# Patient Record
Sex: MALE | Race: WHITE | NOT HISPANIC OR LATINO | Employment: OTHER | URBAN - METROPOLITAN AREA
[De-identification: names, ages, dates, MRNs, and addresses within clinical notes are randomized per-mention and may not be internally consistent; named-entity substitution may affect disease eponyms.]

---

## 2017-03-29 ENCOUNTER — OFFICE VISIT (OUTPATIENT)
Dept: PAIN MEDICINE | Facility: CLINIC | Age: 67
End: 2017-03-29
Payer: MEDICARE

## 2017-03-29 VITALS
HEART RATE: 70 BPM | DIASTOLIC BLOOD PRESSURE: 75 MMHG | BODY MASS INDEX: 23.52 KG/M2 | SYSTOLIC BLOOD PRESSURE: 133 MMHG | WEIGHT: 189.13 LBS | HEIGHT: 75 IN

## 2017-03-29 DIAGNOSIS — M54.16 LUMBAR RADICULITIS: ICD-10-CM

## 2017-03-29 DIAGNOSIS — M51.36 DDD (DEGENERATIVE DISC DISEASE), LUMBAR: Primary | ICD-10-CM

## 2017-03-29 DIAGNOSIS — M50.30 DDD (DEGENERATIVE DISC DISEASE), CERVICAL: ICD-10-CM

## 2017-03-29 PROCEDURE — 1160F RVW MEDS BY RX/DR IN RCRD: CPT | Mod: S$GLB,,, | Performed by: ANESTHESIOLOGY

## 2017-03-29 PROCEDURE — 99999 PR PBB SHADOW E&M-EST. PATIENT-LVL III: CPT | Mod: PBBFAC,,, | Performed by: ANESTHESIOLOGY

## 2017-03-29 PROCEDURE — 99204 OFFICE O/P NEW MOD 45 MIN: CPT | Mod: S$GLB,,, | Performed by: ANESTHESIOLOGY

## 2017-03-29 RX ORDER — HYDROCODONE BITARTRATE AND ACETAMINOPHEN 10; 325 MG/1; MG/1
1 TABLET ORAL EVERY 12 HOURS PRN
Qty: 60 TABLET | Refills: 0 | Status: SHIPPED | OUTPATIENT
Start: 2017-03-29 | End: 2017-05-08 | Stop reason: SDUPTHER

## 2017-03-29 RX ORDER — PANTOPRAZOLE SODIUM 40 MG/1
40 TABLET, DELAYED RELEASE ORAL NIGHTLY
Refills: 3 | COMMUNITY
Start: 2017-03-02 | End: 2021-06-11

## 2017-03-29 RX ORDER — UMECLIDINIUM 62.5 UG/1
1 AEROSOL, POWDER ORAL DAILY
Refills: 2 | COMMUNITY
Start: 2017-02-22 | End: 2018-01-16

## 2017-03-29 NOTE — PROGRESS NOTES
This note was completed with dictation software and grammatical errors may exist.    Referring Physician: Demetri Andres MD    PCP: Hermelindo Sanchez MD      CC: Neck and lower back pain    HPI:   Patient is 66-year-old male referred to us for neck and lower back pain.  Lower back pain is more bothersome currently.  Pain has been present for over 30 years.  He has intermittent aching, burning, sharp pain in his lower back.  Pain radiates down his left lower leg into his left calf.  He does have numbness in his left toes.  He also has posterior neck pain and radiates to his bilateral shoulders.  He denies any radiating arm pain.  Pain worsens with lifting, getting up, standing and walking.  Pain improves with rest.  He has tried physical therapy with minimal benefit.  He is underwent lumbar NATALIE's as well as cervical NATALIE's with Dr. Andres in the past with moderate benefit.  He has not any interventions recently.  He does desire a lumbar NATALIE in the near future with a cervical NATALIE to follow.  He denies any worsening weakness.  No bowel bladder changes.  He takes Norco very sparingly with mild to moderate benefit.  He rates his pain 4/10 today, 10/10 at worse.    ROS:  CONSTITUTIONAL: No fevers, chills, night sweats, wt. loss, appetite changes  SKIN: no rashes or itching  ENT: No headaches, head trauma, vision changes, or eye pain  LYMPH NODES: None noticed   CV: No chest pain, palpitations.   RESP: No shortness of breath, dyspnea on exertion, cough, wheezing, or hemoptysis  GI: No nausea, emesis, diarrhea, constipation, melena, hematochezia, pain.    : No dysuria, hematuria, urgency, or frequency   HEME: No easy bruising, bleeding problems  PSYCHIATRIC: No depression, anxiety, psychosis, hallucinations.  NEURO: No seizures, memory loss, dizziness or difficulty sleeping  MSK: +HPI      Past Medical History:   Diagnosis Date    Arthritis     Cancer SKIN    Cardiac angina     COPD (chronic obstructive pulmonary  "disease)     Coronary artery disease ANGINA. HAD  [facility] 8/12. 40 % BLOCKAGE. DR RUBY IS CARDIOLOGIST.    Depression     GERD (gastroesophageal reflux disease)     Trigger thumb     BILAT    Wears dentures     UPPER    Wears glasses      Past Surgical History:   Procedure Laterality Date    APPENDECTOMY      BACK SURGERY      KNEE SURGERY      BILAT    TRIGGER THUMB      RIGHT     History reviewed. No pertinent family history.  Social History     Social History    Marital status:      Spouse name: N/A    Number of children: N/A    Years of education: N/A     Social History Main Topics    Smoking status: Current Some Day Smoker     Years: 40.00    Smokeless tobacco: None      Comment: 1-2 CIGT SOME DAYS    Alcohol use No      Comment: ALCOHILIC - NONE X 2 YEARS    Drug use: No    Sexual activity: Not Asked     Other Topics Concern    None     Social History Narrative         Medications/Allergies: See med card    Vitals:    03/29/17 1053   BP: 133/75   Pulse: 70   Weight: 85.8 kg (189 lb 2.5 oz)   Height: 6' 3" (1.905 m)         Physical exam:    GENERAL: A and O x3, the patient appears well groomed and is in no acute distress.  Skin: No rashes or obvious lesions  HEENT: normocephalic, atraumatic  CARDIOVASCULAR:  Palpable peripheral pulses  LUNGS: easy work of breathing  ABDOMEN: soft, nontender   UPPER EXTREMITIES: Normal alignment, normal range of motion, no atrophy, no skin changes,  hair growth and nail growth normal and equal bilaterally. No swelling, no tenderness.    LOWER EXTREMITIES:  Normal alignment, normal range of motion, no atrophy, no skin changes,  hair growth and nail growth normal and equal bilaterally. No swelling, no tenderness.  CERVICAL SPINE:  Cervical spine: ROM is full in flexion, extension and lateral rotation with moderate increased pain.  Spurling's maneuver causes no neck pain to either side.  Myofascial exam: No Tenderness to palpation across cervical " paraspinous region bilaterally.    LUMBAR SPINE  Lumbar spine: ROM is limited with flexion extension and oblique extension with moderate increased pain.    Ej's test causes no increased pain on either side.    Supine straight leg raise is positive on the left at 60°  Internal and external rotation of the hip causes no increased pain on either side.  Myofascial exam: No tenderness to palpation across lumbar paraspinous muscles.      MENTAL STATUS: normal orientation, speech, language, and fund of knowledge for social situation.  Emotional state appropriate.    CRANIAL NERVES:  II:  PERRL bilaterally,   III,IV,VI: EOMI.    V:  Facial sensation equal bilaterally  VII:  Facial motor function normal.  VIII:  Hearing equal to finger rub bilaterally  IX/X: Gag normal, palate symmetric  XI:  Shoulder shrug equal, head turn equal  XII:  Tongue midline without fasciculations      MOTOR: Tone and bulk: normal bilateral upper and lower Strength: normal   Delt Bi Tri WE WF     R 5 5 5 5 5 5   L 5 5 5 5 5 5     IP ADD ABD Quad TA Gas HAM  R 5 5 5 5 5 5 5  L 5 5 5 5 5 5 5    SENSATION: Light touch and pinprick intact bilaterally  REFLEXES: normal, symmetric, nonbrisk.  Toes down, no clonus. No hoffmans.  GAIT: normal rise, base, steps, and arm swing.        Imaging:  MRI lumbar spine 10/2012 ([facility])  L2-3, mild disc desiccation.  Mild bilateral facet hypertrophy.  Small right paracentral disc protrusion.  Minimal narrowing of bilateral neural foraminal  L3-4, grade 1 retrolisthesis, disc desiccation, mild disc height loss.  Mild to moderate right lateral recess narrowing seen.  Mild to moderate bilateral facet arthrosis.  L4-5, disc desiccation.  Mild to moderate bilateral facet hypertrophy.  No significant spinal canal stenosis.  Mild bilateral neuroforaminal narrowing.  L5-S1, disc desiccation.  Severe disc height loss.  Moderate bilateral facet arthrosis.  Severe bilateral neural foraminal stenosis.    MRI  cervical spine 10/2012  C2-3: Mild posterior disc osteophyte complex.  At C3-4, right greater than left vertebral joint hypertrophy.  Mild to severe bilateral neuroforaminal narrowing, right greater than left.  C4-5, broad-based disc osteophyte complex.  Bilateral uncovertebral joint hypertrophy and moderate bilateral facet arthrosis.  Moderate to severe bilateral neuroforaminal narrowing.  C5-6, moderate posterior disc osteophyte complex.  Mild central canal stenosis.  Severe right and moderate to severe left neuroforaminal narrowing.  C6-7, moderate disc osteophyte complex.  Mild central canal stenosis.  Moderate to severe bilateral neuroforaminal narrowing        Assessment: Patient referred for neck and lower back pain  1. DDD (degenerative disc disease), lumbar    2. Lumbar radiculitis    3. DDD (degenerative disc disease), cervical          Plan:  - I have stressed the importance of physical activity and exercise to improve overall health  - I think that the patient's back pain and radicular leg symptoms are due to degenerative disc disease and have recommended a lumbar epidural steroid injection to the L5-S1 level.   - Will consider repeat cervical NATALIE for neck pain is neck pain worsens  - He did request pain medications.  He takes it very sparingly.  Script for Norco 10mg provided  - Follow up after procedure        Thank you for referring this interesting patient, and I look forward to continuing to collaborate in his care.

## 2017-04-04 DIAGNOSIS — M47.816 LUMBAR FACET ARTHROPATHY: Primary | ICD-10-CM

## 2017-04-11 ENCOUNTER — SURGERY (OUTPATIENT)
Age: 67
End: 2017-04-11

## 2017-04-11 ENCOUNTER — HOSPITAL ENCOUNTER (OUTPATIENT)
Facility: AMBULARY SURGERY CENTER | Age: 67
Discharge: HOME | End: 2017-04-11
Attending: ANESTHESIOLOGY | Admitting: ANESTHESIOLOGY
Payer: MEDICARE

## 2017-04-11 DIAGNOSIS — M51.36 DDD (DEGENERATIVE DISC DISEASE), LUMBAR: Primary | ICD-10-CM

## 2017-04-11 PROBLEM — M51.369 DDD (DEGENERATIVE DISC DISEASE), LUMBAR: Status: ACTIVE | Noted: 2017-04-11

## 2017-04-11 PROCEDURE — 62323 NJX INTERLAMINAR LMBR/SAC: CPT | Mod: ,,, | Performed by: ANESTHESIOLOGY

## 2017-04-11 PROCEDURE — 62323 NJX INTERLAMINAR LMBR/SAC: CPT | Performed by: ANESTHESIOLOGY

## 2017-04-11 PROCEDURE — 99152 MOD SED SAME PHYS/QHP 5/>YRS: CPT | Mod: ,,, | Performed by: ANESTHESIOLOGY

## 2017-04-11 RX ORDER — SODIUM CHLORIDE 9 MG/ML
INJECTION, SOLUTION INTRAMUSCULAR; INTRAVENOUS; SUBCUTANEOUS
Status: DISCONTINUED | OUTPATIENT
Start: 2017-04-11 | End: 2017-04-11 | Stop reason: HOSPADM

## 2017-04-11 RX ORDER — MIDAZOLAM HYDROCHLORIDE 2 MG/2ML
INJECTION, SOLUTION INTRAMUSCULAR; INTRAVENOUS
Status: DISCONTINUED | OUTPATIENT
Start: 2017-04-11 | End: 2017-04-11 | Stop reason: HOSPADM

## 2017-04-11 RX ORDER — ALPRAZOLAM 1 MG/1
1 TABLET, ORALLY DISINTEGRATING ORAL
Status: DISCONTINUED | OUTPATIENT
Start: 2017-04-11 | End: 2017-04-11 | Stop reason: HOSPADM

## 2017-04-11 RX ORDER — DEXAMETHASONE SODIUM PHOSPHATE 10 MG/ML
INJECTION INTRAMUSCULAR; INTRAVENOUS
Status: DISCONTINUED | OUTPATIENT
Start: 2017-04-11 | End: 2017-04-11 | Stop reason: HOSPADM

## 2017-04-11 RX ORDER — DEXAMETHASONE SODIUM PHOSPHATE 10 MG/ML
INJECTION INTRAMUSCULAR; INTRAVENOUS
Status: DISCONTINUED
Start: 2017-04-11 | End: 2017-04-11 | Stop reason: HOSPADM

## 2017-04-11 RX ORDER — FENTANYL CITRATE 50 UG/ML
INJECTION, SOLUTION INTRAMUSCULAR; INTRAVENOUS
Status: DISCONTINUED | OUTPATIENT
Start: 2017-04-11 | End: 2017-04-11 | Stop reason: HOSPADM

## 2017-04-11 RX ORDER — FENTANYL CITRATE 50 UG/ML
INJECTION, SOLUTION INTRAMUSCULAR; INTRAVENOUS
Status: DISCONTINUED
Start: 2017-04-11 | End: 2017-04-11 | Stop reason: HOSPADM

## 2017-04-11 RX ORDER — SODIUM CHLORIDE, SODIUM LACTATE, POTASSIUM CHLORIDE, CALCIUM CHLORIDE 600; 310; 30; 20 MG/100ML; MG/100ML; MG/100ML; MG/100ML
INJECTION, SOLUTION INTRAVENOUS ONCE AS NEEDED
Status: COMPLETED | OUTPATIENT
Start: 2017-04-11 | End: 2017-04-11

## 2017-04-11 RX ORDER — LIDOCAINE HYDROCHLORIDE 10 MG/ML
INJECTION INFILTRATION; PERINEURAL
Status: DISCONTINUED | OUTPATIENT
Start: 2017-04-11 | End: 2017-04-11 | Stop reason: HOSPADM

## 2017-04-11 RX ORDER — MIDAZOLAM HYDROCHLORIDE 1 MG/ML
INJECTION INTRAMUSCULAR; INTRAVENOUS
Status: DISCONTINUED
Start: 2017-04-11 | End: 2017-04-11 | Stop reason: HOSPADM

## 2017-04-11 RX ADMIN — LIDOCAINE HYDROCHLORIDE 10 ML: 10 INJECTION INFILTRATION; PERINEURAL at 01:04

## 2017-04-11 RX ADMIN — MIDAZOLAM HYDROCHLORIDE 2 MG: 2 INJECTION, SOLUTION INTRAMUSCULAR; INTRAVENOUS at 01:04

## 2017-04-11 RX ADMIN — FENTANYL CITRATE 75 MCG: 50 INJECTION, SOLUTION INTRAMUSCULAR; INTRAVENOUS at 01:04

## 2017-04-11 RX ADMIN — SODIUM CHLORIDE, SODIUM LACTATE, POTASSIUM CHLORIDE, CALCIUM CHLORIDE: 600; 310; 30; 20 INJECTION, SOLUTION INTRAVENOUS at 12:04

## 2017-04-11 RX ADMIN — SODIUM CHLORIDE 4 ML: 9 INJECTION, SOLUTION INTRAMUSCULAR; INTRAVENOUS; SUBCUTANEOUS at 01:04

## 2017-04-11 RX ADMIN — DEXAMETHASONE SODIUM PHOSPHATE 10 MG: 10 INJECTION INTRAMUSCULAR; INTRAVENOUS at 01:04

## 2017-04-11 RX ADMIN — FENTANYL CITRATE 25 MCG: 50 INJECTION, SOLUTION INTRAMUSCULAR; INTRAVENOUS at 01:04

## 2017-04-11 NOTE — DISCHARGE SUMMARY
[facility]  Discharge Note  Short Stay    Admit Date: 4/11/2017    Discharge Date and Time: 4/11/2017    Attending Physician: Steffen Ortiz MD     Discharge Provider: Steffen Ortiz    Diagnoses:  Active Hospital Problems    Diagnosis  POA    *DDD (degenerative disc disease), lumbar [M51.36]  Yes      Resolved Hospital Problems    Diagnosis Date Resolved POA   No resolved problems to display.       Hospital Course: lumbar ANTALIE  Discharged Condition: Good    Final Diagnoses:   Active Hospital Problems    Diagnosis  POA    *DDD (degenerative disc disease), lumbar [M51.36]  Yes      Resolved Hospital Problems    Diagnosis Date Resolved POA   No resolved problems to display.       Disposition: Home or Self Care    Follow up/Patient Instructions:    Medications:  Reconciled Home Medications:   Current Discharge Medication List      CONTINUE these medications which have NOT CHANGED    Details   nitroGLYCERIN (NITROSTAT) 0.4 MG SL tablet Place 0.4 mg under the tongue every 5 (five) minutes as needed.        aspirin (ECOTRIN) 81 MG EC tablet Take 81 mg by mouth once daily.        atorvastatin (LIPITOR) 40 MG tablet Take 40 mg by mouth nightly.        hydrocodone-acetaminophen 10-325mg (NORCO)  mg Tab Take 1 tablet by mouth every 12 (twelve) hours as needed (pain).  Qty: 60 tablet, Refills: 0    Associated Diagnoses: DDD (degenerative disc disease), lumbar      INCRUSE ELLIPTA 62.5 mcg/actuation DsDv Take 1 puff by mouth once daily.  Refills: 2      multivitamin capsule Take 1 capsule by mouth once daily.        omeprazole (PRILOSEC) 40 MG capsule Take 40 mg by mouth once daily.        pantoprazole (PROTONIX) 40 MG tablet Take 40 mg by mouth once daily.  Refills: 3      sertraline (ZOLOFT) 50 MG tablet Take 50 mg by mouth once daily.        trazodone (DESYREL) 150 MG tablet Take 150 mg by mouth every evening.        UNABLE TO FIND [employer] STEP ONE NICOTINE PATCH Q DAY          STOP taking these medications        celecoxib (CELEBREX) 50 MG capsule Comments:   Reason for Stopping:               Discharge Procedure Orders  Diet general     Activity as tolerated     Call MD for:  temperature >100.4     Call MD for:  persistent nausea and vomiting or diarrhea     Call MD for:  severe uncontrolled pain     Call MD for:  redness, tenderness, or signs of infection (pain, swelling, redness, odor or green/yellow discharge around incision site)     Call MD for:  difficulty breathing or increased cough     Call MD for:  severe persistent headache          Follow up with MD in 2-3 weeks    Discharge Procedure Orders (must include Diet, Follow-up, Activity):    Discharge Procedure Orders (must include Diet, Follow-up, Activity)  Diet general     Activity as tolerated     Call MD for:  temperature >100.4     Call MD for:  persistent nausea and vomiting or diarrhea     Call MD for:  severe uncontrolled pain     Call MD for:  redness, tenderness, or signs of infection (pain, swelling, redness, odor or green/yellow discharge around incision site)     Call MD for:  difficulty breathing or increased cough     Call MD for:  severe persistent headache

## 2017-04-11 NOTE — DISCHARGE INSTRUCTIONS
Activity as tolerated.  No heat at injection sites x 2 days  Use ice for mild swelling and for comfort.  May shower today. No baths for two days.      Resume Aspirin, Plavix, or Coumadin the day after the procedure unless otherwise instructed. Gradually increase activity; Do not drive for 24 hours; If diabetic,monitor your glucose carefully.    Seek immediate medical help for:   Severe increase in your usual pain or appearance of new pain.  Prolonged or increasing weakness or numbness in the legs or arms.  Fever above 101 ,Drainage,redness,active bleeding, or increased swelling at the injection site.  Headache, shortness of breath, chest pain, or breathing problems.

## 2017-04-11 NOTE — H&P (VIEW-ONLY)
This note was completed with dictation software and grammatical errors may exist.    Referring Physician: Demetri Andres MD    PCP: Hermelindo Sanchez MD      CC: Neck and lower back pain    HPI:   Patient is 66-year-old male referred to us for neck and lower back pain.  Lower back pain is more bothersome currently.  Pain has been present for over 30 years.  He has intermittent aching, burning, sharp pain in his lower back.  Pain radiates down his left lower leg into his left calf.  He does have numbness in his left toes.  He also has posterior neck pain and radiates to his bilateral shoulders.  He denies any radiating arm pain.  Pain worsens with lifting, getting up, standing and walking.  Pain improves with rest.  He has tried physical therapy with minimal benefit.  He is underwent lumbar NATALIE's as well as cervical NATALIE's with Dr. Andres in the past with moderate benefit.  He has not any interventions recently.  He does desire a lumbar NATALIE in the near future with a cervical NATALIE to follow.  He denies any worsening weakness.  No bowel bladder changes.  He takes Norco very sparingly with mild to moderate benefit.  He rates his pain 4/10 today, 10/10 at worse.    ROS:  CONSTITUTIONAL: No fevers, chills, night sweats, wt. loss, appetite changes  SKIN: no rashes or itching  ENT: No headaches, head trauma, vision changes, or eye pain  LYMPH NODES: None noticed   CV: No chest pain, palpitations.   RESP: No shortness of breath, dyspnea on exertion, cough, wheezing, or hemoptysis  GI: No nausea, emesis, diarrhea, constipation, melena, hematochezia, pain.    : No dysuria, hematuria, urgency, or frequency   HEME: No easy bruising, bleeding problems  PSYCHIATRIC: No depression, anxiety, psychosis, hallucinations.  NEURO: No seizures, memory loss, dizziness or difficulty sleeping  MSK: +HPI      Past Medical History:   Diagnosis Date    Arthritis     Cancer SKIN    Cardiac angina     COPD (chronic obstructive pulmonary  "disease)     Coronary artery disease ANGINA. HAD  [facility] 8/12. 40 % BLOCKAGE. DR RUBY IS CARDIOLOGIST.    Depression     GERD (gastroesophageal reflux disease)     Trigger thumb     BILAT    Wears dentures     UPPER    Wears glasses      Past Surgical History:   Procedure Laterality Date    APPENDECTOMY      BACK SURGERY      KNEE SURGERY      BILAT    TRIGGER THUMB      RIGHT     History reviewed. No pertinent family history.  Social History     Social History    Marital status:      Spouse name: N/A    Number of children: N/A    Years of education: N/A     Social History Main Topics    Smoking status: Current Some Day Smoker     Years: 40.00    Smokeless tobacco: None      Comment: 1-2 CIGT SOME DAYS    Alcohol use No      Comment: ALCOHILIC - NONE X 2 YEARS    Drug use: No    Sexual activity: Not Asked     Other Topics Concern    None     Social History Narrative         Medications/Allergies: See med card    Vitals:    03/29/17 1053   BP: 133/75   Pulse: 70   Weight: 85.8 kg (189 lb 2.5 oz)   Height: 6' 3" (1.905 m)         Physical exam:    GENERAL: A and O x3, the patient appears well groomed and is in no acute distress.  Skin: No rashes or obvious lesions  HEENT: normocephalic, atraumatic  CARDIOVASCULAR:  Palpable peripheral pulses  LUNGS: easy work of breathing  ABDOMEN: soft, nontender   UPPER EXTREMITIES: Normal alignment, normal range of motion, no atrophy, no skin changes,  hair growth and nail growth normal and equal bilaterally. No swelling, no tenderness.    LOWER EXTREMITIES:  Normal alignment, normal range of motion, no atrophy, no skin changes,  hair growth and nail growth normal and equal bilaterally. No swelling, no tenderness.  CERVICAL SPINE:  Cervical spine: ROM is full in flexion, extension and lateral rotation with moderate increased pain.  Spurling's maneuver causes no neck pain to either side.  Myofascial exam: No Tenderness to palpation across cervical " paraspinous region bilaterally.    LUMBAR SPINE  Lumbar spine: ROM is limited with flexion extension and oblique extension with moderate increased pain.    Ej's test causes no increased pain on either side.    Supine straight leg raise is positive on the left at 60°  Internal and external rotation of the hip causes no increased pain on either side.  Myofascial exam: No tenderness to palpation across lumbar paraspinous muscles.      MENTAL STATUS: normal orientation, speech, language, and fund of knowledge for social situation.  Emotional state appropriate.    CRANIAL NERVES:  II:  PERRL bilaterally,   III,IV,VI: EOMI.    V:  Facial sensation equal bilaterally  VII:  Facial motor function normal.  VIII:  Hearing equal to finger rub bilaterally  IX/X: Gag normal, palate symmetric  XI:  Shoulder shrug equal, head turn equal  XII:  Tongue midline without fasciculations      MOTOR: Tone and bulk: normal bilateral upper and lower Strength: normal   Delt Bi Tri WE WF     R 5 5 5 5 5 5   L 5 5 5 5 5 5     IP ADD ABD Quad TA Gas HAM  R 5 5 5 5 5 5 5  L 5 5 5 5 5 5 5    SENSATION: Light touch and pinprick intact bilaterally  REFLEXES: normal, symmetric, nonbrisk.  Toes down, no clonus. No hoffmans.  GAIT: normal rise, base, steps, and arm swing.        Imaging:  MRI lumbar spine 10/2012 ([facility])  L2-3, mild disc desiccation.  Mild bilateral facet hypertrophy.  Small right paracentral disc protrusion.  Minimal narrowing of bilateral neural foraminal  L3-4, grade 1 retrolisthesis, disc desiccation, mild disc height loss.  Mild to moderate right lateral recess narrowing seen.  Mild to moderate bilateral facet arthrosis.  L4-5, disc desiccation.  Mild to moderate bilateral facet hypertrophy.  No significant spinal canal stenosis.  Mild bilateral neuroforaminal narrowing.  L5-S1, disc desiccation.  Severe disc height loss.  Moderate bilateral facet arthrosis.  Severe bilateral neural foraminal stenosis.    MRI  cervical spine 10/2012  C2-3: Mild posterior disc osteophyte complex.  At C3-4, right greater than left vertebral joint hypertrophy.  Mild to severe bilateral neuroforaminal narrowing, right greater than left.  C4-5, broad-based disc osteophyte complex.  Bilateral uncovertebral joint hypertrophy and moderate bilateral facet arthrosis.  Moderate to severe bilateral neuroforaminal narrowing.  C5-6, moderate posterior disc osteophyte complex.  Mild central canal stenosis.  Severe right and moderate to severe left neuroforaminal narrowing.  C6-7, moderate disc osteophyte complex.  Mild central canal stenosis.  Moderate to severe bilateral neuroforaminal narrowing        Assessment: Patient referred for neck and lower back pain  1. DDD (degenerative disc disease), lumbar    2. Lumbar radiculitis    3. DDD (degenerative disc disease), cervical          Plan:  - I have stressed the importance of physical activity and exercise to improve overall health  - I think that the patient's back pain and radicular leg symptoms are due to degenerative disc disease and have recommended a lumbar epidural steroid injection to the L5-S1 level.   - Will consider repeat cervical NATALIE for neck pain is neck pain worsens  - He did request pain medications.  He takes it very sparingly.  Script for Norco 10mg provided  - Follow up after procedure        Thank you for referring this interesting patient, and I look forward to continuing to collaborate in his care.

## 2017-04-11 NOTE — IP AVS SNAPSHOT
[facility] [place] Location  [address] [facility] [address] [facility] [address]           Patient Discharge Instructions   Our goal is to set you up for success. This packet includes information on your condition, medications, and your home care.  It will help you care for yourself to prevent having to return to the hospital.     Please ask your nurse if you have any questions.      There are many details to remember when preparing to leave the hospital. Here is what you will need to do:    1. Take your medicine. If you are prescribed medications, review your Medication List on the following pages. You may have new medications to  at the pharmacy and others that you'll need to stop taking. Review the instructions for how and when to take your medications. Talk with your doctor or nurses if you are unsure of what to do.     2. Go to your follow-up appointments. Specific follow-up information is listed in the following pages. Your may be contacted by a nurse or clinical provider about future appointments. Be sure we have all of the phone numbers to reach you. Please contact your provider's office if you are unable to make an appointment.     3. Watch for warning signs. Your doctor or nurse will give you detailed warning signs to watch for and when to call for assistance. These instructions may also include educational information about your condition. If you experience any of warning signs to your health, call your doctor.           [facility]  Unless otherwise directed by your provider, please   contact [facility], our nurse care line   that is available for 24/7 assistance.     [phone] (toll-free)     Registered nurses in the [facility]   provide: appointment scheduling, clinical advisement, health education, and other advisory services.                  ** Verify the list of medication(s) below is accurate and up to date. Carry this with you in case of  emergency. If your medications have changed, please notify your healthcare provider.             Medication List      CONTINUE taking these medications        Additional Info                      aspirin 81 MG EC tablet   Commonly known as:  ECOTRIN   Refills:  0   Dose:  81 mg    Instructions:  Take 81 mg by mouth once daily.     Begin Date    AM    Noon    PM    Bedtime       atorvastatin 40 MG tablet   Commonly known as:  LIPITOR   Refills:  0   Dose:  40 mg   Indications:  hypercholesterolemia    Instructions:  Take 40 mg by mouth nightly.     Begin Date    AM    Noon    PM    Bedtime       hydrocodone-acetaminophen 10-325mg  mg Tab   Commonly known as:  NORCO   Quantity:  60 tablet   Refills:  0   Dose:  1 tablet    Instructions:  Take 1 tablet by mouth every 12 (twelve) hours as needed (pain).     Begin Date    AM    Noon    PM    Bedtime       INCRUSE ELLIPTA 62.5 mcg/actuation Dsdv   Refills:  2   Dose:  1 puff   Generic drug:  umeclidinium    Instructions:  Take 1 puff by mouth once daily.     Begin Date    AM    Noon    PM    Bedtime       multivitamin capsule   Refills:  0   Dose:  1 capsule   Indications:  Vitamin Deficiency Prevention    Instructions:  Take 1 capsule by mouth once daily.     Begin Date    AM    Noon    PM    Bedtime       nitroGLYCERIN 0.4 MG SL tablet   Commonly known as:  NITROSTAT   Refills:  0   Dose:  0.4 mg   Indications:  Angina    Instructions:  Place 0.4 mg under the tongue every 5 (five) minutes as needed.     Begin Date    AM    Noon    PM    Bedtime       omeprazole 40 MG capsule   Commonly known as:  PRILOSEC   Refills:  0   Dose:  40 mg   Indications:  Gastroesophageal Reflux    Instructions:  Take 40 mg by mouth once daily.     Begin Date    AM    Noon    PM    Bedtime       pantoprazole 40 MG tablet   Commonly known as:  PROTONIX   Refills:  3   Dose:  40 mg    Instructions:  Take 40 mg by mouth once daily.     Begin Date    AM    Noon    PM    Bedtime        sertraline 50 MG tablet   Commonly known as:  ZOLOFT   Refills:  0   Dose:  50 mg   Indications:  Depression    Instructions:  Take 50 mg by mouth once daily.     Begin Date    AM    Noon    PM    Bedtime       trazodone 150 MG tablet   Commonly known as:  DESYREL   Refills:  0   Dose:  150 mg   Indications:  SLEEP    Instructions:  Take 150 mg by mouth every evening.     Begin Date    AM    Noon    PM    Bedtime       UNABLE TO FIND   Refills:  0    Instructions:  [facility] STEP ONE NICOTINE PATCH Q DAY     Begin Date    AM    Noon    PM    Bedtime         STOP taking these medications     celecoxib 50 MG capsule   Commonly known as:  CeleBREX                  Please bring to all follow up appointments:    1. A copy of your discharge instructions.  2. All medicines you are currently taking in their original bottles.  3. Identification and insurance card.    Please arrive 15 minutes ahead of scheduled appointment time.    Please call 24 hours in advance if you must reschedule your appointment and/or time.        Your Scheduled Appointments     May 11, 2017  1:30 PM CDT   Established Patient Visit with JAYNE Luciano - Pain Management ([facility] - Building [address]  [address]  [place] [address]   [phone]                Discharge Instructions     Future Orders    Activity as tolerated     Call MD for:  difficulty breathing or increased cough     Call MD for:  persistent nausea and vomiting or diarrhea     Call MD for:  redness, tenderness, or signs of infection (pain, swelling, redness, odor or green/yellow discharge around incision site)     Call MD for:  severe persistent headache     Call MD for:  severe uncontrolled pain     Call MD for:  temperature >100.4     Diet general     Questions:    Total calories:      Fat restriction, if any:      Protein restriction, if any:      Na restriction, if any:      Fluid restriction:      Additional restrictions:    "       Discharge Instructions         Activity as tolerated.  No heat at injection sites x 2 days  Use ice for mild swelling and for comfort.  May shower today. No baths for two days.      Resume Aspirin, Plavix, or Coumadin the day after the procedure unless otherwise instructed. Gradually increase activity; Do not drive for 24 hours; If diabetic,monitor your glucose carefully.    Seek immediate medical help for:   Severe increase in your usual pain or appearance of new pain.  Prolonged or increasing weakness or numbness in the legs or arms.  Fever above 101 ,Drainage,redness,active bleeding, or increased swelling at the injection site.  Headache, shortness of breath, chest pain, or breathing problems.            Primary Diagnosis     Your primary diagnosis was:  Degeneration Of Lumbar Or Lumbosacral Intervertebral Disc      Admission Information     Date & Time Provider Department [facility]    4/11/2017 12:14 PM Steffen Ortiz MD [facility] [id number]      Care Providers     Provider Role Specialty Primary office phone    Steffen Ortiz MD Attending Provider Pain Medicine [phone]    Steffen Ortiz MD Surgeon  Pain Medicine [phone]      Your Vitals Were     BP Pulse Temp Resp Height Weight    150/69 65 97.9 °F (36.6 °C) (Oral) 18 6' 3" (1.905 m) 85.7 kg (189 lb)    SpO2 BMI             98% 23.62 kg/m2         Recent Lab Values     No lab values to display.      Allergies as of 4/11/2017        Reactions    Penicillins     AS A CHILD - NOT SURE REACTION      Smoking Cessation     If you would like to quit smoking:   You may be eligible for free services if you are a [place] resident and started smoking cigarettes before September 1, 1988.  Call the Smoking Cessation Trust (SCT) toll free at (866) 765-6782 or (398) 416-2674.   Call 9-800-QUIT-NOW if you do not meet the above criteria.   Contact us via email: [email or web address]   View our website for more information: " [email or web address]        Language Assistance Services     ATTENTION: Language assistance services are available, free of charge. Please call [phone].      ATENCIÓN: Si habla dougie, tiene a quintanilla disposición servicios gratuitos de asistencia lingüística. Llame al [phone].     CHÚ Ý: N?u b?n nói Ti?ng Vi?t, có các d?ch v? h? tr? ngôn ng? mi?n phí dành cho b?n. G?i s? [phone].         [facility] complies with applicable Federal civil rights laws and does not discriminate on the basis of race, color, national origin, age, disability, or sex.

## 2017-04-11 NOTE — OP NOTE
PROCEDURE DATE: 4/11/2017    Procedure:   Interlaminar epidural steroid injection at L5-S1 under fluoroscopic guidance.    Diagnosis: lUMBAR DISC DISPLACEMENT WITHOUT MYELOPATHY  pOSTOP DIAGNOSIS: sAME    Physician: Steffen Ortiz M.D.    Medications injected:10 mg dexamethasone with 4 ml of preservative free NaCl    Local anesthetic injected:    Lidocaine 1% 2 ml total    Sedation Medications: RN IV sedation    Estimated blood loss:  None    Complications:  None    Technique:  Time-out taken to identify patient and procedure prior to starting the procedure.  With the patient laying in a prone position, the area was prepped and draped in the usual sterile fashion using ChloraPrep and a fenestrated drape.  After determining the target level with an AP fluoroscopic view, local anesthetic was given using a 25-gauge 1.5 inch needle by raising a wheal and then infiltrating toward the interlaminar entry space.  A 3.5inch 20-gauge Touhy needle was introduced under AP fluoroscopic guidance to the interlaminar space of L5-S1. Once the trajectory was established, the needle was visualized in the lateral view and advanced using loss of resistance technique. Once in the desired position, 1ml contrast was injected to confirm placement and there was no vascular uptake nor intrathecal spread.  The medication was then injected slowly. The patient tolerated the procedure well.      The patient was monitored after the procedure.   They were given post-procedure and discharge instructions to follow at home.  The patient was discharged in a stable condition.

## 2017-04-12 VITALS
HEART RATE: 61 BPM | TEMPERATURE: 98 F | SYSTOLIC BLOOD PRESSURE: 118 MMHG | BODY MASS INDEX: 23.5 KG/M2 | DIASTOLIC BLOOD PRESSURE: 68 MMHG | WEIGHT: 189 LBS | RESPIRATION RATE: 18 BRPM | HEIGHT: 75 IN | OXYGEN SATURATION: 97 %

## 2017-04-13 ENCOUNTER — TELEPHONE (OUTPATIENT)
Dept: PAIN MEDICINE | Facility: CLINIC | Age: 67
End: 2017-04-13

## 2017-04-13 NOTE — TELEPHONE ENCOUNTER
----- Message from Narciso Beltrán sent at 4/13/2017  1:37 PM CDT -----  Contact: same  Patient called in and is requesting a call back from Carol Ann regarding changing some of his meds.  Patient call back number is [phone]

## 2017-04-24 DIAGNOSIS — M54.12 CERVICAL RADICULOPATHY: Primary | ICD-10-CM

## 2017-05-01 ENCOUNTER — SURGERY (OUTPATIENT)
Age: 67
End: 2017-05-01

## 2017-05-01 ENCOUNTER — HOSPITAL ENCOUNTER (OUTPATIENT)
Facility: AMBULARY SURGERY CENTER | Age: 67
Discharge: HOME | End: 2017-05-01
Attending: ANESTHESIOLOGY | Admitting: ANESTHESIOLOGY
Payer: MEDICARE

## 2017-05-01 DIAGNOSIS — M51.36 DDD (DEGENERATIVE DISC DISEASE), LUMBAR: Primary | ICD-10-CM

## 2017-05-01 DIAGNOSIS — M50.30 DDD (DEGENERATIVE DISC DISEASE), CERVICAL: ICD-10-CM

## 2017-05-01 PROCEDURE — 99152 MOD SED SAME PHYS/QHP 5/>YRS: CPT | Mod: ,,, | Performed by: ANESTHESIOLOGY

## 2017-05-01 PROCEDURE — 62321 NJX INTERLAMINAR CRV/THRC: CPT | Performed by: ANESTHESIOLOGY

## 2017-05-01 PROCEDURE — 62321 NJX INTERLAMINAR CRV/THRC: CPT | Mod: ,,, | Performed by: ANESTHESIOLOGY

## 2017-05-01 RX ORDER — FENTANYL CITRATE 50 UG/ML
INJECTION, SOLUTION INTRAMUSCULAR; INTRAVENOUS
Status: DISCONTINUED
Start: 2017-05-01 | End: 2017-05-01 | Stop reason: HOSPADM

## 2017-05-01 RX ORDER — DEXAMETHASONE SODIUM PHOSPHATE 10 MG/ML
INJECTION INTRAMUSCULAR; INTRAVENOUS
Status: DISCONTINUED
Start: 2017-05-01 | End: 2017-05-01 | Stop reason: HOSPADM

## 2017-05-01 RX ORDER — DEXAMETHASONE SODIUM PHOSPHATE 10 MG/ML
INJECTION INTRAMUSCULAR; INTRAVENOUS
Status: DISCONTINUED | OUTPATIENT
Start: 2017-05-01 | End: 2017-05-01 | Stop reason: HOSPADM

## 2017-05-01 RX ORDER — MIDAZOLAM HYDROCHLORIDE 1 MG/ML
INJECTION INTRAMUSCULAR; INTRAVENOUS
Status: DISCONTINUED
Start: 2017-05-01 | End: 2017-05-01 | Stop reason: HOSPADM

## 2017-05-01 RX ORDER — MIDAZOLAM HYDROCHLORIDE 2 MG/2ML
INJECTION, SOLUTION INTRAMUSCULAR; INTRAVENOUS
Status: DISCONTINUED | OUTPATIENT
Start: 2017-05-01 | End: 2017-05-01 | Stop reason: HOSPADM

## 2017-05-01 RX ORDER — SODIUM CHLORIDE, SODIUM LACTATE, POTASSIUM CHLORIDE, CALCIUM CHLORIDE 600; 310; 30; 20 MG/100ML; MG/100ML; MG/100ML; MG/100ML
INJECTION, SOLUTION INTRAVENOUS ONCE AS NEEDED
Status: COMPLETED | OUTPATIENT
Start: 2017-05-01 | End: 2017-05-01

## 2017-05-01 RX ORDER — LIDOCAINE HYDROCHLORIDE 10 MG/ML
INJECTION, SOLUTION EPIDURAL; INFILTRATION; INTRACAUDAL; PERINEURAL
Status: DISCONTINUED | OUTPATIENT
Start: 2017-05-01 | End: 2017-05-01 | Stop reason: HOSPADM

## 2017-05-01 RX ORDER — ALPRAZOLAM 1 MG/1
1 TABLET, ORALLY DISINTEGRATING ORAL
Status: DISCONTINUED | OUTPATIENT
Start: 2017-05-01 | End: 2017-05-01 | Stop reason: HOSPADM

## 2017-05-01 RX ORDER — FENTANYL CITRATE 50 UG/ML
INJECTION, SOLUTION INTRAMUSCULAR; INTRAVENOUS
Status: DISCONTINUED | OUTPATIENT
Start: 2017-05-01 | End: 2017-05-01 | Stop reason: HOSPADM

## 2017-05-01 RX ORDER — SODIUM CHLORIDE 9 MG/ML
INJECTION, SOLUTION INTRAMUSCULAR; INTRAVENOUS; SUBCUTANEOUS
Status: DISCONTINUED | OUTPATIENT
Start: 2017-05-01 | End: 2017-05-01 | Stop reason: HOSPADM

## 2017-05-01 RX ADMIN — MIDAZOLAM HYDROCHLORIDE 2 MG: 2 INJECTION, SOLUTION INTRAMUSCULAR; INTRAVENOUS at 01:05

## 2017-05-01 RX ADMIN — FENTANYL CITRATE 75 MCG: 50 INJECTION, SOLUTION INTRAMUSCULAR; INTRAVENOUS at 01:05

## 2017-05-01 RX ADMIN — FENTANYL CITRATE 25 MCG: 50 INJECTION, SOLUTION INTRAMUSCULAR; INTRAVENOUS at 01:05

## 2017-05-01 RX ADMIN — LIDOCAINE HYDROCHLORIDE 10 ML: 10 INJECTION, SOLUTION EPIDURAL; INFILTRATION; INTRACAUDAL; PERINEURAL at 01:05

## 2017-05-01 RX ADMIN — SODIUM CHLORIDE 4 ML: 9 INJECTION, SOLUTION INTRAMUSCULAR; INTRAVENOUS; SUBCUTANEOUS at 01:05

## 2017-05-01 RX ADMIN — DEXAMETHASONE SODIUM PHOSPHATE 10 MG: 10 INJECTION INTRAMUSCULAR; INTRAVENOUS at 01:05

## 2017-05-01 RX ADMIN — SODIUM CHLORIDE, SODIUM LACTATE, POTASSIUM CHLORIDE, CALCIUM CHLORIDE: 600; 310; 30; 20 INJECTION, SOLUTION INTRAVENOUS at 01:05

## 2017-05-01 NOTE — IP AVS SNAPSHOT
[facility] Surgical [place] Location  [address] [address]           Patient Discharge Instructions   Our goal is to set you up for success. This packet includes information on your condition, medications, and your home care.  It will help you care for yourself to prevent having to return to the hospital.     Please ask your nurse if you have any questions.      There are many details to remember when preparing to leave the hospital. Here is what you will need to do:    1. Take your medicine. If you are prescribed medications, review your Medication List on the following pages. You may have new medications to  at the pharmacy and others that you'll need to stop taking. Review the instructions for how and when to take your medications. Talk with your doctor or nurses if you are unsure of what to do.     2. Go to your follow-up appointments. Specific follow-up information is listed in the following pages. Your may be contacted by a nurse or clinical provider about future appointments. Be sure we have all of the phone numbers to reach you. Please contact your provider's office if you are unable to make an appointment.     3. Watch for warning signs. Your doctor or nurse will give you detailed warning signs to watch for and when to call for assistance. These instructions may also include educational information about your condition. If you experience any of warning signs to your health, call your doctor.           [facility]  Unless otherwise directed by your provider, please   contact [facility], our nurse care line   that is available for 24/7 assistance.     [phone] (toll-free)     Registered nurses in the [facility]   provide: appointment scheduling, clinical advisement, health education, and other advisory services.                  ** Verify the list of medication(s) below is accurate and up to date. Carry this with you in case of  emergency. If your medications have changed, please notify your healthcare provider.             Medication List      CONTINUE taking these medications        Additional Info                      aspirin 81 MG EC tablet   Commonly known as:  ECOTRIN   Refills:  0   Dose:  81 mg    Instructions:  Take 81 mg by mouth once daily.     Begin Date    AM    Noon    PM    Bedtime       atorvastatin 40 MG tablet   Commonly known as:  LIPITOR   Refills:  0   Dose:  40 mg   Indications:  hypercholesterolemia    Instructions:  Take 40 mg by mouth nightly.     Begin Date    AM    Noon    PM    Bedtime       INCRUSE ELLIPTA 62.5 mcg/actuation Dsdv   Refills:  2   Dose:  1 puff   Generic drug:  umeclidinium    Instructions:  Take 1 puff by mouth once daily.     Begin Date    AM    Noon    PM    Bedtime       multivitamin capsule   Refills:  0   Dose:  1 capsule   Indications:  Vitamin Deficiency Prevention    Instructions:  Take 1 capsule by mouth once daily.     Begin Date    AM    Noon    PM    Bedtime       nitroGLYCERIN 0.4 MG SL tablet   Commonly known as:  NITROSTAT   Refills:  0   Dose:  0.4 mg   Indications:  Angina    Instructions:  Place 0.4 mg under the tongue every 5 (five) minutes as needed.     Begin Date    AM    Noon    PM    Bedtime       omeprazole 40 MG capsule   Commonly known as:  PRILOSEC   Refills:  0   Dose:  40 mg   Indications:  Gastroesophageal Reflux    Instructions:  Take 40 mg by mouth once daily.     Begin Date    AM    Noon    PM    Bedtime       pantoprazole 40 MG tablet   Commonly known as:  PROTONIX   Refills:  3   Dose:  40 mg    Instructions:  Take 40 mg by mouth once daily.     Begin Date    AM    Noon    PM    Bedtime       sertraline 50 MG tablet   Commonly known as:  ZOLOFT   Refills:  0   Dose:  50 mg   Indications:  Depression    Instructions:  Take 50 mg by mouth once daily.     Begin Date    AM    Noon    PM    Bedtime       trazodone 150 MG tablet   Commonly known as:  DESYREL    Refills:  0   Dose:  150 mg   Indications:  SLEEP    Instructions:  Take 150 mg by mouth every evening.     Begin Date    AM    Noon    PM    Bedtime       UNABLE TO FIND   Refills:  0    Instructions:  [facility] STEP ONE NICOTINE PATCH Q DAY     Begin Date    AM    Noon    PM    Bedtime                  Please bring to all follow up appointments:    1. A copy of your discharge instructions.  2. All medicines you are currently taking in their original bottles.  3. Identification and insurance card.    Please arrive 15 minutes ahead of scheduled appointment time.    Please call 24 hours in advance if you must reschedule your appointment and/or time.        Your Scheduled Appointments     May 23, 2017 11:00 AM CDT   Established Patient Visit with JAYNE Luciano - Pain Management ([facility] [place] [facility] Building [address]  [place] [id number] [phone]                Discharge Instructions     Future Orders    Activity as tolerated     Call MD for:  difficulty breathing or increased cough     Call MD for:  persistent nausea and vomiting or diarrhea     Call MD for:  redness, tenderness, or signs of infection (pain, swelling, redness, odor or green/yellow discharge around incision site)     Call MD for:  severe persistent headache     Call MD for:  severe uncontrolled pain     Call MD for:  temperature >100.4     Diet general     Questions:    Total calories:      Fat restriction, if any:      Protein restriction, if any:      Na restriction, if any:      Fluid restriction:      Additional restrictions:          Discharge Instructions       Pain injection instructions:     Steroids take about a week to relieve pain.  Initially you may get pain relief from the local anesthetic but this may wear off before the steroid works.    No driving for 24 hrs   Activity as tolerated- gradually increase activities.  Dont lift over 10 lbs for 24 hrs  No heat at injection  "sites x 2 days  Use ice for mild swelling and for comfort.  May shower today. No baths for two days.      Resume Aspirin, Plavix, or Coumadin the day after the procedure unless otherwise instructed.   If diabetic,monitor your glucose carefully as steroids can increase glucose level    Seek immediate medical help for:   Severe increase in your usual pain or appearance of new pain.  Prolonged or increasing weakness or numbness in the legs or arms.  Fever above 101 ,Drainage,redness,active bleeding, or increased swelling at the injection site.  Headache, shortness of breath, chest pain, or breathing problems.            Primary Diagnosis     Your primary diagnosis was:  Degeneration Of Intervertebral Disc Of Cervical Region      Admission Information     Date & Time Provider Department CSN    5/1/2017 12:41 PM Steffen Ortiz MD [facility] [id number]      Care Providers     Provider Role Specialty Primary office phone    Steffen Ortiz MD Attending Provider Pain Medicine [phone]    Steffen Ortiz MD Surgeon  Pain Medicine [phone]      Your Vitals Were     BP Pulse Temp Resp Height Weight    159/69 (BP Location: Right arm, Patient Position: Lying, BP Method: Automatic) 68 97.8 °F (36.6 °C) (Oral) 18 6' 3" (1.905 m) 85.7 kg (189 lb)    SpO2 BMI             97% 23.62 kg/m2         Recent Lab Values     No lab values to display.      Allergies as of 5/1/2017        Reactions    Penicillins     AS A CHILD - NOT SURE REACTION      Smoking Cessation     If you would like to quit smoking:   You may be eligible for free services if you are a [place] resident and started smoking cigarettes before September 1, 1988.  Call the Smoking Cessation Trust (RUST) toll free at (162) 141-0235 or (168) 209-5033.   Call 4-800-QUIT-NOW if you do not meet the above criteria.   Contact us via email: [email or web address]   View our website for more information: [email or web address]        Language Assistance " Services     ATTENTION: Language assistance services are available, free of charge. Please call [phone].      ATENCIÓN: Si habla español, tiene a quintanilla disposición servicios gratuitos de asistencia lingüística. Llame al [phone].     CHÚ Ý: N?u b?n nói Ti?ng Vi?t, có các d?ch v? h? tr? ngôn ng? mi?n phí dành cho b?n. G?i s? [phone].         [facility] complies with applicable Federal civil rights laws and does not discriminate on the basis of race, color, national origin, age, disability, or sex.

## 2017-05-01 NOTE — PLAN OF CARE
Pt states ready to go home , stable, salma po fluids, ambulatory, denies pain. Injection site at neck CD&I. No drainage no dressing

## 2017-05-01 NOTE — OP NOTE
PROCEDURE DATE: 5/1/2017    Procedure: C7-T1 cervical interlaminar epidural steroid injection under utilizing fluoroscopy.    Diagnosis: Cervical DISC DEGENERATION WITHOUT MYELOPATHY  POSTOP DIAGNOSIS: SAME    Physician: Steffen Ortiz MD    Medications injected:  Dexamethasone 10mg followed by a slow injection of 4 mL sterile, preservative-free normal saline.    Local anesthetic used: Lidocaine 1%, 2 ml.    Sedation Medications: RN IV sedation    Complications:  None    Estimated blood loss: None    Technique:  A time-out was taken to identify patient and procedure prior to starting the procedure.  With the patient laying in a prone position with the neck in a mid-flexed forward position, the area was prepped and draped in the usual sterile fashion using ChloraPrep and a fenestrated drape.  The area was determined under AP fluoroscopic guidance.  Local anesthetic was given using a 25-gauge 1.5 inch needle by raising a wheal and then infiltrating ventrally.  A 3.5 inch 20-gauge Touhy needle was introduced under fluoroscopic guidance to meet the lamina of C7.  The needle was then hinged under the lamina then advanced using loss of resistance technique.  Once the tip of the needle was in the desired position, the 1ml contrast dye Omnipaque was injected to determine placement and no uptake.  The steroid was then injected slowly followed by a slow injection of 4 mL of the sterile preservative-free normal saline.  The patient tolerated the procedure well.    The patient was monitored after the procedure and was given post-procedure and discharge instructions to follow at home. The patient was discharged in a stable condition.

## 2017-05-01 NOTE — H&P
"CC: neck pain    HPI: The patient is a 66 y.o. male with a history of cervical DDD here for cervical NATALIE. There are no major changes in history and physical from 3/2017 by myself.    Past Medical History:   Diagnosis Date    Arthritis     Cancer SKIN    Cardiac angina     COPD (chronic obstructive pulmonary disease)     Coronary artery disease ANGINA. HAD  [facility] 8/12. 40 % BLOCKAGE. DR RUBY IS CARDIOLOGIST.    Depression     GERD (gastroesophageal reflux disease)     Trigger thumb     BILAT    Wears dentures     UPPER    Wears glasses        Past Surgical History:   Procedure Laterality Date    APPENDECTOMY      BACK SURGERY      KNEE SURGERY      BILAT    TRIGGER THUMB      RIGHT       No family history on file.    Social History     Social History    Marital status:      Spouse name: N/A    Number of children: N/A    Years of education: N/A     Social History Main Topics    Smoking status: Current Some Day Smoker     Years: 40.00    Smokeless tobacco: Not on file      Comment: 1-2 CIGT SOME DAYS    Alcohol use No      Comment: ALCOHILIC - NONE X 2 YEARS    Drug use: No    Sexual activity: Not on file     Other Topics Concern    Not on file     Social History Narrative       No current facility-administered medications for this encounter.        Review of patient's allergies indicates:   Allergen Reactions    Penicillins      AS A CHILD - NOT SURE REACTION       Vitals:    04/27/17 1522   Weight: 85.7 kg (189 lb)   Height: 6' 3" (1.905 m)       REVIEW OF SYSTEMS:     GENERAL: No weight loss, malaise or fevers.  HEENT:  No recent changes in vision or hearing  NECK: Negative for lumps, no difficulty with swallowing.  RESPIRATORY: Negative for cough, wheezing or shortness of breath, patient denies any recent URI.  CARDIOVASCULAR: Negative for chest pain, leg swelling or palpitations.  GI: Negative for abdominal discomfort, blood in stools or black stools or change in bowel " habits.  MUSCULOSKELETAL: See HPI.  SKIN: Negative for lesions, rash, and itching.  PSYCH: No suicidal or homicidal ideations, no current mood disturbances.  HEMATOLOGY/LYMPHOLOGY: Negative for prolonged bleeding, bruising easily or swollen nodes. Patient is not currently taking any anti-coagulants  ENDO: No history of diabetes or thyroid dysfunction  NEURO: No history of syncope, paralysis, seizures or tremors.All other reviewed and negative other than HPI.    Physical exam:  Gen: A and O x3, pleasant, well-groomed  Skin: No rashes or obvious lesions  HEENT: PERRLA, no obvious deformities on ears or in canals. No thyroid masses, trachea midline, no palpable lymph nodes in neck, axilla.  CVS: Regular rate and rhythm, normal S1 and S2, no murmurs.  Resp: Clear to auscultation bilaterally.  Abdomen: Soft, NT/ND, normal bowel sounds present.  Musculoskeletal/Neuro: Moving all extremities    Assessment:  DDD (degenerative disc disease), lumbar  -     Place in Outpatient; Standing  -     Vital signs; Standing  -     Activity as tolerated; Standing  -     Insert peripheral IV; Standing  -     Verify informed consent; Standing  -     Notify physician ; Standing  -     Notify physician ; Standing  -     Notify physician (specify); Standing  -     Diet NPO; Standing    DDD (degenerative disc disease), cervical    Other orders  -     lactated ringers infusion; Inject into the vein once as needed (IV Sedation).  -     Low Risk of VTE; Standing  -     alprazolam dissolvable tablet 1 mg; Take 1 tablet (1 mg total) by mouth as needed for Anxiety.          PLAN: cervical NATALIE  ASA 3,  MP3  No history of anesthetic complications  Plan for RN IV sedation

## 2017-05-01 NOTE — DISCHARGE SUMMARY
[facility]  Discharge Note  Short Stay    Admit Date: 5/1/2017    Discharge Date and Time: 5/1/2017    Attending Physician: Steffen Ortiz MD     Discharge Provider: Steffen Ortiz    Diagnoses:  Active Hospital Problems    Diagnosis  POA    *DDD (degenerative disc disease), cervical [M50.30]  Yes      Resolved Hospital Problems    Diagnosis Date Resolved POA   No resolved problems to display.       Hospital Course: Lumbar NATALIE  Discharged Condition: Good    Final Diagnoses:   Active Hospital Problems    Diagnosis  POA    *DDD (degenerative disc disease), cervical [M50.30]  Yes      Resolved Hospital Problems    Diagnosis Date Resolved POA   No resolved problems to display.       Disposition: Home or Self Care    Follow up/Patient Instructions:    Medications:  Reconciled Home Medications:   Current Discharge Medication List      CONTINUE these medications which have NOT CHANGED    Details   INCRUSE ELLIPTA 62.5 mcg/actuation DsDv Take 1 puff by mouth once daily.  Refills: 2      multivitamin capsule Take 1 capsule by mouth once daily.        sertraline (ZOLOFT) 50 MG tablet Take 50 mg by mouth once daily.        aspirin (ECOTRIN) 81 MG EC tablet Take 81 mg by mouth once daily.        atorvastatin (LIPITOR) 40 MG tablet Take 40 mg by mouth nightly.        nitroGLYCERIN (NITROSTAT) 0.4 MG SL tablet Place 0.4 mg under the tongue every 5 (five) minutes as needed.        omeprazole (PRILOSEC) 40 MG capsule Take 40 mg by mouth once daily.        pantoprazole (PROTONIX) 40 MG tablet Take 40 mg by mouth once daily.  Refills: 3      trazodone (DESYREL) 150 MG tablet Take 150 mg by mouth every evening.        UNABLE TO FIND [facility] STEP ONE NICOTINE PATCH Q DAY              Discharge Procedure Orders  Diet general     Activity as tolerated     Call MD for:  temperature >100.4     Call MD for:  persistent nausea and vomiting or diarrhea     Call MD for:  severe uncontrolled pain     Call MD for:  redness, tenderness, or  signs of infection (pain, swelling, redness, odor or green/yellow discharge around incision site)     Call MD for:  difficulty breathing or increased cough     Call MD for:  severe persistent headache          Follow up with MD in 2-3 weeks    Discharge Procedure Orders (must include Diet, Follow-up, Activity):    Discharge Procedure Orders (must include Diet, Follow-up, Activity)  Diet general     Activity as tolerated     Call MD for:  temperature >100.4     Call MD for:  persistent nausea and vomiting or diarrhea     Call MD for:  severe uncontrolled pain     Call MD for:  redness, tenderness, or signs of infection (pain, swelling, redness, odor or green/yellow discharge around incision site)     Call MD for:  difficulty breathing or increased cough     Call MD for:  severe persistent headache

## 2017-05-02 VITALS
HEART RATE: 75 BPM | BODY MASS INDEX: 23.5 KG/M2 | SYSTOLIC BLOOD PRESSURE: 145 MMHG | TEMPERATURE: 99 F | OXYGEN SATURATION: 94 % | HEIGHT: 75 IN | DIASTOLIC BLOOD PRESSURE: 87 MMHG | WEIGHT: 189 LBS | RESPIRATION RATE: 18 BRPM

## 2017-05-08 ENCOUNTER — TELEPHONE (OUTPATIENT)
Dept: PAIN MEDICINE | Facility: CLINIC | Age: 67
End: 2017-05-08

## 2017-05-08 ENCOUNTER — NURSE TRIAGE (OUTPATIENT)
Dept: ADMINISTRATIVE | Facility: CLINIC | Age: 67
End: 2017-05-08

## 2017-05-08 DIAGNOSIS — M51.36 DDD (DEGENERATIVE DISC DISEASE), LUMBAR: ICD-10-CM

## 2017-05-08 RX ORDER — HYDROCODONE BITARTRATE AND ACETAMINOPHEN 10; 325 MG/1; MG/1
1 TABLET ORAL EVERY 12 HOURS PRN
Qty: 60 TABLET | Refills: 0 | Status: SHIPPED | OUTPATIENT
Start: 2017-05-08 | End: 2017-06-07

## 2017-05-08 RX ORDER — HYDROCODONE BITARTRATE AND ACETAMINOPHEN 10; 325 MG/1; MG/1
1 TABLET ORAL EVERY 12 HOURS PRN
Qty: 60 TABLET | Refills: 0 | Status: SHIPPED | OUTPATIENT
Start: 2017-05-08 | End: 2017-05-08 | Stop reason: SDUPTHER

## 2017-05-08 NOTE — TELEPHONE ENCOUNTER
----- Message from Berny Marin sent at 5/8/2017  2:34 PM CDT -----  Contact: Adam  Needs to be sent to another [facility]. Not in stock Rx hydrocodone-acetaminophen 10-325mg (NORCO)  mg Tab 60 tablet   May be at this one below  [facility]/pharmacy   [phone]    Call [phone] (home)

## 2017-05-08 NOTE — TELEPHONE ENCOUNTER
----- Message from Narciso DALLAS Frisard sent at 5/8/2017  1:50 PM CDT -----  Contact: same  Patient called in and requested a message be sent regarding his injection he had last week and this past Friday 5/5/17 started getting sick.  Patient thought it may be a stomach but wanted to check just in case it was from the shot although it was 4 days later.    Patient stated he was supposed to have an appt for tomorrow Tuesday 5/9/17 (it states patient cancelled appt but he says he did not cancel)  Patient stated he has to be seen tomorrow.    Patient call back number is [phone]

## 2017-05-08 NOTE — TELEPHONE ENCOUNTER
Reason for Disposition   MODERATE difficulty breathing (e.g., speaks in phrases, SOB even at rest, pulse 100-120) of new onset or worse than normal    Protocols used: ST BREATHING DIFFICULTY-A-OH

## 2017-06-14 ENCOUNTER — TELEPHONE (OUTPATIENT)
Dept: PAIN MEDICINE | Facility: CLINIC | Age: 67
End: 2017-06-14

## 2017-06-14 NOTE — TELEPHONE ENCOUNTER
----- Message from Chloé Jacobs sent at 6/14/2017  2:49 PM CDT -----  Patient requesting to schedule injection in neck for pain/stated currently on vacation but would like to schedule for when he returns/please call back at [phone] to schedule or advise. (Patient is calling from [place] so may need to use area code when calling)

## 2017-06-26 ENCOUNTER — OFFICE VISIT (OUTPATIENT)
Dept: PAIN MEDICINE | Facility: CLINIC | Age: 67
End: 2017-06-26
Payer: MEDICARE

## 2017-06-26 VITALS
SYSTOLIC BLOOD PRESSURE: 149 MMHG | DIASTOLIC BLOOD PRESSURE: 77 MMHG | BODY MASS INDEX: 23.5 KG/M2 | HEART RATE: 63 BPM | WEIGHT: 189 LBS | HEIGHT: 75 IN

## 2017-06-26 DIAGNOSIS — M54.12 CERVICAL RADICULOPATHY: ICD-10-CM

## 2017-06-26 DIAGNOSIS — M47.816 LUMBAR FACET ARTHROPATHY: ICD-10-CM

## 2017-06-26 DIAGNOSIS — M51.36 DDD (DEGENERATIVE DISC DISEASE), LUMBAR: Primary | ICD-10-CM

## 2017-06-26 DIAGNOSIS — M54.16 LUMBAR RADICULITIS: ICD-10-CM

## 2017-06-26 PROCEDURE — 99214 OFFICE O/P EST MOD 30 MIN: CPT | Mod: S$GLB,,, | Performed by: PHYSICIAN ASSISTANT

## 2017-06-26 PROCEDURE — 99999 PR PBB SHADOW E&M-EST. PATIENT-LVL IV: CPT | Mod: PBBFAC,,, | Performed by: PHYSICIAN ASSISTANT

## 2017-06-26 RX ORDER — HYDROCODONE BITARTRATE AND ACETAMINOPHEN 10; 325 MG/1; MG/1
1 TABLET ORAL EVERY 12 HOURS PRN
Qty: 60 TABLET | Refills: 0 | Status: SHIPPED | OUTPATIENT
Start: 2017-06-26 | End: 2017-07-25

## 2017-06-26 RX ORDER — HYDROCODONE BITARTRATE AND ACETAMINOPHEN 10; 325 MG/1; MG/1
1 TABLET ORAL EVERY 12 HOURS PRN
COMMUNITY
End: 2017-06-26 | Stop reason: SDUPTHER

## 2017-06-26 RX ORDER — KETOCONAZOLE 20 MG/G
CREAM TOPICAL
COMMUNITY
Start: 2017-05-08 | End: 2018-01-16

## 2017-06-26 RX ORDER — BACLOFEN 10 MG/1
10 TABLET ORAL 3 TIMES DAILY PRN
Qty: 90 TABLET | Refills: 0 | Status: SHIPPED | OUTPATIENT
Start: 2017-06-26 | End: 2017-07-25 | Stop reason: SDUPTHER

## 2017-06-26 RX ORDER — CLONAZEPAM 0.5 MG/1
TABLET ORAL
Refills: 0 | COMMUNITY
Start: 2017-05-08 | End: 2019-04-09

## 2017-06-26 NOTE — PROGRESS NOTES
Referring Physician: No ref. provider found    PCP: Hermelindo Sanchez MD      CC: Neck and lower back pain    Interval History:  Mr. Goode his a 66 y.o. male with chronic low back and neck pain who presents today for f/u s/p cervical NATALIE. Reports >75% relief for 2-3 weeks. Pain then gradually returned to baseline. He would like to repeat this. He continues to have mild  benefit from lumbar NATALIE at L5-S1. He would like to also repeat this. In the past he had several injections with Dr. Andres with moderate benefit. He takes Hydrocodone sparingly with moderate benefit. He is not currently on any anti neuropathic agents. He does request a muscle relaxer. Pain today is rated 8/10.    Pt has been seen in the clinic before, however pt is new to me.     History below per Dr. Ortiz    HPI:   Patient is 66-year-old male referred to us for neck and lower back pain.  Lower back pain is more bothersome currently.  Pain has been present for over 30 years.  He has intermittent aching, burning, sharp pain in his lower back.  Pain radiates down his left lower leg into his left calf.  He does have numbness in his left toes.  He also has posterior neck pain and radiates to his bilateral shoulders.  He denies any radiating arm pain.  Pain worsens with lifting, getting up, standing and walking.  Pain improves with rest.  He has tried physical therapy with minimal benefit.  He is underwent lumbar NATALIE's as well as cervical NATALIE's with Dr. Andres in the past with moderate benefit.  He has not any interventions recently.  He does desire a lumbar NATALIE in the near future with a cervical NATALIE to follow.  He denies any worsening weakness.  No bowel bladder changes.  He takes Norco very sparingly with mild to moderate benefit.  He rates his pain 4/10 today, 10/10 at worse.    ROS:  CONSTITUTIONAL: No fevers, chills, night sweats, wt. loss, appetite changes  SKIN: no rashes or itching  ENT: No headaches, head trauma, vision changes, or eye pain  LYMPH  "NODES: None noticed   CV: No chest pain, palpitations.   RESP: No shortness of breath, dyspnea on exertion, cough, wheezing, or hemoptysis  GI: No nausea, emesis, diarrhea, constipation, melena, hematochezia, pain.    : No dysuria, hematuria, urgency, or frequency   HEME: No easy bruising, bleeding problems  PSYCHIATRIC: No depression, anxiety, psychosis, hallucinations.  NEURO: No seizures, memory loss, dizziness or difficulty sleeping  MSK: +HPI      Past Medical History:   Diagnosis Date    Arthritis     Cancer SKIN    Cardiac angina     COPD (chronic obstructive pulmonary disease)     Coronary artery disease ANGINA. HAD  [facility] 8/12. 40 % BLOCKAGE. DR RUBY IS CARDIOLOGIST.    Depression     GERD (gastroesophageal reflux disease)     Trigger thumb     BILAT    Wears dentures     UPPER    Wears glasses      Past Surgical History:   Procedure Laterality Date    APPENDECTOMY      BACK SURGERY      KNEE SURGERY      BILAT    TRIGGER THUMB      RIGHT     History reviewed. No pertinent family history.  Social History     Social History    Marital status:      Spouse name: N/A    Number of children: N/A    Years of education: N/A     Social History Main Topics    Smoking status: Current Some Day Smoker     Years: 40.00    Smokeless tobacco: None      Comment: 1-2 CIGT SOME DAYS    Alcohol use No      Comment: ALCOHILIC - NONE X 2 YEARS    Drug use: No    Sexual activity: Not Asked     Other Topics Concern    None     Social History Narrative    None         Medications/Allergies: See med card    Vitals:    06/26/17 1008   BP: (!) 149/77   Pulse: 63   Weight: 85.7 kg (189 lb)   Height: 6' 3" (1.905 m)   PainSc:   8   PainLoc: Neck         Physical exam:    GENERAL: A and O x3, the patient appears well groomed and is in no acute distress.  Skin: No rashes or obvious lesions  HEENT: normocephalic, atraumatic  CARDIOVASCULAR:  RRR  LUNGS: non labored breathing  ABDOMEN: soft, nontender "   UPPER EXTREMITIES: Normal alignment, normal range of motion, no atrophy, no skin changes,  hair growth and nail growth normal and equal bilaterally. No swelling, no tenderness.    LOWER EXTREMITIES:  Normal alignment, normal range of motion, no atrophy, no skin changes,  hair growth and nail growth normal and equal bilaterally. No swelling, no tenderness.  CERVICAL SPINE:  Cervical spine: ROM is full in flexion, extension and lateral rotation with moderate increased pain.  Spurling's maneuver causes no neck pain to either side.  Myofascial exam: No Tenderness to palpation across cervical paraspinous region bilaterally.    LUMBAR SPINE  Lumbar spine: ROM is limited with flexion extension and oblique extension with moderate increased pain.    Ej's test causes no increased pain on either side.    Supine straight leg raise is negative  Internal and external rotation of the hip causes no increased pain on either side.  Myofascial exam: No tenderness to palpation across lumbar paraspinous muscles.      MENTAL STATUS: normal orientation, speech, language, and fund of knowledge for social situation.  Emotional state appropriate.    CRANIAL NERVES:  II:  PERRL bilaterally,   III,IV,VI: EOMI.    V:  Facial sensation equal bilaterally  VII:  Facial motor function normal.  VIII:  Hearing equal to finger rub bilaterally  IX/X: Gag normal, palate symmetric  XI:  Shoulder shrug equal, head turn equal  XII:  Tongue midline without fasciculations      MOTOR: Tone and bulk: normal bilateral upper and lower Strength: normal   Delt Bi Tri WE WF     R 5 5 5 5 5 5   L 5 5 5 5 5 5     IP ADD ABD Quad TA Gas HAM  R 5 5 5 5 5 5 5  L 5 5 5 5 5 5 5    SENSATION: Light touch and pinprick intact bilaterally  REFLEXES: normal, symmetric, nonbrisk.  Toes down, no clonus. No hoffmans.  GAIT: normal rise, base, steps, and arm swing.        Imaging:  MRI lumbar spine 10/2012 ([facility])  L2-3, mild disc desiccation.  Mild bilateral  facet hypertrophy.  Small right paracentral disc protrusion.  Minimal narrowing of bilateral neural foraminal  L3-4, grade 1 retrolisthesis, disc desiccation, mild disc height loss.  Mild to moderate right lateral recess narrowing seen.  Mild to moderate bilateral facet arthrosis.  L4-5, disc desiccation.  Mild to moderate bilateral facet hypertrophy.  No significant spinal canal stenosis.  Mild bilateral neuroforaminal narrowing.  L5-S1, disc desiccation.  Severe disc height loss.  Moderate bilateral facet arthrosis.  Severe bilateral neural foraminal stenosis.    MRI cervical spine 10/2012  C2-3: Mild posterior disc osteophyte complex.  At C3-4, right greater than left vertebral joint hypertrophy.  Mild to severe bilateral neuroforaminal narrowing, right greater than left.  C4-5, broad-based disc osteophyte complex.  Bilateral uncovertebral joint hypertrophy and moderate bilateral facet arthrosis.  Moderate to severe bilateral neuroforaminal narrowing.  C5-6, moderate posterior disc osteophyte complex.  Mild central canal stenosis.  Severe right and moderate to severe left neuroforaminal narrowing.  C6-7, moderate disc osteophyte complex.  Mild central canal stenosis.  Moderate to severe bilateral neuroforaminal narrowing        Assessment: Mr. Goode is a 66 y.o. male with neck and lower back pain  1. DDD (degenerative disc disease), lumbar    2. Cervical radiculopathy    3. Lumbar facet arthropathy    4. Lumbar radiculitis      Plan:  1.  I have stressed the importance of physical activity and exercise to improve overall health  2. Schedule cervical NATALIE at C7-T1. I have explained the risks, benefits, and alternatives of the procedure in detail. The patient voices understanding and all questions have been answered. The patient agrees to proceed as planned. Written Consent obtained.   3. He would like to repeat lumbar epidural steroid injection to the L5-S1 level as well. This would be his 4th steroid injection in  4 months, I recommend we wait until pain has returned to baseline.  4.  He did request pain medications.  He takes it very sparingly.  Script for Norco 10mg provided  5. Baclofen 10 mg q 8  h prn. Will consider adding an anti neuropathic agent in the future.   6. F/u s/p cervical NATALIE

## 2017-07-05 DIAGNOSIS — M54.12 CERVICAL RADICULOPATHY: Primary | ICD-10-CM

## 2017-07-13 ENCOUNTER — SURGERY (OUTPATIENT)
Age: 67
End: 2017-07-13

## 2017-07-13 ENCOUNTER — HOSPITAL ENCOUNTER (OUTPATIENT)
Facility: AMBULARY SURGERY CENTER | Age: 67
Discharge: HOME | End: 2017-07-13
Attending: ANESTHESIOLOGY | Admitting: ANESTHESIOLOGY
Payer: MEDICARE

## 2017-07-13 DIAGNOSIS — M50.30 DDD (DEGENERATIVE DISC DISEASE), CERVICAL: Primary | ICD-10-CM

## 2017-07-13 DIAGNOSIS — M51.36 DDD (DEGENERATIVE DISC DISEASE), LUMBAR: ICD-10-CM

## 2017-07-13 PROCEDURE — 99152 MOD SED SAME PHYS/QHP 5/>YRS: CPT | Mod: ,,, | Performed by: ANESTHESIOLOGY

## 2017-07-13 PROCEDURE — 62321 NJX INTERLAMINAR CRV/THRC: CPT | Mod: ,,, | Performed by: ANESTHESIOLOGY

## 2017-07-13 PROCEDURE — 62321 NJX INTERLAMINAR CRV/THRC: CPT | Performed by: ANESTHESIOLOGY

## 2017-07-13 RX ORDER — FENTANYL CITRATE 50 UG/ML
INJECTION, SOLUTION INTRAMUSCULAR; INTRAVENOUS
Status: DISCONTINUED | OUTPATIENT
Start: 2017-07-13 | End: 2017-07-13 | Stop reason: HOSPADM

## 2017-07-13 RX ORDER — ALPRAZOLAM 1 MG/1
1 TABLET, ORALLY DISINTEGRATING ORAL ONCE AS NEEDED
Status: DISCONTINUED | OUTPATIENT
Start: 2017-07-13 | End: 2017-07-13 | Stop reason: HOSPADM

## 2017-07-13 RX ORDER — SODIUM CHLORIDE, SODIUM LACTATE, POTASSIUM CHLORIDE, CALCIUM CHLORIDE 600; 310; 30; 20 MG/100ML; MG/100ML; MG/100ML; MG/100ML
INJECTION, SOLUTION INTRAVENOUS CONTINUOUS
Status: DISCONTINUED | OUTPATIENT
Start: 2017-07-13 | End: 2017-07-13 | Stop reason: HOSPADM

## 2017-07-13 RX ORDER — MIDAZOLAM HYDROCHLORIDE 1 MG/ML
INJECTION INTRAMUSCULAR; INTRAVENOUS
Status: DISCONTINUED
Start: 2017-07-13 | End: 2017-07-13 | Stop reason: HOSPADM

## 2017-07-13 RX ORDER — DEXAMETHASONE SODIUM PHOSPHATE 10 MG/ML
INJECTION INTRAMUSCULAR; INTRAVENOUS
Status: DISCONTINUED
Start: 2017-07-13 | End: 2017-07-13 | Stop reason: HOSPADM

## 2017-07-13 RX ORDER — SODIUM CHLORIDE 9 MG/ML
INJECTION, SOLUTION INTRAMUSCULAR; INTRAVENOUS; SUBCUTANEOUS
Status: DISCONTINUED | OUTPATIENT
Start: 2017-07-13 | End: 2017-07-13 | Stop reason: HOSPADM

## 2017-07-13 RX ORDER — MIDAZOLAM HYDROCHLORIDE 2 MG/2ML
INJECTION, SOLUTION INTRAMUSCULAR; INTRAVENOUS
Status: DISCONTINUED | OUTPATIENT
Start: 2017-07-13 | End: 2017-07-13 | Stop reason: HOSPADM

## 2017-07-13 RX ORDER — DEXAMETHASONE SODIUM PHOSPHATE 10 MG/ML
INJECTION INTRAMUSCULAR; INTRAVENOUS
Status: DISCONTINUED | OUTPATIENT
Start: 2017-07-13 | End: 2017-07-13 | Stop reason: HOSPADM

## 2017-07-13 RX ORDER — FENTANYL CITRATE 50 UG/ML
INJECTION, SOLUTION INTRAMUSCULAR; INTRAVENOUS
Status: DISCONTINUED
Start: 2017-07-13 | End: 2017-07-13 | Stop reason: HOSPADM

## 2017-07-13 RX ORDER — LIDOCAINE HYDROCHLORIDE 10 MG/ML
INJECTION, SOLUTION EPIDURAL; INFILTRATION; INTRACAUDAL; PERINEURAL
Status: DISCONTINUED
Start: 2017-07-13 | End: 2017-07-13 | Stop reason: HOSPADM

## 2017-07-13 RX ORDER — LIDOCAINE HYDROCHLORIDE 10 MG/ML
INJECTION, SOLUTION EPIDURAL; INFILTRATION; INTRACAUDAL; PERINEURAL
Status: DISCONTINUED | OUTPATIENT
Start: 2017-07-13 | End: 2017-07-13 | Stop reason: HOSPADM

## 2017-07-13 RX ADMIN — SODIUM CHLORIDE 4 ML: 9 INJECTION, SOLUTION INTRAMUSCULAR; INTRAVENOUS; SUBCUTANEOUS at 03:07

## 2017-07-13 RX ADMIN — MIDAZOLAM HYDROCHLORIDE 2 MG: 2 INJECTION, SOLUTION INTRAMUSCULAR; INTRAVENOUS at 03:07

## 2017-07-13 RX ADMIN — LIDOCAINE HYDROCHLORIDE 5 ML: 10 INJECTION, SOLUTION EPIDURAL; INFILTRATION; INTRACAUDAL; PERINEURAL at 03:07

## 2017-07-13 RX ADMIN — FENTANYL CITRATE 25 MCG: 50 INJECTION, SOLUTION INTRAMUSCULAR; INTRAVENOUS at 03:07

## 2017-07-13 RX ADMIN — DEXAMETHASONE SODIUM PHOSPHATE 10 MG: 10 INJECTION INTRAMUSCULAR; INTRAVENOUS at 03:07

## 2017-07-13 RX ADMIN — SODIUM CHLORIDE, SODIUM LACTATE, POTASSIUM CHLORIDE, CALCIUM CHLORIDE: 600; 310; 30; 20 INJECTION, SOLUTION INTRAVENOUS at 02:07

## 2017-07-13 RX ADMIN — FENTANYL CITRATE 75 MCG: 50 INJECTION, SOLUTION INTRAMUSCULAR; INTRAVENOUS at 03:07

## 2017-07-13 NOTE — H&P (VIEW-ONLY)
Referring Physician: No ref. provider found    PCP: Hermelindo Sanchez MD      CC: Neck and lower back pain    Interval History:  Mr. Goode his a 66 y.o. male with chronic low back and neck pain who presents today for f/u s/p cervical NATALIE. Reports >75% relief for 2-3 weeks. Pain then gradually returned to baseline. He would like to repeat this. He continues to have mild  benefit from lumbar NATALIE at L5-S1. He would like to also repeat this. In the past he had several injections with Dr. Andres with moderate benefit. He takes Hydrocodone sparingly with moderate benefit. He is not currently on any anti neuropathic agents. He does request a muscle relaxer. Pain today is rated 8/10.    Pt has been seen in the clinic before, however pt is new to me.     History below per Dr. Ortiz    HPI:   Patient is 66-year-old male referred to us for neck and lower back pain.  Lower back pain is more bothersome currently.  Pain has been present for over 30 years.  He has intermittent aching, burning, sharp pain in his lower back.  Pain radiates down his left lower leg into his left calf.  He does have numbness in his left toes.  He also has posterior neck pain and radiates to his bilateral shoulders.  He denies any radiating arm pain.  Pain worsens with lifting, getting up, standing and walking.  Pain improves with rest.  He has tried physical therapy with minimal benefit.  He is underwent lumbar NATALIE's as well as cervical NATALIE's with Dr. Andres in the past with moderate benefit.  He has not any interventions recently.  He does desire a lumbar NATALIE in the near future with a cervical NATALIE to follow.  He denies any worsening weakness.  No bowel bladder changes.  He takes Norco very sparingly with mild to moderate benefit.  He rates his pain 4/10 today, 10/10 at worse.    ROS:  CONSTITUTIONAL: No fevers, chills, night sweats, wt. loss, appetite changes  SKIN: no rashes or itching  ENT: No headaches, head trauma, vision changes, or eye pain  LYMPH  "NODES: None noticed   CV: No chest pain, palpitations.   RESP: No shortness of breath, dyspnea on exertion, cough, wheezing, or hemoptysis  GI: No nausea, emesis, diarrhea, constipation, melena, hematochezia, pain.    : No dysuria, hematuria, urgency, or frequency   HEME: No easy bruising, bleeding problems  PSYCHIATRIC: No depression, anxiety, psychosis, hallucinations.  NEURO: No seizures, memory loss, dizziness or difficulty sleeping  MSK: +HPI      Past Medical History:   Diagnosis Date    Arthritis     Cancer SKIN    Cardiac angina     COPD (chronic obstructive pulmonary disease)     Coronary artery disease ANGINA. HAD  [facility] 8/12. 40 % BLOCKAGE. DR RUBY IS CARDIOLOGIST.    Depression     GERD (gastroesophageal reflux disease)     Trigger thumb     BILAT    Wears dentures     UPPER    Wears glasses      Past Surgical History:   Procedure Laterality Date    APPENDECTOMY      BACK SURGERY      KNEE SURGERY      BILAT    TRIGGER THUMB      RIGHT     History reviewed. No pertinent family history.  Social History     Social History    Marital status:      Spouse name: N/A    Number of children: N/A    Years of education: N/A     Social History Main Topics    Smoking status: Current Some Day Smoker     Years: 40.00    Smokeless tobacco: None      Comment: 1-2 CIGT SOME DAYS    Alcohol use No      Comment: ALCOHILIC - NONE X 2 YEARS    Drug use: No    Sexual activity: Not Asked     Other Topics Concern    None     Social History Narrative    None         Medications/Allergies: See med card    Vitals:    06/26/17 1008   BP: (!) 149/77   Pulse: 63   Weight: 85.7 kg (189 lb)   Height: 6' 3" (1.905 m)   PainSc:   8   PainLoc: Neck         Physical exam:    GENERAL: A and O x3, the patient appears well groomed and is in no acute distress.  Skin: No rashes or obvious lesions  HEENT: normocephalic, atraumatic  CARDIOVASCULAR:  RRR  LUNGS: non labored breathing  ABDOMEN: soft, nontender "   UPPER EXTREMITIES: Normal alignment, normal range of motion, no atrophy, no skin changes,  hair growth and nail growth normal and equal bilaterally. No swelling, no tenderness.    LOWER EXTREMITIES:  Normal alignment, normal range of motion, no atrophy, no skin changes,  hair growth and nail growth normal and equal bilaterally. No swelling, no tenderness.  CERVICAL SPINE:  Cervical spine: ROM is full in flexion, extension and lateral rotation with moderate increased pain.  Spurling's maneuver causes no neck pain to either side.  Myofascial exam: No Tenderness to palpation across cervical paraspinous region bilaterally.    LUMBAR SPINE  Lumbar spine: ROM is limited with flexion extension and oblique extension with moderate increased pain.    Ej's test causes no increased pain on either side.    Supine straight leg raise is negative  Internal and external rotation of the hip causes no increased pain on either side.  Myofascial exam: No tenderness to palpation across lumbar paraspinous muscles.      MENTAL STATUS: normal orientation, speech, language, and fund of knowledge for social situation.  Emotional state appropriate.    CRANIAL NERVES:  II:  PERRL bilaterally,   III,IV,VI: EOMI.    V:  Facial sensation equal bilaterally  VII:  Facial motor function normal.  VIII:  Hearing equal to finger rub bilaterally  IX/X: Gag normal, palate symmetric  XI:  Shoulder shrug equal, head turn equal  XII:  Tongue midline without fasciculations      MOTOR: Tone and bulk: normal bilateral upper and lower Strength: normal   Delt Bi Tri WE WF     R 5 5 5 5 5 5   L 5 5 5 5 5 5     IP ADD ABD Quad TA Gas HAM  R 5 5 5 5 5 5 5  L 5 5 5 5 5 5 5    SENSATION: Light touch and pinprick intact bilaterally  REFLEXES: normal, symmetric, nonbrisk.  Toes down, no clonus. No hoffmans.  GAIT: normal rise, base, steps, and arm swing.        Imaging:  MRI lumbar spine 10/2012 ([facility])  L2-3, mild disc desiccation.  Mild bilateral  facet hypertrophy.  Small right paracentral disc protrusion.  Minimal narrowing of bilateral neural foraminal  L3-4, grade 1 retrolisthesis, disc desiccation, mild disc height loss.  Mild to moderate right lateral recess narrowing seen.  Mild to moderate bilateral facet arthrosis.  L4-5, disc desiccation.  Mild to moderate bilateral facet hypertrophy.  No significant spinal canal stenosis.  Mild bilateral neuroforaminal narrowing.  L5-S1, disc desiccation.  Severe disc height loss.  Moderate bilateral facet arthrosis.  Severe bilateral neural foraminal stenosis.    MRI cervical spine 10/2012  C2-3: Mild posterior disc osteophyte complex.  At C3-4, right greater than left vertebral joint hypertrophy.  Mild to severe bilateral neuroforaminal narrowing, right greater than left.  C4-5, broad-based disc osteophyte complex.  Bilateral uncovertebral joint hypertrophy and moderate bilateral facet arthrosis.  Moderate to severe bilateral neuroforaminal narrowing.  C5-6, moderate posterior disc osteophyte complex.  Mild central canal stenosis.  Severe right and moderate to severe left neuroforaminal narrowing.  C6-7, moderate disc osteophyte complex.  Mild central canal stenosis.  Moderate to severe bilateral neuroforaminal narrowing        Assessment: Mr. Goode is a 66 y.o. male with neck and lower back pain  1. DDD (degenerative disc disease), lumbar    2. Cervical radiculopathy    3. Lumbar facet arthropathy    4. Lumbar radiculitis      Plan:  1.  I have stressed the importance of physical activity and exercise to improve overall health  2. Schedule cervical NATALIE at C7-T1. I have explained the risks, benefits, and alternatives of the procedure in detail. The patient voices understanding and all questions have been answered. The patient agrees to proceed as planned. Written Consent obtained.   3. He would like to repeat lumbar epidural steroid injection to the L5-S1 level as well. This would be his 4th steroid injection in  4 months, I recommend we wait until pain has returned to baseline.  4.  He did request pain medications.  He takes it very sparingly.  Script for Norco 10mg provided  5. Baclofen 10 mg q 8  h prn. Will consider adding an anti neuropathic agent in the future.   6. F/u s/p cervical NATALIE

## 2017-07-13 NOTE — OP NOTE
PROCEDURE DATE: 7/13/2017    Procedure: C7-T1 cervical interlaminar epidural steroid injection under utilizing fluoroscopy.    Diagnosis: Cervical Radiculitis  POSTOP DIAGNOSIS: SAME    Physician: Steffen Ortiz MD    Medications injected:  Dexamethasone 10mg followed by a slow injection of 4 mL sterile, preservative-free normal saline.    Local anesthetic used: Lidocaine 1%, 2 ml.    Sedation Medications: RN IV sedation    Complications:  None    Estimated blood loss: None    Technique:  A time-out was taken to identify patient and procedure prior to starting the procedure.  With the patient laying in a prone position with the neck in a mid-flexed forward position, the area was prepped and draped in the usual sterile fashion using ChloraPrep and a fenestrated drape.  The area was determined under AP fluoroscopic guidance.  Local anesthetic was given using a 25-gauge 1.5 inch needle by raising a wheal and then infiltrating ventrally.  A 3.5 inch 20-gauge Touhy needle was introduced under fluoroscopic guidance to meet the lamina of C7.  The needle was then hinged under the lamina then advanced using loss of resistance technique.  Once the tip of the needle was in the desired position, the 1ml contrast dye Omnipaque was injected to determine placement and no uptake.  The steroid was then injected slowly followed by a slow injection of 4 mL of the sterile preservative-free normal saline.  The patient tolerated the procedure well.    The patient was monitored after the procedure and was given post-procedure and discharge instructions to follow at home. The patient was discharged in a stable condition.

## 2017-07-13 NOTE — DISCHARGE SUMMARY
[facility]  Discharge Note  Short Stay    Admit Date: 7/13/2017    Discharge Date and Time: 7/13/2017    Attending Physician: Steffen Ortiz MD     Discharge Provider: Steffen Ortiz    Diagnoses:  Active Hospital Problems    Diagnosis  POA    *DDD (degenerative disc disease), cervical [M50.30]  Yes      Resolved Hospital Problems    Diagnosis Date Resolved POA   No resolved problems to display.       Hospital Course: Cervical NATALIE  Discharged Condition: Good    Final Diagnoses:   Active Hospital Problems    Diagnosis  POA    *DDD (degenerative disc disease), cervical [M50.30]  Yes      Resolved Hospital Problems    Diagnosis Date Resolved POA   No resolved problems to display.       Disposition: Home or Self Care    Follow up/Patient Instructions:    Medications:  Reconciled Home Medications:   Current Discharge Medication List      CONTINUE these medications which have NOT CHANGED    Details   INCRUSE ELLIPTA 62.5 mcg/actuation DsDv Take 1 puff by mouth once daily.  Refills: 2      sertraline (ZOLOFT) 50 MG tablet Take 50 mg by mouth once daily.        aspirin (ECOTRIN) 81 MG EC tablet Take 81 mg by mouth once daily.        atorvastatin (LIPITOR) 40 MG tablet Take 40 mg by mouth nightly.        baclofen (LIORESAL) 10 MG tablet Take 1 tablet (10 mg total) by mouth 3 (three) times daily as needed.  Qty: 90 tablet, Refills: 0      clonazePAM (KLONOPIN) 0.5 MG tablet TAKE 1 TABLET BY MOUTH 30 MINUTES PRIOR TO FLIGHT  Refills: 0      hydrocodone-acetaminophen 10-325mg (NORCO)  mg Tab Take 1 tablet by mouth every 12 (twelve) hours as needed for Pain.  Qty: 60 tablet, Refills: 0      ketoconazole (NIZORAL) 2 % cream       multivitamin capsule Take 1 capsule by mouth once daily.        nitroGLYCERIN (NITROSTAT) 0.4 MG SL tablet Place 0.4 mg under the tongue every 5 (five) minutes as needed.        omeprazole (PRILOSEC) 40 MG capsule Take 40 mg by mouth once daily.        pantoprazole (PROTONIX) 40 MG tablet Take  40 mg by mouth once daily.  Refills: 3      trazodone (DESYREL) 150 MG tablet Take 150 mg by mouth every evening.        UNABLE TO FIND [employer] STEP ONE NICOTINE PATCH Q DAY              Discharge Procedure Orders  Diet general     Activity as tolerated     Call MD for:  temperature >100.4     Call MD for:  persistent nausea and vomiting or diarrhea     Call MD for:  severe uncontrolled pain     Call MD for:  redness, tenderness, or signs of infection (pain, swelling, redness, odor or green/yellow discharge around incision site)     Call MD for:  difficulty breathing or increased cough     Call MD for:  severe persistent headache          Follow up with MD in 2-3 weeks    Discharge Procedure Orders (must include Diet, Follow-up, Activity):    Discharge Procedure Orders (must include Diet, Follow-up, Activity)  Diet general     Activity as tolerated     Call MD for:  temperature >100.4     Call MD for:  persistent nausea and vomiting or diarrhea     Call MD for:  severe uncontrolled pain     Call MD for:  redness, tenderness, or signs of infection (pain, swelling, redness, odor or green/yellow discharge around incision site)     Call MD for:  difficulty breathing or increased cough     Call MD for:  severe persistent headache

## 2017-07-13 NOTE — INTERVAL H&P NOTE
The patient has been examined and the H&P has been reviewed:    I concur with the findings and no changes have occurred since H&P was written.   This patient has been cleared for surgery in an ambulatory surgical facility    ASA 3,  MP3  No history of anesthetic complications  Plan for RN IV sedation      Anesthesia/Surgery risks, benefits and alternative options discussed and understood by patient/family.          Active Hospital Problems    Diagnosis  POA    DDD (degenerative disc disease), cervical [M50.30]  Yes      Resolved Hospital Problems    Diagnosis Date Resolved POA   No resolved problems to display.

## 2017-07-14 VITALS
TEMPERATURE: 98 F | HEIGHT: 75 IN | OXYGEN SATURATION: 97 % | BODY MASS INDEX: 23.5 KG/M2 | RESPIRATION RATE: 20 BRPM | DIASTOLIC BLOOD PRESSURE: 76 MMHG | SYSTOLIC BLOOD PRESSURE: 143 MMHG | WEIGHT: 189 LBS | HEART RATE: 57 BPM

## 2017-07-25 RX ORDER — BACLOFEN 10 MG/1
10 TABLET ORAL 3 TIMES DAILY PRN
Qty: 90 TABLET | Refills: 0 | Status: SHIPPED | OUTPATIENT
Start: 2017-07-25 | End: 2017-08-14 | Stop reason: SDUPTHER

## 2017-08-14 ENCOUNTER — TELEPHONE (OUTPATIENT)
Dept: PAIN MEDICINE | Facility: CLINIC | Age: 67
End: 2017-08-14

## 2017-08-14 ENCOUNTER — OFFICE VISIT (OUTPATIENT)
Dept: PAIN MEDICINE | Facility: CLINIC | Age: 67
End: 2017-08-14
Payer: MEDICARE

## 2017-08-14 VITALS
HEART RATE: 60 BPM | BODY MASS INDEX: 23.5 KG/M2 | WEIGHT: 189 LBS | HEIGHT: 75 IN | DIASTOLIC BLOOD PRESSURE: 78 MMHG | SYSTOLIC BLOOD PRESSURE: 131 MMHG

## 2017-08-14 DIAGNOSIS — M54.16 LUMBAR RADICULITIS: ICD-10-CM

## 2017-08-14 DIAGNOSIS — M47.816 LUMBAR FACET ARTHROPATHY: ICD-10-CM

## 2017-08-14 DIAGNOSIS — M54.12 CERVICAL RADICULOPATHY: Primary | ICD-10-CM

## 2017-08-14 DIAGNOSIS — M51.36 DDD (DEGENERATIVE DISC DISEASE), LUMBAR: ICD-10-CM

## 2017-08-14 PROCEDURE — 99999 PR PBB SHADOW E&M-EST. PATIENT-LVL III: CPT | Mod: PBBFAC,,, | Performed by: PHYSICIAN ASSISTANT

## 2017-08-14 PROCEDURE — 99214 OFFICE O/P EST MOD 30 MIN: CPT | Mod: S$GLB,,, | Performed by: PHYSICIAN ASSISTANT

## 2017-08-14 PROCEDURE — 3008F BODY MASS INDEX DOCD: CPT | Mod: S$GLB,,, | Performed by: PHYSICIAN ASSISTANT

## 2017-08-14 RX ORDER — ALBUTEROL SULFATE 90 UG/1
AEROSOL, METERED RESPIRATORY (INHALATION)
COMMUNITY
Start: 2017-07-05 | End: 2018-01-16

## 2017-08-14 RX ORDER — TIOTROPIUM BROMIDE 18 UG/1
CAPSULE ORAL; RESPIRATORY (INHALATION)
Status: ON HOLD | COMMUNITY
Start: 2017-08-10 | End: 2018-04-09

## 2017-08-14 RX ORDER — HYDROCODONE BITARTRATE AND ACETAMINOPHEN 10; 325 MG/1; MG/1
1 TABLET ORAL EVERY 12 HOURS PRN
Qty: 60 TABLET | Refills: 0 | Status: SHIPPED | OUTPATIENT
Start: 2017-08-14 | End: 2017-09-13

## 2017-08-14 RX ORDER — BACLOFEN 10 MG/1
10 TABLET ORAL 3 TIMES DAILY PRN
Qty: 90 TABLET | Refills: 1 | Status: SHIPPED | OUTPATIENT
Start: 2017-08-14 | End: 2017-09-13

## 2017-08-14 NOTE — PROGRESS NOTES
Referring Physician: No ref. provider found    PCP: Hermelindo Sanchez MD      CC: Neck and lower back pain    Interval History:  Mr. Goode his a 66 y.o. male with chronic low back and neck pain who presents today for f/u s/p repeat cervical NATALIE. Reports >85% relief.  He continues to have mild  benefit from lumbar NATALIE at L5-S1. He would like to also repeat this. In the past he had several injections with Dr. Andres with moderate benefit. He takes Hydrocodone sparingly with moderate benefit. He is not currently on any anti neuropathic agents. He also takes baclofen. Pain today is rated 6/10.    HPI:   Patient is 66-year-old male referred to us for neck and lower back pain.  Lower back pain is more bothersome currently.  Pain has been present for over 30 years.  He has intermittent aching, burning, sharp pain in his lower back.  Pain radiates down his left lower leg into his left calf.  He does have numbness in his left toes.  He also has posterior neck pain and radiates to his bilateral shoulders.  He denies any radiating arm pain.  Pain worsens with lifting, getting up, standing and walking.  Pain improves with rest.  He has tried physical therapy with minimal benefit.  He is underwent lumbar NATALIE's as well as cervical NATALIE's with Dr. Andres in the past with moderate benefit.  He has not any interventions recently.  He does desire a lumbar NATALIE in the near future with a cervical NATALIE to follow.  He denies any worsening weakness.  No bowel bladder changes.  He takes Norco very sparingly with mild to moderate benefit.  He rates his pain 4/10 today, 10/10 at worse.    ROS:  CONSTITUTIONAL: No fevers, chills, night sweats, wt. loss, appetite changes  SKIN: no rashes or itching  ENT: No headaches, head trauma, vision changes, or eye pain  LYMPH NODES: None noticed   CV: No chest pain, palpitations.   RESP: No shortness of breath, dyspnea on exertion, cough, wheezing, or hemoptysis  GI: No nausea, emesis, diarrhea,  "constipation, melena, hematochezia, pain.    : No dysuria, hematuria, urgency, or frequency   HEME: No easy bruising, bleeding problems  PSYCHIATRIC: No depression, anxiety, psychosis, hallucinations.  NEURO: No seizures, memory loss, dizziness or difficulty sleeping  MSK: +HPI      Past Medical History:   Diagnosis Date    Arthritis     Cancer SKIN    Cardiac angina     COPD (chronic obstructive pulmonary disease)     Coronary artery disease ANGINA. HAD  [facility] 8/12. 40 % BLOCKAGE. DR RUBY IS CARDIOLOGIST.    Depression     GERD (gastroesophageal reflux disease)     Trigger thumb     BILAT    Wears dentures     UPPER    Wears glasses      Past Surgical History:   Procedure Laterality Date    APPENDECTOMY      BACK SURGERY      KNEE SURGERY      BILAT    TRIGGER THUMB      RIGHT     History reviewed. No pertinent family history.  Social History     Social History    Marital status:      Spouse name: N/A    Number of children: N/A    Years of education: N/A     Social History Main Topics    Smoking status: Current Some Day Smoker     Years: 40.00    Smokeless tobacco: None      Comment: 1-2 CIGT SOME DAYS    Alcohol use No      Comment: ALCOHILIC - NONE X 2 YEARS    Drug use: No    Sexual activity: Not Asked     Other Topics Concern    None     Social History Narrative    None         Medications/Allergies: See med card    Vitals:    08/14/17 0816   BP: 131/78   Pulse: 60   Weight: 85.7 kg (189 lb)   Height: 6' 3" (1.905 m)   PainSc:   6   PainLoc: Neck         Physical exam:    GENERAL: A and O x3, the patient appears well groomed and is in no acute distress.  Skin: No rashes or obvious lesions  HEENT: normocephalic, atraumatic  CARDIOVASCULAR:  RRR  LUNGS: non labored breathing  ABDOMEN: soft, nontender   UPPER EXTREMITIES: Normal alignment, normal range of motion, no atrophy, no skin changes,  hair growth and nail growth normal and equal bilaterally. No swelling, no tenderness.  "   LOWER EXTREMITIES:  Normal alignment, normal range of motion, no atrophy, no skin changes,  hair growth and nail growth normal and equal bilaterally. No swelling, no tenderness.  CERVICAL SPINE:  Cervical spine: ROM is full in flexion, extension and lateral rotation with moderate increased pain.  Spurling's maneuver causes no neck pain to either side.  Myofascial exam: No Tenderness to palpation across cervical paraspinous region bilaterally.    LUMBAR SPINE  Lumbar spine: ROM is limited with flexion extension and oblique extension with moderate increased pain.    Ej's test causes no increased pain on either side.    Supine straight leg raise is negative  Internal and external rotation of the hip causes no increased pain on either side.  Myofascial exam: No tenderness to palpation across lumbar paraspinous muscles.      MENTAL STATUS: normal orientation, speech, language, and fund of knowledge for social situation.  Emotional state appropriate.    CRANIAL NERVES:  II:  PERRL bilaterally,   III,IV,VI: EOMI.    V:  Facial sensation equal bilaterally  VII:  Facial motor function normal.  VIII:  Hearing equal to finger rub bilaterally  IX/X: Gag normal, palate symmetric  XI:  Shoulder shrug equal, head turn equal  XII:  Tongue midline without fasciculations      MOTOR: Tone and bulk: normal bilateral upper and lower Strength: normal   Delt Bi Tri WE WF     R 5 5 5 5 5 5   L 5 5 5 5 5 5     IP ADD ABD Quad TA Gas HAM  R 5 5 5 5 5 5 5  L 5 5 5 5 5 5 5    SENSATION: Light touch and pinprick intact bilaterally  REFLEXES: normal, symmetric, nonbrisk.  Toes down, no clonus. No hoffmans.  GAIT: normal rise, base, steps, and arm swing.        Imaging:  MRI lumbar spine 10/2012 ([facility])  L2-3, mild disc desiccation.  Mild bilateral facet hypertrophy.  Small right paracentral disc protrusion.  Minimal narrowing of bilateral neural foraminal  L3-4, grade 1 retrolisthesis, disc desiccation, mild disc height  loss.  Mild to moderate right lateral recess narrowing seen.  Mild to moderate bilateral facet arthrosis.  L4-5, disc desiccation.  Mild to moderate bilateral facet hypertrophy.  No significant spinal canal stenosis.  Mild bilateral neuroforaminal narrowing.  L5-S1, disc desiccation.  Severe disc height loss.  Moderate bilateral facet arthrosis.  Severe bilateral neural foraminal stenosis.    MRI cervical spine 10/2012  C2-3: Mild posterior disc osteophyte complex.  At C3-4, right greater than left vertebral joint hypertrophy.  Mild to severe bilateral neuroforaminal narrowing, right greater than left.  C4-5, broad-based disc osteophyte complex.  Bilateral uncovertebral joint hypertrophy and moderate bilateral facet arthrosis.  Moderate to severe bilateral neuroforaminal narrowing.  C5-6, moderate posterior disc osteophyte complex.  Mild central canal stenosis.  Severe right and moderate to severe left neuroforaminal narrowing.  C6-7, moderate disc osteophyte complex.  Mild central canal stenosis.  Moderate to severe bilateral neuroforaminal narrowing        Assessment: Mr. Goode is a 66 y.o. male with neck and lower back pain  1. Cervical radiculopathy    2. DDD (degenerative disc disease), lumbar    3. Lumbar facet arthropathy    4. Lumbar radiculitis      Plan:  1.  I have stressed the importance of physical activity and exercise to improve overall health  2. Monitor progress and consider repeat cervical NATALIE at C7-T1. He is aware that he has had 3 steroid injections in 4 months and we will only be able to do 4 in 12 months.   3. He would like to repeat lumbar epidural steroid injection to the L5-S1 level as well. This would be his 4th steroid injection in 4 months, I recommend we wait until pain has returned to baseline.  4.  He did request pain medications.  He takes it very sparingly.  Script for Norco 10mg provided  5. Baclofen 10 mg q 8  h prn. Will consider adding an anti neuropathic agent in the future.    6. F/u prn  All medication management was performed by Dr. Steffen Ortiz

## 2017-10-18 ENCOUNTER — OFFICE VISIT (OUTPATIENT)
Dept: PAIN MEDICINE | Facility: CLINIC | Age: 67
End: 2017-10-18
Payer: MEDICARE

## 2017-10-18 VITALS
HEIGHT: 75 IN | SYSTOLIC BLOOD PRESSURE: 126 MMHG | HEART RATE: 72 BPM | WEIGHT: 189 LBS | BODY MASS INDEX: 23.5 KG/M2 | DIASTOLIC BLOOD PRESSURE: 83 MMHG

## 2017-10-18 DIAGNOSIS — M54.16 LUMBAR RADICULITIS: ICD-10-CM

## 2017-10-18 DIAGNOSIS — M51.36 DDD (DEGENERATIVE DISC DISEASE), LUMBAR: Primary | ICD-10-CM

## 2017-10-18 DIAGNOSIS — M50.30 DDD (DEGENERATIVE DISC DISEASE), CERVICAL: ICD-10-CM

## 2017-10-18 PROCEDURE — 99214 OFFICE O/P EST MOD 30 MIN: CPT | Mod: S$GLB,,, | Performed by: ANESTHESIOLOGY

## 2017-10-18 PROCEDURE — 99999 PR PBB SHADOW E&M-EST. PATIENT-LVL III: CPT | Mod: PBBFAC,,, | Performed by: ANESTHESIOLOGY

## 2017-10-18 RX ORDER — TRAZODONE HYDROCHLORIDE 150 MG/1
225 TABLET ORAL
COMMUNITY
Start: 2017-10-06 | End: 2018-01-16

## 2017-10-18 RX ORDER — TAMSULOSIN HYDROCHLORIDE 0.4 MG/1
0.4 CAPSULE ORAL NIGHTLY
COMMUNITY
Start: 2017-09-21 | End: 2020-12-15

## 2017-10-18 RX ORDER — HYDROCODONE BITARTRATE AND ACETAMINOPHEN 10; 325 MG/1; MG/1
1 TABLET ORAL EVERY 12 HOURS PRN
Qty: 60 TABLET | Refills: 0 | Status: SHIPPED | OUTPATIENT
Start: 2017-10-18 | End: 2017-11-27 | Stop reason: SDUPTHER

## 2017-10-18 RX ORDER — HYDROCODONE BITARTRATE AND ACETAMINOPHEN 10; 325 MG/1; MG/1
TABLET ORAL
COMMUNITY
End: 2017-10-18 | Stop reason: SDUPTHER

## 2017-10-18 RX ORDER — BACLOFEN 10 MG/1
TABLET ORAL 4 TIMES DAILY PRN
COMMUNITY
Start: 2017-10-13 | End: 2019-04-09

## 2017-10-18 RX ORDER — CIPROFLOXACIN 500 MG/1
TABLET ORAL
COMMUNITY
Start: 2017-09-21 | End: 2018-01-16

## 2017-10-18 RX ORDER — PNEUMOCOCCAL 13-VALENT CONJUGATE VACCINE 2.2; 2.2; 2.2; 2.2; 2.2; 4.4; 2.2; 2.2; 2.2; 2.2; 2.2; 2.2; 2.2 UG/.5ML; UG/.5ML; UG/.5ML; UG/.5ML; UG/.5ML; UG/.5ML; UG/.5ML; UG/.5ML; UG/.5ML; UG/.5ML; UG/.5ML; UG/.5ML; UG/.5ML
INJECTION, SUSPENSION INTRAMUSCULAR
COMMUNITY
Start: 2017-09-22 | End: 2018-01-16

## 2017-10-18 NOTE — PROGRESS NOTES
Referring Physician: No ref. provider found    PCP: Hermelindo Sanchez MD      CC: Neck and lower back pain    Interval History:  Mr. Goode his a 67 y.o. male with chronic low back and neck pain who returns for his follow up visit.  Neck pain continues to be tolerable following cervical ESIs on 5/2017 and 7/2017.  He is pleased with his progress. He takes Hydrocodone sparingly with moderate benefit. He is not currently on any anti neuropathic agents. He also takes baclofen. Pain today is rated 6/10.  Prior HPI:   Patient is 66-year-old male referred to us for neck and lower back pain.  Lower back pain is more bothersome currently.  Pain has been present for over 30 years.  He has intermittent aching, burning, sharp pain in his lower back.  Pain radiates down his left lower leg into his left calf.  He does have numbness in his left toes.  He also has posterior neck pain and radiates to his bilateral shoulders.  He denies any radiating arm pain.  Pain worsens with lifting, getting up, standing and walking.  Pain improves with rest.  He has tried physical therapy with minimal benefit.  He is underwent lumbar NATALIE's as well as cervical NATALIE's with Dr. Andres in the past with moderate benefit.  He has not any interventions recently.  He does desire a lumbar NATALIE in the near future with a cervical NATALIE to follow.  He denies any worsening weakness.  No bowel bladder changes.  He takes Norco very sparingly with mild to moderate benefit.  He rates his pain 4/10 today, 10/10 at worse.    ROS:  CONSTITUTIONAL: No fevers, chills, night sweats, wt. loss, appetite changes  SKIN: no rashes or itching  ENT: No headaches, head trauma, vision changes, or eye pain  LYMPH NODES: None noticed   CV: No chest pain, palpitations.   RESP: No shortness of breath, dyspnea on exertion, cough, wheezing, or hemoptysis  GI: No nausea, emesis, diarrhea, constipation, melena, hematochezia, pain.    : No dysuria, hematuria, urgency, or frequency  "  HEME: No easy bruising, bleeding problems  PSYCHIATRIC: No depression, anxiety, psychosis, hallucinations.  NEURO: No seizures, memory loss, dizziness or difficulty sleeping  MSK: +HPI      Past Medical History:   Diagnosis Date    Arthritis     Cancer SKIN    Cardiac angina     COPD (chronic obstructive pulmonary disease)     Coronary artery disease ANGINA. HAD  [facility] 8/12. 40 % BLOCKAGE. DR RUBY IS CARDIOLOGIST.    Depression     GERD (gastroesophageal reflux disease)     Trigger thumb     BILAT    Wears dentures     UPPER    Wears glasses      Past Surgical History:   Procedure Laterality Date    APPENDECTOMY      BACK SURGERY      KNEE SURGERY      BILAT    TRIGGER THUMB      RIGHT     History reviewed. No pertinent family history.  Social History     Social History    Marital status:      Spouse name: N/A    Number of children: N/A    Years of education: N/A     Social History Main Topics    Smoking status: Current Some Day Smoker     Years: 40.00    Smokeless tobacco: None      Comment: 1-2 CIGT SOME DAYS    Alcohol use No      Comment: ALCOHILIC - NONE X 2 YEARS    Drug use: No    Sexual activity: Not Asked     Other Topics Concern    None     Social History Narrative    None         Medications/Allergies: See med card    Vitals:    10/18/17 1110   BP: 126/83   Pulse: 72   Weight: 85.7 kg (189 lb)   Height: 6' 3" (1.905 m)   PainSc:   6   PainLoc: Neck         Physical exam:    GENERAL: A and O x3, the patient appears well groomed and is in no acute distress.  Skin: No rashes or obvious lesions  HEENT: normocephalic, atraumatic  CARDIOVASCULAR:  RRR  LUNGS: non labored breathing  ABDOMEN: soft, nontender   UPPER EXTREMITIES: Normal alignment, normal range of motion, no atrophy, no skin changes,  hair growth and nail growth normal and equal bilaterally. No swelling, no tenderness.    LOWER EXTREMITIES:  Normal alignment, normal range of motion, no atrophy, no skin changes,  " hair growth and nail growth normal and equal bilaterally. No swelling, no tenderness.  CERVICAL SPINE:  Cervical spine: ROM is full in flexion, extension and lateral rotation with moderate increased pain.  Spurling's maneuver causes no neck pain to either side.  Myofascial exam: No Tenderness to palpation across cervical paraspinous region bilaterally.    LUMBAR SPINE  Lumbar spine: ROM is limited with flexion extension and oblique extension with moderate increased pain.    Ej's test causes no increased pain on either side.    Supine straight leg raise is negative  Internal and external rotation of the hip causes no increased pain on either side.  Myofascial exam: No tenderness to palpation across lumbar paraspinous muscles.      MENTAL STATUS: normal orientation, speech, language, and fund of knowledge for social situation.  Emotional state appropriate.    CRANIAL NERVES:  II:  PERRL bilaterally,   III,IV,VI: EOMI.    V:  Facial sensation equal bilaterally  VII:  Facial motor function normal.  VIII:  Hearing equal to finger rub bilaterally  IX/X: Gag normal, palate symmetric  XI:  Shoulder shrug equal, head turn equal  XII:  Tongue midline without fasciculations      MOTOR: Tone and bulk: normal bilateral upper and lower Strength: normal   Delt Bi Tri WE WF     R 5 5 5 5 5 5   L 5 5 5 5 5 5     IP ADD ABD Quad TA Gas HAM  R 5 5 5 5 5 5 5  L 5 5 5 5 5 5 5    SENSATION: Light touch and pinprick intact bilaterally  REFLEXES: normal, symmetric, nonbrisk.  Toes down, no clonus. No hoffmans.  GAIT: normal rise, base, steps, and arm swing.        Imaging:  MRI lumbar spine 10/2012 ([facility])  L2-3, mild disc desiccation.  Mild bilateral facet hypertrophy.  Small right paracentral disc protrusion.  Minimal narrowing of bilateral neural foraminal  L3-4, grade 1 retrolisthesis, disc desiccation, mild disc height loss.  Mild to moderate right lateral recess narrowing seen.  Mild to moderate bilateral facet  arthrosis.  L4-5, disc desiccation.  Mild to moderate bilateral facet hypertrophy.  No significant spinal canal stenosis.  Mild bilateral neuroforaminal narrowing.  L5-S1, disc desiccation.  Severe disc height loss.  Moderate bilateral facet arthrosis.  Severe bilateral neural foraminal stenosis.    MRI cervical spine 10/2012  C2-3: Mild posterior disc osteophyte complex.  At C3-4, right greater than left vertebral joint hypertrophy.  Mild to severe bilateral neuroforaminal narrowing, right greater than left.  C4-5, broad-based disc osteophyte complex.  Bilateral uncovertebral joint hypertrophy and moderate bilateral facet arthrosis.  Moderate to severe bilateral neuroforaminal narrowing.  C5-6, moderate posterior disc osteophyte complex.  Mild central canal stenosis.  Severe right and moderate to severe left neuroforaminal narrowing.  C6-7, moderate disc osteophyte complex.  Mild central canal stenosis.  Moderate to severe bilateral neuroforaminal narrowing        Assessment: Mr. Goode is a 67 y.o. male with neck and lower back pain  1. DDD (degenerative disc disease), lumbar    2. Lumbar radiculitis    3. DDD (degenerative disc disease), cervical      Plan:  1.  I have stressed the importance of physical activity and exercise to improve overall health  2. Monitor progress and consider repeat cervical NATALIE at C7-T1. He is aware that he has had 3 steroid injections in 4 months and we will only be able to do 4 in 12 months.   3. He did request pain medications.  He takes it very sparingly.  Script for Norco 10mg provided.  UDS next visit  4. Baclofen 10 mg q 8  h prn. Will consider adding an anti neuropathic agent in the future.   5. Follow up in 2 months

## 2017-11-27 DIAGNOSIS — M51.36 DDD (DEGENERATIVE DISC DISEASE), LUMBAR: ICD-10-CM

## 2017-11-27 DIAGNOSIS — M54.16 LUMBAR RADICULITIS: ICD-10-CM

## 2017-11-27 RX ORDER — HYDROCODONE BITARTRATE AND ACETAMINOPHEN 10; 325 MG/1; MG/1
1 TABLET ORAL EVERY 12 HOURS PRN
Qty: 60 TABLET | Refills: 0 | Status: SHIPPED | OUTPATIENT
Start: 2017-11-27 | End: 2018-01-04 | Stop reason: SDUPTHER

## 2017-11-27 NOTE — TELEPHONE ENCOUNTER
----- Message from Jada Johnson sent at 11/27/2017 10:37 AM CST -----  Contact: self  Patient needs a refill on Narco called into [facility] pharmacy at [phone].  Please call patient at [phone] if you have any questions. Thanks!

## 2018-01-04 DIAGNOSIS — M51.36 DDD (DEGENERATIVE DISC DISEASE), LUMBAR: ICD-10-CM

## 2018-01-04 DIAGNOSIS — M54.16 LUMBAR RADICULITIS: ICD-10-CM

## 2018-01-04 RX ORDER — HYDROCODONE BITARTRATE AND ACETAMINOPHEN 10; 325 MG/1; MG/1
1 TABLET ORAL EVERY 12 HOURS PRN
Qty: 60 TABLET | Refills: 0 | Status: SHIPPED | OUTPATIENT
Start: 2018-01-04 | End: 2018-03-08

## 2018-01-04 NOTE — TELEPHONE ENCOUNTER
----- Message from RT Amara sent at 1/4/2018 11:48 AM CST -----  Contact: pt    pt , requesting medication refill: [place], thanks.

## 2018-01-16 ENCOUNTER — OFFICE VISIT (OUTPATIENT)
Dept: GASTROENTEROLOGY | Facility: CLINIC | Age: 68
End: 2018-01-16
Payer: MEDICARE

## 2018-01-16 VITALS
HEIGHT: 75 IN | BODY MASS INDEX: 24.52 KG/M2 | WEIGHT: 197.19 LBS | RESPIRATION RATE: 18 BRPM | DIASTOLIC BLOOD PRESSURE: 60 MMHG | SYSTOLIC BLOOD PRESSURE: 122 MMHG

## 2018-01-16 DIAGNOSIS — N28.1 KIDNEY CYSTS: ICD-10-CM

## 2018-01-16 DIAGNOSIS — M54.9 BACK PAIN, UNSPECIFIED BACK LOCATION, UNSPECIFIED BACK PAIN LATERALITY, UNSPECIFIED CHRONICITY: ICD-10-CM

## 2018-01-16 DIAGNOSIS — Z12.11 ENCOUNTER FOR SCREENING COLONOSCOPY: Primary | ICD-10-CM

## 2018-01-16 DIAGNOSIS — N40.0 BENIGN PROSTATIC HYPERPLASIA, UNSPECIFIED WHETHER LOWER URINARY TRACT SYMPTOMS PRESENT: ICD-10-CM

## 2018-01-16 PROCEDURE — 99205 OFFICE O/P NEW HI 60 MIN: CPT | Mod: ,,, | Performed by: INTERNAL MEDICINE

## 2018-01-16 RX ORDER — ATORVASTATIN CALCIUM 40 MG/1
40 TABLET, FILM COATED ORAL NIGHTLY
COMMUNITY

## 2018-01-16 RX ORDER — POLYETHYLENE GLYCOL 3350, SODIUM SULFATE ANHYDROUS, SODIUM BICARBONATE, SODIUM CHLORIDE, POTASSIUM CHLORIDE 236; 22.74; 6.74; 5.86; 2.97 G/4L; G/4L; G/4L; G/4L; G/4L
4 POWDER, FOR SOLUTION ORAL ONCE
Qty: 1 BOTTLE | Refills: 0 | Status: SHIPPED | OUTPATIENT
Start: 2018-01-16 | End: 2018-01-16

## 2018-01-17 NOTE — PROGRESS NOTES
[facility] New Patient Visit    Subjective:           PCP: Hermelindo Sanchez    Referring Provider: No ref. provider found    Chief Complaint: Establish Care and Colonoscopy       HPI:  HPI  Adam Goode is a 67 y.o. male here for A colonoscopy. Patient's old records were reviewed and the patient has a 6 cm cyst in the kidney. I discussed this with Dr. MICAH FRANCO. Patient patient is going to be referred to Dr. Sacha Mancilla for a opinion. Detailed physical examination is noncontributory. Old colonoscopy report was not obtained despite every possible effort. Proceed with colonoscopy.    ROS:   Constitutional: No fevers, chills, weight loss  ENT: No allergies, sore throat, rhinorrhea  CV: No chest pain, palpitations, edema  Pulm: No cough, shortness of breath, wheezing  Ophtho: No vision changes  GI: No blood in stools, change in bowel habits, nausea/vomiting  Denies alternating diarrhea/constipation.   Derm: No rash  Heme: No easy bruising or lymphadenopathy  MSK: No arthralgias or myalgias  : No dysuria, hematuria, frequency, polyuria, or flank tenderness  Endo: No hot or cold intolerance  Neuro: No syncope or seizure, or dizziness  Psych: No hallucination, depression or suicidal ideation      Medical History:  has a past medical history of Arthritis; Cancer (SKIN); Cardiac angina; COPD (chronic obstructive pulmonary disease); Coronary artery disease (ANGINA. HAD  [facility] 8/12. 40 % BLOCKAGE. DR RUBY IS CARDIOLOGIST.); Depression; GERD (gastroesophageal reflux disease); Trigger thumb; Wears dentures; and Wears glasses.      Surgical History:  has a past surgical history that includes TRIGGER THUMB; Back surgery; Knee surgery; and Appendectomy.    Family History:   Family History   Problem Relation Age of Onset    COPD Mother     Diabetes Mother     Heart disease Mother     Birth defects Father     Diabetes Father     Heart disease Father        Social History:   Social History   Substance Use Topics  "   Smoking status: Current Some Day Smoker     Years: 40.00    Smokeless tobacco: Never Used      Comment: 1-2 CIGT SOME DAYS    Alcohol use Yes      Comment: beer on occasion       The patient's social and family histories were reviewed by me and updated in the appropriate section of the electronic medical record.    Allergies:   Review of patient's allergies indicates:   Allergen Reactions    Penicillins      AS A CHILD - NOT SURE REACTION       Medications:   Current Outpatient Prescriptions   Medication Sig Dispense Refill    aspirin (ECOTRIN) 81 MG EC tablet Take 81 mg by mouth once daily.        atorvastatin (LIPITOR) 40 MG tablet Take 40 mg by mouth once daily.      baclofen (LIORESAL) 10 MG tablet       clonazePAM (KLONOPIN) 0.5 MG tablet TAKE 1 TABLET BY MOUTH 30 MINUTES PRIOR TO FLIGHT  0    hydrocodone-acetaminophen 10-325mg (NORCO)  mg Tab Take 1 tablet by mouth every 12 (twelve) hours as needed for Pain. 60 tablet 0    multivitamin capsule Take 1 capsule by mouth once daily.        nitroGLYCERIN (NITROSTAT) 0.4 MG SL tablet Place 0.4 mg under the tongue every 5 (five) minutes as needed.        pantoprazole (PROTONIX) 40 MG tablet Take 40 mg by mouth once daily.  3    sertraline (ZOLOFT) 50 MG tablet Take 50 mg by mouth once daily.        SPIRIVA WITH HANDIHALER 18 mcg inhalation capsule       tamsulosin (FLOMAX) 0.4 mg Cp24       trazodone (DESYREL) 150 MG tablet Take 150 mg by mouth every evening.        UNABLE TO FIND [employer] STEP ONE NICOTINE PATCH Q DAY        No current facility-administered medications for this visit.      All medications and past history have been reviewed.        Objective:        Vital Signs:  Blood pressure 122/60, resp. rate 18, height 6' 3" (1.905 m), weight 89.4 kg (197 lb 3.2 oz). Body mass index is 24.65 kg/m².    Physical Exam:   General Appearance: Well appearing in no acute distress, well developed, well                 nourished  Head: " Normocephalic, without obvious abnormality  Eyes:  Pupils equal, round and reactive to light  Throat: Lips, mucosa, and tongue normal; teeth and gums normal  Lungs: CTA bilaterally in anterior and posterior fields, no wheezes, no crackles  Heart:  Regular rate and rhythm, no murmurs heard  Abdomen: Soft, non tender, non distended with positive bowel sounds in all four quadrants. No hepatosplenomegaly, ascites, or mass. Negative for succusion splash  : male   Extremities: No cyanosis, edema or deformity  Skin: No rash  Neurologic: Normal exam      Labs/Imaging:  All lab results and imaging results have been reviewed and discussed with the patient.    Endoscopy:     Assessment/Plan:    Assessment:       1. Encounter for screening colonoscopy    2. Back pain, unspecified back location, unspecified back pain laterality, unspecified chronicity    3. Kidney cysts    4. Benign prostatic hyperplasia, unspecified whether lower urinary tract symptoms present        Plan:       Encounter for screening colonoscopy  -     Ambulatory Referral to External Surgery    Back pain, unspecified back location, unspecified back pain laterality, unspecified chronicity    Kidney cysts    Benign prostatic hyperplasia, unspecified whether lower urinary tract symptoms present  -     Ambulatory Referral to External Surgery          Follow-up for After Test(s).    The plan was discussed with the patient and all questions/concerns have been answered to the patient's satisfaction.    CC: No ref. provider found    Electronically signed by Keyanna Pinzon MD    This note was dictated using voice recognition software and may contain grammatical errors.

## 2018-02-05 ENCOUNTER — TELEPHONE (OUTPATIENT)
Dept: GASTROENTEROLOGY | Facility: CLINIC | Age: 68
End: 2018-02-05

## 2018-02-05 NOTE — TELEPHONE ENCOUNTER
----- Message from Janette Galvan sent at 2/1/2018  7:32 AM CST -----  Contact: self  Pt having very loose stools since his colonoscopy, feels like he cant breathe and his left leg is numb.  Please call him asap at [phone].  Dr Winslow tried to call the pt, couldn't get him.  Asked janette to call and give him an appt today.  Janette spoke to pt, he feels great and appreciates dr winslow concern.  Please call pt with colonoscopy results.

## 2018-02-15 DIAGNOSIS — M51.36 DDD (DEGENERATIVE DISC DISEASE), LUMBAR: ICD-10-CM

## 2018-02-15 DIAGNOSIS — M54.16 LUMBAR RADICULITIS: ICD-10-CM

## 2018-02-15 RX ORDER — HYDROCODONE BITARTRATE AND ACETAMINOPHEN 10; 325 MG/1; MG/1
1 TABLET ORAL EVERY 12 HOURS PRN
Qty: 60 TABLET | Refills: 0 | OUTPATIENT
Start: 2018-02-15

## 2018-02-15 NOTE — TELEPHONE ENCOUNTER
----- Message from Bertha Pearson sent at 2/15/2018 11:44 AM CST -----  Contact: Patient  Adam, patient [phone] calling because he needs a refill on hydrocodone-acetaminophen 10-325mg (NORCO)  mg Tab. Patient is almost out of medication. Please advise. Thanks.    [facility]/pharmacy #[id number] [id number]  Phone: [phone] Fax: [phone]

## 2018-03-08 ENCOUNTER — OFFICE VISIT (OUTPATIENT)
Dept: PAIN MEDICINE | Facility: CLINIC | Age: 68
End: 2018-03-08
Payer: MEDICARE

## 2018-03-08 VITALS
HEIGHT: 75 IN | BODY MASS INDEX: 24.49 KG/M2 | SYSTOLIC BLOOD PRESSURE: 132 MMHG | DIASTOLIC BLOOD PRESSURE: 81 MMHG | HEART RATE: 61 BPM | WEIGHT: 197 LBS

## 2018-03-08 DIAGNOSIS — M51.36 DDD (DEGENERATIVE DISC DISEASE), LUMBAR: ICD-10-CM

## 2018-03-08 DIAGNOSIS — M50.30 DDD (DEGENERATIVE DISC DISEASE), CERVICAL: ICD-10-CM

## 2018-03-08 DIAGNOSIS — Z79.891 CHRONIC USE OF OPIATE DRUGS THERAPEUTIC PURPOSES: Primary | ICD-10-CM

## 2018-03-08 PROCEDURE — 99214 OFFICE O/P EST MOD 30 MIN: CPT | Mod: S$GLB,,, | Performed by: ANESTHESIOLOGY

## 2018-03-08 PROCEDURE — 80365 DRUG SCREENING OXYCODONE: CPT

## 2018-03-08 PROCEDURE — 80307 DRUG TEST PRSMV CHEM ANLYZR: CPT

## 2018-03-08 PROCEDURE — 99999 PR PBB SHADOW E&M-EST. PATIENT-LVL IV: CPT | Mod: PBBFAC,,, | Performed by: ANESTHESIOLOGY

## 2018-03-08 RX ORDER — PREDNISONE 20 MG/1
TABLET ORAL
Refills: 0 | Status: ON HOLD | COMMUNITY
Start: 2017-12-21 | End: 2019-01-30

## 2018-03-08 RX ORDER — HYDROCODONE BITARTRATE AND ACETAMINOPHEN 10; 325 MG/1; MG/1
TABLET ORAL
COMMUNITY
Start: 2017-10-20 | End: 2018-03-08

## 2018-03-08 RX ORDER — HYDROCODONE BITARTRATE AND ACETAMINOPHEN 10; 325 MG/1; MG/1
1 TABLET ORAL EVERY 12 HOURS PRN
Qty: 60 TABLET | Refills: 0 | Status: SHIPPED | OUTPATIENT
Start: 2018-03-08 | End: 2018-04-07

## 2018-03-08 RX ORDER — DOXYCYCLINE 100 MG/1
CAPSULE ORAL
Refills: 0 | Status: ON HOLD | COMMUNITY
Start: 2017-12-21 | End: 2019-01-30

## 2018-03-08 RX ORDER — HYDROCODONE BITARTRATE AND ACETAMINOPHEN 10; 325 MG/1; MG/1
1 TABLET ORAL EVERY 12 HOURS PRN
Qty: 60 TABLET | Refills: 0 | Status: SHIPPED | OUTPATIENT
Start: 2018-04-06 | End: 2018-05-06

## 2018-03-08 RX ORDER — TIOTROPIUM BROMIDE AND OLODATEROL 3.124; 2.736 UG/1; UG/1
SPRAY, METERED RESPIRATORY (INHALATION)
COMMUNITY
Start: 2018-02-27 | End: 2020-02-05 | Stop reason: ALTCHOICE

## 2018-03-08 NOTE — PROGRESS NOTES
Referring Physician: No ref. provider found    PCP: Hermelindo Sanchez MD      CC: Neck and lower back pain    Interval History:  Mr. Goode his a 67 y.o. male with chronic low back and neck pain who returns for his follow up visit.  Neck pain continues to be tolerable following cervical ESIs on 5/2017 and 7/2017.  Pain has gradually returned and he wishes to have repeat cervical NATALIE.  He takes Hydrocodone sparingly with moderate benefit. He is not currently on any anti neuropathic agents. He also takes baclofen. Pain today is rated 6/10.  Prior HPI:   Patient is 66-year-old male referred to us for neck and lower back pain.  Lower back pain is more bothersome currently.  Pain has been present for over 30 years.  He has intermittent aching, burning, sharp pain in his lower back.  Pain radiates down his left lower leg into his left calf.  He does have numbness in his left toes.  He also has posterior neck pain and radiates to his bilateral shoulders.  He denies any radiating arm pain.  Pain worsens with lifting, getting up, standing and walking.  Pain improves with rest.  He has tried physical therapy with minimal benefit.  He is underwent lumbar NATALIE's as well as cervical NATALIE's with Dr. Andres in the past with moderate benefit.  He has not any interventions recently.  He does desire a lumbar NATALIE in the near future with a cervical NATALIE to follow.  He denies any worsening weakness.  No bowel bladder changes.  He takes Norco very sparingly with mild to moderate benefit.  He rates his pain 4/10 today, 10/10 at worse.    ROS:  CONSTITUTIONAL: No fevers, chills, night sweats, wt. loss, appetite changes  SKIN: no rashes or itching  ENT: No headaches, head trauma, vision changes, or eye pain  LYMPH NODES: None noticed   CV: No chest pain, palpitations.   RESP: No shortness of breath, dyspnea on exertion, cough, wheezing, or hemoptysis  GI: No nausea, emesis, diarrhea, constipation, melena, hematochezia, pain.    : No dysuria,  "hematuria, urgency, or frequency   HEME: No easy bruising, bleeding problems  PSYCHIATRIC: No depression, anxiety, psychosis, hallucinations.  NEURO: No seizures, memory loss, dizziness or difficulty sleeping  MSK: +HPI      Past Medical History:   Diagnosis Date    Arthritis     Cancer SKIN    Cardiac angina     COPD (chronic obstructive pulmonary disease)     Coronary artery disease ANGINA. HAD  [facility] 8/12. 40 % BLOCKAGE. DR RUBY IS CARDIOLOGIST.    Depression     GERD (gastroesophageal reflux disease)     Trigger thumb     BILAT    Wears dentures     UPPER    Wears glasses      Past Surgical History:   Procedure Laterality Date    APPENDECTOMY      BACK SURGERY      KNEE SURGERY      BILAT    TRIGGER THUMB      RIGHT     Family History   Problem Relation Age of Onset    COPD Mother     Diabetes Mother     Heart disease Mother     Birth defects Father     Diabetes Father     Heart disease Father      Social History     Social History    Marital status:      Spouse name: N/A    Number of children: N/A    Years of education: N/A     Social History Main Topics    Smoking status: Current Some Day Smoker     Years: 40.00    Smokeless tobacco: Never Used      Comment: 1-2 CIGT SOME DAYS    Alcohol use Yes      Comment: beer on occasion    Drug use: No    Sexual activity: Yes     Partners: Female     Other Topics Concern    None     Social History Narrative    None         Medications/Allergies: See med card    Vitals:    03/08/18 1037   BP: 132/81   Pulse: 61   Weight: 89.4 kg (197 lb)   Height: 6' 3" (1.905 m)   PainSc:   8   PainLoc: Neck         Physical exam:    GENERAL: A and O x3, the patient appears well groomed and is in no acute distress.  Skin: No rashes or obvious lesions  HEENT: normocephalic, atraumatic  CARDIOVASCULAR:  RRR  LUNGS: non labored breathing  ABDOMEN: soft, nontender   UPPER EXTREMITIES: Normal alignment, normal range of motion, no atrophy, no skin " changes,  hair growth and nail growth normal and equal bilaterally. No swelling, no tenderness.    LOWER EXTREMITIES:  Normal alignment, normal range of motion, no atrophy, no skin changes,  hair growth and nail growth normal and equal bilaterally. No swelling, no tenderness.  CERVICAL SPINE:  Cervical spine: ROM is full in flexion, extension and lateral rotation with moderate increased pain.  Spurling's maneuver causes no neck pain to either side.  Myofascial exam: No Tenderness to palpation across cervical paraspinous region bilaterally.    LUMBAR SPINE  Lumbar spine: ROM is limited with flexion extension and oblique extension with moderate increased pain.    Ej's test causes no increased pain on either side.    Supine straight leg raise is negative  Internal and external rotation of the hip causes no increased pain on either side.  Myofascial exam: No tenderness to palpation across lumbar paraspinous muscles.      MENTAL STATUS: normal orientation, speech, language, and fund of knowledge for social situation.  Emotional state appropriate.    CRANIAL NERVES:  II:  PERRL bilaterally,   III,IV,VI: EOMI.    V:  Facial sensation equal bilaterally  VII:  Facial motor function normal.  VIII:  Hearing equal to finger rub bilaterally  IX/X: Gag normal, palate symmetric  XI:  Shoulder shrug equal, head turn equal  XII:  Tongue midline without fasciculations      MOTOR: Tone and bulk: normal bilateral upper and lower Strength: normal   Delt Bi Tri WE WF     R 5 5 5 5 5 5   L 5 5 5 5 5 5     IP ADD ABD Quad TA Gas HAM  R 5 5 5 5 5 5 5  L 5 5 5 5 5 5 5    SENSATION: Light touch and pinprick intact bilaterally  REFLEXES: normal, symmetric, nonbrisk.  Toes down, no clonus. No hoffmans.  GAIT: normal rise, base, steps, and arm swing.        Imaging:  MRI lumbar spine 10/2012 ([facility])  L2-3, mild disc desiccation.  Mild bilateral facet hypertrophy.  Small right paracentral disc protrusion.  Minimal narrowing of  bilateral neural foraminal  L3-4, grade 1 retrolisthesis, disc desiccation, mild disc height loss.  Mild to moderate right lateral recess narrowing seen.  Mild to moderate bilateral facet arthrosis.  L4-5, disc desiccation.  Mild to moderate bilateral facet hypertrophy.  No significant spinal canal stenosis.  Mild bilateral neuroforaminal narrowing.  L5-S1, disc desiccation.  Severe disc height loss.  Moderate bilateral facet arthrosis.  Severe bilateral neural foraminal stenosis.    MRI cervical spine 10/2012  C2-3: Mild posterior disc osteophyte complex.  At C3-4, right greater than left vertebral joint hypertrophy.  Mild to severe bilateral neuroforaminal narrowing, right greater than left.  C4-5, broad-based disc osteophyte complex.  Bilateral uncovertebral joint hypertrophy and moderate bilateral facet arthrosis.  Moderate to severe bilateral neuroforaminal narrowing.  C5-6, moderate posterior disc osteophyte complex.  Mild central canal stenosis.  Severe right and moderate to severe left neuroforaminal narrowing.  C6-7, moderate disc osteophyte complex.  Mild central canal stenosis.  Moderate to severe bilateral neuroforaminal narrowing        Assessment: Mr. Goode is a 67 y.o. male with neck and lower back pain  1. Chronic use of opiate drugs therapeutic purposes    2. DDD (degenerative disc disease), lumbar    3. DDD (degenerative disc disease), cervical      Plan:  1.  I have stressed the importance of physical activity and exercise to improve overall health  2. Schedule repeat cervical NATALIE  3. He did request pain medications.  He takes it very sparingly.  Script for Norco 10mg provided.  UDS today (maybe negative for opioids as he takes it sparingly.  Last use was 3 days ago)  4. Baclofen 10 mg q 8  h prn. Will consider adding an anti neuropathic agent in the future.   5. Follow up in 2 months

## 2018-03-10 LAB
CODEINE UR-MCNC: NEGATIVE NG/ML
CODEINE UR-MCNC: NEGATIVE NG/ML
HYDROCODONE UR-MCNC: NEGATIVE NG/ML
HYDROMORPHONE UR-MCNC: 54 NG/ML
MORPHINE UR-MCNC: NEGATIVE NG/ML
NALOXONE BY LS MS/MS: NEGATIVE NG/ML
NORHYDROCODONE BY LC MS/MS: NEGATIVE NG/ML
NOROXYCODONE BY LC-MS/MS: NEGATIVE NG/ML
NOROXYMORPHONE BY LC-MS/MS: NEGATIVE NG/ML
OXYCODONE UR-MCNC: NEGATIVE NG/ML
OXYCODONE UR-MCNC: NEGATIVE NG/ML
TOXICOLOGIST REVIEW: NORMAL

## 2018-03-12 DIAGNOSIS — M54.12 CERVICAL RADICULOPATHY: Primary | ICD-10-CM

## 2018-03-13 ENCOUNTER — TELEPHONE (OUTPATIENT)
Dept: PAIN MEDICINE | Facility: CLINIC | Age: 68
End: 2018-03-13

## 2018-03-13 LAB
6MAM UR QL: NOT DETECTED
7AMINOCLONAZEPAM UR QL: NOT DETECTED
A-OH ALPRAZ UR QL: NOT DETECTED
ALPRAZ UR QL: NOT DETECTED
AMPHET UR QL SCN: NOT DETECTED
ANNOTATION COMMENT IMP: NORMAL
ANNOTATION COMMENT IMP: NORMAL
BARBITURATES UR QL: NOT DETECTED
BUPRENORPHINE UR QL: NOT DETECTED
BZE UR QL: NOT DETECTED
CARBOXYTHC UR QL: PRESENT
CARISOPRODOL UR QL: NOT DETECTED
CLONAZEPAM UR QL: NOT DETECTED
CODEINE UR QL: NOT DETECTED
CREAT UR-MCNC: 94.7 MG/DL (ref 20–400)
DIAZEPAM UR QL: NOT DETECTED
ETHYL GLUCURONIDE UR QL: NOT DETECTED
FENTANYL UR QL: NOT DETECTED
HYDROCODONE UR QL: NOT DETECTED
HYDROMORPHONE UR QL: NOT DETECTED
LORAZEPAM UR QL: NOT DETECTED
MDA UR QL: NOT DETECTED
MDEA UR QL: NOT DETECTED
MDMA UR QL: NOT DETECTED
ME-PHENIDATE UR QL: NOT DETECTED
MEPERIDINE UR QL: NOT DETECTED
METHADONE UR QL: NOT DETECTED
METHAMPHET UR QL: NOT DETECTED
MIDAZOLAM UR QL SCN: NOT DETECTED
MORPHINE UR QL: NOT DETECTED
NORBUPRENORPHINE UR QL CFM: NOT DETECTED
NORDIAZEPAM UR QL: NOT DETECTED
NORFENTANYL UR QL: NOT DETECTED
NORHYDROCODONE UR QL CFM: NOT DETECTED
NOROXYCODONE UR QL CFM: NOT DETECTED
NOROXYMORPHONE: NOT DETECTED
OXAZEPAM UR QL: NOT DETECTED
OXYCODONE UR QL: NOT DETECTED
OXYMORPHONE UR QL: NOT DETECTED
PATHOLOGY STUDY: NORMAL
PCP UR QL: NOT DETECTED
PHENTERMINE UR QL: NOT DETECTED
PROPOXYPH UR QL: NOT DETECTED
SERVICE CMNT-IMP: NORMAL
TAPENTADOL UR QL SCN: NOT DETECTED
TAPENTADOL-O-SULF: NOT DETECTED
TEMAZEPAM UR QL: NOT DETECTED
TRAMADOL UR QL: NOT DETECTED
ZOLPIDEM UR QL: NOT DETECTED

## 2018-03-19 ENCOUNTER — SURGERY (OUTPATIENT)
Age: 68
End: 2018-03-19

## 2018-03-19 ENCOUNTER — HOSPITAL ENCOUNTER (OUTPATIENT)
Facility: AMBULARY SURGERY CENTER | Age: 68
Discharge: HOME | End: 2018-03-19
Attending: ANESTHESIOLOGY | Admitting: ANESTHESIOLOGY
Payer: MEDICARE

## 2018-03-19 DIAGNOSIS — M50.30 DDD (DEGENERATIVE DISC DISEASE), CERVICAL: Primary | ICD-10-CM

## 2018-03-19 DIAGNOSIS — M54.12 CERVICAL RADICULITIS: ICD-10-CM

## 2018-03-19 PROCEDURE — 62321 NJX INTERLAMINAR CRV/THRC: CPT | Mod: ,,, | Performed by: ANESTHESIOLOGY

## 2018-03-19 PROCEDURE — 99152 MOD SED SAME PHYS/QHP 5/>YRS: CPT | Mod: ,,, | Performed by: ANESTHESIOLOGY

## 2018-03-19 PROCEDURE — 62321 NJX INTERLAMINAR CRV/THRC: CPT | Performed by: ANESTHESIOLOGY

## 2018-03-19 RX ORDER — DEXAMETHASONE SODIUM PHOSPHATE 10 MG/ML
INJECTION INTRAMUSCULAR; INTRAVENOUS
Status: DISCONTINUED | OUTPATIENT
Start: 2018-03-19 | End: 2018-03-23 | Stop reason: HOSPADM

## 2018-03-19 RX ORDER — BUPIVACAINE HYDROCHLORIDE 5 MG/ML
INJECTION, SOLUTION EPIDURAL; INTRACAUDAL
Status: DISPENSED
Start: 2018-03-19 | End: 2018-03-19

## 2018-03-19 RX ORDER — DEXAMETHASONE SODIUM PHOSPHATE 10 MG/ML
INJECTION INTRAMUSCULAR; INTRAVENOUS
Status: DISPENSED
Start: 2018-03-19 | End: 2018-03-19

## 2018-03-19 RX ORDER — LIDOCAINE HYDROCHLORIDE 10 MG/ML
INJECTION, SOLUTION EPIDURAL; INFILTRATION; INTRACAUDAL; PERINEURAL
Status: DISCONTINUED | OUTPATIENT
Start: 2018-03-19 | End: 2018-03-23 | Stop reason: HOSPADM

## 2018-03-19 RX ORDER — LIDOCAINE HYDROCHLORIDE 10 MG/ML
INJECTION, SOLUTION EPIDURAL; INFILTRATION; INTRACAUDAL; PERINEURAL
Status: DISPENSED
Start: 2018-03-19 | End: 2018-03-19

## 2018-03-19 RX ORDER — FENTANYL CITRATE 50 UG/ML
INJECTION, SOLUTION INTRAMUSCULAR; INTRAVENOUS
Status: DISPENSED
Start: 2018-03-19 | End: 2018-03-19

## 2018-03-19 RX ORDER — SODIUM CHLORIDE 9 MG/ML
INJECTION, SOLUTION INTRAMUSCULAR; INTRAVENOUS; SUBCUTANEOUS
Status: DISCONTINUED | OUTPATIENT
Start: 2018-03-19 | End: 2018-03-23 | Stop reason: HOSPADM

## 2018-03-19 RX ORDER — FENTANYL CITRATE 50 UG/ML
INJECTION, SOLUTION INTRAMUSCULAR; INTRAVENOUS
Status: DISCONTINUED | OUTPATIENT
Start: 2018-03-19 | End: 2018-03-23 | Stop reason: HOSPADM

## 2018-03-19 RX ORDER — MIDAZOLAM HYDROCHLORIDE 1 MG/ML
INJECTION INTRAMUSCULAR; INTRAVENOUS
Status: DISPENSED
Start: 2018-03-19 | End: 2018-03-19

## 2018-03-19 RX ORDER — MIDAZOLAM HYDROCHLORIDE 2 MG/2ML
INJECTION, SOLUTION INTRAMUSCULAR; INTRAVENOUS
Status: DISCONTINUED | OUTPATIENT
Start: 2018-03-19 | End: 2018-03-23 | Stop reason: HOSPADM

## 2018-03-19 RX ORDER — SODIUM CHLORIDE, SODIUM LACTATE, POTASSIUM CHLORIDE, CALCIUM CHLORIDE 600; 310; 30; 20 MG/100ML; MG/100ML; MG/100ML; MG/100ML
INJECTION, SOLUTION INTRAVENOUS CONTINUOUS
Status: DISCONTINUED | OUTPATIENT
Start: 2018-03-19 | End: 2018-03-23 | Stop reason: HOSPADM

## 2018-03-19 RX ADMIN — FENTANYL CITRATE 25 MCG: 50 INJECTION, SOLUTION INTRAMUSCULAR; INTRAVENOUS at 12:03

## 2018-03-19 RX ADMIN — FENTANYL CITRATE 50 MCG: 50 INJECTION, SOLUTION INTRAMUSCULAR; INTRAVENOUS at 12:03

## 2018-03-19 RX ADMIN — SODIUM CHLORIDE, SODIUM LACTATE, POTASSIUM CHLORIDE, CALCIUM CHLORIDE: 600; 310; 30; 20 INJECTION, SOLUTION INTRAVENOUS at 11:03

## 2018-03-19 RX ADMIN — DEXAMETHASONE SODIUM PHOSPHATE 10 MG: 10 INJECTION INTRAMUSCULAR; INTRAVENOUS at 12:03

## 2018-03-19 RX ADMIN — MIDAZOLAM HYDROCHLORIDE 2 MG: 2 INJECTION, SOLUTION INTRAMUSCULAR; INTRAVENOUS at 12:03

## 2018-03-19 RX ADMIN — LIDOCAINE HYDROCHLORIDE 10 ML: 10 INJECTION, SOLUTION EPIDURAL; INFILTRATION; INTRACAUDAL; PERINEURAL at 12:03

## 2018-03-19 RX ADMIN — SODIUM CHLORIDE 4 ML: 9 INJECTION, SOLUTION INTRAMUSCULAR; INTRAVENOUS; SUBCUTANEOUS at 12:03

## 2018-03-19 NOTE — H&P (VIEW-ONLY)
Referring Physician: No ref. provider found    PCP: Hermelindo Sanchez MD      CC: Neck and lower back pain    Interval History:  Mr. Goode his a 67 y.o. male with chronic low back and neck pain who returns for his follow up visit.  Neck pain continues to be tolerable following cervical ESIs on 5/2017 and 7/2017.  Pain has gradually returned and he wishes to have repeat cervical NATALIE.  He takes Hydrocodone sparingly with moderate benefit. He is not currently on any anti neuropathic agents. He also takes baclofen. Pain today is rated 6/10.  Prior HPI:   Patient is 66-year-old male referred to us for neck and lower back pain.  Lower back pain is more bothersome currently.  Pain has been present for over 30 years.  He has intermittent aching, burning, sharp pain in his lower back.  Pain radiates down his left lower leg into his left calf.  He does have numbness in his left toes.  He also has posterior neck pain and radiates to his bilateral shoulders.  He denies any radiating arm pain.  Pain worsens with lifting, getting up, standing and walking.  Pain improves with rest.  He has tried physical therapy with minimal benefit.  He is underwent lumbar NATALIE's as well as cervical NATALIE's with Dr. Andres in the past with moderate benefit.  He has not any interventions recently.  He does desire a lumbar NATALIE in the near future with a cervical NATALIE to follow.  He denies any worsening weakness.  No bowel bladder changes.  He takes Norco very sparingly with mild to moderate benefit.  He rates his pain 4/10 today, 10/10 at worse.    ROS:  CONSTITUTIONAL: No fevers, chills, night sweats, wt. loss, appetite changes  SKIN: no rashes or itching  ENT: No headaches, head trauma, vision changes, or eye pain  LYMPH NODES: None noticed   CV: No chest pain, palpitations.   RESP: No shortness of breath, dyspnea on exertion, cough, wheezing, or hemoptysis  GI: No nausea, emesis, diarrhea, constipation, melena, hematochezia, pain.    : No dysuria,  "hematuria, urgency, or frequency   HEME: No easy bruising, bleeding problems  PSYCHIATRIC: No depression, anxiety, psychosis, hallucinations.  NEURO: No seizures, memory loss, dizziness or difficulty sleeping  MSK: +HPI      Past Medical History:   Diagnosis Date    Arthritis     Cancer SKIN    Cardiac angina     COPD (chronic obstructive pulmonary disease)     Coronary artery disease ANGINA. HAD  [facility] 8/12. 40 % BLOCKAGE. DR RUBY IS CARDIOLOGIST.    Depression     GERD (gastroesophageal reflux disease)     Trigger thumb     BILAT    Wears dentures     UPPER    Wears glasses      Past Surgical History:   Procedure Laterality Date    APPENDECTOMY      BACK SURGERY      KNEE SURGERY      BILAT    TRIGGER THUMB      RIGHT     Family History   Problem Relation Age of Onset    COPD Mother     Diabetes Mother     Heart disease Mother     Birth defects Father     Diabetes Father     Heart disease Father      Social History     Social History    Marital status:      Spouse name: N/A    Number of children: N/A    Years of education: N/A     Social History Main Topics    Smoking status: Current Some Day Smoker     Years: 40.00    Smokeless tobacco: Never Used      Comment: 1-2 CIGT SOME DAYS    Alcohol use Yes      Comment: beer on occasion    Drug use: No    Sexual activity: Yes     Partners: Female     Other Topics Concern    None     Social History Narrative    None         Medications/Allergies: See med card    Vitals:    03/08/18 1037   BP: 132/81   Pulse: 61   Weight: 89.4 kg (197 lb)   Height: 6' 3" (1.905 m)   PainSc:   8   PainLoc: Neck         Physical exam:    GENERAL: A and O x3, the patient appears well groomed and is in no acute distress.  Skin: No rashes or obvious lesions  HEENT: normocephalic, atraumatic  CARDIOVASCULAR:  RRR  LUNGS: non labored breathing  ABDOMEN: soft, nontender   UPPER EXTREMITIES: Normal alignment, normal range of motion, no atrophy, no skin " changes,  hair growth and nail growth normal and equal bilaterally. No swelling, no tenderness.    LOWER EXTREMITIES:  Normal alignment, normal range of motion, no atrophy, no skin changes,  hair growth and nail growth normal and equal bilaterally. No swelling, no tenderness.  CERVICAL SPINE:  Cervical spine: ROM is full in flexion, extension and lateral rotation with moderate increased pain.  Spurling's maneuver causes no neck pain to either side.  Myofascial exam: No Tenderness to palpation across cervical paraspinous region bilaterally.    LUMBAR SPINE  Lumbar spine: ROM is limited with flexion extension and oblique extension with moderate increased pain.    Ej's test causes no increased pain on either side.    Supine straight leg raise is negative  Internal and external rotation of the hip causes no increased pain on either side.  Myofascial exam: No tenderness to palpation across lumbar paraspinous muscles.      MENTAL STATUS: normal orientation, speech, language, and fund of knowledge for social situation.  Emotional state appropriate.    CRANIAL NERVES:  II:  PERRL bilaterally,   III,IV,VI: EOMI.    V:  Facial sensation equal bilaterally  VII:  Facial motor function normal.  VIII:  Hearing equal to finger rub bilaterally  IX/X: Gag normal, palate symmetric  XI:  Shoulder shrug equal, head turn equal  XII:  Tongue midline without fasciculations      MOTOR: Tone and bulk: normal bilateral upper and lower Strength: normal   Delt Bi Tri WE WF     R 5 5 5 5 5 5   L 5 5 5 5 5 5     IP ADD ABD Quad TA Gas HAM  R 5 5 5 5 5 5 5  L 5 5 5 5 5 5 5    SENSATION: Light touch and pinprick intact bilaterally  REFLEXES: normal, symmetric, nonbrisk.  Toes down, no clonus. No hoffmans.  GAIT: normal rise, base, steps, and arm swing.        Imaging:  MRI lumbar spine 10/2012 ([facility])  L2-3, mild disc desiccation.  Mild bilateral facet hypertrophy.  Small right paracentral disc protrusion.  Minimal narrowing of  bilateral neural foraminal  L3-4, grade 1 retrolisthesis, disc desiccation, mild disc height loss.  Mild to moderate right lateral recess narrowing seen.  Mild to moderate bilateral facet arthrosis.  L4-5, disc desiccation.  Mild to moderate bilateral facet hypertrophy.  No significant spinal canal stenosis.  Mild bilateral neuroforaminal narrowing.  L5-S1, disc desiccation.  Severe disc height loss.  Moderate bilateral facet arthrosis.  Severe bilateral neural foraminal stenosis.    MRI cervical spine 10/2012  C2-3: Mild posterior disc osteophyte complex.  At C3-4, right greater than left vertebral joint hypertrophy.  Mild to severe bilateral neuroforaminal narrowing, right greater than left.  C4-5, broad-based disc osteophyte complex.  Bilateral uncovertebral joint hypertrophy and moderate bilateral facet arthrosis.  Moderate to severe bilateral neuroforaminal narrowing.  C5-6, moderate posterior disc osteophyte complex.  Mild central canal stenosis.  Severe right and moderate to severe left neuroforaminal narrowing.  C6-7, moderate disc osteophyte complex.  Mild central canal stenosis.  Moderate to severe bilateral neuroforaminal narrowing        Assessment: Mr. Goode is a 67 y.o. male with neck and lower back pain  1. Chronic use of opiate drugs therapeutic purposes    2. DDD (degenerative disc disease), lumbar    3. DDD (degenerative disc disease), cervical      Plan:  1.  I have stressed the importance of physical activity and exercise to improve overall health  2. Schedule repeat cervical NATALIE  3. He did request pain medications.  He takes it very sparingly.  Script for Norco 10mg provided.  UDS today (maybe negative for opioids as he takes it sparingly.  Last use was 3 days ago)  4. Baclofen 10 mg q 8  h prn. Will consider adding an anti neuropathic agent in the future.   5. Follow up in 2 months

## 2018-03-19 NOTE — OP NOTE
PROCEDURE DATE: 3/19/2018    Procedure: C7-T1 cervical interlaminar epidural steroid injection under utilizing fluoroscopy.    Diagnosis: Cervical Degenerative Disc Diease; Cervical Radiculitis  POSTOP DIAGNOSIS: SAME    Physician: Steffen Ortiz MD    Medications injected:  Dexamethasone 10mg followed by a slow injection of 4 mL sterile, preservative-free normal saline.    Local anesthetic used: Lidocaine 1%, 2 ml.    Sedation Medications: RN IV sedation    Complications:  None    Estimated blood loss: NOne    Technique:  A time-out was taken to identify patient and procedure prior to starting the procedure.  With the patient laying in a prone position with the neck in a mid-flexed forward position, the area was prepped and draped in the usual sterile fashion using ChloraPrep and a fenestrated drape.  The area was determined under AP fluoroscopic guidance.  Local anesthetic was given using a 25-gauge 1.5 inch needle by raising a wheal and then infiltrating ventrally.  A 3.5 inch 20-gauge Touhy needle was introduced under fluoroscopic guidance to meet the lamina of C7.  The needle was then hinged under the lamina then advanced using loss of resistance technique.  Once the tip of the needle was in the desired position, the 1ml contrast dye Omnipaque was injected to determine placement and no uptake.  The steroid was then injected slowly followed by a slow injection of 4 mL of the sterile preservative-free normal saline.  The patient tolerated the procedure well.    The patient was monitored after the procedure and was given post-procedure and discharge instructions to follow at home. The patient was discharged in a stable condition.

## 2018-03-19 NOTE — DISCHARGE INSTRUCTIONS
Recovery After Procedural Sedation (Adult)  You have been given medicine by vein to make you sleep during your surgery. This may have included both a pain medicine and sleeping medicine. Most of the effects have worn off. But you may still have some drowsiness for the next 6 to 8 hours.  Home care  Follow these guidelines when you get home:  · For the next 8 hours, you should be watched by a responsible adult. This person should make sure your condition is not getting worse.  · Don't drink any alcohol for the next 24 hours.  · Don't drive, operate dangerous machinery, or make important business or personal decisions during the next 24 hours.  Note: Your healthcare provider may tell you not to take any medicine by mouth for pain or sleep in the next 4 hours. These medicines may react with the medicines you were given in the hospital. This could cause a much stronger response than usual.  Follow-up care  Follow up with your healthcare provider if you are not alert and back to your usual level of activity within 12 hours.  When to seek medical advice  Call your healthcare provider right away if any of these occur:  · Drowsiness gets worse  · Weakness or dizziness gets worse  · Repeated vomiting  · You can't be awakened   Date Last Reviewed: 10/18/2016  © 2289-7824 [employer]. [address] [place] [address], [place] [address]. All rights reserved. This information is not intended as a substitute for professional medical care. Always follow your healthcare professional's instructions.      Pain injection instructions:     Steroids take about a 2 weeks to relieve pain.  Initially you may get pain relief from the local anesthetic but this may wear off before the steroid works.    No driving for 24 hrs   Activity as tolerated- gradually increase activities.  Dont lift over 10 lbs for 24 hrs   No heat at injection sites x 2 days  Use ice for mild swelling and for comfort.  May shower today. No baths for two days.       Resume Aspirin, Plavix, or Coumadin the day after the procedure unless otherwise instructed.   If diabetic,monitor your glucose carefully as steroids can increase glucose level    Seek immediate medical help for:   Severe increase in your usual pain or appearance of new pain.  Prolonged (mor than 8 hours) or increasing weakness or numbness in the legs or arms.    - Numbing medicine was injected that affects nerves that carry information from       muscles to brain and vice versa.  This numbness can last 6-8 hrs so be very careful walking.    Fever above 101 ,Drainage,redness,active bleeding, or increased swelling at the injection site.  Headache, shortness of breath, chest pain, or breathing problems.

## 2018-03-19 NOTE — DISCHARGE SUMMARY
[facility]  Discharge Note  Short Stay    Admit Date: 3/19/2018    Discharge Date and Time: 3/19/2018    Attending Physician: Steffen Ortiz MD     Discharge Provider: Steffen Ortiz    Diagnoses:  Active Hospital Problems    Diagnosis  POA    *Cervical radiculitis [M54.12]  Yes      Resolved Hospital Problems    Diagnosis Date Resolved POA   No resolved problems to display.       Hospital Course: Cervical NATALIE  Discharged Condition: Good    Final Diagnoses:   Active Hospital Problems    Diagnosis  POA    *Cervical radiculitis [M54.12]  Yes      Resolved Hospital Problems    Diagnosis Date Resolved POA   No resolved problems to display.       Disposition: Home or Self Care    Follow up/Patient Instructions:    Medications:  Reconciled Home Medications:   Current Discharge Medication List      CONTINUE these medications which have NOT CHANGED    Details   aspirin (ECOTRIN) 81 MG EC tablet Take 81 mg by mouth once daily.        !! multivitamin capsule Take 1 capsule by mouth once daily.        STIOLTO RESPIMAT 2.5-2.5 mcg/actuation Mist       atorvastatin (LIPITOR) 40 MG tablet Take 40 mg by mouth once daily.      baclofen (LIORESAL) 10 MG tablet       clonazePAM (KLONOPIN) 0.5 MG tablet TAKE 1 TABLET BY MOUTH 30 MINUTES PRIOR TO FLIGHT  Refills: 0      doxycycline (VIBRAMYCIN) 100 MG Cap TAKE ONE CAPSULE BY MOUTH TWICE A DAY FOR 10 DAYS  Refills: 0      !! hydrocodone-acetaminophen 10-325mg (NORCO)  mg Tab Take 1 tablet by mouth every 12 (twelve) hours as needed (pain).  Qty: 60 tablet, Refills: 0      !! hydrocodone-acetaminophen 10-325mg (NORCO)  mg Tab Take 1 tablet by mouth every 12 (twelve) hours as needed (pain).  Qty: 60 tablet, Refills: 0      !! multivitamin capsule Take by mouth.      nitroGLYCERIN (NITROSTAT) 0.4 MG SL tablet Place 0.4 mg under the tongue every 5 (five) minutes as needed.        pantoprazole (PROTONIX) 40 MG tablet Take 40 mg by mouth once daily.  Refills: 3      predniSONE  (DELTASONE) 20 MG tablet TAKE 2 TABS BY MOUTH FOR 4 DAYS, THEN 1 TABLET DAILY FOR 6 DAYS  Refills: 0      sertraline (ZOLOFT) 50 MG tablet Take 50 mg by mouth once daily.        SPIRIVA WITH HANDIHALER 18 mcg inhalation capsule       tamsulosin (FLOMAX) 0.4 mg Cp24       trazodone (DESYREL) 150 MG tablet Take 150 mg by mouth every evening.        umeclidinium 62.5 mcg/actuation DsDv Take by mouth.      UNABLE TO FIND [employer] STEP ONE NICOTINE PATCH Q DAY        !! - Potential duplicate medications found. Please discuss with provider.          Discharge Procedure Orders  Call MD for:  temperature >100.4     Call MD for:  persistent nausea and vomiting or diarrhea     Call MD for:  severe uncontrolled pain     Call MD for:  redness, tenderness, or signs of infection (pain, swelling, redness, odor or green/yellow discharge around incision site)     Call MD for:  difficulty breathing or increased cough     Call MD for:  severe persistent headache          Follow up with MD in 2-3 weeks    Discharge Procedure Orders (must include Diet, Follow-up, Activity):    Discharge Procedure Orders (must include Diet, Follow-up, Activity)  Call MD for:  temperature >100.4     Call MD for:  persistent nausea and vomiting or diarrhea     Call MD for:  severe uncontrolled pain     Call MD for:  redness, tenderness, or signs of infection (pain, swelling, redness, odor or green/yellow discharge around incision site)     Call MD for:  difficulty breathing or increased cough     Call MD for:  severe persistent headache

## 2018-03-19 NOTE — PLAN OF CARE
Patient awake, alert, and oriented.  Complaints of pain are tolerable at present time per pt report.  VSS, tolerating fluids without C/O N/V. Stable for discharge home.

## 2018-03-20 VITALS
HEIGHT: 75 IN | RESPIRATION RATE: 18 BRPM | DIASTOLIC BLOOD PRESSURE: 65 MMHG | HEART RATE: 67 BPM | WEIGHT: 197.06 LBS | OXYGEN SATURATION: 93 % | BODY MASS INDEX: 24.5 KG/M2 | SYSTOLIC BLOOD PRESSURE: 110 MMHG | TEMPERATURE: 98 F

## 2018-03-27 ENCOUNTER — TELEPHONE (OUTPATIENT)
Dept: PAIN MEDICINE | Facility: CLINIC | Age: 68
End: 2018-03-27

## 2018-03-27 NOTE — TELEPHONE ENCOUNTER
Patient reports doing better since injection on neck. Patient would like to schedule procedure for lower back pain. Please advise if ok to schedule. Last procedure NATALIE L5-S1 on 4/11/17

## 2018-03-27 NOTE — TELEPHONE ENCOUNTER
----- Message from Chloé Jacobs sent at 3/27/2018 10:42 AM CDT -----  Patient needs to speak with nurse concerning scheduling injection/please call back at [phone].

## 2018-03-28 ENCOUNTER — TELEPHONE (OUTPATIENT)
Dept: PAIN MEDICINE | Facility: CLINIC | Age: 68
End: 2018-03-28

## 2018-03-28 NOTE — TELEPHONE ENCOUNTER
----- Message from Love Grijalva sent at 3/28/2018  9:29 AM CDT -----  Contact: [phone]  Patient needs to reschedule procedure for an injection.Thanks!

## 2018-04-02 DIAGNOSIS — M51.36 DDD (DEGENERATIVE DISC DISEASE), LUMBAR: Primary | ICD-10-CM

## 2018-04-09 ENCOUNTER — SURGERY (OUTPATIENT)
Age: 68
End: 2018-04-09

## 2018-04-09 ENCOUNTER — HOSPITAL ENCOUNTER (OUTPATIENT)
Facility: AMBULARY SURGERY CENTER | Age: 68
Discharge: HOME | End: 2018-04-09
Attending: ANESTHESIOLOGY | Admitting: ANESTHESIOLOGY
Payer: MEDICARE

## 2018-04-09 DIAGNOSIS — M54.12 CERVICAL RADICULITIS: ICD-10-CM

## 2018-04-09 DIAGNOSIS — M50.30 DDD (DEGENERATIVE DISC DISEASE), CERVICAL: Primary | ICD-10-CM

## 2018-04-09 DIAGNOSIS — M51.36 DDD (DEGENERATIVE DISC DISEASE), LUMBAR: ICD-10-CM

## 2018-04-09 PROCEDURE — 62323 NJX INTERLAMINAR LMBR/SAC: CPT | Performed by: ANESTHESIOLOGY

## 2018-04-09 PROCEDURE — 99152 MOD SED SAME PHYS/QHP 5/>YRS: CPT | Mod: ,,, | Performed by: ANESTHESIOLOGY

## 2018-04-09 PROCEDURE — 62323 NJX INTERLAMINAR LMBR/SAC: CPT | Mod: ,,, | Performed by: ANESTHESIOLOGY

## 2018-04-09 RX ORDER — FENTANYL CITRATE 50 UG/ML
INJECTION, SOLUTION INTRAMUSCULAR; INTRAVENOUS
Status: DISCONTINUED | OUTPATIENT
Start: 2018-04-09 | End: 2018-04-09 | Stop reason: HOSPADM

## 2018-04-09 RX ORDER — LIDOCAINE HYDROCHLORIDE 10 MG/ML
INJECTION, SOLUTION EPIDURAL; INFILTRATION; INTRACAUDAL; PERINEURAL
Status: DISCONTINUED
Start: 2018-04-09 | End: 2018-04-09 | Stop reason: HOSPADM

## 2018-04-09 RX ORDER — DEXAMETHASONE SODIUM PHOSPHATE 10 MG/ML
INJECTION INTRAMUSCULAR; INTRAVENOUS
Status: DISCONTINUED | OUTPATIENT
Start: 2018-04-09 | End: 2018-04-09 | Stop reason: HOSPADM

## 2018-04-09 RX ORDER — MIDAZOLAM HYDROCHLORIDE 1 MG/ML
INJECTION INTRAMUSCULAR; INTRAVENOUS
Status: DISCONTINUED
Start: 2018-04-09 | End: 2018-04-09 | Stop reason: HOSPADM

## 2018-04-09 RX ORDER — SODIUM CHLORIDE 9 MG/ML
INJECTION, SOLUTION INTRAVENOUS CONTINUOUS
Status: DISCONTINUED | OUTPATIENT
Start: 2018-04-09 | End: 2018-04-09 | Stop reason: HOSPADM

## 2018-04-09 RX ORDER — LIDOCAINE HYDROCHLORIDE 10 MG/ML
INJECTION, SOLUTION EPIDURAL; INFILTRATION; INTRACAUDAL; PERINEURAL
Status: DISCONTINUED | OUTPATIENT
Start: 2018-04-09 | End: 2018-04-09 | Stop reason: HOSPADM

## 2018-04-09 RX ORDER — MIDAZOLAM HYDROCHLORIDE 2 MG/2ML
INJECTION, SOLUTION INTRAMUSCULAR; INTRAVENOUS
Status: DISCONTINUED | OUTPATIENT
Start: 2018-04-09 | End: 2018-04-09 | Stop reason: HOSPADM

## 2018-04-09 RX ORDER — DEXAMETHASONE SODIUM PHOSPHATE 10 MG/ML
INJECTION INTRAMUSCULAR; INTRAVENOUS
Status: DISCONTINUED
Start: 2018-04-09 | End: 2018-04-09 | Stop reason: HOSPADM

## 2018-04-09 RX ORDER — SODIUM CHLORIDE 9 MG/ML
INJECTION, SOLUTION INTRAMUSCULAR; INTRAVENOUS; SUBCUTANEOUS
Status: DISCONTINUED | OUTPATIENT
Start: 2018-04-09 | End: 2018-04-09 | Stop reason: HOSPADM

## 2018-04-09 RX ORDER — SODIUM CHLORIDE, SODIUM LACTATE, POTASSIUM CHLORIDE, CALCIUM CHLORIDE 600; 310; 30; 20 MG/100ML; MG/100ML; MG/100ML; MG/100ML
INJECTION, SOLUTION INTRAVENOUS CONTINUOUS
Status: DISCONTINUED | OUTPATIENT
Start: 2018-04-09 | End: 2018-04-09 | Stop reason: HOSPADM

## 2018-04-09 RX ORDER — FENTANYL CITRATE 50 UG/ML
INJECTION, SOLUTION INTRAMUSCULAR; INTRAVENOUS
Status: DISCONTINUED
Start: 2018-04-09 | End: 2018-04-09 | Stop reason: HOSPADM

## 2018-04-09 RX ADMIN — DEXAMETHASONE SODIUM PHOSPHATE 10 MG: 10 INJECTION INTRAMUSCULAR; INTRAVENOUS at 01:04

## 2018-04-09 RX ADMIN — SODIUM CHLORIDE 4 ML: 9 INJECTION, SOLUTION INTRAMUSCULAR; INTRAVENOUS; SUBCUTANEOUS at 01:04

## 2018-04-09 RX ADMIN — FENTANYL CITRATE 50 MCG: 50 INJECTION, SOLUTION INTRAMUSCULAR; INTRAVENOUS at 01:04

## 2018-04-09 RX ADMIN — MIDAZOLAM HYDROCHLORIDE 2 MG: 2 INJECTION, SOLUTION INTRAMUSCULAR; INTRAVENOUS at 01:04

## 2018-04-09 RX ADMIN — LIDOCAINE HYDROCHLORIDE 10 ML: 10 INJECTION, SOLUTION EPIDURAL; INFILTRATION; INTRACAUDAL; PERINEURAL at 01:04

## 2018-04-09 RX ADMIN — SODIUM CHLORIDE: 9 INJECTION, SOLUTION INTRAVENOUS at 12:04

## 2018-04-09 NOTE — DISCHARGE INSTRUCTIONS
Recovery After Procedural Sedation (Adult)  You have been given medicine by vein to make you sleep during your surgery. This may have included both a pain medicine and sleeping medicine. Most of the effects have worn off. But you may still have some drowsiness for the next 6 to 8 hours.  Home care  Follow these guidelines when you get home:  · For the next 8 hours, you should be watched by a responsible adult. This person should make sure your condition is not getting worse.  · Don't drink any alcohol for the next 24 hours.  · Don't drive, operate dangerous machinery, or make important business or personal decisions during the next 24 hours.  Note: Your healthcare provider may tell you not to take any medicine by mouth for pain or sleep in the next 4 hours. These medicines may react with the medicines you were given in the hospital. This could cause a much stronger response than usual.  Follow-up care  Follow up with your healthcare provider if you are not alert and back to your usual level of activity within 12 hours.  When to seek medical advice  Call your healthcare provider right away if any of these occur:  · Drowsiness gets worse  · Weakness or dizziness gets worse  · Repeated vomiting  · You can't be awakened   Date Last Reviewed: 10/18/2016  © 3464-8350 [employer]. [address] [place] [address], [place] [address]. All rights reserved. This information is not intended as a substitute for professional medical care. Always follow your healthcare professional's instructions.      Pain injection instructions:     Steroids take about a 2 weeks to relieve pain.  Initially you may get pain relief from the local anesthetic but this may wear off before the steroid works.    No driving for 24 hrs   Activity as tolerated- gradually increase activities.  Dont lift over 10 lbs for 24 hrs   No heat at injection sites x 2 days  Use ice for mild swelling and for comfort.  May shower today. No baths for two days.       Resume Aspirin, Plavix, or Coumadin the day after the procedure unless otherwise instructed.   If diabetic,monitor your glucose carefully as steroids can increase glucose level    Seek immediate medical help for:   Severe increase in your usual pain or appearance of new pain.  Prolonged (mor than 8 hours) or increasing weakness or numbness in the legs or arms.    - Numbing medicine was injected that affects nerves that carry information from       muscles to brain and vice versa.  This numbness can last 6-8 hrs so be very careful walking.    Fever above 101 ,Drainage,redness,active bleeding, or increased swelling at the injection site.  Headache, shortness of breath, chest pain, or breathing problems.

## 2018-04-09 NOTE — H&P
"CC: low back pain    HPI: The patient is a 67 y.o. male with a history of lumbar DDD here for lumbar NATALIE. There are no major changes in history and physical from 3/8/18 by myself.    Past Medical History:   Diagnosis Date    Arthritis     Cancer SKIN    Cardiac angina     COPD (chronic obstructive pulmonary disease)     Coronary artery disease ANGINA. HAD  [facility] 8/12. 40 % BLOCKAGE. DR RUBY IS CARDIOLOGIST.    Depression     GERD (gastroesophageal reflux disease)     Trigger thumb     BILAT    Wears dentures     UPPER    Wears glasses        Past Surgical History:   Procedure Laterality Date    APPENDECTOMY      BACK SURGERY      KNEE SURGERY      BILAT    TRIGGER THUMB      RIGHT       Family History   Problem Relation Age of Onset    COPD Mother     Diabetes Mother     Heart disease Mother     Birth defects Father     Diabetes Father     Heart disease Father        Social History     Social History    Marital status:      Spouse name: N/A    Number of children: N/A    Years of education: N/A     Social History Main Topics    Smoking status: Current Some Day Smoker     Years: 40.00    Smokeless tobacco: Never Used      Comment: 1-2 CIGT SOME DAYS    Alcohol use Yes      Comment: beer on occasion    Drug use: No    Sexual activity: Yes     Partners: Female     Other Topics Concern    Not on file     Social History Narrative    No narrative on file       Current Facility-Administered Medications   Medication Dose Route Frequency Provider Last Rate Last Dose    0.9%  NaCl infusion   Intravenous Continuous Steffen Ortiz MD        lactated ringers infusion   Intravenous Continuous Steffen Ortiz MD           Review of patient's allergies indicates:   Allergen Reactions    Penicillins      AS A CHILD - NOT SURE REACTION       Vitals:    04/04/18 0623   Weight: 89.4 kg (197 lb)   Height: 6' 3" (1.905 m)       REVIEW OF SYSTEMS:     GENERAL: No weight loss, malaise or fevers.  HEENT:  " No recent changes in vision or hearing  NECK: Negative for lumps, no difficulty with swallowing.  RESPIRATORY: Negative for cough, wheezing or shortness of breath, patient denies any recent URI.  CARDIOVASCULAR: Negative for chest pain, leg swelling or palpitations.  GI: Negative for abdominal discomfort, blood in stools or black stools or change in bowel habits.  MUSCULOSKELETAL: See HPI.  SKIN: Negative for lesions, rash, and itching.  PSYCH: No suicidal or homicidal ideations, no current mood disturbances.  HEMATOLOGY/LYMPHOLOGY: Negative for prolonged bleeding, bruising easily or swollen nodes. Patient is not currently taking any anti-coagulants  ENDO: No history of diabetes or thyroid dysfunction  NEURO: No history of syncope, paralysis, seizures or tremors.All other reviewed and negative other than HPI.    Physical exam:  Gen: A and O x3, pleasant, well-groomed  Skin: No rashes or obvious lesions  HEENT: PERRLA, no obvious deformities on ears or in canals. No thyroid masses, trachea midline, no palpable lymph nodes in neck, axilla.  CVS: Regular rate and rhythm, normal S1 and S2, no murmurs.  Resp: Clear to auscultation bilaterally.  Abdomen: Soft, NT/ND, normal bowel sounds present.  Musculoskeletal/Neuro: Moving all extremities    Assessment:  DDD (degenerative disc disease), cervical  -     Place in Outpatient; Standing  -     Vital signs; Standing  -     Activity as tolerated; Standing  -     Place 18-22 [facility] IV ; Standing  -     Verify informed consent; Standing  -     Notify physician ; Standing  -     Notify physician ; Standing  -     Notify physician (specify); Standing  -     Diet NPO; Standing    Cervical radiculitis  -     Place in Outpatient; Standing  -     Vital signs; Standing  -     Activity as tolerated; Standing  -     Place 18-22 [facility] IV ; Standing  -     Verify informed consent; Standing  -     Notify physician ; Standing  -     Notify physician ; Standing  -      Notify physician (specify); Standing  -     Diet NPO; Standing    Other orders  -     0.9%  NaCl infusion; Inject into the vein continuous.  -     lactated ringers infusion; Inject into the vein continuous.  -     IP VTE LOW RISK PATIENT; Standing          PLAN: Lumbar NATALIE      This patient has been cleared for surgery in an ambulatory surgical facility    ASA 3,  Mallampatti Score 3  No history of anesthetic complications  Plan for RN IV sedation

## 2018-04-09 NOTE — PLAN OF CARE
Pt states ready to go home , stable, salma po fluids, ambulatory, denies pain, Leg raises performed in bed without diff and instructed on fall risk. PTverbalized understanding

## 2018-04-09 NOTE — OP NOTE
PROCEDURE DATE: 4/9/2018    Procedure:   Interlaminar epidural steroid injection at L5-S1 under fluoroscopic guidance.    Diagnosis: lUMBAR DISC DISPLACEMENT WITHOUT MYELOPATHY  pOSTOP DIAGNOSIS: sAME    Physician: Steffen Ortiz M.D.    Medications injected:10 mg dexamethasone with 4 ml of preservative free NaCl    Local anesthetic injected:    Lidocaine 1% 2 ml total    Sedation Medications: RN IV sedation    Estimated blood loss:  None    Complications:  None    Technique:  Time-out taken to identify patient and procedure prior to starting the procedure.  With the patient laying in a prone position, the area was prepped and draped in the usual sterile fashion using ChloraPrep and a fenestrated drape.  After determining the target level with an AP fluoroscopic view, local anesthetic was given using a 25-gauge 1.5 inch needle by raising a wheal and then infiltrating toward the interlaminar entry space.  A 3.5inch 20-gauge Touhy needle was introduced under AP fluoroscopic guidance to the interlaminar space of L5-S1. Once the trajectory was established, the needle was visualized in the lateral view and advanced using loss of resistance technique. Once in the desired position, 1ml contrast was injected to confirm placement and there was no vascular uptake nor intrathecal spread.  The medication was then injected slowly. The patient tolerated the procedure well.      The patient was monitored after the procedure.   They were given post-procedure and discharge instructions to follow at home.  The patient was discharged in a stable condition.

## 2018-04-09 NOTE — DISCHARGE SUMMARY
[facility]  Discharge Note  Short Stay    Admit Date: 4/9/2018    Discharge Date and Time: 4/9/2018    Attending Physician: Steffen Ortiz MD     Discharge Provider: Steffen Ortiz    Diagnoses:  Active Hospital Problems    Diagnosis  POA    *Cervical radiculitis [M54.12]  Yes      Resolved Hospital Problems    Diagnosis Date Resolved POA   No resolved problems to display.       Hospital Course: Lumbar NATALIE  Discharged Condition: Good    Final Diagnoses:   Active Hospital Problems    Diagnosis  POA    *Cervical radiculitis [M54.12]  Yes      Resolved Hospital Problems    Diagnosis Date Resolved POA   No resolved problems to display.       Disposition: Home or Self Care    Follow up/Patient Instructions:    Medications:  Reconciled Home Medications:      Medication List      CONTINUE taking these medications    aspirin 81 MG EC tablet  Commonly known as:  ECOTRIN     atorvastatin 40 MG tablet  Commonly known as:  LIPITOR     baclofen 10 MG tablet  Commonly known as:  LIORESAL     clonazePAM 0.5 MG tablet  Commonly known as:  KLONOPIN     doxycycline 100 MG Cap  Commonly known as:  VIBRAMYCIN     hydrocodone-acetaminophen 10-325mg  mg Tab  Commonly known as:  NORCO  Take 1 tablet by mouth every 12 (twelve) hours as needed (pain).     * multivitamin capsule     * multivitamin capsule     nitroGLYCERIN 0.4 MG SL tablet  Commonly known as:  NITROSTAT     pantoprazole 40 MG tablet  Commonly known as:  PROTONIX     predniSONE 20 MG tablet  Commonly known as:  DELTASONE     sertraline 50 MG tablet  Commonly known as:  ZOLOFT     STIOLTO RESPIMAT 2.5-2.5 mcg/actuation Mist  Generic drug:  tiotropium-olodaterol     tamsulosin 0.4 mg Cp24  Commonly known as:  FLOMAX     traZODone 150 MG tablet  Commonly known as:  DESYREL     umeclidinium 62.5 mcg/actuation Dsdv        * This list has 2 medication(s) that are the same as other medications prescribed for you. Read the directions carefully, and ask your doctor or other  care provider to review them with you.            STOP taking these medications    SPIRIVA WITH HANDIHALER 18 mcg inhalation capsule  Generic drug:  tiotropium     UNABLE TO FIND            Discharge Procedure Orders  Call MD for:  temperature >100.4     Call MD for:  persistent nausea and vomiting or diarrhea     Call MD for:  severe uncontrolled pain     Call MD for:  redness, tenderness, or signs of infection (pain, swelling, redness, odor or green/yellow discharge around incision site)     Call MD for:  difficulty breathing or increased cough     Call MD for:  severe persistent headache          Follow up with MD in 2-3 weeks    Discharge Procedure Orders (must include Diet, Follow-up, Activity):    Discharge Procedure Orders (must include Diet, Follow-up, Activity)  Call MD for:  temperature >100.4     Call MD for:  persistent nausea and vomiting or diarrhea     Call MD for:  severe uncontrolled pain     Call MD for:  redness, tenderness, or signs of infection (pain, swelling, redness, odor or green/yellow discharge around incision site)     Call MD for:  difficulty breathing or increased cough     Call MD for:  severe persistent headache

## 2018-04-11 VITALS
HEART RATE: 71 BPM | RESPIRATION RATE: 18 BRPM | SYSTOLIC BLOOD PRESSURE: 128 MMHG | OXYGEN SATURATION: 95 % | HEIGHT: 75 IN | BODY MASS INDEX: 24.49 KG/M2 | WEIGHT: 197 LBS | DIASTOLIC BLOOD PRESSURE: 77 MMHG | TEMPERATURE: 99 F

## 2018-05-14 ENCOUNTER — OFFICE VISIT (OUTPATIENT)
Dept: PAIN MEDICINE | Facility: CLINIC | Age: 68
End: 2018-05-14
Payer: MEDICARE

## 2018-05-14 VITALS
BODY MASS INDEX: 24.49 KG/M2 | HEART RATE: 72 BPM | HEIGHT: 75 IN | DIASTOLIC BLOOD PRESSURE: 74 MMHG | WEIGHT: 197 LBS | SYSTOLIC BLOOD PRESSURE: 125 MMHG

## 2018-05-14 DIAGNOSIS — M51.36 DDD (DEGENERATIVE DISC DISEASE), LUMBAR: Primary | ICD-10-CM

## 2018-05-14 DIAGNOSIS — M47.816 LUMBAR FACET ARTHROPATHY: ICD-10-CM

## 2018-05-14 DIAGNOSIS — M54.12 CERVICAL RADICULOPATHY: ICD-10-CM

## 2018-05-14 PROCEDURE — 99999 PR PBB SHADOW E&M-EST. PATIENT-LVL IV: CPT | Mod: PBBFAC,,, | Performed by: PHYSICIAN ASSISTANT

## 2018-05-14 PROCEDURE — 99214 OFFICE O/P EST MOD 30 MIN: CPT | Mod: S$GLB,,, | Performed by: PHYSICIAN ASSISTANT

## 2018-05-14 RX ORDER — HYDROCODONE BITARTRATE AND ACETAMINOPHEN 10; 325 MG/1; MG/1
1 TABLET ORAL EVERY 12 HOURS PRN
Qty: 60 TABLET | Refills: 0 | Status: SHIPPED | OUTPATIENT
Start: 2018-05-14 | End: 2018-05-18 | Stop reason: SDUPTHER

## 2018-05-14 RX ORDER — HYDROCODONE BITARTRATE AND ACETAMINOPHEN 10; 325 MG/1; MG/1
TABLET ORAL
COMMUNITY
Start: 2017-10-20 | End: 2018-05-14 | Stop reason: SDUPTHER

## 2018-05-14 RX ORDER — BACLOFEN 10 MG/1
10 TABLET ORAL
COMMUNITY
Start: 2017-10-13 | End: 2018-05-14 | Stop reason: SDUPTHER

## 2018-05-14 RX ORDER — TRAZODONE HYDROCHLORIDE 150 MG/1
TABLET ORAL
COMMUNITY
Start: 2018-03-29 | End: 2018-05-14 | Stop reason: SDUPTHER

## 2018-05-14 NOTE — PROGRESS NOTES
Referring Physician: No ref. provider found    PCP: Hermelindo Sanchez MD      CC: Neck and lower back pain    Interval History:  Mr. Goode his a 67 y.o. male with chronic low back and neck pain who presents for f/u s/p Lumbar NATALIE at L5-S1. Reports 80% improvement. Neck pain continues to be bothersome on the left.  He is s/p cervical NATALIE that provided only mild benefit.  He takes Hydrocodone sparingly with moderate benefit. He is not currently on any anti neuropathic agents. He also takes baclofen. Pain today is rated 6/10.    Prior HPI:   Patient is 66-year-old male referred to us for neck and lower back pain.  Lower back pain is more bothersome currently.  Pain has been present for over 30 years.  He has intermittent aching, burning, sharp pain in his lower back.  Pain radiates down his left lower leg into his left calf.  He does have numbness in his left toes.  He also has posterior neck pain and radiates to his bilateral shoulders.  He denies any radiating arm pain.  Pain worsens with lifting, getting up, standing and walking.  Pain improves with rest.  He has tried physical therapy with minimal benefit.  He is underwent lumbar NATALIE's as well as cervical NATALIE's with Dr. Andres in the past with moderate benefit.  He has not any interventions recently.  He does desire a lumbar NATALIE in the near future with a cervical NATALIE to follow.  He denies any worsening weakness.  No bowel bladder changes.  He takes Norco very sparingly with mild to moderate benefit.  He rates his pain 4/10 today, 10/10 at worse.    ROS:  CONSTITUTIONAL: No fevers, chills, night sweats, wt. loss, appetite changes  SKIN: no rashes or itching  ENT: No headaches, head trauma, vision changes, or eye pain  LYMPH NODES: None noticed   CV: No chest pain, palpitations.   RESP: No shortness of breath, dyspnea on exertion, cough, wheezing, or hemoptysis  GI: No nausea, emesis, diarrhea, constipation, melena, hematochezia, pain.    : No dysuria, hematuria,  "urgency, or frequency   HEME: No easy bruising, bleeding problems  PSYCHIATRIC: No depression, anxiety, psychosis, hallucinations.  NEURO: No seizures, memory loss, dizziness or difficulty sleeping  MSK: +HPI      Past Medical History:   Diagnosis Date    Arthritis     Cancer SKIN    Cardiac angina     COPD (chronic obstructive pulmonary disease)     Coronary artery disease ANGINA. HAD  [facility] 8/12. 40 % BLOCKAGE. DR RUBY IS CARDIOLOGIST.    Depression     GERD (gastroesophageal reflux disease)     Trigger thumb     BILAT    Wears dentures     UPPER    Wears glasses      Past Surgical History:   Procedure Laterality Date    APPENDECTOMY      BACK SURGERY      KNEE SURGERY      BILAT    TRIGGER THUMB      RIGHT     Family History   Problem Relation Age of Onset    COPD Mother     Diabetes Mother     Heart disease Mother     Birth defects Father     Diabetes Father     Heart disease Father      Social History     Social History    Marital status:      Spouse name: N/A    Number of children: N/A    Years of education: N/A     Social History Main Topics    Smoking status: Current Some Day Smoker     Years: 40.00    Smokeless tobacco: Never Used      Comment: 1-2 CIGT SOME DAYS    Alcohol use Yes      Comment: beer on occasion    Drug use: No    Sexual activity: Yes     Partners: Female     Other Topics Concern    None     Social History Narrative    None         Medications/Allergies: See med card    Vitals:    05/14/18 0909   BP: 125/74   Pulse: 72   Weight: 89.4 kg (197 lb)   Height: 6' 3" (1.905 m)   PainSc:   6   PainLoc: Back         Physical exam:    GENERAL: A and O x3, the patient appears well groomed and is in no acute distress.  Skin: No rashes or obvious lesions  HEENT: normocephalic, atraumatic  CARDIOVASCULAR:  RRR  LUNGS: non labored breathing  ABDOMEN: soft, nontender   UPPER EXTREMITIES: Normal alignment, normal range of motion, no atrophy, no skin changes,  hair " growth and nail growth normal and equal bilaterally. No swelling, no tenderness.    LOWER EXTREMITIES:  Normal alignment, normal range of motion, no atrophy, no skin changes,  hair growth and nail growth normal and equal bilaterally. No swelling, no tenderness.  CERVICAL SPINE:  Cervical spine: ROM is full in flexion, extension and lateral rotation with moderate increased pain.  Spurling's maneuver causes no neck pain to either side.  Myofascial exam: moderateTenderness to palpation across cervical paraspinous region bilaterally.    LUMBAR SPINE  Lumbar spine: ROM is limited with flexion extension and oblique extension with moderate increased pain.    Ej's test causes no increased pain on either side.    Supine straight leg raise is negative  Internal and external rotation of the hip causes no increased pain on either side.  Myofascial exam: No tenderness to palpation across lumbar paraspinous muscles.      MENTAL STATUS: normal orientation, speech, language, and fund of knowledge for social situation.  Emotional state appropriate.    CRANIAL NERVES:  II:  PERRL bilaterally,   III,IV,VI: EOMI.    V:  Facial sensation equal bilaterally  VII:  Facial motor function normal.  VIII:  Hearing equal to finger rub bilaterally  IX/X: Gag normal, palate symmetric  XI:  Shoulder shrug equal, head turn equal  XII:  Tongue midline without fasciculations      MOTOR: Tone and bulk: normal bilateral upper and lower Strength: normal   Delt Bi Tri WE WF     R 5 5 5 5 5 5   L 5 5 5 5 5 5     IP ADD ABD Quad TA Gas HAM  R 5 5 5 5 5 5 5  L 5 5 5 5 5 5 5    SENSATION: Light touch and pinprick intact bilaterally  REFLEXES: normal, symmetric, nonbrisk.  Toes down, no clonus. No hoffmans.  GAIT: normal rise, base, steps, and arm swing.        Imaging:  MRI lumbar spine 10/2012 ([facility])  L2-3, mild disc desiccation.  Mild bilateral facet hypertrophy.  Small right paracentral disc protrusion.  Minimal narrowing of bilateral  neural foraminal  L3-4, grade 1 retrolisthesis, disc desiccation, mild disc height loss.  Mild to moderate right lateral recess narrowing seen.  Mild to moderate bilateral facet arthrosis.  L4-5, disc desiccation.  Mild to moderate bilateral facet hypertrophy.  No significant spinal canal stenosis.  Mild bilateral neuroforaminal narrowing.  L5-S1, disc desiccation.  Severe disc height loss.  Moderate bilateral facet arthrosis.  Severe bilateral neural foraminal stenosis.    MRI cervical spine 10/2012  C2-3: Mild posterior disc osteophyte complex.  At C3-4, right greater than left vertebral joint hypertrophy.  Mild to severe bilateral neuroforaminal narrowing, right greater than left.  C4-5, broad-based disc osteophyte complex.  Bilateral uncovertebral joint hypertrophy and moderate bilateral facet arthrosis.  Moderate to severe bilateral neuroforaminal narrowing.  C5-6, moderate posterior disc osteophyte complex.  Mild central canal stenosis.  Severe right and moderate to severe left neuroforaminal narrowing.  C6-7, moderate disc osteophyte complex.  Mild central canal stenosis.  Moderate to severe bilateral neuroforaminal narrowing        Assessment: Mr. Goode is a 67 y.o. male with neck and lower back pain  1. DDD (degenerative disc disease), lumbar    2. Cervical radiculopathy    3. Lumbar facet arthropathy      Plan:  1.  I have stressed the importance of physical activity and exercise to improve overall health. Home neck exercises given  2. He wishes to repeat cervical NATALIE. I recommend trying conservative treatments first. Pain is located in left Paraspinous muscles.   3. He did request pain medications.  He takes it very sparingly.  Script for Norco 10mg provided.  U  4. Baclofen 10 mg q 8  h prn. Will consider adding an anti neuropathic agent in the future.   5. Follow up in 2 months  All medication management was performed by Dr. Steffen Ortiz

## 2018-05-14 NOTE — PATIENT INSTRUCTIONS
Exercises at Your Workstation: Eyes, Neck, and Head  Breathe deeply as you do your exercises. Inhale through your nose, and exhale through your mouth.   Tired eyes? Stiff neck? A few easy moves can help prevent these kinds of problems. Take a few minutes during your day to do these exercises--right at your desk. They'll loosen up your muscles, keep you more alert, and make a big difference in how you work and feel.    For Your Eyes  Eye Cup  · Lean forward with your elbows on your desk.  · Cup your hands and place them lightly over your closed eyes. Hold for a minute, while breathing deeply in and out.  · Slowly uncover and open your eyes. Repeat 2 times.  Eye Roll  · Close your eyes. Slowly roll your eyeballs clockwise all the way around. Repeat 3 times.  · Now slowly roll them all the way around counterclockwise. Repeat 3 times.  Eye Rest  · Every 20 minutes, look away from the computer screen. Focus on an object at least 20 feet away. Stay focused on this object for a full 20 seconds.    For Your Neck and Head  Warm-up  · Drop your head gently to your chest. While breathing in, slowly roll your head up to your left shoulder. While breathing out, slowly roll your head back to center. Repeat to the right.  · Repeat 3 times on each side.  Head Tilt  · Sit up straight. Tuck in your chin.  · Slowly tip your head to the left. Return to the center. Then, tip your head to the right.  · Repeat 3 times on each side.  If you feel pain while performing these stretching exercises, please stop and consult your doctor.   Head Turn  · Sit up straight.  · Slowly turn your head and look over your left shoulder. Hold for a few seconds. Go back to the center, then repeat to your right.  · Repeat 3 times on each side.    Neck Exercises: Active Neck Rotation    To start, lie on your back, knees bent and feet flat on the floor. Keep your ears, shoulders, and hips aligned, but dont press your lower back to the floor. Rest your  hands on your pelvis. Breathe deeply and relax.    · Use your neck muscles to turn your head to one side until you feel a stretch in the muscles.  · Hold for 5 seconds. Then turn to the other side.  · Repeat 5 times on each side.  Note: Keep your shoulders on the floor. Dont lift or tuck your chin as you turn your head.      Neck Exercises: Arm Lift            To start, lie on your back, knees bent and feet flat on the floor. Keep your ears, shoulders, and hips aligned, but dont press your lower back to the floor. Rest your hands on your pelvis. Breathe deeply and relax.  · Raise one arm overhead, then lower it. As you lower that arm, raise the other arm.  · Continue to move both arms in slow, smooth arcs. Keep your arms straight and your head and neck relaxed.  · Repeat 10 times with each arm.  For your safety, check with your healthcare provider before starting an exercise program.     Neck Exercises: Head Lifts     Do this exercise on your hands and knees. Keep your knees under your hips and your hands under your shoulders. Keep your spine in a neutral position (not arched or sagging). Keep your ears in line with your shoulders. Hold for a few seconds before starting the exercise.  · Keeping your back straight, slowly drop your chin toward your chest. Tuck in your chin.  · Hold for 5 seconds. Then lift your head until your neck is level with your back.  · Hold for 5 seconds. Repeat 5 times.      Neck Exercises: Neck Flex      To start, sit in a chair with your feet flat on the floor. Your weight should be slightly forward so that youre balanced evenly on your buttocks. Relax your shoulders and keep your head level. Avoid arching your back or rounding your shoulders. Using a chair with arms may help you keep your balance.  · Rest the back of your left hand against your lower back. Place your right palm on the top of your head.  · Gently pull your head forward and down until you feel a stretch in the neck  muscles. Dont force the motion.  · Hold for 20 seconds, then return to starting position. Switch arms.  © 2851-0001 [employer]. [address] [place] [address], [place] [address]. All rights reserved. This information is not intended as a substitute for professional medical care. Always follow your healthcare professional's instructions.          Exercises: Neck Isometrics  To start, sit in a chair with your feet flat on the floor. Your weight should be slightly forward so that youre balanced evenly on your buttocks. Relax your shoulders and keep your head level. Using a chair with arms may help you keep your balance.  1. Press your palm against your forehead. Resist with your neck muscles. Hold for 10 seconds. Relax. Repeat 5 times.  2. Do the exercise again, pressing on the side of your head. Repeat 5 times. Switch sides.  3. Do the exercise again, pressing on the back of your head. Repeat 5 times.       For your safety, check with your healthcare provider before starting an exercise program.       Neck Exercises: Passive Neck Rotation        To start, lie on your back, knees bent and feet flat on the floor. Keep your ears, shoulders, and hips aligned, but dont press your lower back to the floor. Rest your hands on your pelvis. Breathe deeply and relax.  · With your neck relaxed, place the palm of one hand on your forehead. Use your hand to turn your head to one side until you feel a stretch in the neck muscles. Do not push through pain.  · Hold for 5 seconds. Then turn to the other side.  · Repeat 5 times on each side.   Note: Keep your shoulders on the floor. Dont lift your chin as you turn your head.    Neck Exercises: Neck Glide  To start, lie on your back, knees bent and feet flat on the floor. Keep your ears, shoulders, and hips aligned. Dont press your lower back to the floor. Rest your hands on your pelvis. Breathe deeply and relax.  · Gently flatten the curve of your neck against the  floor.  · Lengthen your neck as though youre growing taller. Dont jam your chin into your chest, and dont push too hard.  · Hold for 5 seconds. Release. Repeat 3 times.      © 1828-5983 [employer]. [address] [place] [address], [place] [address]. All rights reserved. This information is not intended as a substitute for professional medical care. Always follow your healthcare professional's instructions.

## 2018-05-18 ENCOUNTER — TELEPHONE (OUTPATIENT)
Dept: PAIN MEDICINE | Facility: CLINIC | Age: 68
End: 2018-05-18

## 2018-05-18 RX ORDER — HYDROCODONE BITARTRATE AND ACETAMINOPHEN 10; 325 MG/1; MG/1
1 TABLET ORAL EVERY 12 HOURS PRN
Qty: 60 TABLET | Refills: 0 | Status: SHIPPED | OUTPATIENT
Start: 2018-05-18 | End: 2018-08-16 | Stop reason: SDUPTHER

## 2018-08-16 RX ORDER — HYDROCODONE BITARTRATE AND ACETAMINOPHEN 10; 325 MG/1; MG/1
1 TABLET ORAL EVERY 12 HOURS PRN
Qty: 60 TABLET | Refills: 0 | Status: SHIPPED | OUTPATIENT
Start: 2018-08-16 | End: 2018-09-14

## 2018-08-16 NOTE — TELEPHONE ENCOUNTER
----- Message from Janneth Jalloh sent at 8/16/2018  1:22 PM CDT -----  Contact: pt  Pt is requesting a refill on his medication(NORCO)  Please call pt to advise  Call back   Thanks      ..  [id number] - [place] - [address]  [address]  [place] [address]  Phone: [phone] Fax: [phone]    [id number] [place] - [address]  [address]  [place] [address]  Phone: [phone] Fax: [phone]

## 2018-10-30 ENCOUNTER — TELEPHONE (OUTPATIENT)
Dept: PAIN MEDICINE | Facility: CLINIC | Age: 68
End: 2018-10-30

## 2018-10-30 RX ORDER — HYDROCODONE BITARTRATE AND ACETAMINOPHEN 10; 325 MG/1; MG/1
1 TABLET ORAL EVERY 12 HOURS PRN
Qty: 60 TABLET | Refills: 0 | Status: SHIPPED | OUTPATIENT
Start: 2018-11-06 | End: 2018-12-05

## 2018-10-30 NOTE — TELEPHONE ENCOUNTER
----- Message from Thomas Vila sent at 10/30/2018 10:27 AM CDT -----  Type: Needs Medical Advice    Who Called: patient   Pharmacy name and phone #:    [id number] - [place]  [address]  [address] [place] [address]  Phone: [phone] Fax: [phone]  Best Call Back Number: [phone]  Additional Information: patient needs office to fax over NORCO prescription to the pharmacy. please call to advise and when completed.

## 2018-11-06 ENCOUNTER — OFFICE VISIT (OUTPATIENT)
Dept: PAIN MEDICINE | Facility: CLINIC | Age: 68
End: 2018-11-06
Payer: MEDICARE

## 2018-11-06 VITALS
SYSTOLIC BLOOD PRESSURE: 124 MMHG | BODY MASS INDEX: 24.49 KG/M2 | HEART RATE: 84 BPM | WEIGHT: 197 LBS | HEIGHT: 75 IN | DIASTOLIC BLOOD PRESSURE: 77 MMHG

## 2018-11-06 DIAGNOSIS — Z79.891 CHRONIC USE OF OPIATE DRUGS THERAPEUTIC PURPOSES: Primary | ICD-10-CM

## 2018-11-06 DIAGNOSIS — M47.816 LUMBAR FACET ARTHROPATHY: ICD-10-CM

## 2018-11-06 DIAGNOSIS — M54.12 CERVICAL RADICULOPATHY: ICD-10-CM

## 2018-11-06 DIAGNOSIS — M51.36 DDD (DEGENERATIVE DISC DISEASE), LUMBAR: ICD-10-CM

## 2018-11-06 PROCEDURE — 1101F PT FALLS ASSESS-DOCD LE1/YR: CPT | Mod: CPTII,S$GLB,, | Performed by: PHYSICIAN ASSISTANT

## 2018-11-06 PROCEDURE — 80307 DRUG TEST PRSMV CHEM ANLYZR: CPT

## 2018-11-06 PROCEDURE — 99214 OFFICE O/P EST MOD 30 MIN: CPT | Mod: S$GLB,,, | Performed by: PHYSICIAN ASSISTANT

## 2018-11-06 PROCEDURE — 99999 PR PBB SHADOW E&M-EST. PATIENT-LVL III: CPT | Mod: PBBFAC,,, | Performed by: PHYSICIAN ASSISTANT

## 2018-11-06 RX ORDER — CYCLOBENZAPRINE HCL 10 MG
10 TABLET ORAL 3 TIMES DAILY PRN
Qty: 90 TABLET | Refills: 1 | Status: SHIPPED | OUTPATIENT
Start: 2018-11-06 | End: 2018-12-06

## 2018-11-06 RX ORDER — TIZANIDINE 4 MG/1
4 TABLET ORAL 3 TIMES DAILY
Qty: 90 TABLET | Refills: 1 | Status: SHIPPED | OUTPATIENT
Start: 2018-11-06 | End: 2019-01-07 | Stop reason: SDUPTHER

## 2018-11-06 NOTE — PROGRESS NOTES
Referring Physician: No ref. provider found    PCP: Hermelindo Sanchez MD      CC: Neck and lower back pain    Interval History:  Mr. Goode his a 68 y.o. male with chronic low back and neck pain who presents for f/u and medication refill. He is s/p Lumbar NATALIE at L5-S1 that provided 80% improvement. Continues to have some benefit. He is s/p cervical NATALIE that provided only mild benefit. He started regular massage therapy since LCV which has been helpful. He takes Baclofen but does not find it helpful. He takes Hydrocodone sparingly with moderate benefit. He is not currently on any anti neuropathic agents. Previous UDS was postive for THC. He denies using any marijuana products but does report his roommate smokes. He is going to visit his son in [place] for 2 months in February. Pain today is rated 3/10.    Prior HPI:   Patient is 66-year-old male referred to us for neck and lower back pain.  Lower back pain is more bothersome currently.  Pain has been present for over 30 years.  He has intermittent aching, burning, sharp pain in his lower back.  Pain radiates down his left lower leg into his left calf.  He does have numbness in his left toes.  He also has posterior neck pain and radiates to his bilateral shoulders.  He denies any radiating arm pain.  Pain worsens with lifting, getting up, standing and walking.  Pain improves with rest.  He has tried physical therapy with minimal benefit.  He is underwent lumbar NATALIE's as well as cervical NATALIE's with Dr. Andres in the past with moderate benefit.  He has not any interventions recently.  He does desire a lumbar NATALIE in the near future with a cervical NATALIE to follow.  He denies any worsening weakness.  No bowel bladder changes.  He takes Norco very sparingly with mild to moderate benefit.  He rates his pain 4/10 today, 10/10 at worse.    ROS:  CONSTITUTIONAL: No fevers, chills, night sweats, wt. loss, appetite changes  SKIN: no rashes or itching  ENT: No headaches, head trauma,  vision changes, or eye pain  LYMPH NODES: None noticed   CV: No chest pain, palpitations.   RESP: No shortness of breath, dyspnea on exertion, cough, wheezing, or hemoptysis  GI: No nausea, emesis, diarrhea, constipation, melena, hematochezia, pain.    : No dysuria, hematuria, urgency, or frequency   HEME: No easy bruising, bleeding problems  PSYCHIATRIC: No depression, anxiety, psychosis, hallucinations.  NEURO: No seizures, memory loss, dizziness or difficulty sleeping  MSK: +HPI      Past Medical History:   Diagnosis Date    Arthritis     Cancer SKIN    Cardiac angina     COPD (chronic obstructive pulmonary disease)     Coronary artery disease ANGINA. HAD  [facility] 8/12. 40 % BLOCKAGE. DR RUBY IS CARDIOLOGIST.    Depression     GERD (gastroesophageal reflux disease)     Trigger thumb     BILAT    Wears dentures     UPPER    Wears glasses      Past Surgical History:   Procedure Laterality Date    APPENDECTOMY      BACK SURGERY      INJECTION-STEROID-EPIDURAL-CERVICAL N/A 3/19/2018    [facility]    INJECTION-STEROID-EPIDURAL-CERVICAL N/A 7/13/2017    [facility]    INJECTION-STEROID-EPIDURAL-CERVICAL N/A 5/1/2017    [facility]    INJECTION-STEROID-EPIDURAL-LUMBAR N/A 4/9/2018    [facility]    INJECTION-STEROID-EPIDURAL-LUMBAR N/A 4/11/2017    [facility]    KNEE SURGERY      BILAT    PROCEDURE GANGLIONECTOMY-FINGER Left 1/23/2013    [facility]    RELEASE, TRIGGER FINGER Left 1/23/2013    [facility]    TRIGGER THUMB      RIGHT     Family History   Problem Relation Age of Onset    COPD Mother     Diabetes Mother     Heart disease Mother     Birth defects Father     Diabetes Father     Heart disease Father      Social History     Socioeconomic History    Marital status:      Spouse name:  "None    Number of children: None    Years of education: None    Highest education level: None   Social Needs    Financial resource strain: None    Food insecurity - worry: None    Food insecurity - inability: None    Transportation needs - medical: None    Transportation needs - non-medical: None   Occupational History    None   Tobacco Use    Smoking status: Current Some Day Smoker     Years: 40.00    Smokeless tobacco: Never Used    Tobacco comment: 1-2 CIGT SOME DAYS   Substance and Sexual Activity    Alcohol use: Yes     Comment: beer on occasion    Drug use: No    Sexual activity: Yes     Partners: Female   Other Topics Concern    None   Social History Narrative    None         Medications/Allergies: See med card    Vitals:    11/06/18 1000   BP: 124/77   Pulse: 84   Weight: 89.4 kg (197 lb)   Height: 6' 3" (1.905 m)   PainSc:   3   PainLoc: Back     Physical exam:    GENERAL: A and O x3, the patient appears well groomed and is in no acute distress.  Skin: No rashes or obvious lesions  HEENT: normocephalic, atraumatic  CARDIOVASCULAR:  RRR  LUNGS: non labored breathing  ABDOMEN: soft, nontender   UPPER EXTREMITIES: Normal alignment, normal range of motion, no atrophy, no skin changes,  hair growth and nail growth normal and equal bilaterally. No swelling, no tenderness.    LOWER EXTREMITIES:  Normal alignment, normal range of motion, no atrophy, no skin changes,  hair growth and nail growth normal and equal bilaterally. No swelling, no tenderness.  CERVICAL SPINE:  Cervical spine: ROM is full in flexion, extension and lateral rotation with moderate increased pain.  Spurling's maneuver causes no neck pain to either side.  Myofascial exam: moderateTenderness to palpation across cervical paraspinous region bilaterally.    LUMBAR SPINE  Lumbar spine: ROM is limited with flexion extension and oblique extension with moderate increased pain.    Ej's test causes no increased pain on either side.  "   Supine straight leg raise is negative  Internal and external rotation of the hip causes no increased pain on either side.  Myofascial exam: No tenderness to palpation across lumbar paraspinous muscles.      MENTAL STATUS: normal orientation, speech, language, and fund of knowledge for social situation.  Emotional state appropriate.    CRANIAL NERVES:  II:  PERRL bilaterally,   III,IV,VI: EOMI.    V:  Facial sensation equal bilaterally  VII:  Facial motor function normal.  VIII:  Hearing equal to finger rub bilaterally  IX/X: Gag normal, palate symmetric  XI:  Shoulder shrug equal, head turn equal  XII:  Tongue midline without fasciculations      MOTOR: Tone and bulk: normal bilateral upper and lower Strength: normal   Delt Bi Tri WE WF     R 5 5 5 5 5 5   L 5 5 5 5 5 5     IP ADD ABD Quad TA Gas HAM  R 5 5 5 5 5 5 5  L 5 5 5 5 5 5 5    SENSATION: Light touch and pinprick intact bilaterally  REFLEXES: normal, symmetric, nonbrisk.  Toes down, no clonus. No hoffmans.  GAIT: normal rise, base, steps, and arm swing.        Imaging:  MRI lumbar spine 10/2012 ([facility])  L2-3, mild disc desiccation.  Mild bilateral facet hypertrophy.  Small right paracentral disc protrusion.  Minimal narrowing of bilateral neural foraminal  L3-4, grade 1 retrolisthesis, disc desiccation, mild disc height loss.  Mild to moderate right lateral recess narrowing seen.  Mild to moderate bilateral facet arthrosis.  L4-5, disc desiccation.  Mild to moderate bilateral facet hypertrophy.  No significant spinal canal stenosis.  Mild bilateral neuroforaminal narrowing.  L5-S1, disc desiccation.  Severe disc height loss.  Moderate bilateral facet arthrosis.  Severe bilateral neural foraminal stenosis.    MRI cervical spine 10/2012  C2-3: Mild posterior disc osteophyte complex.  At C3-4, right greater than left vertebral joint hypertrophy.  Mild to severe bilateral neuroforaminal narrowing, right greater than left.  C4-5, broad-based disc  osteophyte complex.  Bilateral uncovertebral joint hypertrophy and moderate bilateral facet arthrosis.  Moderate to severe bilateral neuroforaminal narrowing.  C5-6, moderate posterior disc osteophyte complex.  Mild central canal stenosis.  Severe right and moderate to severe left neuroforaminal narrowing.  C6-7, moderate disc osteophyte complex.  Mild central canal stenosis.  Moderate to severe bilateral neuroforaminal narrowing    Assessment: Mr. Goode is a 68 y.o. male with neck and lower back pain  1. Chronic use of opiate drugs therapeutic purposes    2. DDD (degenerative disc disease), lumbar    3. Cervical radiculopathy    4. Lumbar facet arthropathy      Plan:  1.  I have stressed the importance of physical activity and exercise to improve overall health. Home neck exercises given  2. He wishes to repeat cervical NATALIE prior to trip in February.   3. He did request pain medications.  He takes it very sparingly.  Script for Norco 10mg provided. Repeat UDS today. Warning was issued after last UDS results.   4. Discontinue Baclofen. Start zanaflex 4mg q 8 h prn   5. Follow up in 1 month   All medication management was performed by Dr. Steffen Ortiz

## 2018-12-19 ENCOUNTER — TELEPHONE (OUTPATIENT)
Dept: PAIN MEDICINE | Facility: CLINIC | Age: 68
End: 2018-12-19

## 2018-12-19 NOTE — TELEPHONE ENCOUNTER
Patient needed an appointment for refills on Hydrocodone. Per patient request scheduled appointment for 1/7/19

## 2018-12-19 NOTE — TELEPHONE ENCOUNTER
----- Message from Sylvia Landon sent at 12/19/2018 11:53 AM CST -----    Type:  RX Refill Request    Who Called:  pt  Refill   RX Name and Strength: hydroceone 10/325  Mg//How is the patient currently taking it? (ex. 1XDay):  As needed  Is this a 30 day or 90 day RX: 30  day  Preferred Pharmacy with phone number:    [facility]/pharmacy #[id number] - [place] - [address]  [address] [address]  Phone: [phone] Fax: [phone]  Local Ordering Provider:   Dr zechariah Sommer Call Back Number: [phone]  Additional Information:   Calling  For a  refill

## 2019-01-07 ENCOUNTER — OFFICE VISIT (OUTPATIENT)
Dept: PAIN MEDICINE | Facility: CLINIC | Age: 69
End: 2019-01-07
Payer: MEDICARE

## 2019-01-07 VITALS
BODY MASS INDEX: 24.49 KG/M2 | HEART RATE: 83 BPM | SYSTOLIC BLOOD PRESSURE: 127 MMHG | WEIGHT: 197 LBS | HEIGHT: 75 IN | DIASTOLIC BLOOD PRESSURE: 79 MMHG

## 2019-01-07 DIAGNOSIS — M51.36 DDD (DEGENERATIVE DISC DISEASE), LUMBAR: ICD-10-CM

## 2019-01-07 DIAGNOSIS — M47.816 LUMBAR FACET ARTHROPATHY: ICD-10-CM

## 2019-01-07 DIAGNOSIS — M54.12 CERVICAL RADICULOPATHY: ICD-10-CM

## 2019-01-07 DIAGNOSIS — Z79.891 CHRONIC USE OF OPIATE FOR THERAPEUTIC PURPOSE: ICD-10-CM

## 2019-01-07 DIAGNOSIS — M62.838 MUSCLE SPASM: Primary | ICD-10-CM

## 2019-01-07 DIAGNOSIS — M79.18 MYOFASCIAL PAIN: ICD-10-CM

## 2019-01-07 PROCEDURE — 99999 PR PBB SHADOW E&M-EST. PATIENT-LVL IV: CPT | Mod: PBBFAC,,, | Performed by: PHYSICIAN ASSISTANT

## 2019-01-07 PROCEDURE — 1101F PT FALLS ASSESS-DOCD LE1/YR: CPT | Mod: CPTII,S$GLB,, | Performed by: PHYSICIAN ASSISTANT

## 2019-01-07 PROCEDURE — 99214 PR OFFICE/OUTPT VISIT, EST, LEVL IV, 30-39 MIN: ICD-10-PCS | Mod: S$GLB,,, | Performed by: PHYSICIAN ASSISTANT

## 2019-01-07 PROCEDURE — 99214 OFFICE O/P EST MOD 30 MIN: CPT | Mod: S$GLB,,, | Performed by: PHYSICIAN ASSISTANT

## 2019-01-07 PROCEDURE — 99999 PR PBB SHADOW E&M-EST. PATIENT-LVL IV: ICD-10-PCS | Mod: PBBFAC,,, | Performed by: PHYSICIAN ASSISTANT

## 2019-01-07 PROCEDURE — 1101F PR PT FALLS ASSESS DOC 0-1 FALLS W/OUT INJ PAST YR: ICD-10-PCS | Mod: CPTII,S$GLB,, | Performed by: PHYSICIAN ASSISTANT

## 2019-01-07 PROCEDURE — 80307 DRUG TEST PRSMV CHEM ANLYZR: CPT

## 2019-01-07 RX ORDER — HYDROCODONE BITARTRATE AND ACETAMINOPHEN 10; 325 MG/1; MG/1
1 TABLET ORAL EVERY 12 HOURS PRN
Qty: 90 TABLET | Refills: 0 | Status: SHIPPED | OUTPATIENT
Start: 2019-01-07 | End: 2019-02-05

## 2019-01-07 RX ORDER — TIZANIDINE 4 MG/1
4 TABLET ORAL 3 TIMES DAILY
Qty: 90 TABLET | Refills: 1 | Status: SHIPPED | OUTPATIENT
Start: 2019-01-07 | End: 2019-11-21

## 2019-01-07 RX ORDER — HYDROCODONE BITARTRATE AND ACETAMINOPHEN 10; 325 MG/1; MG/1
1 TABLET ORAL EVERY 12 HOURS PRN
Status: ON HOLD | COMMUNITY
End: 2019-01-30 | Stop reason: SDUPTHER

## 2019-01-07 NOTE — PROGRESS NOTES
Referring Physician: No ref. provider found    PCP: Hermelindo Sanchez MD      CC: Neck and lower back pain    Interval History:  Mr. Goode his a 68 y.o. male with chronic low back and neck pain who presents for f/u and medication refill. He is s/p Lumbar NATALIE at L5-S1 that provided 80% improvement. Continues to have some benefit. He is s/p cervical NATALIE that provided only mild benefit. He started regular massage therapy since LCV which has been helpful. Zanaflex has been more helpful than Baclofen He takes Hydrocodone sparingly with moderate benefit. He is not currently on any anti neuropathic agents. Previous UDS was postive for THC. He denies using any marijuana products but does report his roommate smokes. He is going to visit his son in [place] for 2 months in February. Pain today is rated 8/10.    Prior HPI:   Patient is 66-year-old male referred to us for neck and lower back pain.  Lower back pain is more bothersome currently.  Pain has been present for over 30 years.  He has intermittent aching, burning, sharp pain in his lower back.  Pain radiates down his left lower leg into his left calf.  He does have numbness in his left toes.  He also has posterior neck pain and radiates to his bilateral shoulders.  He denies any radiating arm pain.  Pain worsens with lifting, getting up, standing and walking.  Pain improves with rest.  He has tried physical therapy with minimal benefit.  He is underwent lumbar NATALIE's as well as cervical NATALIE's with Dr. Andres in the past with moderate benefit.  He has not any interventions recently.  He does desire a lumbar NATALIE in the near future with a cervical NATALIE to follow.  He denies any worsening weakness.  No bowel bladder changes.  He takes Norco very sparingly with mild to moderate benefit.  He rates his pain 4/10 today, 10/10 at worse.    ROS:  CONSTITUTIONAL: No fevers, chills, night sweats, wt. loss, appetite changes  SKIN: no rashes or itching  ENT: No headaches, head trauma,  vision changes, or eye pain  LYMPH NODES: None noticed   CV: No chest pain, palpitations.   RESP: No shortness of breath, dyspnea on exertion, cough, wheezing, or hemoptysis  GI: No nausea, emesis, diarrhea, constipation, melena, hematochezia, pain.    : No dysuria, hematuria, urgency, or frequency   HEME: No easy bruising, bleeding problems  PSYCHIATRIC: No depression, anxiety, psychosis, hallucinations.  NEURO: No seizures, memory loss, dizziness or difficulty sleeping  MSK: +HPI      Past Medical History:   Diagnosis Date    Arthritis     Cancer SKIN    Cardiac angina     COPD (chronic obstructive pulmonary disease)     Coronary artery disease ANGINA. HAD  [facility] 8/12. 40 % BLOCKAGE. DR RUBY IS CARDIOLOGIST.    Depression     GERD (gastroesophageal reflux disease)     Trigger thumb     BILAT    Wears dentures     UPPER    Wears glasses      Past Surgical History:   Procedure Laterality Date    APPENDECTOMY      BACK SURGERY      INJECTION-STEROID-EPIDURAL-CERVICAL N/A 3/19/2018    [facility]    INJECTION-STEROID-EPIDURAL-CERVICAL N/A 7/13/2017    [facility]    INJECTION-STEROID-EPIDURAL-CERVICAL N/A 5/1/2017    [facility]    INJECTION-STEROID-EPIDURAL-LUMBAR N/A 4/9/2018    [facility]    INJECTION-STEROID-EPIDURAL-LUMBAR N/A 4/11/2017    [facility]    KNEE SURGERY      BILAT    PROCEDURE GANGLIONECTOMY-FINGER Left 1/23/2013    [facility]    RELEASE, TRIGGER FINGER Left 1/23/2013    [facility]    TRIGGER THUMB      RIGHT     Family History   Problem Relation Age of Onset    COPD Mother     Diabetes Mother     Heart disease Mother     Birth defects Father     Diabetes Father     Heart disease Father      Social History     Socioeconomic History    Marital status:      Spouse name:  "None    Number of children: None    Years of education: None    Highest education level: None   Social Needs    Financial resource strain: None    Food insecurity - worry: None    Food insecurity - inability: None    Transportation needs - medical: None    Transportation needs - non-medical: None   Occupational History    None   Tobacco Use    Smoking status: Current Some Day Smoker     Years: 40.00    Smokeless tobacco: Never Used    Tobacco comment: 1-2 CIGT SOME DAYS   Substance and Sexual Activity    Alcohol use: Yes     Comment: beer on occasion    Drug use: No    Sexual activity: Yes     Partners: Female   Other Topics Concern    None   Social History Narrative    None         Medications/Allergies: See med card    Vitals:    01/07/19 1112   BP: 127/79   Pulse: 83   Weight: 89.4 kg (197 lb)   Height: 6' 3" (1.905 m)   PainSc:   8   PainLoc: Neck     Physical exam:    GENERAL: A and O x3, the patient appears well groomed and is in no acute distress.  Skin: No rashes or obvious lesions  HEENT: normocephalic, atraumatic  CARDIOVASCULAR:  RRR  LUNGS: non labored breathing  ABDOMEN: soft, nontender   UPPER EXTREMITIES: Normal alignment, normal range of motion, no atrophy, no skin changes,  hair growth and nail growth normal and equal bilaterally. No swelling, no tenderness.    LOWER EXTREMITIES:  Normal alignment, normal range of motion, no atrophy, no skin changes,  hair growth and nail growth normal and equal bilaterally. No swelling, no tenderness.  CERVICAL SPINE:  Cervical spine: ROM is full in flexion, extension and lateral rotation with moderate increased pain.  Spurling's maneuver causes no neck pain to either side.  Myofascial exam: moderateTenderness to palpation across cervical paraspinous region bilaterally.    LUMBAR SPINE  Lumbar spine: ROM is limited with flexion extension and oblique extension with moderate increased pain.    Ej's test causes no increased pain on either side.  "   Supine straight leg raise is negative  Internal and external rotation of the hip causes no increased pain on either side.  Myofascial exam: moderate tenderness to palpation across lumbar paraspinous muscles on left       MENTAL STATUS: normal orientation, speech, language, and fund of knowledge for social situation.  Emotional state appropriate.    CRANIAL NERVES:  II:  PERRL bilaterally,   III,IV,VI: EOMI.    V:  Facial sensation equal bilaterally  VII:  Facial motor function normal.  VIII:  Hearing equal to finger rub bilaterally  IX/X: Gag normal, palate symmetric  XI:  Shoulder shrug equal, head turn equal  XII:  Tongue midline without fasciculations      MOTOR: Tone and bulk: normal bilateral upper and lower Strength: normal   Delt Bi Tri WE WF     R 5 5 5 5 5 5   L 5 5 5 5 5 5     IP ADD ABD Quad TA Gas HAM  R 5 5 5 5 5 5 5  L 5 5 5 5 5 5 5    SENSATION: Light touch and pinprick intact bilaterally  REFLEXES: normal, symmetric, nonbrisk.  Toes down, no clonus. No hoffmans.  GAIT: normal rise, base, steps, and arm swing.        Imaging:  MRI lumbar spine 10/2012 ([facility])  L2-3, mild disc desiccation.  Mild bilateral facet hypertrophy.  Small right paracentral disc protrusion.  Minimal narrowing of bilateral neural foraminal  L3-4, grade 1 retrolisthesis, disc desiccation, mild disc height loss.  Mild to moderate right lateral recess narrowing seen.  Mild to moderate bilateral facet arthrosis.  L4-5, disc desiccation.  Mild to moderate bilateral facet hypertrophy.  No significant spinal canal stenosis.  Mild bilateral neuroforaminal narrowing.  L5-S1, disc desiccation.  Severe disc height loss.  Moderate bilateral facet arthrosis.  Severe bilateral neural foraminal stenosis.    MRI cervical spine 10/2012  C2-3: Mild posterior disc osteophyte complex.  At C3-4, right greater than left vertebral joint hypertrophy.  Mild to severe bilateral neuroforaminal narrowing, right greater than left.  C4-5,  broad-based disc osteophyte complex.  Bilateral uncovertebral joint hypertrophy and moderate bilateral facet arthrosis.  Moderate to severe bilateral neuroforaminal narrowing.  C5-6, moderate posterior disc osteophyte complex.  Mild central canal stenosis.  Severe right and moderate to severe left neuroforaminal narrowing.  C6-7, moderate disc osteophyte complex.  Mild central canal stenosis.  Moderate to severe bilateral neuroforaminal narrowing    Assessment: Mr. Goode is a 68 y.o. male with neck and lower back pain  1. Muscle spasm    2. Chronic use of opiate for therapeutic purpose    3. DDD (degenerative disc disease), lumbar    4. Cervical radiculopathy    5. Lumbar facet arthropathy    6. Myofascial pain      Plan:  1.  I have stressed the importance of physical activity and exercise to improve overall health. Home neck exercises given  2.  He did request pain medications.  Script for Norco 10mg provided. Repeat UDS today. Warning was issued after last UDS results.   3. Z zanaflex 4mg q 8 h prn   4. Follow up in 1 month   All medication management was performed by Dr. Steffen Ortiz

## 2019-01-08 ENCOUNTER — TELEPHONE (OUTPATIENT)
Dept: PAIN MEDICINE | Facility: CLINIC | Age: 69
End: 2019-01-08

## 2019-01-08 NOTE — TELEPHONE ENCOUNTER
----- Message from Mel Guerrero sent at 1/8/2019  1:07 PM CST -----  Contact: self  Patient need to speak to nurse regarding medication HYDROcodone-acetaminophen (NORCO)  mg per tablet     Patient states his pharmacy fax over something regarding medication HYDROcodone-acetaminophen (NORCO)  mg per tablet (form was faxed on 01/07 per pt.)    Patient states pharmacy only giving him 7 day supply due to needing to hear from office of Dr. Ortiz(need over ride     Please call pt at [phone] (home)         [facility]/pharmacy #[id number] - [place] - [address]  [address] [address]  Phone: [phone] Fax: [phone]

## 2019-01-08 NOTE — TELEPHONE ENCOUNTER
----- Message from Ralph Ortiz sent at 1/8/2019  3:32 PM CST -----  Contact: Foster with [facility]   Foster with [facility] called, she need to speak with a nurse regarding rx HYDROcodone-acetaminophen (NORCO)  mg per tablet they are having trouble getting rx covered. Please call back at [phone].

## 2019-01-11 LAB
6MAM UR QL: NOT DETECTED
7AMINOCLONAZEPAM UR QL: NOT DETECTED
A-OH ALPRAZ UR QL: NOT DETECTED
ALPRAZ UR QL: NOT DETECTED
AMPHET UR QL SCN: NOT DETECTED
ANNOTATION COMMENT IMP: ABNORMAL
ANNOTATION COMMENT IMP: ABNORMAL
BARBITURATES UR QL: NOT DETECTED
BUPRENORPHINE UR QL: NOT DETECTED
BZE UR QL: NOT DETECTED
CARBOXYTHC UR QL: NOT DETECTED
CARISOPRODOL UR QL: NOT DETECTED
CLONAZEPAM UR QL: NOT DETECTED
CODEINE UR QL: NOT DETECTED
CREAT UR-MCNC: <20 MG/DL (ref 20–400)
DIAZEPAM UR QL: NOT DETECTED
ETHYL GLUCURONIDE UR QL: NOT DETECTED
FENTANYL UR QL: NOT DETECTED
HYDROCODONE UR QL: NOT DETECTED
HYDROMORPHONE UR QL: NOT DETECTED
LORAZEPAM UR QL: NOT DETECTED
MDA UR QL: NOT DETECTED
MDEA UR QL: NOT DETECTED
MDMA UR QL: NOT DETECTED
ME-PHENIDATE UR QL: NOT DETECTED
MEPERIDINE UR QL: NOT DETECTED
METHADONE UR QL: NOT DETECTED
METHAMPHET UR QL: NOT DETECTED
MIDAZOLAM UR QL SCN: NOT DETECTED
MORPHINE UR QL: NOT DETECTED
NORBUPRENORPHINE UR QL CFM: NOT DETECTED
NORDIAZEPAM UR QL: NOT DETECTED
NORFENTANYL UR QL: NOT DETECTED
NORHYDROCODONE UR QL CFM: NOT DETECTED
NOROXYCODONE UR QL CFM: NOT DETECTED
NOROXYMORPHONE: NOT DETECTED
OXAZEPAM UR QL: NOT DETECTED
OXYCODONE UR QL: NOT DETECTED
OXYMORPHONE UR QL: NOT DETECTED
PATHOLOGY STUDY: ABNORMAL
PCP UR QL: NOT DETECTED
PHENTERMINE UR QL: NOT DETECTED
PROPOXYPH UR QL: NOT DETECTED
SERVICE CMNT-IMP: ABNORMAL
TAPENTADOL UR QL SCN: NOT DETECTED
TAPENTADOL-O-SULF: NOT DETECTED
TEMAZEPAM UR QL: NOT DETECTED
TRAMADOL UR QL: NOT DETECTED
ZOLPIDEM UR QL: NOT DETECTED

## 2019-01-14 ENCOUNTER — TELEPHONE (OUTPATIENT)
Dept: PAIN MEDICINE | Facility: CLINIC | Age: 69
End: 2019-01-14

## 2019-01-14 NOTE — TELEPHONE ENCOUNTER
Patient refused blood test. Informed patient that we are no longer able to prescribe controlled substances if he refused the blood test. Patient voiced understanding and still refused test.

## 2019-01-14 NOTE — TELEPHONE ENCOUNTER
Creatinine was lower than normal range. Urine appears dilute and sample was negative for Hydrocodone. We will no longer be able to prescribe controlled substances.

## 2019-01-14 NOTE — TELEPHONE ENCOUNTER
"Informed patient of UDS results. Patient stated that he did not dilute his urine and had not taken his medication for a "few" days.   "

## 2019-01-30 PROBLEM — R91.1 LUNG NODULE: Status: ACTIVE | Noted: 2019-01-30

## 2019-03-29 ENCOUNTER — INITIAL CONSULT (OUTPATIENT)
Dept: HEMATOLOGY/ONCOLOGY | Facility: CLINIC | Age: 69
End: 2019-03-29
Payer: MEDICARE

## 2019-03-29 VITALS
HEART RATE: 72 BPM | HEIGHT: 75 IN | WEIGHT: 192.44 LBS | SYSTOLIC BLOOD PRESSURE: 127 MMHG | DIASTOLIC BLOOD PRESSURE: 72 MMHG | TEMPERATURE: 98 F | OXYGEN SATURATION: 97 % | RESPIRATION RATE: 24 BRPM | BODY MASS INDEX: 23.93 KG/M2

## 2019-03-29 DIAGNOSIS — R91.1 LUNG NODULE: Primary | ICD-10-CM

## 2019-03-29 PROCEDURE — 99999 PR PBB SHADOW E&M-EST. PATIENT-LVL III: ICD-10-PCS | Mod: PBBFAC,,, | Performed by: INTERNAL MEDICINE

## 2019-03-29 PROCEDURE — 99999 PR PBB SHADOW E&M-EST. PATIENT-LVL III: CPT | Mod: PBBFAC,,, | Performed by: INTERNAL MEDICINE

## 2019-03-29 PROCEDURE — 99205 OFFICE O/P NEW HI 60 MIN: CPT | Mod: S$GLB,,, | Performed by: INTERNAL MEDICINE

## 2019-03-29 PROCEDURE — 1101F PT FALLS ASSESS-DOCD LE1/YR: CPT | Mod: CPTII,S$GLB,, | Performed by: INTERNAL MEDICINE

## 2019-03-29 PROCEDURE — 1101F PR PT FALLS ASSESS DOC 0-1 FALLS W/OUT INJ PAST YR: ICD-10-PCS | Mod: CPTII,S$GLB,, | Performed by: INTERNAL MEDICINE

## 2019-03-29 PROCEDURE — 99205 PR OFFICE/OUTPT VISIT, NEW, LEVL V, 60-74 MIN: ICD-10-PCS | Mod: S$GLB,,, | Performed by: INTERNAL MEDICINE

## 2019-03-29 NOTE — PROGRESS NOTES
HPI    68 years old  male who was presented to Oncology Clinic for evaluation with squamous cell carcinoma of the lung.  Patient was recently diagnosed in January with fine-needle aspiration of the right lung nodule was found to have squamous cell carcinoma.  And recently patient had a which resection(by description) at [facility].  Since then patient was instructed to follow up with our medical oncologist for evaluation and treatment.  Patient was told that doing the which resection tumor is larger than expected compared to the images.  It might be present as late stage I or early stage II.  However at this time I do not have previous images as well as pathology to be compared to.    Currently patient denies any chest pain shortness breath.  Patient denies any abdominal pain nausea vomiting or diarrhea.  Patient denies any weight loss night sweats fever or chills.    Review of system  Fourteen point review of system negative except for above.      Past Medical History:   Diagnosis Date    Arthritis     Cancer SKIN    Cardiac angina     COPD (chronic obstructive pulmonary disease)     Coronary artery disease ANGINA. HAD  [facility] 8/12. 40 % BLOCKAGE. DR RUBY IS CARDIOLOGIST.    Depression     GERD (gastroesophageal reflux disease)     Trigger thumb     BILAT    Wears dentures     UPPER    Wears glasses      Social History     Socioeconomic History    Marital status:      Spouse name: Not on file    Number of children: Not on file    Years of education: Not on file    Highest education level: Not on file   Occupational History    Not on file   Social Needs    Financial resource strain: Not on file    Food insecurity:     Worry: Not on file     Inability: Not on file    Transportation needs:     Medical: Not on file     Non-medical: Not on file   Tobacco Use    Smoking status: Current Some Day Smoker     Years: 40.00    Tobacco comment: 1-2 CIGT SOME DAYS/states last  cigarette 1/2/19   Substance and Sexual Activity    Alcohol use: Yes     Comment: beer on occasion    Drug use: No    Sexual activity: Yes     Partners: Female   Lifestyle    Physical activity:     Days per week: Not on file     Minutes per session: Not on file    Stress: Not on file   Relationships    Social connections:     Talks on phone: Not on file     Gets together: Not on file     Attends Zoroastrian service: Not on file     Active member of club or organization: Not on file     Attends meetings of clubs or organizations: Not on file     Relationship status: Not on file    Intimate partner violence:     Fear of current or ex partner: Not on file     Emotionally abused: Not on file     Physically abused: Not on file     Forced sexual activity: Not on file   Other Topics Concern    Not on file   Social History Narrative    Not on file         Subjective      Review of Systems   Constitutional: Negative for appetite change, fatigue and unexpected weight change.   HENT: Negative for mouth sores.   Eyes: Negative for visual disturbance.   Respiratory: Negative for cough and shortness of breath.   Cardiovascular: Negative for chest pain.   Gastrointestinal: Negative for diarrhea.   Genitourinary: Negative for frequency.   Musculoskeletal: Negative for back pain.   Skin: Negative for rash.   Neurological: Negative for headaches.   Hematological: Negative for adenopathy.   Psychiatric/Behavioral: The patient is not nervous/anxious.   All other systems reviewed and are negative.     Objective    Physical Exam   Vitals:    03/29/19 0944   BP: 127/72   Pulse: 72   Resp: (!) 24   Temp: 98 °F (36.7 °C)       Constitutional: patient is oriented to person, place, and time. patient appears well-developed and well-nourished. No distress.   HENT:  Normocephalic atraumatic pupils equal round reactive to light extraocular muscle intact.  Cardiovascular:  Regular rate and rhythm     Pulmonary/Chest:  Symmetrical chest a  rising  Abdominal:  Soft nontender nondistended bowel sounds x4   Musculoskeletal: Normal range of motion. Gait is normal No clubbing, cyanosis     Lymphadenopathy:  No evidence of lymphadenopathy  Neurological:  Sensorimotor grossly intact cranial nerves 2-12 grossly intact.    Skin:  Warm dry no rash no lesions   Psychiatric:  Normal affect    CMP  Sodium   Date Value Ref Range Status   01/18/2013 136 136 - 145 mmol/L Final     Potassium   Date Value Ref Range Status   01/18/2013 4.2 3.5 - 5.1 mmol/L Final     Chloride   Date Value Ref Range Status   01/18/2013 102 95 - 110 mmol/L Final     CO2   Date Value Ref Range Status   01/18/2013 23 23 - 29 mmol/L Final     BUN, Bld   Date Value Ref Range Status   01/30/2019 17 9 - 21 mg/dL Final     Creatinine   Date Value Ref Range Status   01/30/2019 1.06 0.50 - 1.40 mg/dL Final   01/18/2013 0.9 0.5 - 1.4 mg/dL Final     Calcium   Date Value Ref Range Status   01/18/2013 10.7 (H) 8.7 - 10.5 mg/dL Final     Anion Gap   Date Value Ref Range Status   01/18/2013 11 5 - 15 meq/L Final     eGFR if    Date Value Ref Range Status   01/30/2019 >60 >60 mL/min/1.73 m^2 Final     eGFR if non    Date Value Ref Range Status   01/30/2019 >60 >60 mL/min/1.73 m^2 Final     Comment:     Calculation used to obtain the estimated glomerular filtration  rate (eGFR) is the CKD-EPI equation.        Lab Results   Component Value Date    WBC 7.92 01/30/2019    HGB 13.6 (L) 01/30/2019    HCT 41.8 01/30/2019    MCV 82 01/30/2019     01/30/2019       Assessment Plan    [] 03/30/2019 LUNG, RIGHT, FINE NEEDLE ASPIRATION:  - POSITIVE FOR SQUAMOUS CELL CARCINOMA    [] Status post right lung resection [facility] 4 weeks prior to this visit.  Patient was told size of tumor is larger than expected he might be having place stage I or early stage II SCC.  Patient is here for evaluation and recommendation.    [] Restaging Staging  -CT chest abdomen pelvis with IV  contrast    [] [facility]      [] I will have patient return to clinic next week after I have received and reviewed all pathology report images studies.    [] Today I have briefly explained to patient regarding possibility of additional treatment    [] Patient voices understanding.    [] Will follow next week.

## 2019-03-29 NOTE — LETTER
March 29, 2019      Vincent Gamino MD  [address]   [address]  [facility] [address]           [facility]  [address], [address]  [facility] [address]  Phone: [phone]          Patient: Adam Goode   MR Number: [id number]   YOB: 1950   Date of Visit: 3/29/2019       Dear Dr. Vincent Gamino:    Thank you for referring Adam Goode to me for evaluation. Attached you will find relevant portions of my assessment and plan of care.    If you have questions, please do not hesitate to call me. I look forward to following Adam Goode along with you.    Sincerely,    Kamron Jones MD    Enclosure  CC:  No Recipients    If you would like to receive this communication electronically, please contact [email or web address] or [phone] to request more information on [employer] Link access.    For providers and/or their staff who would like to refer a patient to [facility], please contact us through our one-stop-shop provider referral line, [facility], at [phone].    If you feel you have received this communication in error or would no longer like to receive these types of communications, please e-mail [email or web address]

## 2019-03-30 ENCOUNTER — HOSPITAL ENCOUNTER (OUTPATIENT)
Dept: RADIOLOGY | Facility: HOSPITAL | Age: 69
Discharge: HOME | End: 2019-03-30
Attending: INTERNAL MEDICINE
Payer: MEDICARE

## 2019-03-30 DIAGNOSIS — R91.1 LUNG NODULE: ICD-10-CM

## 2019-03-30 PROCEDURE — 74177 CT CHEST ABDOMEN PELVIS WITH CONTRAST (XPD): ICD-10-PCS | Mod: 26,,, | Performed by: RADIOLOGY

## 2019-03-30 PROCEDURE — 71260 CT THORAX DX C+: CPT | Mod: 26,,, | Performed by: RADIOLOGY

## 2019-03-30 PROCEDURE — 74177 CT ABD & PELVIS W/CONTRAST: CPT | Mod: TC

## 2019-03-30 PROCEDURE — 25500020 PHARM REV CODE 255: Performed by: INTERNAL MEDICINE

## 2019-03-30 PROCEDURE — 71260 CT CHEST ABDOMEN PELVIS WITH CONTRAST (XPD): ICD-10-PCS | Mod: 26,,, | Performed by: RADIOLOGY

## 2019-03-30 PROCEDURE — 74177 CT ABD & PELVIS W/CONTRAST: CPT | Mod: 26,,, | Performed by: RADIOLOGY

## 2019-03-30 RX ADMIN — IOHEXOL 100 ML: 350 INJECTION, SOLUTION INTRAVENOUS at 10:03

## 2019-04-02 ENCOUNTER — OFFICE VISIT (OUTPATIENT)
Dept: HEMATOLOGY/ONCOLOGY | Facility: CLINIC | Age: 69
End: 2019-04-02
Payer: MEDICARE

## 2019-04-02 VITALS
SYSTOLIC BLOOD PRESSURE: 123 MMHG | HEIGHT: 75 IN | BODY MASS INDEX: 24.37 KG/M2 | DIASTOLIC BLOOD PRESSURE: 81 MMHG | TEMPERATURE: 98 F | RESPIRATION RATE: 20 BRPM | HEART RATE: 89 BPM | WEIGHT: 196 LBS

## 2019-04-02 DIAGNOSIS — R91.1 LUNG NODULE: ICD-10-CM

## 2019-04-02 DIAGNOSIS — Z98.890 STATUS POST SURGERY: ICD-10-CM

## 2019-04-02 PROCEDURE — 1101F PR PT FALLS ASSESS DOC 0-1 FALLS W/OUT INJ PAST YR: ICD-10-PCS | Mod: CPTII,S$GLB,, | Performed by: INTERNAL MEDICINE

## 2019-04-02 PROCEDURE — 1101F PT FALLS ASSESS-DOCD LE1/YR: CPT | Mod: CPTII,S$GLB,, | Performed by: INTERNAL MEDICINE

## 2019-04-02 PROCEDURE — 99214 PR OFFICE/OUTPT VISIT, EST, LEVL IV, 30-39 MIN: ICD-10-PCS | Mod: S$GLB,,, | Performed by: INTERNAL MEDICINE

## 2019-04-02 PROCEDURE — 99999 PR PBB SHADOW E&M-EST. PATIENT-LVL III: CPT | Mod: PBBFAC,,, | Performed by: INTERNAL MEDICINE

## 2019-04-02 PROCEDURE — 99999 PR PBB SHADOW E&M-EST. PATIENT-LVL III: ICD-10-PCS | Mod: PBBFAC,,, | Performed by: INTERNAL MEDICINE

## 2019-04-02 PROCEDURE — 99214 OFFICE O/P EST MOD 30 MIN: CPT | Mod: S$GLB,,, | Performed by: INTERNAL MEDICINE

## 2019-04-02 NOTE — PROGRESS NOTES
HPI    68 years old  male who was presented to Oncology Clinic for evaluation with squamous cell carcinoma of the lung.  Patient was recently diagnosed in January with fine-needle aspiration of the right lung nodule was found to have squamous cell carcinoma.  And recently patient had a which resection(by description) at [facility].  Since then patient was instructed to follow up with our medical oncologist for evaluation and treatment.  Patient was told that doing the which resection tumor is larger than expected compared to the images.  It might be present as late stage I or early stage II.  However at this time I do not have previous images as well as pathology to be compared to.    Currently patient denies any chest pain shortness breath.  Patient denies any abdominal pain nausea vomiting or diarrhea.  Patient denies any weight loss night sweats fever or chills.    Review of system  Fourteen point review of system negative except for above.      Past Medical History:   Diagnosis Date    Arthritis     Cancer SKIN    Cardiac angina     COPD (chronic obstructive pulmonary disease)     Coronary artery disease ANGINA. HAD  [facility] 8/12. 40 % BLOCKAGE. DR RUBY IS CARDIOLOGIST.    Depression     GERD (gastroesophageal reflux disease)     Trigger thumb     BILAT    Wears dentures     UPPER    Wears glasses      Social History     Socioeconomic History    Marital status:      Spouse name: Not on file    Number of children: Not on file    Years of education: Not on file    Highest education level: Not on file   Occupational History    Not on file   Social Needs    Financial resource strain: Not on file    Food insecurity:     Worry: Not on file     Inability: Not on file    Transportation needs:     Medical: Not on file     Non-medical: Not on file   Tobacco Use    Smoking status: Current Some Day Smoker     Years: 40.00    Tobacco comment: 1-2 CIGT SOME DAYS/states last  cigarette 1/2/19   Substance and Sexual Activity    Alcohol use: Yes     Comment: beer on occasion    Drug use: No    Sexual activity: Yes     Partners: Female   Lifestyle    Physical activity:     Days per week: Not on file     Minutes per session: Not on file    Stress: Not on file   Relationships    Social connections:     Talks on phone: Not on file     Gets together: Not on file     Attends Episcopalian service: Not on file     Active member of club or organization: Not on file     Attends meetings of clubs or organizations: Not on file     Relationship status: Not on file    Intimate partner violence:     Fear of current or ex partner: Not on file     Emotionally abused: Not on file     Physically abused: Not on file     Forced sexual activity: Not on file   Other Topics Concern    Not on file   Social History Narrative    Not on file         Subjective      Review of Systems   Constitutional: Negative for appetite change, fatigue and unexpected weight change.   HENT: Negative for mouth sores.   Eyes: Negative for visual disturbance.   Respiratory: Negative for cough and shortness of breath.   Cardiovascular: Negative for chest pain.   Gastrointestinal: Negative for diarrhea.   Genitourinary: Negative for frequency.   Musculoskeletal: Negative for back pain.   Skin: Negative for rash.   Neurological: Negative for headaches.   Hematological: Negative for adenopathy.   Psychiatric/Behavioral: The patient is not nervous/anxious.   All other systems reviewed and are negative.     Objective    Physical Exam   There were no vitals filed for this visit.    Constitutional: patient is oriented to person, place, and time. patient appears well-developed and well-nourished. No distress.   HENT:  Normocephalic atraumatic pupils equal round reactive to light extraocular muscle intact.  Cardiovascular:  Regular rate and rhythm     Pulmonary/Chest:  Symmetrical chest a rising  Abdominal:  Soft nontender nondistended  bowel sounds x4   Musculoskeletal: Normal range of motion. Gait is normal No clubbing, cyanosis     Lymphadenopathy:  No evidence of lymphadenopathy  Neurological:  Sensorimotor grossly intact cranial nerves 2-12 grossly intact.    Skin:  Warm dry no rash no lesions   Psychiatric:  Normal affect    CMP  Sodium   Date Value Ref Range Status   03/30/2019 139 136 - 145 mmol/L Final     Potassium   Date Value Ref Range Status   03/30/2019 4.6 3.5 - 5.1 mmol/L Final     Chloride   Date Value Ref Range Status   03/30/2019 106 95 - 110 mmol/L Final     CO2   Date Value Ref Range Status   03/30/2019 23 23 - 29 mmol/L Final     Glucose   Date Value Ref Range Status   03/30/2019 123 (H) 70 - 110 mg/dL Final     BUN, Bld   Date Value Ref Range Status   03/30/2019 21 8 - 23 mg/dL Final     Creatinine   Date Value Ref Range Status   03/30/2019 1.2 0.5 - 1.4 mg/dL Final   01/18/2013 0.9 0.5 - 1.4 mg/dL Final     Calcium   Date Value Ref Range Status   03/30/2019 10.5 8.7 - 10.5 mg/dL Final   01/18/2013 10.7 (H) 8.7 - 10.5 mg/dL Final     Total Protein   Date Value Ref Range Status   03/30/2019 7.5 6.0 - 8.4 g/dL Final     Albumin   Date Value Ref Range Status   03/30/2019 4.2 3.5 - 5.2 g/dL Final     Total Bilirubin   Date Value Ref Range Status   03/30/2019 0.3 0.1 - 1.0 mg/dL Final     Comment:     For infants and newborns, interpretation of results should be based  on gestational age, weight and in agreement with clinical  observations.  Premature Infant recommended reference ranges:  Up to 24 hours.............<8.0 mg/dL  Up to 48 hours............<12.0 mg/dL  3-5 days..................<15.0 mg/dL  6-29 days.................<15.0 mg/dL       Alkaline Phosphatase   Date Value Ref Range Status   03/30/2019 156 (H) 55 - 135 U/L Final     AST   Date Value Ref Range Status   03/30/2019 18 10 - 40 U/L Final     ALT   Date Value Ref Range Status   03/30/2019 18 10 - 44 U/L Final     Anion Gap   Date Value Ref Range Status    03/30/2019 10 8 - 16 mmol/L Final   01/18/2013 11 5 - 15 meq/L Final     eGFR if    Date Value Ref Range Status   03/30/2019 >60 >60 mL/min/1.73 m^2 Final     eGFR if non    Date Value Ref Range Status   03/30/2019 >60 >60 mL/min/1.73 m^2 Final     Comment:     Calculation used to obtain the estimated glomerular filtration  rate (eGFR) is the CKD-EPI equation.        Lab Results   Component Value Date    WBC 7.60 03/30/2019    HGB 13.8 (L) 03/30/2019    HCT 42.2 03/30/2019    MCV 82 03/30/2019     03/30/2019       comparison is somewhat difficult with the interval surgery and technical differences.      Impression ct 3/29/2019       Interval changes of right lower lobectomy.  Severe emphysematous changes.  Few small scattered nodules largest measuring 5 mm.  At least some and possibly all these were present on the prior exam appearing similar today.  Strict comparison between the studies is difficult.  Trace right pleural effusion    Low-density lesions in the liver consistent with cysts.  Right renal cyst appearing very mildly complex with thin internal septation.  Mild circumferential wall thickening of the distal esophagus questioning esophagitis.  Additional findings as detailed above         Assessment Plan    [] 03/30/2019 LUNG, RIGHT, FINE NEEDLE ASPIRATION:  - POSITIVE FOR SQUAMOUS CELL CARCINOMA    [] Status post right lung resection [facility] 4 weeks prior to this visit.  Patient was told size of tumor is larger than expected he might be having place stage I or early stage II SCC.        Cisplatin and docetaxel q 21 days for 4 weeks this may change after final view of surgical path    []  [id number] stage IIa with risk factors such as this will pleural invasion present    [] plan for treatment    [] chemo port pending

## 2019-04-12 ENCOUNTER — TELEPHONE (OUTPATIENT)
Dept: HEMATOLOGY/ONCOLOGY | Facility: CLINIC | Age: 69
End: 2019-04-12

## 2019-04-12 NOTE — TELEPHONE ENCOUNTER
Patient informed that Dr. Jones has been out of town and will be back next week.  As soon as the chemo plan is created and insurance approved he will start his treatment.  A chemo class will be held prior to treatment.  Patient expresses concerns regarding the delay in starting.  Again, the patient was informed that Dr. Jones was waiting on the final path to develop the plan and Dr. Jones will review at the 1st of the week.  The patient will be notified of progress.  Patient verbalized understanding.    ----- Message from Jasmyne Boothe sent at 4/12/2019  9:38 AM CDT -----  Contact: Adam sosa  Type: Needs Medical Advice    Who Called:  Adam Sommer Call Back Number: [phone]  Additional Information: Pls call pt regarding his chemo treatments.

## 2019-04-12 NOTE — TELEPHONE ENCOUNTER
Duplicate  ----- Message from Nicolasa Peña RN sent at 4/12/2019 11:14 AM CDT -----  Contact: Adam sosa      ----- Message -----  From: Jasmyne Boothe  Sent: 4/12/2019   9:38 AM  To: Karen Lundy Staff    Type: Needs Medical Advice    Who Called:  Adam Sommer Call Back Number: [phone]  Additional Information: Pls call pt regarding his chemo treatments.

## 2019-04-16 NOTE — PLAN OF CARE
START ON PATHWAY REGIMEN - Non-Small Cell Lung    LOS1        Docetaxel (Taxotere(R))       Cisplatin (Platinol(R))           Additional Orders: Premedicate with dexamethasone 8 mg PO BID for three   days beginning 1 day prior to therapy.    **Always confirm dose/schedule in your pharmacy ordering system**        Patient Characteristics:  Stage IIB - III - Resected (Adjuvant), No Prior Chemotherapy, Squamous Cell  AJCC T Category: T2  Current Disease Status: No Distant Mets or Local Recurrence  AJCC N Category: N0  AJCC M Category: M0  AJCC 8 Stage Grouping: Unknown  Histology: Squamous Cell  Intent of Therapy:  Curative Intent, Discussed with Patient

## 2019-04-17 ENCOUNTER — DOCUMENTATION ONLY (OUTPATIENT)
Dept: INFUSION THERAPY | Facility: HOSPITAL | Age: 69
End: 2019-04-17

## 2019-04-17 ENCOUNTER — TELEPHONE (OUTPATIENT)
Dept: HEMATOLOGY/ONCOLOGY | Facility: CLINIC | Age: 69
End: 2019-04-17

## 2019-04-17 DIAGNOSIS — C34.91 SQUAMOUS CELL CARCINOMA OF LUNG, RIGHT: ICD-10-CM

## 2019-04-17 NOTE — PROGRESS NOTES
[4/17/2019 9:39 AM]  SURY Leyva:    need dates to start chemo on Adam Goode [id number]     [4/17/2019 9:46 AM]    Good Morning 04/24/19 @9:30     [4/17/2019 9:47 AM]  SURY Leyva:    ty

## 2019-04-22 ENCOUNTER — DOCUMENTATION ONLY (OUTPATIENT)
Dept: INFUSION THERAPY | Facility: HOSPITAL | Age: 69
End: 2019-04-22

## 2019-04-22 ENCOUNTER — APPOINTMENT (OUTPATIENT)
Dept: INFUSION THERAPY | Facility: HOSPITAL | Age: 69
End: 2019-04-22
Attending: INTERNAL MEDICINE
Payer: MEDICARE

## 2019-04-22 ENCOUNTER — DOCUMENTATION ONLY (OUTPATIENT)
Dept: HEMATOLOGY/ONCOLOGY | Facility: CLINIC | Age: 69
End: 2019-04-22

## 2019-04-22 DIAGNOSIS — R11.2 NAUSEA AND VOMITING, INTRACTABILITY OF VOMITING NOT SPECIFIED, UNSPECIFIED VOMITING TYPE: ICD-10-CM

## 2019-04-22 DIAGNOSIS — Z45.2 ENCOUNTER FOR CARE RELATED TO VASCULAR ACCESS PORT: ICD-10-CM

## 2019-04-22 NOTE — PROGRESS NOTES
Pharmacy Treatment Plan Review    Patient  Adam Goode, 68 y.o.  1950    Indication: squamous cell carcinoma of the lung      History:  -S/P Wedge resection     Labs:  CBC  Lab Results   Component Value Date    WBC 8.78 04/11/2019    HGB 12.9 (L) 04/11/2019    HCT 38.4 (L) 04/11/2019    MCV 82 04/11/2019     04/11/2019       BMP  Lab Results   Component Value Date     04/11/2019    K 4.3 04/11/2019     04/11/2019    CO2 24 04/11/2019    BUN 25 (H) 04/11/2019    CREATININE 1.02 04/11/2019    CALCIUM 10.0 04/11/2019    ANIONGAP 10 04/11/2019    ESTGFRAFRICA >60 04/11/2019    EGFRNONAA >60 04/11/2019       LFTs  Lab Results   Component Value Date    ALT 18 03/30/2019    AST 18 03/30/2019    ALKPHOS 156 (H) 03/30/2019    BILITOT 0.3 03/30/2019       The patient is scheduled to start the following infusion:   OP NSCLC DOCETAXEL + CISPLATIN Q3W    21-day cycle for 4 cycles of:    Chemotherapy:   -CISplatin (PLATINOL) 75 mg/m2 = 162 mg in sodium chloride 0.9% 500 mL chemo infusion, 75 mg/m2 = 162 mg, Intravenous, on day 1    -DOCEtaxel (TAXOTERE) 75 mg/m2 = 160 mg in sodium chloride 0.9% 250 mL chemo infusion, 75 mg/m2 = 160 mg, Intravenous, on day 1    Premeds  -Aprepitant 130 mg IVPB  -Palonosetron 0.25 mg/Dexamethasone 12 mg     Supportive meds  -Pre and post hydration with normal saline on day 1  -Hydration on day 2    The treatment plan matches NCCN template

## 2019-04-22 NOTE — PROGRESS NOTES
:     LCSW met with pt for social service aspect of chemo school. Provided overview of [facility].     Distress Screening:    Patient did complete distress screening tool:  Distress Score    Distress Score: 1        Practical Problems Physical Problems                                                   Family Problems                                         Emotional Problems                         Pain: Yes                    Sleep: Yes          Spiritual/Religions Concerns               Other Problems            LCSW provided support and acknowledged challenges. LCSW provided information on support groups.     LCSW also encouraged patient to utilize on line and phone support services as well as offered to be available to patient for support.    Family/Social/Spiritual Support:  Pt is supported by his daughter who lives nearby.  Pt has a determined attitude about his treatment and does not endorse any emotional concerns other than disappointment that he needs chemo (pt states he originally thought he would have surgery only, and it was upsetting to learn that chemo is needed.)    Pt believes that his pain has not been well managed in the past and he anticipates that this may be a problem during treatment.     Pt did share that he has been treated by a pain specialist in the past but left the provider when the provider asked for additional tests due to the fact that his urine was dilulted.        During the past two weeks, the patient has been bothered by feeling down, depression, irritability, or hopelessness: Not at all    During the past two weeks, the patient has had little interest in doing things: Not at all    Plan:  1. Pt declined tour of infusion suite  2. Discussed pain. Encouraged pt to talk with Ernestine and Dr. DELON hines about his concerns. Suggested non medical interventions for pain to complement medications such as meditation, guided imagery.  3.  Encouraged good sleep. Discussed value of sleep, staying active, positive thinking    Prisca Wesley, ANNIKAW

## 2019-04-22 NOTE — PROGRESS NOTES
Patient here for chemotherapy education.  Discussed possible side effects with self-care tips. Provided copies of [facility] information.   Patient was allowed time to voice questions and concerns.  All were answered and patient verbalized understanding.     Patient does have prescriptions for anti-nausea medications. Instructed on how to take.   Reviewed time to be here on 4/24.  Voiced understanding.  Consent signed and witnessed.

## 2019-04-23 RX ORDER — LIDOCAINE AND PRILOCAINE 25; 25 MG/G; MG/G
CREAM TOPICAL
Qty: 30 G | Refills: 3 | Status: SHIPPED | OUTPATIENT
Start: 2019-04-23 | End: 2020-12-15

## 2019-04-23 RX ORDER — PROCHLORPERAZINE MALEATE 10 MG
10 TABLET ORAL EVERY 6 HOURS PRN
Qty: 90 TABLET | Refills: 2 | Status: SHIPPED | OUTPATIENT
Start: 2019-04-23 | End: 2019-06-27 | Stop reason: ALTCHOICE

## 2019-04-24 ENCOUNTER — OFFICE VISIT (OUTPATIENT)
Dept: HEMATOLOGY/ONCOLOGY | Facility: CLINIC | Age: 69
End: 2019-04-24
Payer: MEDICARE

## 2019-04-24 ENCOUNTER — INFUSION (OUTPATIENT)
Dept: INFUSION THERAPY | Facility: HOSPITAL | Age: 69
End: 2019-04-24
Attending: INTERNAL MEDICINE
Payer: MEDICARE

## 2019-04-24 ENCOUNTER — DOCUMENTATION ONLY (OUTPATIENT)
Dept: INFUSION THERAPY | Facility: HOSPITAL | Age: 69
End: 2019-04-24

## 2019-04-24 VITALS
SYSTOLIC BLOOD PRESSURE: 141 MMHG | DIASTOLIC BLOOD PRESSURE: 80 MMHG | BODY MASS INDEX: 25.57 KG/M2 | TEMPERATURE: 98 F | WEIGHT: 205.69 LBS | HEIGHT: 75 IN | RESPIRATION RATE: 20 BRPM | HEART RATE: 70 BPM

## 2019-04-24 VITALS
WEIGHT: 205.69 LBS | BODY MASS INDEX: 25.57 KG/M2 | SYSTOLIC BLOOD PRESSURE: 125 MMHG | HEART RATE: 69 BPM | RESPIRATION RATE: 20 BRPM | TEMPERATURE: 98 F | DIASTOLIC BLOOD PRESSURE: 72 MMHG | HEIGHT: 75 IN

## 2019-04-24 DIAGNOSIS — Z51.11 ENCOUNTER FOR ANTINEOPLASTIC CHEMOTHERAPY: ICD-10-CM

## 2019-04-24 DIAGNOSIS — Z09 ONCOLOGY FOLLOW-UP ENCOUNTER: ICD-10-CM

## 2019-04-24 DIAGNOSIS — Z98.890 STATUS POST SURGERY: Primary | ICD-10-CM

## 2019-04-24 DIAGNOSIS — C34.91 SQUAMOUS CELL CARCINOMA OF LUNG, RIGHT: Primary | ICD-10-CM

## 2019-04-24 DIAGNOSIS — Z98.890 STATUS POST SURGERY: ICD-10-CM

## 2019-04-24 DIAGNOSIS — R91.1 LUNG NODULE: ICD-10-CM

## 2019-04-24 PROCEDURE — 96413 CHEMO IV INFUSION 1 HR: CPT | Mod: PN

## 2019-04-24 PROCEDURE — 99999 PR PBB SHADOW E&M-EST. PATIENT-LVL III: CPT | Mod: PBBFAC,,, | Performed by: INTERNAL MEDICINE

## 2019-04-24 PROCEDURE — 25000003 PHARM REV CODE 250: Mod: PN | Performed by: INTERNAL MEDICINE

## 2019-04-24 PROCEDURE — 96367 TX/PROPH/DG ADDL SEQ IV INF: CPT | Mod: PN

## 2019-04-24 PROCEDURE — 96366 THER/PROPH/DIAG IV INF ADDON: CPT | Mod: PN

## 2019-04-24 PROCEDURE — 96417 CHEMO IV INFUS EACH ADDL SEQ: CPT | Mod: PN

## 2019-04-24 PROCEDURE — 99999 PR PBB SHADOW E&M-EST. PATIENT-LVL III: ICD-10-PCS | Mod: PBBFAC,,, | Performed by: INTERNAL MEDICINE

## 2019-04-24 PROCEDURE — 96368 THER/DIAG CONCURRENT INF: CPT | Mod: PN

## 2019-04-24 PROCEDURE — 1101F PT FALLS ASSESS-DOCD LE1/YR: CPT | Mod: CPTII,S$GLB,, | Performed by: INTERNAL MEDICINE

## 2019-04-24 PROCEDURE — 63600175 PHARM REV CODE 636 W HCPCS: Mod: PN | Performed by: INTERNAL MEDICINE

## 2019-04-24 PROCEDURE — 99215 OFFICE O/P EST HI 40 MIN: CPT | Mod: S$GLB,,, | Performed by: INTERNAL MEDICINE

## 2019-04-24 PROCEDURE — 1101F PR PT FALLS ASSESS DOC 0-1 FALLS W/OUT INJ PAST YR: ICD-10-PCS | Mod: CPTII,S$GLB,, | Performed by: INTERNAL MEDICINE

## 2019-04-24 PROCEDURE — 99215 PR OFFICE/OUTPT VISIT, EST, LEVL V, 40-54 MIN: ICD-10-PCS | Mod: S$GLB,,, | Performed by: INTERNAL MEDICINE

## 2019-04-24 RX ORDER — SODIUM CHLORIDE 0.9 % (FLUSH) 0.9 %
10 SYRINGE (ML) INJECTION
Status: DISCONTINUED | OUTPATIENT
Start: 2019-04-24 | End: 2019-04-24 | Stop reason: HOSPADM

## 2019-04-24 RX ORDER — HEPARIN 100 UNIT/ML
500 SYRINGE INTRAVENOUS
Status: CANCELLED | OUTPATIENT
Start: 2019-04-24

## 2019-04-24 RX ORDER — CHLORHEXIDINE GLUCONATE ORAL RINSE 1.2 MG/ML
SOLUTION DENTAL
Refills: 0 | COMMUNITY
Start: 2019-04-09 | End: 2020-12-15

## 2019-04-24 RX ORDER — ALPRAZOLAM 0.25 MG/1
0.25 TABLET ORAL DAILY
Refills: 5 | COMMUNITY
Start: 2019-04-02 | End: 2022-01-12

## 2019-04-24 RX ORDER — SODIUM CHLORIDE 0.9 % (FLUSH) 0.9 %
10 SYRINGE (ML) INJECTION
Status: CANCELLED | OUTPATIENT
Start: 2019-04-25

## 2019-04-24 RX ORDER — HEPARIN 100 UNIT/ML
500 SYRINGE INTRAVENOUS
Status: DISCONTINUED | OUTPATIENT
Start: 2019-04-24 | End: 2019-04-24 | Stop reason: HOSPADM

## 2019-04-24 RX ORDER — HYDROCODONE BITARTRATE AND ACETAMINOPHEN 10; 325 MG/1; MG/1
1 TABLET ORAL EVERY 12 HOURS PRN
COMMUNITY
End: 2019-04-24 | Stop reason: SDUPTHER

## 2019-04-24 RX ORDER — HEPARIN 100 UNIT/ML
500 SYRINGE INTRAVENOUS
Status: CANCELLED | OUTPATIENT
Start: 2019-04-25

## 2019-04-24 RX ORDER — SODIUM CHLORIDE 0.9 % (FLUSH) 0.9 %
10 SYRINGE (ML) INJECTION
Status: CANCELLED | OUTPATIENT
Start: 2019-04-24

## 2019-04-24 RX ORDER — HYDROCODONE BITARTRATE AND ACETAMINOPHEN 10; 325 MG/1; MG/1
1 TABLET ORAL EVERY 8 HOURS PRN
Qty: 90 TABLET | Refills: 0 | Status: SHIPPED | OUTPATIENT
Start: 2019-04-24 | End: 2019-06-05 | Stop reason: SDUPTHER

## 2019-04-24 RX ORDER — MUPIROCIN 20 MG/G
OINTMENT TOPICAL
Refills: 0 | COMMUNITY
Start: 2019-04-09 | End: 2019-11-21

## 2019-04-24 RX ADMIN — POTASSIUM CHLORIDE: 2 INJECTION, SOLUTION, CONCENTRATE INTRAVENOUS at 09:04

## 2019-04-24 RX ADMIN — APREPITANT 130 MG: 130 INJECTION, EMULSION INTRAVENOUS at 12:04

## 2019-04-24 RX ADMIN — DOCETAXEL 160 MG: 20 INJECTION, SOLUTION, CONCENTRATE INTRAVENOUS at 12:04

## 2019-04-24 RX ADMIN — CISPLATIN 162 MG: 100 INJECTION, SOLUTION INTRAVENOUS at 01:04

## 2019-04-24 RX ADMIN — DEXAMETHASONE SODIUM PHOSPHATE: 4 INJECTION, SOLUTION INTRA-ARTICULAR; INTRALESIONAL; INTRAMUSCULAR; INTRAVENOUS; SOFT TISSUE at 11:04

## 2019-04-24 RX ADMIN — POTASSIUM CHLORIDE: 2 INJECTION, SOLUTION, CONCENTRATE INTRAVENOUS at 01:04

## 2019-04-24 NOTE — PLAN OF CARE
Problem: Adult Inpatient Plan of Care  Goal: Plan of Care Review  Outcome: Ongoing (interventions implemented as appropriate)  Plan of care reviewed with patient; patient verbalizes understanding. Medications reviewed and administered as ordered. VSS.

## 2019-04-24 NOTE — PROGRESS NOTES
Pharmacist Patient Education Note     OP NSCLC DOCETAXEL + CISPLATIN Q3W chemotherapy regimen was discussed with the patient. Medication handouts for each agent were provided to the patient.     The following side effects were reviewed:   -Peripheral Neuropathy  -Swelling  -Nausea  -Electrolyte abnormalities  -Diarrhea  -Low blood count  -Mouth sores  -Nail changes     The patient had no concerns.  All questions were answered.     Karri Sheridan PharmD

## 2019-04-24 NOTE — PROGRESS NOTES
HPI    68 years old  male who was presented to Oncology Clinic for evaluation with squamous cell carcinoma of the lung.  Patient was recently diagnosed in January with fine-needle aspiration of the right lung nodule was found to have squamous cell carcinoma.  And recently patient had a which resection(by description) at [facility].  Since then patient was instructed to follow up with our medical oncologist for evaluation and treatment.  Patient was told that doing the which resection tumor is larger than expected compared to the images.  It might be present as late stage I or early stage II.      04/24/2019:  Patient denies any chest pain shortness breath.  Patient denies any abdominal pain nausea vomiting or diarrhea.  Patient denies any fever chills..  Patient states that he has pain he wants to increase [place] from 5/325 to 10/325.      Review of system  Fourteen point review of system negative except for above.      Past Medical History:   Diagnosis Date    Arthritis     Cancer SKIN    Cardiac angina     COPD (chronic obstructive pulmonary disease)     Coronary artery disease ANGINA. HAD  C 8/12. 40 % BLOCKAGE. DR RUBY IS CARDIOLOGIST.    Depression     GERD (gastroesophageal reflux disease)     Kidney cysts     Lung cancer     MVP (mitral valve prolapse)     Trigger thumb     BILAT    Wears dentures     UPPER    Wears glasses      Social History     Socioeconomic History    Marital status:      Spouse name: Not on file    Number of children: Not on file    Years of education: Not on file    Highest education level: Not on file   Occupational History    Not on file   Social Needs    Financial resource strain: Not on file    Food insecurity:     Worry: Not on file     Inability: Not on file    Transportation needs:     Medical: Not on file     Non-medical: Not on file   Tobacco Use    Smoking status: Former Smoker     Years: 40.00     Last attempt to quit:  2019     Years since quittin.3    Smokeless tobacco: Never Used    Tobacco comment: 1-2 CIGT SOME DAYS/states last cigarette 20   Substance and Sexual Activity    Alcohol use: Yes     Comment: beer on occasion    Drug use: No    Sexual activity: Yes     Partners: Female   Lifestyle    Physical activity:     Days per week: Not on file     Minutes per session: Not on file    Stress: Not on file   Relationships    Social connections:     Talks on phone: Not on file     Gets together: Not on file     Attends Scientology service: Not on file     Active member of club or organization: Not on file     Attends meetings of clubs or organizations: Not on file     Relationship status: Not on file   Other Topics Concern    Not on file   Social History Narrative    Not on file       Objective    Physical Exam   Vitals:    19 0832   BP: 125/72   Pulse: 69   Resp: 20   Temp: 97.7 °F (36.5 °C)       Constitutional: patient is oriented to person, place, and time. patient appears well-developed and well-nourished. No distress.   HENT:  Normocephalic atraumatic pupils equal round reactive to light extraocular muscle intact.  Cardiovascular:  Regular rate and rhythm     Pulmonary/Chest:  Symmetrical chest a rising  Abdominal:  Soft nontender nondistended bowel sounds x4   Musculoskeletal: Normal range of motion. Gait is normal No clubbing, cyanosis     Lymphadenopathy:  No evidence of lymphadenopathy  Neurological:  Sensorimotor grossly intact cranial nerves 2-12 grossly intact.    Skin:  Warm dry no rash no lesions   Psychiatric:  Normal affect    CMP  Sodium   Date Value Ref Range Status   2019 138 136 - 145 mmol/L Final     Potassium   Date Value Ref Range Status   2019 4.3 3.5 - 5.1 mmol/L Final     Chloride   Date Value Ref Range Status   2019 104 95 - 110 mmol/L Final     CO2   Date Value Ref Range Status   2019 24 22 - 31 mmol/L Final     Glucose   Date Value Ref Range Status    04/11/2019 102 70 - 110 mg/dL Final     Comment:     The ADA recommends the following guidelines for fasting glucose:  Normal:       less than 100 mg/dL  Prediabetes:  100 mg/dL to 125 mg/dL  Diabetes:     126 mg/dL or higher       BUN, Bld   Date Value Ref Range Status   04/11/2019 25 (H) 9 - 21 mg/dL Final     Creatinine   Date Value Ref Range Status   04/11/2019 1.02 0.50 - 1.40 mg/dL Final   01/18/2013 0.9 0.5 - 1.4 mg/dL Final     Calcium   Date Value Ref Range Status   04/11/2019 10.0 8.4 - 10.2 mg/dL Final   01/18/2013 10.7 (H) 8.7 - 10.5 mg/dL Final     Total Protein   Date Value Ref Range Status   03/30/2019 7.5 6.0 - 8.4 g/dL Final     Albumin   Date Value Ref Range Status   03/30/2019 4.2 3.5 - 5.2 g/dL Final     Total Bilirubin   Date Value Ref Range Status   03/30/2019 0.3 0.1 - 1.0 mg/dL Final     Comment:     For infants and newborns, interpretation of results should be based  on gestational age, weight and in agreement with clinical  observations.  Premature Infant recommended reference ranges:  Up to 24 hours.............<8.0 mg/dL  Up to 48 hours............<12.0 mg/dL  3-5 days..................<15.0 mg/dL  6-29 days.................<15.0 mg/dL       Alkaline Phosphatase   Date Value Ref Range Status   03/30/2019 156 (H) 55 - 135 U/L Final     AST   Date Value Ref Range Status   03/30/2019 18 10 - 40 U/L Final     ALT   Date Value Ref Range Status   03/30/2019 18 10 - 44 U/L Final     Anion Gap   Date Value Ref Range Status   04/11/2019 10 8 - 16 mmol/L Final   01/18/2013 11 5 - 15 meq/L Final     eGFR if    Date Value Ref Range Status   04/11/2019 >60 >60 mL/min/1.73 m^2 Final     eGFR if non    Date Value Ref Range Status   04/11/2019 >60 >60 mL/min/1.73 m^2 Final     Comment:     Calculation used to obtain the estimated glomerular filtration  rate (eGFR) is the CKD-EPI equation.        Lab Results   Component Value Date    WBC 8.78 04/11/2019    HGB 12.9 (L)  04/11/2019    HCT 38.4 (L) 04/11/2019    MCV 82 04/11/2019     04/11/2019       comparison is somewhat difficult with the interval surgery and technical differences.      Impression ct 3/29/2019       Interval changes of right lower lobectomy.  Severe emphysematous changes.  Few small scattered nodules largest measuring 5 mm.  At least some and possibly all these were present on the prior exam appearing similar today.  Strict comparison between the studies is difficult.  Trace right pleural effusion    Low-density lesions in the liver consistent with cysts.  Right renal cyst appearing very mildly complex with thin internal septation.  Mild circumferential wall thickening of the distal esophagus questioning esophagitis.  Additional findings as detailed above         Assessment Plan    [] 03/30/2019 LUNG, RIGHT, FINE NEEDLE ASPIRATION:  - POSITIVE FOR SQUAMOUS CELL CARCINOMA    [] Status post right lung resection [facility] 4 weeks prior to this visit.  Patient was told size of tumor is larger than expected he might be having late stage I or early stage II SCC.      Cisplatin and docetaxel q 21 days for 4 cycles    [] Postsurgical pain.  Will temporarily increase [place] to 10/325 for short duration.    []  [id number] stage IIa with pleural invasion present  -adjuvant chemo  -RTC 21 days with CBC and CMP     [] CT chest abd pelv after completion of chemo    [] severe emphysematous changes   Follow Pulm

## 2019-04-25 ENCOUNTER — INFUSION (OUTPATIENT)
Dept: INFUSION THERAPY | Facility: HOSPITAL | Age: 69
End: 2019-04-25
Attending: INTERNAL MEDICINE
Payer: MEDICARE

## 2019-04-25 ENCOUNTER — DOCUMENTATION ONLY (OUTPATIENT)
Dept: INFUSION THERAPY | Facility: HOSPITAL | Age: 69
End: 2019-04-25

## 2019-04-25 VITALS
HEIGHT: 75 IN | DIASTOLIC BLOOD PRESSURE: 67 MMHG | HEART RATE: 69 BPM | RESPIRATION RATE: 18 BRPM | BODY MASS INDEX: 25.57 KG/M2 | WEIGHT: 205.69 LBS | TEMPERATURE: 98 F | SYSTOLIC BLOOD PRESSURE: 119 MMHG

## 2019-04-25 DIAGNOSIS — R91.1 LUNG NODULE: ICD-10-CM

## 2019-04-25 DIAGNOSIS — Z98.890 STATUS POST SURGERY: Primary | ICD-10-CM

## 2019-04-25 PROCEDURE — 25000003 PHARM REV CODE 250: Mod: PN | Performed by: INTERNAL MEDICINE

## 2019-04-25 PROCEDURE — 96360 HYDRATION IV INFUSION INIT: CPT | Mod: PN

## 2019-04-25 PROCEDURE — 96361 HYDRATE IV INFUSION ADD-ON: CPT | Mod: PN

## 2019-04-25 PROCEDURE — A4216 STERILE WATER/SALINE, 10 ML: HCPCS | Mod: PN | Performed by: INTERNAL MEDICINE

## 2019-04-25 RX ORDER — SODIUM CHLORIDE 0.9 % (FLUSH) 0.9 %
10 SYRINGE (ML) INJECTION
Status: DISCONTINUED | OUTPATIENT
Start: 2019-04-25 | End: 2019-04-25 | Stop reason: HOSPADM

## 2019-04-25 RX ORDER — HEPARIN 100 UNIT/ML
500 SYRINGE INTRAVENOUS
Status: DISCONTINUED | OUTPATIENT
Start: 2019-04-25 | End: 2019-04-25 | Stop reason: HOSPADM

## 2019-04-25 RX ADMIN — SODIUM CHLORIDE 1000 ML: 0.9 INJECTION, SOLUTION INTRAVENOUS at 01:04

## 2019-04-25 RX ADMIN — Medication 10 ML: at 01:04

## 2019-04-25 NOTE — PROGRESS NOTES
Nutrition: Met with patient on 4/24/2019 in the chair while patient was here for chemo infusion #1. Patient not currently a nutritional risk. Patient eating well. Patient eats protein rich foods. Patient denies issues with bowels. WT: 205# Discussed the importance of weight maintenance and nutrition during treatment. Discussed food safety, nausea mgt, and bowel mgt. Offered support and encouragement. Will follow up at cycle 2. Jessa Ochoa,MS, RD ,LDN

## 2019-05-07 NOTE — PROGRESS NOTES
HPI    68 years old  male who was presented to Oncology Clinic for evaluation with squamous cell carcinoma of the lung.  Patient was recently diagnosed in January with fine-needle aspiration of the right lung nodule was found to have squamous cell carcinoma.  And recently patient had a which resection(by description) at [facility].  Since then patient was instructed to follow up with our medical oncologist for evaluation and treatment.  Patient was told that doing the which resection tumor is larger than expected compared to the images.  It might be present as late stage I or early stage II.      Patient complaining of losing hair, mouth sore and fatigue.  Denies fever chills.  Denies abdominal pain nausea vomiting or diarrhea.  Denies any chest pain or shortness of breath.  Patient wants to have a mouthwash solution to be filled.  No other acute event.  Fourteen point review of system as above.      Past Medical History:   Diagnosis Date    Arthritis     Cancer SKIN    Cardiac angina     COPD (chronic obstructive pulmonary disease)     Coronary artery disease ANGINA. HAD  C 8/12. 40 % BLOCKAGE. DR RUBY IS CARDIOLOGIST.    Depression     GERD (gastroesophageal reflux disease)     Kidney cysts     Lung cancer     MVP (mitral valve prolapse)     Trigger thumb     BILAT    Wears dentures     UPPER    Wears glasses      Social History     Socioeconomic History    Marital status:      Spouse name: Not on file    Number of children: Not on file    Years of education: Not on file    Highest education level: Not on file   Occupational History    Not on file   Social Needs    Financial resource strain: Not on file    Food insecurity:     Worry: Not on file     Inability: Not on file    Transportation needs:     Medical: Not on file     Non-medical: Not on file   Tobacco Use    Smoking status: Former Smoker     Years: 40.00     Last attempt to quit: 1/2/2019     Years  since quittin.3    Smokeless tobacco: Never Used    Tobacco comment: 1-2 CIGT SOME DAYS/states last cigarette 19   Substance and Sexual Activity    Alcohol use: Yes     Comment: beer on occasion    Drug use: No    Sexual activity: Yes     Partners: Female   Lifestyle    Physical activity:     Days per week: Not on file     Minutes per session: Not on file    Stress: Not on file   Relationships    Social connections:     Talks on phone: Not on file     Gets together: Not on file     Attends Mu-ism service: Not on file     Active member of club or organization: Not on file     Attends meetings of clubs or organizations: Not on file     Relationship status: Not on file   Other Topics Concern    Not on file   Social History Narrative    Not on file       Objective    Physical Exam   Vitals:    19 0835   BP: 129/81   Pulse: 67   Resp: 18   Temp: 98 °F (36.7 °C)       Constitutional: patient is oriented to person, place, and time. patient appears well-developed and well-nourished. No distress.   HENT:  Normocephalic atraumatic pupils equal round reactive to light extraocular muscle intact. No evidence of lesions on exam.  Cardiovascular:  Regular rate and rhythm     Pulmonary/Chest:  Symmetrical chest a rising  Abdominal:  Soft nontender nondistended bowel sounds x4   Musculoskeletal: Normal range of motion. Gait is normal No clubbing, cyanosis     Lymphadenopathy:  No evidence of lymphadenopathy  Neurological:  Sensorimotor grossly intact cranial nerves 2-12 grossly intact.    Skin:  Warm dry no rash no lesions   Psychiatric:  Normal affect    CMP  Sodium   Date Value Ref Range Status   2019 140 136 - 145 mmol/L Final     Potassium   Date Value Ref Range Status   2019 4.3 3.5 - 5.1 mmol/L Final     Chloride   Date Value Ref Range Status   2019 107 95 - 110 mmol/L Final     CO2   Date Value Ref Range Status   2019 26 22 - 31 mmol/L Final     Glucose   Date Value Ref Range  Status   04/24/2019 112 (H) 70 - 110 mg/dL Final     Comment:     The ADA recommends the following guidelines for fasting glucose:  Normal:       less than 100 mg/dL  Prediabetes:  100 mg/dL to 125 mg/dL  Diabetes:     126 mg/dL or higher       BUN, Bld   Date Value Ref Range Status   04/24/2019 17 9 - 21 mg/dL Final     Creatinine   Date Value Ref Range Status   04/24/2019 1.00 0.50 - 1.40 mg/dL Final   01/18/2013 0.9 0.5 - 1.4 mg/dL Final     Calcium   Date Value Ref Range Status   04/24/2019 9.6 8.4 - 10.2 mg/dL Final   01/18/2013 10.7 (H) 8.7 - 10.5 mg/dL Final     Total Protein   Date Value Ref Range Status   04/24/2019 6.7 6.0 - 8.4 g/dL Final     Albumin   Date Value Ref Range Status   04/24/2019 4.0 3.5 - 5.2 g/dL Final     Total Bilirubin   Date Value Ref Range Status   04/24/2019 0.3 0.2 - 1.3 mg/dL Final     Alkaline Phosphatase   Date Value Ref Range Status   04/24/2019 134 38 - 145 U/L Final     AST   Date Value Ref Range Status   04/24/2019 27 17 - 59 U/L Final     ALT   Date Value Ref Range Status   04/24/2019 22 10 - 44 U/L Final     Anion Gap   Date Value Ref Range Status   04/24/2019 7 (L) 8 - 16 mmol/L Final   01/18/2013 11 5 - 15 meq/L Final     eGFR if    Date Value Ref Range Status   04/24/2019 >60 >60 mL/min/1.73 m^2 Final     eGFR if non    Date Value Ref Range Status   04/24/2019 >60 >60 mL/min/1.73 m^2 Final     Comment:     Calculation used to obtain the estimated glomerular filtration  rate (eGFR) is the CKD-EPI equation.        Lab Results   Component Value Date    WBC 7.73 04/24/2019    HGB 12.1 (L) 04/24/2019    HCT 37.0 (L) 04/24/2019    MCV 84 04/24/2019     04/24/2019       comparison is somewhat difficult with the interval surgery and technical differences.      Impression ct 3/29/2019       Interval changes of right lower lobectomy.  Severe emphysematous changes.  Few small scattered nodules largest measuring 5 mm.  At least some and possibly  all these were present on the prior exam appearing similar today.  Strict comparison between the studies is difficult.  Trace right pleural effusion    Low-density lesions in the liver consistent with cysts.  Right renal cyst appearing very mildly complex with thin internal septation.  Mild circumferential wall thickening of the distal esophagus questioning esophagitis.  Additional findings as detailed above         Assessment Plan    [] 03/30/2019 LUNG, RIGHT, FINE NEEDLE ASPIRATION:  - POSITIVE FOR SQUAMOUS CELL CARCINOMA    [] Status post right lung resection [facility] 4 weeks prior to this visit.  Patient was told size of tumor is larger than expected he might be having late stage I or early stage II SCC.      Plan: Cisplatin and docetaxel q 21 days for 4 cycles    [] Postsurgical pain.  Respond to [place] to 10/325 .    []  [id number] stage IIa with pleural invasion present  -RTC 7 days with CBC and CMP pretreatment evaluation for cycle 2 of adjuvant  -next chemo plan for 5/15/2019    [] Sore mouth but without oral lesion  -continue follow monitor Magic mouthwash will be prescribed today.    [] 1 episode of dizziness dehydration post chemo  -educate patient patient regarding hydration  -patient may return to infusion clinic for IV fluid post chemo    [] CT chest abd pelv after completion of chemo    [] severe emphysematous changes   Follow Pulm

## 2019-05-08 ENCOUNTER — LAB VISIT (OUTPATIENT)
Dept: LAB | Facility: HOSPITAL | Age: 69
End: 2019-05-08
Attending: INTERNAL MEDICINE
Payer: MEDICARE

## 2019-05-08 ENCOUNTER — OFFICE VISIT (OUTPATIENT)
Dept: HEMATOLOGY/ONCOLOGY | Facility: CLINIC | Age: 69
End: 2019-05-08
Payer: MEDICARE

## 2019-05-08 VITALS
HEIGHT: 75 IN | HEART RATE: 67 BPM | BODY MASS INDEX: 24.59 KG/M2 | WEIGHT: 197.75 LBS | DIASTOLIC BLOOD PRESSURE: 81 MMHG | RESPIRATION RATE: 18 BRPM | SYSTOLIC BLOOD PRESSURE: 129 MMHG | TEMPERATURE: 98 F

## 2019-05-08 DIAGNOSIS — L65.9 ALOPECIA OF SCALP: ICD-10-CM

## 2019-05-08 DIAGNOSIS — Z09 ONCOLOGY FOLLOW-UP ENCOUNTER: ICD-10-CM

## 2019-05-08 DIAGNOSIS — Z51.11 ENCOUNTER FOR ANTINEOPLASTIC CHEMOTHERAPY: ICD-10-CM

## 2019-05-08 DIAGNOSIS — T45.1X5A CHEMOTHERAPY-INDUCED DIARRHEA: ICD-10-CM

## 2019-05-08 DIAGNOSIS — C34.91 SQUAMOUS CELL CARCINOMA OF LUNG, RIGHT: ICD-10-CM

## 2019-05-08 DIAGNOSIS — K52.1 CHEMOTHERAPY-INDUCED DIARRHEA: ICD-10-CM

## 2019-05-08 DIAGNOSIS — K12.1 ULCER MOUTH: ICD-10-CM

## 2019-05-08 DIAGNOSIS — Z09 CHEMOTHERAPY FOLLOW-UP EXAMINATION: ICD-10-CM

## 2019-05-08 LAB
ALBUMIN SERPL BCP-MCNC: 3.9 G/DL (ref 3.5–5.2)
ALP SERPL-CCNC: 127 U/L (ref 38–145)
ALT SERPL W/O P-5'-P-CCNC: 21 U/L (ref 10–44)
ANION GAP SERPL CALC-SCNC: 6 MMOL/L (ref 8–16)
ANISOCYTOSIS BLD QL SMEAR: SLIGHT
AST SERPL-CCNC: 24 U/L (ref 17–59)
BASOPHILS # BLD AUTO: 0.03 K/UL (ref 0–0.2)
BASOPHILS NFR BLD: 0.7 % (ref 0–1.9)
BILIRUB SERPL-MCNC: 0.2 MG/DL (ref 0.2–1.3)
BUN SERPL-MCNC: 18 MG/DL (ref 9–21)
CALCIUM SERPL-MCNC: 9.6 MG/DL (ref 8.4–10.2)
CHLORIDE SERPL-SCNC: 107 MMOL/L (ref 95–110)
CO2 SERPL-SCNC: 27 MMOL/L (ref 22–31)
CREAT SERPL-MCNC: 0.96 MG/DL (ref 0.5–1.4)
DIFFERENTIAL METHOD: ABNORMAL
EOSINOPHIL # BLD AUTO: 0 K/UL (ref 0–0.5)
EOSINOPHIL NFR BLD: 0.2 % (ref 0–8)
ERYTHROCYTE [DISTWIDTH] IN BLOOD BY AUTOMATED COUNT: 14.1 % (ref 11.5–14.5)
EST. GFR  (AFRICAN AMERICAN): >60 ML/MIN/1.73 M^2
EST. GFR  (NON AFRICAN AMERICAN): >60 ML/MIN/1.73 M^2
GLUCOSE SERPL-MCNC: 118 MG/DL (ref 70–110)
HCT VFR BLD AUTO: 36.5 % (ref 40–54)
HGB BLD-MCNC: 11.9 G/DL (ref 14–18)
IMM GRANULOCYTES # BLD AUTO: 0.03 K/UL (ref 0–0.04)
IMM GRANULOCYTES NFR BLD AUTO: 0.7 % (ref 0–0.5)
LYMPHOCYTES # BLD AUTO: 2.7 K/UL (ref 1–4.8)
LYMPHOCYTES NFR BLD: 60.5 % (ref 18–48)
MAGNESIUM SERPL-MCNC: 1.9 MG/DL (ref 1.6–2.6)
MCH RBC QN AUTO: 27.1 PG (ref 27–31)
MCHC RBC AUTO-ENTMCNC: 32.6 G/DL (ref 32–36)
MCV RBC AUTO: 83 FL (ref 82–98)
MONOCYTES # BLD AUTO: 0.9 K/UL (ref 0.3–1)
MONOCYTES NFR BLD: 19.1 % (ref 4–15)
NEUTROPHILS # BLD AUTO: 0.8 K/UL (ref 1.8–7.7)
NEUTROPHILS NFR BLD: 18.8 % (ref 38–73)
NRBC BLD-RTO: 0 /100 WBC
PLATELET # BLD AUTO: 251 K/UL (ref 150–350)
PLATELET BLD QL SMEAR: ABNORMAL
PMV BLD AUTO: 8.5 FL (ref 9.2–12.9)
POTASSIUM SERPL-SCNC: 4.3 MMOL/L (ref 3.5–5.1)
PROT SERPL-MCNC: 6.7 G/DL (ref 6–8.4)
RBC # BLD AUTO: 4.39 M/UL (ref 4.6–6.2)
SODIUM SERPL-SCNC: 140 MMOL/L (ref 136–145)
WBC # BLD AUTO: 4.46 K/UL (ref 3.9–12.7)

## 2019-05-08 PROCEDURE — 85025 COMPLETE CBC W/AUTO DIFF WBC: CPT

## 2019-05-08 PROCEDURE — 1100F PTFALLS ASSESS-DOCD GE2>/YR: CPT | Mod: CPTII,S$GLB,, | Performed by: INTERNAL MEDICINE

## 2019-05-08 PROCEDURE — 80053 COMPREHEN METABOLIC PANEL: CPT | Mod: PN

## 2019-05-08 PROCEDURE — 3288F FALL RISK ASSESSMENT DOCD: CPT | Mod: CPTII,S$GLB,, | Performed by: INTERNAL MEDICINE

## 2019-05-08 PROCEDURE — 83735 ASSAY OF MAGNESIUM: CPT | Mod: PN

## 2019-05-08 PROCEDURE — 99999 PR PBB SHADOW E&M-EST. PATIENT-LVL III: ICD-10-PCS | Mod: PBBFAC,,, | Performed by: INTERNAL MEDICINE

## 2019-05-08 PROCEDURE — 99999 PR PBB SHADOW E&M-EST. PATIENT-LVL III: CPT | Mod: PBBFAC,,, | Performed by: INTERNAL MEDICINE

## 2019-05-08 PROCEDURE — 85025 COMPLETE CBC W/AUTO DIFF WBC: CPT | Mod: PN

## 2019-05-08 PROCEDURE — 36415 COLL VENOUS BLD VENIPUNCTURE: CPT | Mod: PN

## 2019-05-08 PROCEDURE — 99214 PR OFFICE/OUTPT VISIT, EST, LEVL IV, 30-39 MIN: ICD-10-PCS | Mod: S$GLB,,, | Performed by: INTERNAL MEDICINE

## 2019-05-08 PROCEDURE — 3288F PR FALLS RISK ASSESSMENT DOCUMENTED: ICD-10-PCS | Mod: CPTII,S$GLB,, | Performed by: INTERNAL MEDICINE

## 2019-05-08 PROCEDURE — 99214 OFFICE O/P EST MOD 30 MIN: CPT | Mod: S$GLB,,, | Performed by: INTERNAL MEDICINE

## 2019-05-08 PROCEDURE — 83735 ASSAY OF MAGNESIUM: CPT

## 2019-05-08 PROCEDURE — 80053 COMPREHEN METABOLIC PANEL: CPT

## 2019-05-08 PROCEDURE — 1100F PR PT FALLS ASSESS DOC 2+ FALLS/FALL W/INJURY/YR: ICD-10-PCS | Mod: CPTII,S$GLB,, | Performed by: INTERNAL MEDICINE

## 2019-05-14 NOTE — PROGRESS NOTES
HPI    68 years old  male who was presented to Oncology Clinic for evaluation with squamous cell carcinoma of the lung.  Patient was recently diagnosed in January with fine-needle aspiration of the right lung nodule was found to have squamous cell carcinoma.  And recently patient had a which resection(by description) at [facility].  Since then patient was instructed to follow up with our medical oncologist for evaluation and treatment.  Patient was told that doing the which resection tumor is larger than expected compared to the images.  It might be present as late stage I or early stage II.        Today 05/15/2019:  Patient here for C2 D1 of treatment.  Denies chest pain shortness breath.  Denies any nausea vomiting diarrhea.  Denies fever chills.  Fourteen point review system negative as above.      Past Medical History:   Diagnosis Date    Arthritis     Cancer SKIN    Cardiac angina     COPD (chronic obstructive pulmonary disease)     Coronary artery disease ANGINA. HAD  [facility] . 40 % BLOCKAGE. DR RUBY IS CARDIOLOGIST.    Depression     GERD (gastroesophageal reflux disease)     Kidney cysts     Lung cancer     MVP (mitral valve prolapse)     Trigger thumb     BILAT    Wears dentures     UPPER    Wears glasses      Social History     Socioeconomic History    Marital status:      Spouse name: Not on file    Number of children: Not on file    Years of education: Not on file    Highest education level: Not on file   Occupational History    Not on file   Social Needs    Financial resource strain: Not on file    Food insecurity:     Worry: Not on file     Inability: Not on file    Transportation needs:     Medical: Not on file     Non-medical: Not on file   Tobacco Use    Smoking status: Former Smoker     Years: 40.00     Last attempt to quit: 2019     Years since quittin.3    Smokeless tobacco: Never Used    Tobacco comment: 1-2 CIGT SOME DAYS/states  last cigarette 1/2/19   Substance and Sexual Activity    Alcohol use: Yes     Comment: beer on occasion    Drug use: No    Sexual activity: Yes     Partners: Female   Lifestyle    Physical activity:     Days per week: Not on file     Minutes per session: Not on file    Stress: Not on file   Relationships    Social connections:     Talks on phone: Not on file     Gets together: Not on file     Attends Uatsdin service: Not on file     Active member of club or organization: Not on file     Attends meetings of clubs or organizations: Not on file     Relationship status: Not on file   Other Topics Concern    Not on file   Social History Narrative    Not on file       Objective    Physical Exam   There were no vitals filed for this visit.    Constitutional: patient is oriented to person, place, and time. patient appears well-developed and well-nourished. No distress.   HENT:  Normocephalic atraumatic pupils equal round reactive to light extraocular muscle intact. No evidence of lesions on exam.  Cardiovascular:  Regular rate and rhythm     Pulmonary/Chest:  Symmetrical chest a rising  Abdominal:  Soft nontender nondistended bowel sounds x4   Musculoskeletal: Normal range of motion. Gait is normal No clubbing, cyanosis     Lymphadenopathy:  No evidence of lymphadenopathy  Neurological:  Sensorimotor grossly intact cranial nerves 2-12 grossly intact.    Skin:  Warm dry no rash no lesions   Psychiatric:  Normal affect    CMP  Sodium   Date Value Ref Range Status   05/08/2019 140 136 - 145 mmol/L Final     Potassium   Date Value Ref Range Status   05/08/2019 4.3 3.5 - 5.1 mmol/L Final     Chloride   Date Value Ref Range Status   05/08/2019 107 95 - 110 mmol/L Final     CO2   Date Value Ref Range Status   05/08/2019 27 22 - 31 mmol/L Final     Glucose   Date Value Ref Range Status   05/08/2019 118 (H) 70 - 110 mg/dL Final     Comment:     The ADA recommends the following guidelines for fasting glucose:  Normal:        less than 100 mg/dL  Prediabetes:  100 mg/dL to 125 mg/dL  Diabetes:     126 mg/dL or higher       BUN, Bld   Date Value Ref Range Status   05/08/2019 18 9 - 21 mg/dL Final     Creatinine   Date Value Ref Range Status   05/08/2019 0.96 0.50 - 1.40 mg/dL Final   01/18/2013 0.9 0.5 - 1.4 mg/dL Final     Calcium   Date Value Ref Range Status   05/08/2019 9.6 8.4 - 10.2 mg/dL Final   01/18/2013 10.7 (H) 8.7 - 10.5 mg/dL Final     Total Protein   Date Value Ref Range Status   05/08/2019 6.7 6.0 - 8.4 g/dL Final     Albumin   Date Value Ref Range Status   05/08/2019 3.9 3.5 - 5.2 g/dL Final     Total Bilirubin   Date Value Ref Range Status   05/08/2019 0.2 0.2 - 1.3 mg/dL Final     Alkaline Phosphatase   Date Value Ref Range Status   05/08/2019 127 38 - 145 U/L Final     AST   Date Value Ref Range Status   05/08/2019 24 17 - 59 U/L Final     ALT   Date Value Ref Range Status   05/08/2019 21 10 - 44 U/L Final     Anion Gap   Date Value Ref Range Status   05/08/2019 6 (L) 8 - 16 mmol/L Final   01/18/2013 11 5 - 15 meq/L Final     eGFR if    Date Value Ref Range Status   05/08/2019 >60 >60 mL/min/1.73 m^2 Final     eGFR if non    Date Value Ref Range Status   05/08/2019 >60 >60 mL/min/1.73 m^2 Final     Comment:     Calculation used to obtain the estimated glomerular filtration  rate (eGFR) is the CKD-EPI equation.        Lab Results   Component Value Date    WBC 4.46 05/08/2019    HGB 11.9 (L) 05/08/2019    HCT 36.5 (L) 05/08/2019    MCV 83 05/08/2019     05/08/2019       comparison is somewhat difficult with the interval surgery and technical differences.      Impression ct 3/29/2019       Interval changes of right lower lobectomy.  Severe emphysematous changes.  Few small scattered nodules largest measuring 5 mm.  At least some and possibly all these were present on the prior exam appearing similar today.  Strict comparison between the studies is difficult.  Trace right pleural  effusion    Low-density lesions in the liver consistent with cysts.  Right renal cyst appearing very mildly complex with thin internal septation.  Mild circumferential wall thickening of the distal esophagus questioning esophagitis.  Additional findings as detailed above         Assessment Plan    [] 03/30/2019 LUNG, RIGHT, FINE NEEDLE ASPIRATION:  - POSITIVE FOR SQUAMOUS CELL CARCINOMA    [] Status post right lung resection [facility] 4 weeks prior to this visit.  Patient was told size of tumor is larger than expected he might be having late stage I or early stage II SCC.      Plan: Cisplatin and docetaxel q 21 days for 4 cycles    []  [id number] stage IIa with pleural invasion present  -RTC 7 days with CBC and CMP pretreatment evaluation for cycle 2 of adjuvant  -C2D1 plan for 5/15/2019    [] Sore mouth but without oral lesion  -continue Magic mouthwash will be prescribed today.    [] Slight increased creatinine from 0.96-1.23 however GFR is to greater than 60%.  Close monitor.  Hydration 1 L normal saline next day.    [] CT chest abd pelv after completion of chemo    [] severe emphysematous changes   Follow Pulm

## 2019-05-15 ENCOUNTER — INFUSION (OUTPATIENT)
Dept: INFUSION THERAPY | Facility: HOSPITAL | Age: 69
End: 2019-05-15
Attending: INTERNAL MEDICINE
Payer: MEDICARE

## 2019-05-15 ENCOUNTER — OFFICE VISIT (OUTPATIENT)
Dept: HEMATOLOGY/ONCOLOGY | Facility: CLINIC | Age: 69
End: 2019-05-15
Payer: MEDICARE

## 2019-05-15 VITALS
TEMPERATURE: 98 F | BODY MASS INDEX: 24.7 KG/M2 | HEIGHT: 75 IN | HEART RATE: 71 BPM | WEIGHT: 198.63 LBS | RESPIRATION RATE: 18 BRPM | OXYGEN SATURATION: 98 % | DIASTOLIC BLOOD PRESSURE: 71 MMHG | SYSTOLIC BLOOD PRESSURE: 120 MMHG

## 2019-05-15 VITALS
SYSTOLIC BLOOD PRESSURE: 105 MMHG | DIASTOLIC BLOOD PRESSURE: 66 MMHG | BODY MASS INDEX: 24.7 KG/M2 | OXYGEN SATURATION: 98 % | WEIGHT: 198.63 LBS | HEIGHT: 75 IN | RESPIRATION RATE: 18 BRPM | HEART RATE: 66 BPM | TEMPERATURE: 98 F

## 2019-05-15 DIAGNOSIS — Z98.890 STATUS POST SURGERY: ICD-10-CM

## 2019-05-15 DIAGNOSIS — Z98.890 STATUS POST SURGERY: Primary | ICD-10-CM

## 2019-05-15 DIAGNOSIS — Z51.11 ENCOUNTER FOR ANTINEOPLASTIC CHEMOTHERAPY: Primary | ICD-10-CM

## 2019-05-15 DIAGNOSIS — R91.1 LUNG NODULE: ICD-10-CM

## 2019-05-15 DIAGNOSIS — Z09 ONCOLOGY FOLLOW-UP ENCOUNTER: ICD-10-CM

## 2019-05-15 PROCEDURE — 96417 CHEMO IV INFUS EACH ADDL SEQ: CPT | Mod: PN

## 2019-05-15 PROCEDURE — 25000003 PHARM REV CODE 250: Mod: PN | Performed by: INTERNAL MEDICINE

## 2019-05-15 PROCEDURE — 63600175 PHARM REV CODE 636 W HCPCS: Mod: PN | Performed by: INTERNAL MEDICINE

## 2019-05-15 PROCEDURE — 99999 PR PBB SHADOW E&M-EST. PATIENT-LVL III: ICD-10-PCS | Mod: PBBFAC,,, | Performed by: INTERNAL MEDICINE

## 2019-05-15 PROCEDURE — 1101F PT FALLS ASSESS-DOCD LE1/YR: CPT | Mod: CPTII,S$GLB,, | Performed by: INTERNAL MEDICINE

## 2019-05-15 PROCEDURE — 99215 OFFICE O/P EST HI 40 MIN: CPT | Mod: S$GLB,,, | Performed by: INTERNAL MEDICINE

## 2019-05-15 PROCEDURE — 99215 PR OFFICE/OUTPT VISIT, EST, LEVL V, 40-54 MIN: ICD-10-PCS | Mod: S$GLB,,, | Performed by: INTERNAL MEDICINE

## 2019-05-15 PROCEDURE — 96367 TX/PROPH/DG ADDL SEQ IV INF: CPT | Mod: PN

## 2019-05-15 PROCEDURE — 1101F PR PT FALLS ASSESS DOC 0-1 FALLS W/OUT INJ PAST YR: ICD-10-PCS | Mod: CPTII,S$GLB,, | Performed by: INTERNAL MEDICINE

## 2019-05-15 PROCEDURE — A4216 STERILE WATER/SALINE, 10 ML: HCPCS | Mod: PN | Performed by: INTERNAL MEDICINE

## 2019-05-15 PROCEDURE — 99999 PR PBB SHADOW E&M-EST. PATIENT-LVL III: CPT | Mod: PBBFAC,,, | Performed by: INTERNAL MEDICINE

## 2019-05-15 PROCEDURE — 96366 THER/PROPH/DIAG IV INF ADDON: CPT | Mod: PN

## 2019-05-15 PROCEDURE — 96413 CHEMO IV INFUSION 1 HR: CPT | Mod: PN

## 2019-05-15 RX ORDER — SODIUM CHLORIDE 0.9 % (FLUSH) 0.9 %
10 SYRINGE (ML) INJECTION
Status: CANCELLED | OUTPATIENT
Start: 2019-05-16

## 2019-05-15 RX ORDER — SODIUM CHLORIDE 0.9 % (FLUSH) 0.9 %
10 SYRINGE (ML) INJECTION
Status: CANCELLED | OUTPATIENT
Start: 2019-05-15

## 2019-05-15 RX ORDER — HEPARIN 100 UNIT/ML
500 SYRINGE INTRAVENOUS
Status: CANCELLED | OUTPATIENT
Start: 2019-05-15

## 2019-05-15 RX ORDER — SODIUM CHLORIDE 0.9 % (FLUSH) 0.9 %
10 SYRINGE (ML) INJECTION
Status: DISCONTINUED | OUTPATIENT
Start: 2019-05-15 | End: 2019-05-15 | Stop reason: HOSPADM

## 2019-05-15 RX ORDER — HEPARIN 100 UNIT/ML
500 SYRINGE INTRAVENOUS
Status: CANCELLED | OUTPATIENT
Start: 2019-05-16

## 2019-05-15 RX ADMIN — Medication 10 ML: at 09:05

## 2019-05-15 RX ADMIN — CISPLATIN 162 MG: 100 INJECTION, SOLUTION INTRAVENOUS at 02:05

## 2019-05-15 RX ADMIN — APREPITANT 130 MG: 130 INJECTION, EMULSION INTRAVENOUS at 12:05

## 2019-05-15 RX ADMIN — DOCETAXEL 160 MG: 20 INJECTION, SOLUTION, CONCENTRATE INTRAVENOUS at 12:05

## 2019-05-15 RX ADMIN — DEXAMETHASONE SODIUM PHOSPHATE: 4 INJECTION, SOLUTION INTRA-ARTICULAR; INTRALESIONAL; INTRAMUSCULAR; INTRAVENOUS; SOFT TISSUE at 11:05

## 2019-05-15 RX ADMIN — POTASSIUM CHLORIDE: 2 INJECTION, SOLUTION, CONCENTRATE INTRAVENOUS at 10:05

## 2019-05-15 RX ADMIN — POTASSIUM CHLORIDE: 2 INJECTION, SOLUTION, CONCENTRATE INTRAVENOUS at 03:05

## 2019-05-15 NOTE — PLAN OF CARE
Problem: Adult Inpatient Plan of Care  Goal: Plan of Care Review  Outcome: Ongoing (interventions implemented as appropriate)  Tolerated treatment well, VSS, schedule reviewed with pt, ambulated from infusion center

## 2019-05-16 ENCOUNTER — DOCUMENTATION ONLY (OUTPATIENT)
Dept: INFUSION THERAPY | Facility: HOSPITAL | Age: 69
End: 2019-05-16

## 2019-05-16 ENCOUNTER — INFUSION (OUTPATIENT)
Dept: INFUSION THERAPY | Facility: HOSPITAL | Age: 69
End: 2019-05-16
Attending: INTERNAL MEDICINE
Payer: MEDICARE

## 2019-05-16 VITALS
TEMPERATURE: 98 F | HEART RATE: 63 BPM | DIASTOLIC BLOOD PRESSURE: 76 MMHG | RESPIRATION RATE: 16 BRPM | SYSTOLIC BLOOD PRESSURE: 125 MMHG

## 2019-05-16 DIAGNOSIS — R91.1 LUNG NODULE: ICD-10-CM

## 2019-05-16 DIAGNOSIS — Z98.890 STATUS POST SURGERY: Primary | ICD-10-CM

## 2019-05-16 PROCEDURE — 63600175 PHARM REV CODE 636 W HCPCS: Mod: PN | Performed by: INTERNAL MEDICINE

## 2019-05-16 PROCEDURE — 96361 HYDRATE IV INFUSION ADD-ON: CPT | Mod: PN

## 2019-05-16 PROCEDURE — 96360 HYDRATION IV INFUSION INIT: CPT | Mod: PN

## 2019-05-16 PROCEDURE — A4216 STERILE WATER/SALINE, 10 ML: HCPCS | Mod: PN | Performed by: INTERNAL MEDICINE

## 2019-05-16 PROCEDURE — 96374 THER/PROPH/DIAG INJ IV PUSH: CPT | Mod: PN

## 2019-05-16 PROCEDURE — 25000003 PHARM REV CODE 250: Mod: PN | Performed by: INTERNAL MEDICINE

## 2019-05-16 RX ORDER — PROCHLORPERAZINE EDISYLATE 5 MG/ML
10 INJECTION INTRAMUSCULAR; INTRAVENOUS ONCE
Status: COMPLETED | OUTPATIENT
Start: 2019-05-16 | End: 2019-05-16

## 2019-05-16 RX ORDER — HEPARIN 100 UNIT/ML
500 SYRINGE INTRAVENOUS
Status: DISCONTINUED | OUTPATIENT
Start: 2019-05-16 | End: 2019-05-16 | Stop reason: HOSPADM

## 2019-05-16 RX ORDER — SODIUM CHLORIDE 0.9 % (FLUSH) 0.9 %
10 SYRINGE (ML) INJECTION
Status: DISCONTINUED | OUTPATIENT
Start: 2019-05-16 | End: 2019-05-16 | Stop reason: HOSPADM

## 2019-05-16 RX ADMIN — SODIUM CHLORIDE, PRESERVATIVE FREE 10 ML: 5 INJECTION INTRAVENOUS at 01:05

## 2019-05-16 RX ADMIN — SODIUM CHLORIDE 1000 ML: 9 INJECTION, SOLUTION INTRAVENOUS at 01:05

## 2019-05-16 RX ADMIN — SODIUM CHLORIDE, PRESERVATIVE FREE 10 ML: 5 INJECTION INTRAVENOUS at 03:05

## 2019-05-16 RX ADMIN — PROCHLORPERAZINE EDISYLATE 10 MG: 5 INJECTION INTRAMUSCULAR; INTRAVENOUS at 02:05

## 2019-05-16 NOTE — PLAN OF CARE
Problem: Adult Inpatient Plan of Care  Goal: Plan of Care Review  Outcome: Ongoing (interventions implemented as appropriate)  Pt. Completed treatment, tolerated without noted distress.Vtial signs stable. Patient discharged from infusion center ambulatory. Patient had all present questions answered.

## 2019-05-16 NOTE — PROGRESS NOTES
Nutrition: Met wiht patient on 5/15/2019 to discuss patients oral intake. Patient is currently a mild nutritional risk due to weigh tloss. Pattiet lost 7#s since last treatment. Patient did not realize he had lost that much weight and will try to eating more high calorie foods. Wt: 198# Discussed high calorie foods. Discussed ons and high calorie ingredients for smoothies. OFfered support and encouragement. Will follow up at cycle 3. Jessa Ochoa,MS, RD, LDN

## 2019-05-16 NOTE — PROGRESS NOTES
[5/16/2019 1:39 PM]  Amada beth need to see if val will order something for nausea for sharp [id number] he is here for hydration and complaining of nausea and he took his zofran already thanks          [5/16/2019 2:06 PM]  Ethan Johnson:    Compazine 10mg iv     [5/16/2019 2:07 PM]  Amada Gallo  okay thanks i will put it in

## 2019-05-22 ENCOUNTER — INFUSION (OUTPATIENT)
Dept: INFUSION THERAPY | Facility: HOSPITAL | Age: 69
End: 2019-05-22
Attending: INTERNAL MEDICINE
Payer: MEDICARE

## 2019-05-22 ENCOUNTER — OFFICE VISIT (OUTPATIENT)
Dept: HEMATOLOGY/ONCOLOGY | Facility: CLINIC | Age: 69
End: 2019-05-22
Payer: MEDICARE

## 2019-05-22 VITALS
HEART RATE: 75 BPM | SYSTOLIC BLOOD PRESSURE: 126 MMHG | RESPIRATION RATE: 18 BRPM | DIASTOLIC BLOOD PRESSURE: 79 MMHG | TEMPERATURE: 98 F

## 2019-05-22 VITALS
BODY MASS INDEX: 24.01 KG/M2 | HEIGHT: 75 IN | SYSTOLIC BLOOD PRESSURE: 126 MMHG | WEIGHT: 193.13 LBS | DIASTOLIC BLOOD PRESSURE: 79 MMHG | TEMPERATURE: 98 F | OXYGEN SATURATION: 98 % | HEART RATE: 75 BPM | RESPIRATION RATE: 18 BRPM

## 2019-05-22 DIAGNOSIS — D70.1 CHEMOTHERAPY INDUCED NEUTROPENIA: Primary | ICD-10-CM

## 2019-05-22 DIAGNOSIS — Z51.11 ENCOUNTER FOR ANTINEOPLASTIC CHEMOTHERAPY: ICD-10-CM

## 2019-05-22 DIAGNOSIS — T45.1X5A CHEMOTHERAPY INDUCED NEUTROPENIA: Primary | ICD-10-CM

## 2019-05-22 DIAGNOSIS — Z51.11 ENCOUNTER FOR ANTINEOPLASTIC CHEMOTHERAPY: Primary | ICD-10-CM

## 2019-05-22 PROCEDURE — 96372 THER/PROPH/DIAG INJ SC/IM: CPT | Mod: PN

## 2019-05-22 PROCEDURE — 63600175 PHARM REV CODE 636 W HCPCS: Mod: JG,PN | Performed by: INTERNAL MEDICINE

## 2019-05-22 PROCEDURE — 1101F PR PT FALLS ASSESS DOC 0-1 FALLS W/OUT INJ PAST YR: ICD-10-PCS | Mod: CPTII,S$GLB,, | Performed by: INTERNAL MEDICINE

## 2019-05-22 PROCEDURE — 1101F PT FALLS ASSESS-DOCD LE1/YR: CPT | Mod: CPTII,S$GLB,, | Performed by: INTERNAL MEDICINE

## 2019-05-22 PROCEDURE — 99215 OFFICE O/P EST HI 40 MIN: CPT | Mod: S$GLB,,, | Performed by: INTERNAL MEDICINE

## 2019-05-22 PROCEDURE — 99215 PR OFFICE/OUTPT VISIT, EST, LEVL V, 40-54 MIN: ICD-10-PCS | Mod: S$GLB,,, | Performed by: INTERNAL MEDICINE

## 2019-05-22 PROCEDURE — 99999 PR PBB SHADOW E&M-EST. PATIENT-LVL III: CPT | Mod: PBBFAC,,, | Performed by: INTERNAL MEDICINE

## 2019-05-22 PROCEDURE — 99999 PR PBB SHADOW E&M-EST. PATIENT-LVL III: ICD-10-PCS | Mod: PBBFAC,,, | Performed by: INTERNAL MEDICINE

## 2019-05-22 RX ORDER — ONDANSETRON 4 MG/1
4 TABLET, FILM COATED ORAL EVERY 6 HOURS PRN
Qty: 30 TABLET | Refills: 1 | Status: SHIPPED | OUTPATIENT
Start: 2019-05-22 | End: 2020-05-21

## 2019-05-22 RX ADMIN — FILGRASTIM 480 MCG: 480 INJECTION, SOLUTION INTRAVENOUS; SUBCUTANEOUS at 10:05

## 2019-05-22 NOTE — PROGRESS NOTES
HPI    68 years old  male who was presented to Oncology Clinic for evaluation with squamous cell carcinoma of the lung.  Patient was recently diagnosed in January with fine-needle aspiration of the right lung nodule was found to have squamous cell carcinoma.  And recently patient had a which resection(by description) at [facility].  Since then patient was instructed to follow up with our medical oncologist for evaluation and treatment.  Patient was told that doing the which resection tumor is larger than expected compared to the images.  It might be present as late stage I or early stage II.        Today 2019:  Completed cycle #2 of treatment   Patient is here for follow up post cycle #2  Denies chest pain shortness breath.  Denies any nausea vomiting diarrhea.  Denies fever chills.  Fourteen point review system negative as above.      Past Medical History:   Diagnosis Date    Arthritis     Cancer SKIN    Cardiac angina     COPD (chronic obstructive pulmonary disease)     Coronary artery disease ANGINA. HAD  [facility] . 40 % [address]. DR RUBY IS CARDIOLOGIST.    Depression     GERD (gastroesophageal reflux disease)     Kidney cysts     Lung cancer     MVP (mitral valve prolapse)     Trigger thumb     BILAT    Wears dentures     UPPER    Wears glasses      Social History     Socioeconomic History    Marital status:      Spouse name: Not on file    Number of children: Not on file    Years of education: Not on file    Highest education level: Not on file   Occupational History    Not on file   Social Needs    Financial resource strain: Not on file    Food insecurity:     Worry: Not on file     Inability: Not on file    Transportation needs:     Medical: Not on file     Non-medical: Not on file   Tobacco Use    Smoking status: Former Smoker     Years: 40.00     Last attempt to quit: 2019     Years since quittin.3    Smokeless tobacco: Never Used     Tobacco comment: 1-2 CIGT SOME DAYS/states last cigarette 1/2/19   Substance and Sexual Activity    Alcohol use: Yes     Comment: beer on occasion    Drug use: No    Sexual activity: Yes     Partners: Female   Lifestyle    Physical activity:     Days per week: Not on file     Minutes per session: Not on file    Stress: Not on file   Relationships    Social connections:     Talks on phone: Not on file     Gets together: Not on file     Attends [employer] service: Not on file     Active member of club or organization: Not on file     Attends meetings of clubs or organizations: Not on file     Relationship status: Not on file   Other Topics Concern    Not on file   Social History Narrative    Not on file       Objective    Physical Exam   There were no vitals filed for this visit.    Constitutional: patient is oriented to person, place, and time. patient appears well-developed and well-nourished. No distress.   HENT:  Normocephalic atraumatic pupils equal round reactive to light extraocular muscle intact. No evidence of lesions on exam.  Cardiovascular:  Regular rate and rhythm     Pulmonary/Chest:  Symmetrical chest a rising  Abdominal:  Soft nontender nondistended bowel sounds x4   Musculoskeletal: Normal range of motion. Gait is normal No clubbing, cyanosis     Lymphadenopathy:  No evidence of lymphadenopathy  Neurological:  Sensorimotor grossly intact cranial nerves 2-12 grossly intact.    Skin:  Warm dry no rash no lesions   Psychiatric:  Normal affect    CMP  Sodium   Date Value Ref Range Status   05/15/2019 138 136 - 145 mmol/L Final     Potassium   Date Value Ref Range Status   05/15/2019 4.4 3.5 - 5.1 mmol/L Final     Chloride   Date Value Ref Range Status   05/15/2019 105 95 - 110 mmol/L Final     CO2   Date Value Ref Range Status   05/15/2019 26 22 - 31 mmol/L Final     Glucose   Date Value Ref Range Status   05/15/2019 139 (H) 70 - 110 mg/dL Final     Comment:     The ADA recommends the following  guidelines for fasting glucose:  Normal:       less than 100 mg/dL  Prediabetes:  100 mg/dL to 125 mg/dL  Diabetes:     126 mg/dL or higher       BUN, Bld   Date Value Ref Range Status   05/15/2019 22 (H) 9 - 21 mg/dL Final     Creatinine   Date Value Ref Range Status   05/15/2019 1.23 0.50 - 1.40 mg/dL Final   01/18/2013 0.9 0.5 - 1.4 mg/dL Final     Calcium   Date Value Ref Range Status   05/15/2019 9.9 8.4 - 10.2 mg/dL Final   01/18/2013 10.7 (H) 8.7 - 10.5 mg/dL Final     Total Protein   Date Value Ref Range Status   05/15/2019 6.8 6.0 - 8.4 g/dL Final     Albumin   Date Value Ref Range Status   05/15/2019 4.1 3.5 - 5.2 g/dL Final     Total Bilirubin   Date Value Ref Range Status   05/15/2019 0.2 0.2 - 1.3 mg/dL Final     Alkaline Phosphatase   Date Value Ref Range Status   05/15/2019 131 38 - 145 U/L Final     AST   Date Value Ref Range Status   05/15/2019 25 17 - 59 U/L Final     ALT   Date Value Ref Range Status   05/15/2019 19 10 - 44 U/L Final     Anion Gap   Date Value Ref Range Status   05/15/2019 7 (L) 8 - 16 mmol/L Final   01/18/2013 11 5 - 15 meq/L Final     eGFR if    Date Value Ref Range Status   05/15/2019 >60 >60 mL/min/1.73 m^2 Final     eGFR if non    Date Value Ref Range Status   05/15/2019 >60 >60 mL/min/1.73 m^2 Final     Comment:     Calculation used to obtain the estimated glomerular filtration  rate (eGFR) is the CKD-EPI equation.        Lab Results   Component Value Date    WBC 9.47 05/15/2019    HGB 12.1 (L) 05/15/2019    HCT 37.4 (L) 05/15/2019    MCV 84 05/15/2019     05/15/2019       comparison is somewhat difficult with the interval surgery and technical differences.      Impression ct 3/29/2019       Interval changes of right lower lobectomy.  Severe emphysematous changes.  Few small scattered nodules largest measuring 5 mm.  At least some and possibly all these were present on the prior exam appearing similar today.  Strict comparison between  the studies is difficult.  Trace right pleural effusion    Low-density lesions in the liver consistent with cysts.  Right renal cyst appearing very mildly complex with thin internal septation.  Mild circumferential wall thickening of the distal esophagus questioning esophagitis.  Additional findings as detailed above         Assessment Plan    [] 03/30/2019 LUNG, RIGHT, FINE NEEDLE ASPIRATION:  - POSITIVE FOR SQUAMOUS CELL CARCINOMA    [] Status post right lung resection [facility] 4 weeks prior to this visit.  Patient was told size of tumor is larger than expected he might be having late stage I or early stage II SCC.      Plan: Cisplatin and docetaxel q 21 days for 4 cycles    []  [id number] stage IIa with pleural invasion present  -RTC on 6/5/2019 for cycle #3 with pre treatment evaluation with CBC CMP    [] Sore mouth but without oral lesion  -continue Magic mouthwash     [] Stable creatinine with GFR is to greater than 60%.      [] Chemo induced neutropenia.    -Neupogen 480 mcg daily times x2  -apply for Neulasta for cycle 3.    [] CT chest abd pelv after completion of chemo    [] severe emphysematous changes   Follow Pulm

## 2019-05-23 ENCOUNTER — INFUSION (OUTPATIENT)
Dept: INFUSION THERAPY | Facility: HOSPITAL | Age: 69
End: 2019-05-23
Attending: INTERNAL MEDICINE
Payer: MEDICARE

## 2019-05-23 VITALS
DIASTOLIC BLOOD PRESSURE: 71 MMHG | SYSTOLIC BLOOD PRESSURE: 117 MMHG | RESPIRATION RATE: 18 BRPM | TEMPERATURE: 99 F | HEART RATE: 81 BPM

## 2019-05-23 DIAGNOSIS — T45.1X5A CHEMOTHERAPY INDUCED NEUTROPENIA: Primary | ICD-10-CM

## 2019-05-23 DIAGNOSIS — Z51.11 ENCOUNTER FOR ANTINEOPLASTIC CHEMOTHERAPY: ICD-10-CM

## 2019-05-23 DIAGNOSIS — D70.1 CHEMOTHERAPY INDUCED NEUTROPENIA: Primary | ICD-10-CM

## 2019-05-23 PROCEDURE — 96372 THER/PROPH/DIAG INJ SC/IM: CPT | Mod: PN

## 2019-05-23 PROCEDURE — 63600175 PHARM REV CODE 636 W HCPCS: Mod: JG,PN | Performed by: INTERNAL MEDICINE

## 2019-05-23 RX ADMIN — FILGRASTIM 480 MCG: 480 INJECTION, SOLUTION INTRAVENOUS; SUBCUTANEOUS at 10:05

## 2019-05-24 ENCOUNTER — INFUSION (OUTPATIENT)
Dept: INFUSION THERAPY | Facility: HOSPITAL | Age: 69
End: 2019-05-24
Attending: INTERNAL MEDICINE
Payer: MEDICARE

## 2019-05-24 VITALS
SYSTOLIC BLOOD PRESSURE: 106 MMHG | TEMPERATURE: 99 F | HEART RATE: 85 BPM | DIASTOLIC BLOOD PRESSURE: 69 MMHG | RESPIRATION RATE: 18 BRPM

## 2019-05-24 DIAGNOSIS — Z51.11 ENCOUNTER FOR ANTINEOPLASTIC CHEMOTHERAPY: ICD-10-CM

## 2019-05-24 DIAGNOSIS — D70.1 CHEMOTHERAPY INDUCED NEUTROPENIA: Primary | ICD-10-CM

## 2019-05-24 DIAGNOSIS — T45.1X5A CHEMOTHERAPY INDUCED NEUTROPENIA: Primary | ICD-10-CM

## 2019-05-24 PROCEDURE — 63600175 PHARM REV CODE 636 W HCPCS: Mod: JG,PN | Performed by: INTERNAL MEDICINE

## 2019-05-24 PROCEDURE — 96372 THER/PROPH/DIAG INJ SC/IM: CPT | Mod: PN

## 2019-05-24 RX ADMIN — FILGRASTIM 480 MCG: 480 INJECTION, SOLUTION INTRAVENOUS; SUBCUTANEOUS at 10:05

## 2019-05-29 ENCOUNTER — LAB VISIT (OUTPATIENT)
Dept: LAB | Facility: HOSPITAL | Age: 69
End: 2019-05-29
Attending: INTERNAL MEDICINE
Payer: MEDICARE

## 2019-05-29 ENCOUNTER — OFFICE VISIT (OUTPATIENT)
Dept: HEMATOLOGY/ONCOLOGY | Facility: CLINIC | Age: 69
End: 2019-05-29
Payer: MEDICARE

## 2019-05-29 DIAGNOSIS — T45.1X5A CHEMOTHERAPY INDUCED NEUTROPENIA: Primary | ICD-10-CM

## 2019-05-29 DIAGNOSIS — Z51.11 ENCOUNTER FOR ANTINEOPLASTIC CHEMOTHERAPY: ICD-10-CM

## 2019-05-29 DIAGNOSIS — D70.1 CHEMOTHERAPY INDUCED NEUTROPENIA: Primary | ICD-10-CM

## 2019-05-29 LAB
ALBUMIN SERPL BCP-MCNC: 3.4 G/DL (ref 3.5–5.2)
ALP SERPL-CCNC: 133 U/L (ref 38–145)
ALT SERPL W/O P-5'-P-CCNC: 18 U/L (ref 10–44)
ANION GAP SERPL CALC-SCNC: 8 MMOL/L (ref 8–16)
ANISOCYTOSIS BLD QL SMEAR: SLIGHT
AST SERPL-CCNC: 22 U/L (ref 17–59)
BASOPHILS # BLD AUTO: ABNORMAL K/UL (ref 0–0.2)
BASOPHILS NFR BLD: 0 % (ref 0–1.9)
BILIRUB SERPL-MCNC: 0.1 MG/DL (ref 0.2–1.3)
BUN SERPL-MCNC: 15 MG/DL (ref 9–21)
CALCIUM SERPL-MCNC: 9.2 MG/DL (ref 8.4–10.2)
CHLORIDE SERPL-SCNC: 105 MMOL/L (ref 95–110)
CO2 SERPL-SCNC: 25 MMOL/L (ref 22–31)
CREAT SERPL-MCNC: 1.05 MG/DL (ref 0.5–1.4)
DIFFERENTIAL METHOD: ABNORMAL
EOSINOPHIL # BLD AUTO: ABNORMAL K/UL (ref 0–0.5)
EOSINOPHIL NFR BLD: 0 % (ref 0–8)
ERYTHROCYTE [DISTWIDTH] IN BLOOD BY AUTOMATED COUNT: 15.7 % (ref 11.5–14.5)
EST. GFR  (AFRICAN AMERICAN): >60 ML/MIN/1.73 M^2
EST. GFR  (NON AFRICAN AMERICAN): >60 ML/MIN/1.73 M^2
GLUCOSE SERPL-MCNC: 126 MG/DL (ref 70–110)
HCT VFR BLD AUTO: 33 % (ref 40–54)
HGB BLD-MCNC: 10.8 G/DL (ref 14–18)
HYPOCHROMIA BLD QL SMEAR: ABNORMAL
IMM GRANULOCYTES # BLD AUTO: ABNORMAL K/UL (ref 0–0.04)
IMM GRANULOCYTES NFR BLD AUTO: ABNORMAL % (ref 0–0.5)
LYMPHOCYTES # BLD AUTO: ABNORMAL K/UL (ref 1–4.8)
LYMPHOCYTES NFR BLD: 20 % (ref 18–48)
MAGNESIUM SERPL-MCNC: 1.7 MG/DL (ref 1.6–2.6)
MCH RBC QN AUTO: 27.1 PG (ref 27–31)
MCHC RBC AUTO-ENTMCNC: 32.7 G/DL (ref 32–36)
MCV RBC AUTO: 83 FL (ref 82–98)
METAMYELOCYTES NFR BLD MANUAL: 3 %
MONOCYTES # BLD AUTO: ABNORMAL K/UL (ref 0.3–1)
MONOCYTES NFR BLD: 5 % (ref 4–15)
MYELOCYTES NFR BLD MANUAL: 3 %
NEUTROPHILS # BLD AUTO: 9.6 K/UL (ref 1.8–7.7)
NEUTROPHILS NFR BLD: 65 % (ref 38–73)
NEUTS BAND NFR BLD MANUAL: 4 %
NRBC BLD-RTO: 0 /100 WBC
OVALOCYTES BLD QL SMEAR: ABNORMAL
PLATELET # BLD AUTO: 186 K/UL (ref 150–350)
PLATELET BLD QL SMEAR: ABNORMAL
PMV BLD AUTO: 9.6 FL (ref 9.2–12.9)
POIKILOCYTOSIS BLD QL SMEAR: SLIGHT
POLYCHROMASIA BLD QL SMEAR: ABNORMAL
POTASSIUM SERPL-SCNC: 4.1 MMOL/L (ref 3.5–5.1)
PROT SERPL-MCNC: 6 G/DL (ref 6–8.4)
RBC # BLD AUTO: 3.99 M/UL (ref 4.6–6.2)
SODIUM SERPL-SCNC: 138 MMOL/L (ref 136–145)
TOXIC GRANULES BLD QL SMEAR: PRESENT
WBC # BLD AUTO: 13.89 K/UL (ref 3.9–12.7)

## 2019-05-29 PROCEDURE — 1101F PR PT FALLS ASSESS DOC 0-1 FALLS W/OUT INJ PAST YR: ICD-10-PCS | Mod: CPTII,S$GLB,, | Performed by: INTERNAL MEDICINE

## 2019-05-29 PROCEDURE — 80053 COMPREHEN METABOLIC PANEL: CPT

## 2019-05-29 PROCEDURE — 83735 ASSAY OF MAGNESIUM: CPT

## 2019-05-29 PROCEDURE — 83735 ASSAY OF MAGNESIUM: CPT | Mod: PN

## 2019-05-29 PROCEDURE — 85007 BL SMEAR W/DIFF WBC COUNT: CPT | Mod: PN

## 2019-05-29 PROCEDURE — 36415 COLL VENOUS BLD VENIPUNCTURE: CPT | Mod: PN

## 2019-05-29 PROCEDURE — 85027 COMPLETE CBC AUTOMATED: CPT | Mod: PN

## 2019-05-29 PROCEDURE — 99214 PR OFFICE/OUTPT VISIT, EST, LEVL IV, 30-39 MIN: ICD-10-PCS | Mod: S$GLB,,, | Performed by: INTERNAL MEDICINE

## 2019-05-29 PROCEDURE — 99214 OFFICE O/P EST MOD 30 MIN: CPT | Mod: S$GLB,,, | Performed by: INTERNAL MEDICINE

## 2019-05-29 PROCEDURE — 99999 PR PBB SHADOW E&M-EST. PATIENT-LVL I: ICD-10-PCS | Mod: PBBFAC,,, | Performed by: INTERNAL MEDICINE

## 2019-05-29 PROCEDURE — 80053 COMPREHEN METABOLIC PANEL: CPT | Mod: PN

## 2019-05-29 PROCEDURE — 1101F PT FALLS ASSESS-DOCD LE1/YR: CPT | Mod: CPTII,S$GLB,, | Performed by: INTERNAL MEDICINE

## 2019-05-29 PROCEDURE — 85007 BL SMEAR W/DIFF WBC COUNT: CPT

## 2019-05-29 PROCEDURE — 85027 COMPLETE CBC AUTOMATED: CPT

## 2019-05-29 PROCEDURE — 99999 PR PBB SHADOW E&M-EST. PATIENT-LVL I: CPT | Mod: PBBFAC,,, | Performed by: INTERNAL MEDICINE

## 2019-05-29 NOTE — PROGRESS NOTES
HPI    68 years old  male who was presented to Oncology Clinic for evaluation with squamous cell carcinoma of the lung.  Patient was recently diagnosed in January with fine-needle aspiration of the right lung nodule was found to have squamous cell carcinoma.  And recently patient had a which resection(by description) at [facility].  Since then patient was instructed to follow up with our medical oncologist for evaluation and treatment.  Patient was told that doing the which resection tumor is larger than expected compared to the images.  It might be present as late stage I or early stage II.        Today 2019:  Follow up visit for post cycle 2 treatment evaluation.  Patient reports feeling better after GCSF     Denies chest pain shortness breath.  Denies any nausea vomiting diarrhea.  Denies fever chills.  Fourteen point review system negative as above.      Past Medical History:   Diagnosis Date    Arthritis     Cancer SKIN    Cardiac angina     COPD (chronic obstructive pulmonary disease)     Coronary artery disease ANGINA. HAD  [facility] . 40 % [address]. DR RUBY IS CARDIOLOGIST.    Depression     GERD (gastroesophageal reflux disease)     Kidney cysts     Lung cancer     MVP (mitral valve prolapse)     Trigger thumb     BILAT    Wears dentures     UPPER    Wears glasses      Social History     Socioeconomic History    Marital status:      Spouse name: Not on file    Number of children: Not on file    Years of education: Not on file    Highest education level: Not on file   Occupational History    Not on file   Social Needs    Financial resource strain: Not on file    Food insecurity:     Worry: Not on file     Inability: Not on file    Transportation needs:     Medical: Not on file     Non-medical: Not on file   Tobacco Use    Smoking status: Former Smoker     Years: 40.00     Last attempt to quit: 2019     Years since quittin.4    Smokeless  tobacco: Never Used    Tobacco comment: 1-2 CIGT SOME DAYS/states last cigarette 1/2/19   Substance and Sexual Activity    Alcohol use: Yes     Comment: beer on occasion    Drug use: No    Sexual activity: Yes     Partners: Female   Lifestyle    Physical activity:     Days per week: Not on file     Minutes per session: Not on file    Stress: Not on file   Relationships    Social connections:     Talks on phone: Not on file     Gets together: Not on file     Attends Latter-day service: Not on file     Active member of club or organization: Not on file     Attends meetings of clubs or organizations: Not on file     Relationship status: Not on file   Other Topics Concern    Not on file   Social History Narrative    Not on file       Objective    Physical Exam   Vital reviewed   Constitutional: patient is oriented to person, place, and time. patient appears well-developed and well-nourished. No distress.   HENT:  Normocephalic atraumatic pupils equal round reactive to light extraocular muscle intact. No evidence of lesions on exam.  Cardiovascular:  Regular rate and rhythm     Pulmonary/Chest:  Symmetrical chest a rising  Abdominal:  Soft nontender nondistended bowel sounds x4   Musculoskeletal: Normal range of motion. Gait is normal No clubbing, cyanosis     Lymphadenopathy:  No evidence of lymphadenopathy  Neurological:  Sensorimotor grossly intact cranial nerves 2-12 grossly intact.    Skin:  Warm dry no rash no lesions   Psychiatric:  Normal affect    CMP  Sodium   Date Value Ref Range Status   05/22/2019 136 136 - 145 mmol/L Final     Potassium   Date Value Ref Range Status   05/22/2019 4.7 3.5 - 5.1 mmol/L Final     Chloride   Date Value Ref Range Status   05/22/2019 102 95 - 110 mmol/L Final     CO2   Date Value Ref Range Status   05/22/2019 25 22 - 31 mmol/L Final     Glucose   Date Value Ref Range Status   05/22/2019 120 (H) 70 - 110 mg/dL Final     Comment:     The ADA recommends the following  guidelines for fasting glucose:  Normal:       less than 100 mg/dL  Prediabetes:  100 mg/dL to 125 mg/dL  Diabetes:     126 mg/dL or higher       BUN, Bld   Date Value Ref Range Status   05/22/2019 27 (H) 9 - 21 mg/dL Final     Creatinine   Date Value Ref Range Status   05/22/2019 1.23 0.50 - 1.40 mg/dL Final   01/18/2013 0.9 0.5 - 1.4 mg/dL Final     Calcium   Date Value Ref Range Status   05/22/2019 10.0 8.4 - 10.2 mg/dL Final   01/18/2013 10.7 (H) 8.7 - 10.5 mg/dL Final     Total Protein   Date Value Ref Range Status   05/22/2019 7.1 6.0 - 8.4 g/dL Final     Albumin   Date Value Ref Range Status   05/22/2019 4.3 3.5 - 5.2 g/dL Final     Total Bilirubin   Date Value Ref Range Status   05/22/2019 0.3 0.2 - 1.3 mg/dL Final     Alkaline Phosphatase   Date Value Ref Range Status   05/22/2019 128 38 - 145 U/L Final     AST   Date Value Ref Range Status   05/22/2019 28 17 - 59 U/L Final     ALT   Date Value Ref Range Status   05/22/2019 28 10 - 44 U/L Final     Anion Gap   Date Value Ref Range Status   05/22/2019 9 8 - 16 mmol/L Final   01/18/2013 11 5 - 15 meq/L Final     eGFR if    Date Value Ref Range Status   05/22/2019 >60 >60 mL/min/1.73 m^2 Final     eGFR if non    Date Value Ref Range Status   05/22/2019 >60 >60 mL/min/1.73 m^2 Final     Comment:     Calculation used to obtain the estimated glomerular filtration  rate (eGFR) is the CKD-EPI equation.        Lab Results   Component Value Date    WBC 13.89 (H) 05/29/2019    HGB 10.8 (L) 05/29/2019    HCT 33.0 (L) 05/29/2019    MCV 83 05/29/2019     05/29/2019       comparison is somewhat difficult with the interval surgery and technical differences.      Impression ct 3/29/2019       Interval changes of right lower lobectomy.  Severe emphysematous changes.  Few small scattered nodules largest measuring 5 mm.  At least some and possibly all these were present on the prior exam appearing similar today.  Strict comparison between  the studies is difficult.  Trace right pleural effusion    Low-density lesions in the liver consistent with cysts.  Right renal cyst appearing very mildly complex with thin internal septation.  Mild circumferential wall thickening of the distal esophagus questioning esophagitis.  Additional findings as detailed above         Assessment Plan    [] 03/30/2019 LUNG, RIGHT, FINE NEEDLE ASPIRATION:  - POSITIVE FOR SQUAMOUS CELL CARCINOMA    [] Status post right lung resection [facility] 4 weeks prior to this visit.  Patient was told size of tumor is larger than expected he might be having late stage I or early stage II SCC.      Plan: Cisplatin and docetaxel q 21 days for 4 cycles    []  [id number] stage IIa with pleural invasion present  -RTC on 6/5/2019 for cycle #3 with pre treatment evaluation with CBC CMP    [] Sore mouth but without oral lesion  -continue Magic mouthwash     [] Stable creatinine with GFR is to greater than 60%.      [] Chemo induced neutropenia.     -Neupogen 480 mcg daily times x2 respond to treatment   -RTC on 6/5/2019 for cycle #3    [] CT chest abd pelv after completion of chemo    [] severe emphysematous changes   Follow Pulm

## 2019-06-04 NOTE — PROGRESS NOTES
HPI    68 years old  male who was presented to Oncology Clinic for evaluation with squamous cell carcinoma of the lung.  Patient was recently diagnosed in January with fine-needle aspiration of the right lung nodule was found to have squamous cell carcinoma.  And recently patient had a which resection(by description) at [facility].  Since then patient was instructed to follow up with our medical oncologist for evaluation and treatment.  Patient was told that doing the which resection tumor is larger than expected compared to the images.  It might be present as late stage I or early stage II.      2019 today follow-up for pretreatment evaluation of cycle 3 chemotherapy.    Denies chest pain shortness breath.  Denies any nausea vomiting diarrhea.  Denies fever chills.  Fourteen point review system negative as above.      Past Medical History:   Diagnosis Date    Arthritis     Cancer SKIN    Cardiac angina     COPD (chronic obstructive pulmonary disease)     Coronary artery disease ANGINA. HAD  [facility] . 40 % [address]. DR RUBY IS CARDIOLOGIST.    Depression     GERD (gastroesophageal reflux disease)     Kidney cysts     Lung cancer     MVP (mitral valve prolapse)     Trigger thumb     BILAT    Wears dentures     UPPER    Wears glasses      Social History     Socioeconomic History    Marital status:      Spouse name: Not on file    Number of children: Not on file    Years of education: Not on file    Highest education level: Not on file   Occupational History    Not on file   Social Needs    Financial resource strain: Not on file    Food insecurity:     Worry: Not on file     Inability: Not on file    Transportation needs:     Medical: Not on file     Non-medical: Not on file   Tobacco Use    Smoking status: Former Smoker     Years: 40.00     Last attempt to quit: 2019     Years since quittin.4    Smokeless tobacco: Never Used    Tobacco comment: 1-2  CIGT SOME DAYS/states last cigarette 1/2/19   Substance and Sexual Activity    Alcohol use: Yes     Comment: beer on occasion    Drug use: No    Sexual activity: Yes     Partners: Female   Lifestyle    Physical activity:     Days per week: Not on file     Minutes per session: Not on file    Stress: Not on file   Relationships    Social connections:     Talks on phone: Not on file     Gets together: Not on file     Attends Denominational service: Not on file     Active member of club or organization: Not on file     Attends meetings of clubs or organizations: Not on file     Relationship status: Not on file   Other Topics Concern    Not on file   Social History Narrative    Not on file       Objective    Physical Exam   Vitals:    06/05/19 0859   BP: 116/72   Pulse: 76   Resp: 18   Temp: 97.8 °F (36.6 °C)       Constitutional: patient is oriented to person, place, and time. patient appears well-developed and well-nourished. No distress.   HENT:  Normocephalic atraumatic pupils equal round reactive to light extraocular muscle intact. No evidence of lesions on exam.  Cardiovascular:  Regular rate and rhythm     Pulmonary/Chest:  Symmetrical chest a rising  Abdominal:  Soft nontender nondistended bowel sounds x4   Musculoskeletal: Normal range of motion. Gait is normal No clubbing, cyanosis     Lymphadenopathy:  No evidence of lymphadenopathy  Neurological:  Sensorimotor grossly intact cranial nerves 2-12 grossly intact.    Skin:  Warm dry no rash no lesions   Psychiatric:  Normal affect    CMP  Sodium   Date Value Ref Range Status   05/29/2019 138 136 - 145 mmol/L Final     Potassium   Date Value Ref Range Status   05/29/2019 4.1 3.5 - 5.1 mmol/L Final     Chloride   Date Value Ref Range Status   05/29/2019 105 95 - 110 mmol/L Final     CO2   Date Value Ref Range Status   05/29/2019 25 22 - 31 mmol/L Final     Glucose   Date Value Ref Range Status   05/29/2019 126 (H) 70 - 110 mg/dL Final     Comment:     The ADA  recommends the following guidelines for fasting glucose:  Normal:       less than 100 mg/dL  Prediabetes:  100 mg/dL to 125 mg/dL  Diabetes:     126 mg/dL or higher       BUN, Bld   Date Value Ref Range Status   05/29/2019 15 9 - 21 mg/dL Final     Creatinine   Date Value Ref Range Status   05/29/2019 1.05 0.50 - 1.40 mg/dL Final   01/18/2013 0.9 0.5 - 1.4 mg/dL Final     Calcium   Date Value Ref Range Status   05/29/2019 9.2 8.4 - 10.2 mg/dL Final   01/18/2013 10.7 (H) 8.7 - 10.5 mg/dL Final     Total Protein   Date Value Ref Range Status   05/29/2019 6.0 6.0 - 8.4 g/dL Final     Albumin   Date Value Ref Range Status   05/29/2019 3.4 (L) 3.5 - 5.2 g/dL Final     Total Bilirubin   Date Value Ref Range Status   05/29/2019 0.1 (L) 0.2 - 1.3 mg/dL Final     Alkaline Phosphatase   Date Value Ref Range Status   05/29/2019 133 38 - 145 U/L Final     AST   Date Value Ref Range Status   05/29/2019 22 17 - 59 U/L Final     ALT   Date Value Ref Range Status   05/29/2019 18 10 - 44 U/L Final     Anion Gap   Date Value Ref Range Status   05/29/2019 8 8 - 16 mmol/L Final   01/18/2013 11 5 - 15 meq/L Final     eGFR if    Date Value Ref Range Status   05/29/2019 >60 >60 mL/min/1.73 m^2 Final     eGFR if non    Date Value Ref Range Status   05/29/2019 >60 >60 mL/min/1.73 m^2 Final     Comment:     Calculation used to obtain the estimated glomerular filtration  rate (eGFR) is the CKD-EPI equation.        Lab Results   Component Value Date    WBC 13.89 (H) 05/29/2019    HGB 10.8 (L) 05/29/2019    HCT 33.0 (L) 05/29/2019    MCV 83 05/29/2019     05/29/2019       comparison is somewhat difficult with the interval surgery and technical differences.      Impression ct 3/29/2019       Interval changes of right lower lobectomy.  Severe emphysematous changes.  Few small scattered nodules largest measuring 5 mm.  At least some and possibly all these were present on the prior exam appearing similar  today.  Strict comparison between the studies is difficult.  Trace right pleural effusion    Low-density lesions in the liver consistent with cysts.  Right renal cyst appearing very mildly complex with thin internal septation.  Mild circumferential wall thickening of the distal esophagus questioning esophagitis.  Additional findings as detailed above         Assessment Plan    [] 03/30/2019 LUNG, RIGHT, FINE NEEDLE ASPIRATION:  - POSITIVE FOR SQUAMOUS CELL CARCINOMA    [] Status post right lung resection [facility] 4 weeks prior to this visit.  Patient was told size of tumor is larger than expected he might be having late stage I or early stage II SCC.      Plan: Cisplatin and docetaxel q 21 days for 4 cycles    []  [id number] stage IIa with pleural invasion present  -today 6/5/2019 for cycle #3 with pre treatment evaluation with CBC CMP  -patient return to clinic in 3 weeks for pretreatment evaluation of cycle 4.    [] Sore mouth but without oral lesion  -continue Magic mouthwash     [] Chemo induced neutropenia - resolved    [] severe emphysematous changes   Follow Pulm

## 2019-06-05 ENCOUNTER — INFUSION (OUTPATIENT)
Dept: INFUSION THERAPY | Facility: HOSPITAL | Age: 69
End: 2019-06-05
Attending: INTERNAL MEDICINE
Payer: MEDICARE

## 2019-06-05 ENCOUNTER — DOCUMENTATION ONLY (OUTPATIENT)
Dept: INFUSION THERAPY | Facility: HOSPITAL | Age: 69
End: 2019-06-05

## 2019-06-05 ENCOUNTER — OFFICE VISIT (OUTPATIENT)
Dept: HEMATOLOGY/ONCOLOGY | Facility: CLINIC | Age: 69
End: 2019-06-05
Payer: MEDICARE

## 2019-06-05 VITALS
TEMPERATURE: 98 F | DIASTOLIC BLOOD PRESSURE: 72 MMHG | SYSTOLIC BLOOD PRESSURE: 116 MMHG | WEIGHT: 201.94 LBS | RESPIRATION RATE: 18 BRPM | HEIGHT: 75 IN | HEART RATE: 76 BPM | BODY MASS INDEX: 25.11 KG/M2

## 2019-06-05 VITALS
RESPIRATION RATE: 18 BRPM | HEIGHT: 75 IN | WEIGHT: 201.94 LBS | HEART RATE: 66 BPM | DIASTOLIC BLOOD PRESSURE: 76 MMHG | SYSTOLIC BLOOD PRESSURE: 125 MMHG | BODY MASS INDEX: 25.11 KG/M2 | TEMPERATURE: 98 F

## 2019-06-05 DIAGNOSIS — Z09 ONCOLOGY FOLLOW-UP ENCOUNTER: ICD-10-CM

## 2019-06-05 DIAGNOSIS — D70.1 CHEMOTHERAPY INDUCED NEUTROPENIA: Primary | ICD-10-CM

## 2019-06-05 DIAGNOSIS — R91.1 LUNG NODULE: ICD-10-CM

## 2019-06-05 DIAGNOSIS — Z51.11 ENCOUNTER FOR ANTINEOPLASTIC CHEMOTHERAPY: ICD-10-CM

## 2019-06-05 DIAGNOSIS — T45.1X5A CHEMOTHERAPY INDUCED NEUTROPENIA: Primary | ICD-10-CM

## 2019-06-05 DIAGNOSIS — C34.91 SQUAMOUS CELL CARCINOMA OF LUNG, RIGHT: ICD-10-CM

## 2019-06-05 DIAGNOSIS — Z98.890 STATUS POST SURGERY: Primary | ICD-10-CM

## 2019-06-05 PROCEDURE — 99215 PR OFFICE/OUTPT VISIT, EST, LEVL V, 40-54 MIN: ICD-10-PCS | Mod: S$GLB,,, | Performed by: INTERNAL MEDICINE

## 2019-06-05 PROCEDURE — 96413 CHEMO IV INFUSION 1 HR: CPT | Mod: PN

## 2019-06-05 PROCEDURE — 96368 THER/DIAG CONCURRENT INF: CPT | Mod: PN

## 2019-06-05 PROCEDURE — 25000003 PHARM REV CODE 250: Mod: PN | Performed by: INTERNAL MEDICINE

## 2019-06-05 PROCEDURE — 1101F PT FALLS ASSESS-DOCD LE1/YR: CPT | Mod: CPTII,S$GLB,, | Performed by: INTERNAL MEDICINE

## 2019-06-05 PROCEDURE — 1101F PR PT FALLS ASSESS DOC 0-1 FALLS W/OUT INJ PAST YR: ICD-10-PCS | Mod: CPTII,S$GLB,, | Performed by: INTERNAL MEDICINE

## 2019-06-05 PROCEDURE — 63600175 PHARM REV CODE 636 W HCPCS: Mod: PN | Performed by: INTERNAL MEDICINE

## 2019-06-05 PROCEDURE — 99999 PR PBB SHADOW E&M-EST. PATIENT-LVL III: ICD-10-PCS | Mod: PBBFAC,,, | Performed by: INTERNAL MEDICINE

## 2019-06-05 PROCEDURE — 96417 CHEMO IV INFUS EACH ADDL SEQ: CPT | Mod: PN

## 2019-06-05 PROCEDURE — 96367 TX/PROPH/DG ADDL SEQ IV INF: CPT | Mod: PN

## 2019-06-05 PROCEDURE — 99215 OFFICE O/P EST HI 40 MIN: CPT | Mod: S$GLB,,, | Performed by: INTERNAL MEDICINE

## 2019-06-05 PROCEDURE — 96366 THER/PROPH/DIAG IV INF ADDON: CPT | Mod: PN

## 2019-06-05 PROCEDURE — 99999 PR PBB SHADOW E&M-EST. PATIENT-LVL III: CPT | Mod: PBBFAC,,, | Performed by: INTERNAL MEDICINE

## 2019-06-05 RX ORDER — HEPARIN 100 UNIT/ML
500 SYRINGE INTRAVENOUS
Status: DISCONTINUED | OUTPATIENT
Start: 2019-06-05 | End: 2019-06-05 | Stop reason: HOSPADM

## 2019-06-05 RX ORDER — HYDROCODONE BITARTRATE AND ACETAMINOPHEN 10; 325 MG/1; MG/1
1 TABLET ORAL EVERY 8 HOURS PRN
Qty: 90 TABLET | Refills: 0 | Status: SHIPPED | OUTPATIENT
Start: 2019-06-05 | End: 2019-07-05 | Stop reason: SDUPTHER

## 2019-06-05 RX ORDER — HEPARIN 100 UNIT/ML
500 SYRINGE INTRAVENOUS
Status: CANCELLED | OUTPATIENT
Start: 2019-06-05

## 2019-06-05 RX ORDER — SODIUM CHLORIDE 0.9 % (FLUSH) 0.9 %
10 SYRINGE (ML) INJECTION
Status: DISCONTINUED | OUTPATIENT
Start: 2019-06-05 | End: 2019-06-05 | Stop reason: HOSPADM

## 2019-06-05 RX ORDER — HEPARIN 100 UNIT/ML
500 SYRINGE INTRAVENOUS
Status: CANCELLED | OUTPATIENT
Start: 2019-06-06

## 2019-06-05 RX ORDER — SODIUM CHLORIDE 0.9 % (FLUSH) 0.9 %
10 SYRINGE (ML) INJECTION
Status: CANCELLED | OUTPATIENT
Start: 2019-06-05

## 2019-06-05 RX ORDER — SODIUM CHLORIDE 0.9 % (FLUSH) 0.9 %
10 SYRINGE (ML) INJECTION
Status: CANCELLED | OUTPATIENT
Start: 2019-06-06

## 2019-06-05 RX ADMIN — APREPITANT 130 MG: 130 INJECTION, EMULSION INTRAVENOUS at 12:06

## 2019-06-05 RX ADMIN — POTASSIUM CHLORIDE: 2 INJECTION, SOLUTION, CONCENTRATE INTRAVENOUS at 01:06

## 2019-06-05 RX ADMIN — CISPLATIN 162 MG: 100 INJECTION, SOLUTION INTRAVENOUS at 02:06

## 2019-06-05 RX ADMIN — DEXAMETHASONE SODIUM PHOSPHATE: 4 INJECTION, SOLUTION INTRA-ARTICULAR; INTRALESIONAL; INTRAMUSCULAR; INTRAVENOUS; SOFT TISSUE at 12:06

## 2019-06-05 RX ADMIN — DOCETAXEL 160 MG: 20 INJECTION, SOLUTION, CONCENTRATE INTRAVENOUS at 01:06

## 2019-06-05 RX ADMIN — POTASSIUM CHLORIDE: 2 INJECTION, SOLUTION, CONCENTRATE INTRAVENOUS at 10:06

## 2019-06-05 NOTE — PROGRESS NOTES
[6/5/2019 10:07 AM]  Ethan Johnson:    Pardeep Mr Goode [id number] is on his way down. X putting orders in for Neupogen tomorrow and friday

## 2019-06-05 NOTE — PROGRESS NOTES
Nutrition: Met with patient today while he was here for chemo infusion. Patient is not currently a nutritional risk. Patient is eating well. States he gained weight by eating more often and eating higher calorie foods. WT: 201# Discussed the need for continuing to eat high calorie foods. Offered support and encouragement. Encouraged to call with questions. Will assist if and when needed. Jessa Ochoa,MS, RD, LDN

## 2019-06-05 NOTE — PLAN OF CARE
Problem: Adult Inpatient Plan of Care  Goal: Plan of Care Review  Outcome: Ongoing (interventions implemented as appropriate)  Plan of care reviewed with patient; patient verbalizes understanding. Medications reviewed and Cisplatin and Taxotere administered as ordered.  Port left acsessed for hydration scheduled tomorrow. VSS. Safety precautions maintained. NADN.

## 2019-06-06 ENCOUNTER — INFUSION (OUTPATIENT)
Dept: INFUSION THERAPY | Facility: HOSPITAL | Age: 69
End: 2019-06-06
Attending: INTERNAL MEDICINE
Payer: MEDICARE

## 2019-06-06 VITALS
HEART RATE: 59 BPM | TEMPERATURE: 98 F | DIASTOLIC BLOOD PRESSURE: 73 MMHG | RESPIRATION RATE: 16 BRPM | SYSTOLIC BLOOD PRESSURE: 134 MMHG | OXYGEN SATURATION: 95 %

## 2019-06-06 DIAGNOSIS — Z98.890 STATUS POST SURGERY: Primary | ICD-10-CM

## 2019-06-06 DIAGNOSIS — R91.1 LUNG NODULE: ICD-10-CM

## 2019-06-06 DIAGNOSIS — Z51.11 ENCOUNTER FOR ANTINEOPLASTIC CHEMOTHERAPY: ICD-10-CM

## 2019-06-06 DIAGNOSIS — T45.1X5A CHEMOTHERAPY INDUCED NEUTROPENIA: ICD-10-CM

## 2019-06-06 DIAGNOSIS — D70.1 CHEMOTHERAPY INDUCED NEUTROPENIA: ICD-10-CM

## 2019-06-06 PROCEDURE — 96375 TX/PRO/DX INJ NEW DRUG ADDON: CPT | Mod: PN

## 2019-06-06 PROCEDURE — 96361 HYDRATE IV INFUSION ADD-ON: CPT | Mod: PN

## 2019-06-06 PROCEDURE — 96372 THER/PROPH/DIAG INJ SC/IM: CPT | Mod: PN,59

## 2019-06-06 PROCEDURE — 96360 HYDRATION IV INFUSION INIT: CPT | Mod: PN

## 2019-06-06 PROCEDURE — 25000003 PHARM REV CODE 250: Mod: PN | Performed by: INTERNAL MEDICINE

## 2019-06-06 PROCEDURE — 63600175 PHARM REV CODE 636 W HCPCS: Mod: JG,PN | Performed by: INTERNAL MEDICINE

## 2019-06-06 RX ORDER — HEPARIN 100 UNIT/ML
500 SYRINGE INTRAVENOUS
Status: DISCONTINUED | OUTPATIENT
Start: 2019-06-06 | End: 2019-06-06 | Stop reason: HOSPADM

## 2019-06-06 RX ORDER — ONDANSETRON 2 MG/ML
8 INJECTION INTRAMUSCULAR; INTRAVENOUS ONCE
Status: COMPLETED | OUTPATIENT
Start: 2019-06-06 | End: 2019-06-06

## 2019-06-06 RX ORDER — SODIUM CHLORIDE 0.9 % (FLUSH) 0.9 %
10 SYRINGE (ML) INJECTION
Status: DISCONTINUED | OUTPATIENT
Start: 2019-06-06 | End: 2019-06-06 | Stop reason: HOSPADM

## 2019-06-06 RX ADMIN — FILGRASTIM 300 MCG: 300 INJECTION, SOLUTION INTRAVENOUS; SUBCUTANEOUS at 04:06

## 2019-06-06 RX ADMIN — ONDANSETRON 8 MG: 2 INJECTION INTRAMUSCULAR; INTRAVENOUS at 02:06

## 2019-06-06 RX ADMIN — SODIUM CHLORIDE 1000 ML: 9 INJECTION, SOLUTION INTRAVENOUS at 02:06

## 2019-06-07 ENCOUNTER — INFUSION (OUTPATIENT)
Dept: INFUSION THERAPY | Facility: HOSPITAL | Age: 69
End: 2019-06-07
Attending: INTERNAL MEDICINE
Payer: MEDICARE

## 2019-06-07 VITALS
TEMPERATURE: 98 F | SYSTOLIC BLOOD PRESSURE: 111 MMHG | RESPIRATION RATE: 20 BRPM | DIASTOLIC BLOOD PRESSURE: 67 MMHG | HEART RATE: 59 BPM

## 2019-06-07 DIAGNOSIS — Z51.11 ENCOUNTER FOR ANTINEOPLASTIC CHEMOTHERAPY: ICD-10-CM

## 2019-06-07 DIAGNOSIS — T45.1X5A CHEMOTHERAPY INDUCED NEUTROPENIA: Primary | ICD-10-CM

## 2019-06-07 DIAGNOSIS — D70.1 CHEMOTHERAPY INDUCED NEUTROPENIA: Primary | ICD-10-CM

## 2019-06-07 PROCEDURE — 63600175 PHARM REV CODE 636 W HCPCS: Mod: JG,PN | Performed by: INTERNAL MEDICINE

## 2019-06-07 PROCEDURE — 96372 THER/PROPH/DIAG INJ SC/IM: CPT | Mod: PN

## 2019-06-07 RX ADMIN — FILGRASTIM 300 MCG: 300 INJECTION, SOLUTION INTRAVENOUS; SUBCUTANEOUS at 10:06

## 2019-06-10 DIAGNOSIS — R11.2 CHEMOTHERAPY INDUCED NAUSEA AND VOMITING: ICD-10-CM

## 2019-06-10 DIAGNOSIS — T45.1X5A CHEMOTHERAPY INDUCED NAUSEA AND VOMITING: ICD-10-CM

## 2019-06-10 DIAGNOSIS — R91.1 LUNG NODULE: ICD-10-CM

## 2019-06-10 RX ORDER — PROMETHAZINE HYDROCHLORIDE 25 MG/1
25 TABLET ORAL EVERY 6 HOURS PRN
Qty: 30 TABLET | Refills: 2 | Status: SHIPPED | OUTPATIENT
Start: 2019-06-10 | End: 2020-12-15

## 2019-06-10 NOTE — TELEPHONE ENCOUNTER
Patient stated that he was having pain in upper quadrant of abdomen when taking compazine.  He is taking Zofran every 6 hours, but continues to have nausea.  Dr. Jones ordered Phenergan 25 mg.  Order entered and Cruzito Tapia NP signed.  Patient have been notified.

## 2019-06-14 ENCOUNTER — LAB VISIT (OUTPATIENT)
Dept: LAB | Facility: HOSPITAL | Age: 69
End: 2019-06-14
Attending: INTERNAL MEDICINE
Payer: MEDICARE

## 2019-06-14 ENCOUNTER — OFFICE VISIT (OUTPATIENT)
Dept: HEMATOLOGY/ONCOLOGY | Facility: CLINIC | Age: 69
End: 2019-06-14
Payer: MEDICARE

## 2019-06-14 VITALS
RESPIRATION RATE: 20 BRPM | BODY MASS INDEX: 24.37 KG/M2 | SYSTOLIC BLOOD PRESSURE: 110 MMHG | DIASTOLIC BLOOD PRESSURE: 70 MMHG | OXYGEN SATURATION: 99 % | TEMPERATURE: 98 F | HEART RATE: 93 BPM | WEIGHT: 196 LBS | HEIGHT: 75 IN

## 2019-06-14 DIAGNOSIS — T45.1X5A CHEMOTHERAPY INDUCED NEUTROPENIA: ICD-10-CM

## 2019-06-14 DIAGNOSIS — Z51.11 ENCOUNTER FOR ANTINEOPLASTIC CHEMOTHERAPY: ICD-10-CM

## 2019-06-14 DIAGNOSIS — C34.91 SQUAMOUS CELL CARCINOMA OF LUNG, RIGHT: Primary | ICD-10-CM

## 2019-06-14 DIAGNOSIS — Z09 ONCOLOGY FOLLOW-UP ENCOUNTER: ICD-10-CM

## 2019-06-14 DIAGNOSIS — Z09 CHEMOTHERAPY FOLLOW-UP EXAMINATION: ICD-10-CM

## 2019-06-14 DIAGNOSIS — D70.1 CHEMOTHERAPY INDUCED NEUTROPENIA: ICD-10-CM

## 2019-06-14 LAB
ALBUMIN SERPL BCP-MCNC: 3.8 G/DL (ref 3.5–5.2)
ALP SERPL-CCNC: 107 U/L (ref 38–145)
ALT SERPL W/O P-5'-P-CCNC: 21 U/L (ref 10–44)
ANION GAP SERPL CALC-SCNC: 11 MMOL/L (ref 8–16)
ANISOCYTOSIS BLD QL SMEAR: SLIGHT
AST SERPL-CCNC: 26 U/L (ref 17–59)
BASOPHILS NFR BLD: 2 % (ref 0–1.9)
BILIRUB SERPL-MCNC: 0.2 MG/DL (ref 0.2–1.3)
BUN SERPL-MCNC: 17 MG/DL (ref 9–21)
CALCIUM SERPL-MCNC: 9.5 MG/DL (ref 8.4–10.2)
CHLORIDE SERPL-SCNC: 105 MMOL/L (ref 95–110)
CO2 SERPL-SCNC: 23 MMOL/L (ref 22–31)
CREAT SERPL-MCNC: 1.17 MG/DL (ref 0.5–1.4)
DIFFERENTIAL METHOD: ABNORMAL
EOSINOPHIL NFR BLD: 0 % (ref 0–8)
ERYTHROCYTE [DISTWIDTH] IN BLOOD BY AUTOMATED COUNT: 16.1 % (ref 11.5–14.5)
EST. GFR  (AFRICAN AMERICAN): >60 ML/MIN/1.73 M^2
EST. GFR  (NON AFRICAN AMERICAN): >60 ML/MIN/1.73 M^2
GIANT PLATELETS BLD QL SMEAR: PRESENT
GLUCOSE SERPL-MCNC: 123 MG/DL (ref 70–110)
HCT VFR BLD AUTO: 33.6 % (ref 40–54)
HGB BLD-MCNC: 11 G/DL (ref 14–18)
IMM GRANULOCYTES # BLD AUTO: ABNORMAL K/UL (ref 0–0.04)
IMM GRANULOCYTES NFR BLD AUTO: ABNORMAL % (ref 0–0.5)
LYMPHOCYTES NFR BLD: 54 % (ref 18–48)
MAGNESIUM SERPL-MCNC: 1.7 MG/DL (ref 1.6–2.6)
MCH RBC QN AUTO: 27.1 PG (ref 27–31)
MCHC RBC AUTO-ENTMCNC: 32.7 G/DL (ref 32–36)
MCV RBC AUTO: 83 FL (ref 82–98)
MONOCYTES NFR BLD: 8 % (ref 4–15)
NEUTROPHILS # BLD AUTO: 1.3 K/UL (ref 1.8–7.7)
NEUTROPHILS NFR BLD: 36 % (ref 38–73)
NRBC BLD-RTO: 0 /100 WBC
OVALOCYTES BLD QL SMEAR: ABNORMAL
PLATELET # BLD AUTO: 181 K/UL (ref 150–350)
PLATELET BLD QL SMEAR: ABNORMAL
PMV BLD AUTO: 9.7 FL (ref 9.2–12.9)
POIKILOCYTOSIS BLD QL SMEAR: SLIGHT
POTASSIUM SERPL-SCNC: 4.5 MMOL/L (ref 3.5–5.1)
PROT SERPL-MCNC: 6.4 G/DL (ref 6–8.4)
RBC # BLD AUTO: 4.06 M/UL (ref 4.6–6.2)
SODIUM SERPL-SCNC: 139 MMOL/L (ref 136–145)
TOXIC GRANULES BLD QL SMEAR: PRESENT
WBC # BLD AUTO: 3.54 K/UL (ref 3.9–12.7)

## 2019-06-14 PROCEDURE — 99999 PR PBB SHADOW E&M-EST. PATIENT-LVL III: ICD-10-PCS | Mod: PBBFAC,,, | Performed by: INTERNAL MEDICINE

## 2019-06-14 PROCEDURE — 80053 COMPREHEN METABOLIC PANEL: CPT

## 2019-06-14 PROCEDURE — 99214 PR OFFICE/OUTPT VISIT, EST, LEVL IV, 30-39 MIN: ICD-10-PCS | Mod: S$GLB,,, | Performed by: INTERNAL MEDICINE

## 2019-06-14 PROCEDURE — 83735 ASSAY OF MAGNESIUM: CPT

## 2019-06-14 PROCEDURE — 83735 ASSAY OF MAGNESIUM: CPT | Mod: PN

## 2019-06-14 PROCEDURE — 85027 COMPLETE CBC AUTOMATED: CPT

## 2019-06-14 PROCEDURE — 85007 BL SMEAR W/DIFF WBC COUNT: CPT

## 2019-06-14 PROCEDURE — 1101F PR PT FALLS ASSESS DOC 0-1 FALLS W/OUT INJ PAST YR: ICD-10-PCS | Mod: CPTII,S$GLB,, | Performed by: INTERNAL MEDICINE

## 2019-06-14 PROCEDURE — 99214 OFFICE O/P EST MOD 30 MIN: CPT | Mod: S$GLB,,, | Performed by: INTERNAL MEDICINE

## 2019-06-14 PROCEDURE — 80053 COMPREHEN METABOLIC PANEL: CPT | Mod: PN

## 2019-06-14 PROCEDURE — 1101F PT FALLS ASSESS-DOCD LE1/YR: CPT | Mod: CPTII,S$GLB,, | Performed by: INTERNAL MEDICINE

## 2019-06-14 PROCEDURE — 99999 PR PBB SHADOW E&M-EST. PATIENT-LVL III: CPT | Mod: PBBFAC,,, | Performed by: INTERNAL MEDICINE

## 2019-06-14 PROCEDURE — 85007 BL SMEAR W/DIFF WBC COUNT: CPT | Mod: PN

## 2019-06-14 PROCEDURE — 85027 COMPLETE CBC AUTOMATED: CPT | Mod: PN

## 2019-06-14 PROCEDURE — 36415 COLL VENOUS BLD VENIPUNCTURE: CPT | Mod: PN

## 2019-06-14 NOTE — PROGRESS NOTES
HPI    68 years old  male who was presented to Oncology Clinic for evaluation with squamous cell carcinoma of the lung.  Patient was recently diagnosed in January with fine-needle aspiration of the right lung nodule was found to have squamous cell carcinoma.  And recently patient had a which resection(by description) at [facility].  Since then patient was instructed to follow up with our medical oncologist for evaluation and treatment.  Patient was told that doing the which resection tumor is larger than expected compared to the images.  It might be present as late stage I or early stage II.      06/05/2019 completed cycle 3.    06/14/2019:  No acute event.  No complain.  Patient states that he is feeling better now since the chemotherapy about a week ago. Denies chest pain shortness breath.  Denies any nausea vomiting diarrhea.  Denies fever chills.  Fourteen point review system negative as above.      Past Medical History:   Diagnosis Date    Arthritis     Cancer SKIN    Cardiac angina     COPD (chronic obstructive pulmonary disease)     Coronary artery disease ANGINA. HAD  [facility] 8/12. 40 % BLOCKAGE. DR RUBY IS CARDIOLOGIST.    Depression     GERD (gastroesophageal reflux disease)     Kidney cysts     Lung cancer     MVP (mitral valve prolapse)     Trigger thumb     BILAT    Wears dentures     UPPER    Wears glasses      Social History     Socioeconomic History    Marital status:      Spouse name: Not on file    Number of children: Not on file    Years of education: Not on file    Highest education level: Not on file   Occupational History    Not on file   Social Needs    Financial resource strain: Not on file    Food insecurity:     Worry: Not on file     Inability: Not on file    Transportation needs:     Medical: Not on file     Non-medical: Not on file   Tobacco Use    Smoking status: Former Smoker     Years: 40.00     Last attempt to quit: 1/2/2019     Years  since quittin.4    Smokeless tobacco: Never Used    Tobacco comment: 1-2 CIGT SOME DAYS/states last cigarette 20   Substance and Sexual Activity    Alcohol use: Yes     Comment: beer on occasion    Drug use: No    Sexual activity: Yes     Partners: Female   Lifestyle    Physical activity:     Days per week: Not on file     Minutes per session: Not on file    Stress: Not on file   Relationships    Social connections:     Talks on phone: Not on file     Gets together: Not on file     Attends Anglican service: Not on file     Active member of club or organization: Not on file     Attends meetings of clubs or organizations: Not on file     Relationship status: Not on file   Other Topics Concern    Not on file   Social History Narrative    Not on file       Objective    Physical Exam   Vitals:    19 0919   BP: 110/70   Pulse: 93   Resp: 20   Temp: 98.4 °F (36.9 °C)       Constitutional: patient is oriented to person, place, and time. patient appears well-developed and well-nourished. No distress.   HENT:  Normocephalic atraumatic pupils equal round reactive to light extraocular muscle intact. No evidence of lesions on exam.  Cardiovascular:  Regular rate and rhythm     Pulmonary/Chest:  Symmetrical chest a rising  Abdominal:  Soft nontender nondistended bowel sounds x4   Musculoskeletal: Normal range of motion. Gait is normal No clubbing, cyanosis     Lymphadenopathy:  No evidence of lymphadenopathy  Neurological:  Sensorimotor grossly intact cranial nerves 2-12 grossly intact.    Skin:  Warm dry no rash no lesions   Psychiatric:  Normal affect    CMP  Sodium   Date Value Ref Range Status   2019 139 136 - 145 mmol/L Final     Potassium   Date Value Ref Range Status   2019 4.5 3.5 - 5.1 mmol/L Final     Chloride   Date Value Ref Range Status   2019 105 95 - 110 mmol/L Final     CO2   Date Value Ref Range Status   2019 23 22 - 31 mmol/L Final     Glucose   Date Value Ref  Range Status   06/14/2019 123 (H) 70 - 110 mg/dL Final     Comment:     The ADA recommends the following guidelines for fasting glucose:  Normal:       less than 100 mg/dL  Prediabetes:  100 mg/dL to 125 mg/dL  Diabetes:     126 mg/dL or higher       BUN, Bld   Date Value Ref Range Status   06/14/2019 17 9 - 21 mg/dL Final     Creatinine   Date Value Ref Range Status   06/14/2019 1.17 0.50 - 1.40 mg/dL Final   01/18/2013 0.9 0.5 - 1.4 mg/dL Final     Calcium   Date Value Ref Range Status   06/14/2019 9.5 8.4 - 10.2 mg/dL Final   01/18/2013 10.7 (H) 8.7 - 10.5 mg/dL Final     Total Protein   Date Value Ref Range Status   06/14/2019 6.4 6.0 - 8.4 g/dL Final     Albumin   Date Value Ref Range Status   06/14/2019 3.8 3.5 - 5.2 g/dL Final     Total Bilirubin   Date Value Ref Range Status   06/14/2019 0.2 0.2 - 1.3 mg/dL Final     Alkaline Phosphatase   Date Value Ref Range Status   06/14/2019 107 38 - 145 U/L Final     AST   Date Value Ref Range Status   06/14/2019 26 17 - 59 U/L Final     ALT   Date Value Ref Range Status   06/14/2019 21 10 - 44 U/L Final     Anion Gap   Date Value Ref Range Status   06/14/2019 11 8 - 16 mmol/L Final   01/18/2013 11 5 - 15 meq/L Final     eGFR if    Date Value Ref Range Status   06/14/2019 >60 >60 mL/min/1.73 m^2 Final     eGFR if non    Date Value Ref Range Status   06/14/2019 >60 >60 mL/min/1.73 m^2 Final     Comment:     Calculation used to obtain the estimated glomerular filtration  rate (eGFR) is the CKD-EPI equation.        Lab Results   Component Value Date    WBC 3.54 (L) 06/14/2019    HGB 11.0 (L) 06/14/2019    HCT 33.6 (L) 06/14/2019    MCV 83 06/14/2019     06/14/2019       comparison is somewhat difficult with the interval surgery and technical differences.      Impression ct 3/29/2019       Interval changes of right lower lobectomy.  Severe emphysematous changes.  Few small scattered nodules largest measuring 5 mm.  At least some and  possibly all these were present on the prior exam appearing similar today.  Strict comparison between the studies is difficult.  Trace right pleural effusion    Low-density lesions in the liver consistent with cysts.  Right renal cyst appearing very mildly complex with thin internal septation.  Mild circumferential wall thickening of the distal esophagus questioning esophagitis.  Additional findings as detailed above         Assessment Plan    [] 03/30/2019 LUNG, RIGHT, FINE NEEDLE ASPIRATION:  - POSITIVE FOR SQUAMOUS CELL CARCINOMA    [] Status post right lung resection [facility] 4 weeks prior to this visit.  Patient was told size of tumor is larger than expected he might be having late stage I or early stage II SCC.      Cisplatin and docetaxel q 21 days for 4 cycles    []  [id number] stage IIa with pleural invasion present  -completed cycle #3 6/5/2019   -RTC 06/26/2019 cycle 4 adjuvant pretreatment evaluation CBC CMP magnesium  -RTC next Friday for simple CBC check see whether not needs to have a Neupogen    [] Sore mouth but without oral lesion  -continue Magic mouthwash     [] Chemo induced neutropenia - resolved    [] severe emphysematous changes   Follow Pulm

## 2019-06-21 ENCOUNTER — LAB VISIT (OUTPATIENT)
Dept: LAB | Facility: HOSPITAL | Age: 69
End: 2019-06-21
Attending: INTERNAL MEDICINE
Payer: MEDICARE

## 2019-06-21 DIAGNOSIS — Z51.11 ENCOUNTER FOR ANTINEOPLASTIC CHEMOTHERAPY: ICD-10-CM

## 2019-06-21 LAB
BASOPHILS # BLD AUTO: 0.01 K/UL (ref 0–0.2)
BASOPHILS NFR BLD: 0.2 % (ref 0–1.9)
DIFFERENTIAL METHOD: ABNORMAL
EOSINOPHIL # BLD AUTO: 0 K/UL (ref 0–0.5)
EOSINOPHIL NFR BLD: 0 % (ref 0–8)
ERYTHROCYTE [DISTWIDTH] IN BLOOD BY AUTOMATED COUNT: 17.9 % (ref 11.5–14.5)
HCT VFR BLD AUTO: 31.8 % (ref 40–54)
HGB BLD-MCNC: 10.2 G/DL (ref 14–18)
IMM GRANULOCYTES # BLD AUTO: 0.03 K/UL (ref 0–0.04)
IMM GRANULOCYTES NFR BLD AUTO: 0.5 % (ref 0–0.5)
LYMPHOCYTES # BLD AUTO: 2.6 K/UL (ref 1–4.8)
LYMPHOCYTES NFR BLD: 39.3 % (ref 18–48)
MCH RBC QN AUTO: 27.2 PG (ref 27–31)
MCHC RBC AUTO-ENTMCNC: 32.1 G/DL (ref 32–36)
MCV RBC AUTO: 85 FL (ref 82–98)
MONOCYTES # BLD AUTO: 0.7 K/UL (ref 0.3–1)
MONOCYTES NFR BLD: 11.1 % (ref 4–15)
NEUTROPHILS # BLD AUTO: 3.2 K/UL (ref 1.8–7.7)
NEUTROPHILS NFR BLD: 48.9 % (ref 38–73)
NRBC BLD-RTO: 0 /100 WBC
PLATELET # BLD AUTO: 197 K/UL (ref 150–350)
PMV BLD AUTO: 8.5 FL (ref 9.2–12.9)
RBC # BLD AUTO: 3.75 M/UL (ref 4.6–6.2)
WBC # BLD AUTO: 6.51 K/UL (ref 3.9–12.7)

## 2019-06-21 PROCEDURE — 85025 COMPLETE CBC W/AUTO DIFF WBC: CPT | Mod: PN

## 2019-06-21 PROCEDURE — 36415 COLL VENOUS BLD VENIPUNCTURE: CPT | Mod: PN

## 2019-06-21 PROCEDURE — 85025 COMPLETE CBC W/AUTO DIFF WBC: CPT

## 2019-06-24 RX ORDER — SODIUM CHLORIDE 0.9 % (FLUSH) 0.9 %
10 SYRINGE (ML) INJECTION
Status: CANCELLED | OUTPATIENT
Start: 2019-06-24

## 2019-06-24 RX ORDER — HEPARIN 100 UNIT/ML
500 SYRINGE INTRAVENOUS
Status: CANCELLED | OUTPATIENT
Start: 2019-06-24

## 2019-06-26 ENCOUNTER — OFFICE VISIT (OUTPATIENT)
Dept: HEMATOLOGY/ONCOLOGY | Facility: CLINIC | Age: 69
End: 2019-06-26
Payer: MEDICARE

## 2019-06-26 ENCOUNTER — INFUSION (OUTPATIENT)
Dept: INFUSION THERAPY | Facility: HOSPITAL | Age: 69
End: 2019-06-26
Attending: INTERNAL MEDICINE
Payer: MEDICARE

## 2019-06-26 ENCOUNTER — DOCUMENTATION ONLY (OUTPATIENT)
Dept: INFUSION THERAPY | Facility: HOSPITAL | Age: 69
End: 2019-06-26

## 2019-06-26 VITALS
HEART RATE: 77 BPM | RESPIRATION RATE: 20 BRPM | SYSTOLIC BLOOD PRESSURE: 112 MMHG | BODY MASS INDEX: 25.05 KG/M2 | WEIGHT: 201.5 LBS | DIASTOLIC BLOOD PRESSURE: 65 MMHG | OXYGEN SATURATION: 97 % | TEMPERATURE: 98 F | HEIGHT: 75 IN

## 2019-06-26 VITALS
TEMPERATURE: 98 F | HEART RATE: 64 BPM | HEIGHT: 75 IN | DIASTOLIC BLOOD PRESSURE: 73 MMHG | RESPIRATION RATE: 17 BRPM | BODY MASS INDEX: 25.05 KG/M2 | WEIGHT: 201.5 LBS | SYSTOLIC BLOOD PRESSURE: 121 MMHG | OXYGEN SATURATION: 97 %

## 2019-06-26 DIAGNOSIS — Z51.11 ENCOUNTER FOR ANTINEOPLASTIC CHEMOTHERAPY: ICD-10-CM

## 2019-06-26 DIAGNOSIS — D70.1 CHEMOTHERAPY INDUCED NEUTROPENIA: ICD-10-CM

## 2019-06-26 DIAGNOSIS — R91.1 LUNG NODULE: ICD-10-CM

## 2019-06-26 DIAGNOSIS — Z09 ONCOLOGY FOLLOW-UP ENCOUNTER: ICD-10-CM

## 2019-06-26 DIAGNOSIS — C34.91 SQUAMOUS CELL CARCINOMA OF LUNG, RIGHT: ICD-10-CM

## 2019-06-26 DIAGNOSIS — T45.1X5A CHEMOTHERAPY INDUCED NEUTROPENIA: ICD-10-CM

## 2019-06-26 DIAGNOSIS — Z98.890 STATUS POST SURGERY: Primary | ICD-10-CM

## 2019-06-26 DIAGNOSIS — Z09 CHEMOTHERAPY FOLLOW-UP EXAMINATION: Primary | ICD-10-CM

## 2019-06-26 PROCEDURE — 1101F PR PT FALLS ASSESS DOC 0-1 FALLS W/OUT INJ PAST YR: ICD-10-PCS | Mod: CPTII,S$GLB,ICN, | Performed by: INTERNAL MEDICINE

## 2019-06-26 PROCEDURE — 25000003 PHARM REV CODE 250: Mod: PN | Performed by: INTERNAL MEDICINE

## 2019-06-26 PROCEDURE — 1101F PT FALLS ASSESS-DOCD LE1/YR: CPT | Mod: CPTII,S$GLB,ICN, | Performed by: INTERNAL MEDICINE

## 2019-06-26 PROCEDURE — 99215 OFFICE O/P EST HI 40 MIN: CPT | Mod: S$GLB,ICN,, | Performed by: INTERNAL MEDICINE

## 2019-06-26 PROCEDURE — 99999 PR PBB SHADOW E&M-EST. PATIENT-LVL III: CPT | Mod: PBBFAC,,, | Performed by: INTERNAL MEDICINE

## 2019-06-26 PROCEDURE — 99215 PR OFFICE/OUTPT VISIT, EST, LEVL V, 40-54 MIN: ICD-10-PCS | Mod: S$GLB,ICN,, | Performed by: INTERNAL MEDICINE

## 2019-06-26 PROCEDURE — A4216 STERILE WATER/SALINE, 10 ML: HCPCS | Mod: PN | Performed by: INTERNAL MEDICINE

## 2019-06-26 PROCEDURE — 96366 THER/PROPH/DIAG IV INF ADDON: CPT | Mod: PN

## 2019-06-26 PROCEDURE — 63600175 PHARM REV CODE 636 W HCPCS: Mod: PN | Performed by: INTERNAL MEDICINE

## 2019-06-26 PROCEDURE — 96367 TX/PROPH/DG ADDL SEQ IV INF: CPT | Mod: PN

## 2019-06-26 PROCEDURE — 96417 CHEMO IV INFUS EACH ADDL SEQ: CPT | Mod: PN

## 2019-06-26 PROCEDURE — 96413 CHEMO IV INFUSION 1 HR: CPT | Mod: PN

## 2019-06-26 PROCEDURE — 99999 PR PBB SHADOW E&M-EST. PATIENT-LVL III: ICD-10-PCS | Mod: PBBFAC,,, | Performed by: INTERNAL MEDICINE

## 2019-06-26 RX ORDER — HEPARIN 100 UNIT/ML
500 SYRINGE INTRAVENOUS
Status: CANCELLED | OUTPATIENT
Start: 2019-06-27

## 2019-06-26 RX ORDER — SODIUM CHLORIDE 0.9 % (FLUSH) 0.9 %
10 SYRINGE (ML) INJECTION
Status: CANCELLED | OUTPATIENT
Start: 2019-06-27

## 2019-06-26 RX ORDER — HEPARIN 100 UNIT/ML
500 SYRINGE INTRAVENOUS
Status: CANCELLED | OUTPATIENT
Start: 2019-06-26

## 2019-06-26 RX ORDER — SODIUM CHLORIDE 0.9 % (FLUSH) 0.9 %
10 SYRINGE (ML) INJECTION
Status: CANCELLED | OUTPATIENT
Start: 2019-06-26

## 2019-06-26 RX ORDER — SODIUM CHLORIDE 0.9 % (FLUSH) 0.9 %
10 SYRINGE (ML) INJECTION
Status: DISCONTINUED | OUTPATIENT
Start: 2019-06-26 | End: 2019-06-26 | Stop reason: HOSPADM

## 2019-06-26 RX ADMIN — SODIUM CHLORIDE, PRESERVATIVE FREE 10 ML: 5 INJECTION INTRAVENOUS at 09:06

## 2019-06-26 RX ADMIN — DOCETAXEL 160 MG: 20 INJECTION, SOLUTION, CONCENTRATE INTRAVENOUS at 12:06

## 2019-06-26 RX ADMIN — POTASSIUM CHLORIDE: 2 INJECTION, SOLUTION, CONCENTRATE INTRAVENOUS at 09:06

## 2019-06-26 RX ADMIN — APREPITANT 130 MG: 130 INJECTION, EMULSION INTRAVENOUS at 11:06

## 2019-06-26 RX ADMIN — POTASSIUM CHLORIDE: 2 INJECTION, SOLUTION, CONCENTRATE INTRAVENOUS at 02:06

## 2019-06-26 RX ADMIN — DEXAMETHASONE SODIUM PHOSPHATE: 4 INJECTION, SOLUTION INTRA-ARTICULAR; INTRALESIONAL; INTRAMUSCULAR; INTRAVENOUS; SOFT TISSUE at 11:06

## 2019-06-26 RX ADMIN — SODIUM CHLORIDE, PRESERVATIVE FREE 10 ML: 5 INJECTION INTRAVENOUS at 04:06

## 2019-06-26 RX ADMIN — CISPLATIN 162 MG: 1 INJECTION INTRAVENOUS at 01:06

## 2019-06-26 NOTE — PROGRESS NOTES
[6/26/2019 8:39 AM]  Ashlyn Dial:    Mrn [id number] will need neupogen tomorrow and Friday     [6/26/2019 8:41 AM]    ok thanks

## 2019-06-26 NOTE — PROGRESS NOTES
HPI    68 years old  male who was presented to Oncology Clinic for evaluation with squamous cell carcinoma of the lung.  Patient was recently diagnosed in January with fine-needle aspiration of the right lung nodule was found to have squamous cell carcinoma.  And recently patient had a which resection(by description) at [facility].  Since then patient was instructed to follow up with our medical oncologist for evaluation and treatment.  Patient was told that doing the which resection tumor is larger than expected compared to the images.  It might be present as late stage I or early stage II.      06/05/2019 completed cycle 3.    06/14/2019:  No acute event.  No complain.  Patient states that he is feeling better now since the chemotherapy about a week ago. Denies chest pain shortness breath.  Denies any nausea vomiting diarrhea.  Denies fever chills.  Fourteen point review system negative as above.    06/26/2019:  Patient here for cycle 4 day 1 of chemotherapy.  Patient states that he is little bit tired but denies any fever chills denies any recent illness.  Patient states that he is ready to go for his final cycle of chemo today.    Past Medical History:   Diagnosis Date    Arthritis     Cancer SKIN    Cardiac angina     COPD (chronic obstructive pulmonary disease)     Coronary artery disease ANGINA. HAD  [facility] 8/12. 40 % BLOCKAGE. DR RUBY IS CARDIOLOGIST.    Depression     GERD (gastroesophageal reflux disease)     Kidney cysts     Lung cancer     MVP (mitral valve prolapse)     Trigger thumb     BILAT    Wears dentures     UPPER    Wears glasses      Social History     Socioeconomic History    Marital status:      Spouse name: Not on file    Number of children: Not on file    Years of education: Not on file    Highest education level: Not on file   Occupational History    Not on file   Social Needs    Financial resource strain: Not on file    Food insecurity:      Worry: Not on file     Inability: Not on file    Transportation needs:     Medical: Not on file     Non-medical: Not on file   Tobacco Use    Smoking status: Former Smoker     Years: 40.00     Last attempt to quit: 2019     Years since quittin.4    Smokeless tobacco: Never Used    Tobacco comment: 1-2 CIGT SOME DAYS/states last cigarette 20   Substance and Sexual Activity    Alcohol use: Yes     Comment: beer on occasion    Drug use: No    Sexual activity: Yes     Partners: Female   Lifestyle    Physical activity:     Days per week: Not on file     Minutes per session: Not on file    Stress: Not on file   Relationships    Social connections:     Talks on phone: Not on file     Gets together: Not on file     Attends Tenriism service: Not on file     Active member of club or organization: Not on file     Attends meetings of clubs or organizations: Not on file     Relationship status: Not on file   Other Topics Concern    Not on file   Social History Narrative    Not on file       Objective    Physical Exam   Vitals:    19 0804   BP: 112/65   Pulse: 77   Resp: 20   Temp: 97.7 °F (36.5 °C)       Constitutional: patient is oriented to person, place, and time. patient appears well-developed and well-nourished. No distress.   HENT:  Normocephalic atraumatic pupils equal round reactive to light extraocular muscle intact. No evidence of lesions on exam.  Cardiovascular:  Regular rate and rhythm     Pulmonary/Chest:  Symmetrical chest a rising  Abdominal:  Soft nontender nondistended bowel sounds x4   Musculoskeletal: Normal range of motion. Gait is normal No clubbing, cyanosis     Lymphadenopathy:  No evidence of lymphadenopathy  Neurological:  Sensorimotor grossly intact cranial nerves 2-12 grossly intact.    Skin:  Warm dry no rash no lesions   Psychiatric:  Normal affect    CMP  Sodium   Date Value Ref Range Status   2019 138 136 - 145 mmol/L Final     Potassium   Date Value Ref Range  Status   06/26/2019 4.3 3.5 - 5.1 mmol/L Final     Chloride   Date Value Ref Range Status   06/26/2019 105 95 - 110 mmol/L Final     CO2   Date Value Ref Range Status   06/26/2019 26 22 - 31 mmol/L Final     Glucose   Date Value Ref Range Status   06/26/2019 120 (H) 70 - 110 mg/dL Final     Comment:     The ADA recommends the following guidelines for fasting glucose:  Normal:       less than 100 mg/dL  Prediabetes:  100 mg/dL to 125 mg/dL  Diabetes:     126 mg/dL or higher       BUN, Bld   Date Value Ref Range Status   06/26/2019 17 9 - 21 mg/dL Final     Creatinine   Date Value Ref Range Status   06/26/2019 1.10 0.50 - 1.40 mg/dL Final   01/18/2013 0.9 0.5 - 1.4 mg/dL Final     Calcium   Date Value Ref Range Status   06/26/2019 9.4 8.4 - 10.2 mg/dL Final   01/18/2013 10.7 (H) 8.7 - 10.5 mg/dL Final     Total Protein   Date Value Ref Range Status   06/26/2019 6.3 6.0 - 8.4 g/dL Final     Albumin   Date Value Ref Range Status   06/26/2019 3.7 3.5 - 5.2 g/dL Final     Total Bilirubin   Date Value Ref Range Status   06/26/2019 0.3 0.2 - 1.3 mg/dL Final     Alkaline Phosphatase   Date Value Ref Range Status   06/26/2019 97 38 - 145 U/L Final     AST   Date Value Ref Range Status   06/26/2019 21 17 - 59 U/L Final     ALT   Date Value Ref Range Status   06/26/2019 12 10 - 44 U/L Final     Anion Gap   Date Value Ref Range Status   06/26/2019 7 (L) 8 - 16 mmol/L Final   01/18/2013 11 5 - 15 meq/L Final     eGFR if    Date Value Ref Range Status   06/26/2019 >60 >60 mL/min/1.73 m^2 Final     eGFR if non    Date Value Ref Range Status   06/26/2019 >60 >60 mL/min/1.73 m^2 Final     Comment:     Calculation used to obtain the estimated glomerular filtration  rate (eGFR) is the CKD-EPI equation.        Lab Results   Component Value Date    WBC 7.48 06/26/2019    HGB 10.9 (L) 06/26/2019    HCT 33.7 (L) 06/26/2019    MCV 86 06/26/2019     06/26/2019       comparison is somewhat difficult with  the interval surgery and technical differences.      Impression ct 3/29/2019       Interval changes of right lower lobectomy.  Severe emphysematous changes.  Few small scattered nodules largest measuring 5 mm.  At least some and possibly all these were present on the prior exam appearing similar today.  Strict comparison between the studies is difficult.  Trace right pleural effusion    Low-density lesions in the liver consistent with cysts.  Right renal cyst appearing very mildly complex with thin internal septation.  Mild circumferential wall thickening of the distal esophagus questioning esophagitis.  Additional findings as detailed above         Assessment Plan    [] 03/30/2019 LUNG, RIGHT, FINE NEEDLE ASPIRATION:  - POSITIVE FOR SQUAMOUS CELL CARCINOMA    [] Status post right lung resection [facility] 4 weeks prior to this visit.  Patient was told size of tumor is larger than expected he might be having late stage I or early stage II SCC.      Cisplatin and docetaxel q 21 days for 4 cycles    []  [id number] stage IIa with pleural invasion present  -completed cycle #3 6/5/2019   -proceed with cycle 4 (final cycle) of adjuvant therapy  -RTC 10 days from now with repeat CBC CMP, magnesium and plan post treatment images evaluation  -will give patient Neupogen 24 and 48 hr post treatment for chemo induced neutropenia     [] Sore mouth but without oral lesion  -continue Magic mouthwash     [] Chemo induced neutropenia - resolved    [] severe emphysematous changes   Follow Pulm

## 2019-06-27 ENCOUNTER — INFUSION (OUTPATIENT)
Dept: INFUSION THERAPY | Facility: HOSPITAL | Age: 69
End: 2019-06-27
Attending: INTERNAL MEDICINE
Payer: MEDICARE

## 2019-06-27 VITALS
SYSTOLIC BLOOD PRESSURE: 110 MMHG | RESPIRATION RATE: 16 BRPM | HEART RATE: 73 BPM | DIASTOLIC BLOOD PRESSURE: 59 MMHG | OXYGEN SATURATION: 95 % | TEMPERATURE: 97 F

## 2019-06-27 DIAGNOSIS — D70.1 CHEMOTHERAPY INDUCED NEUTROPENIA: ICD-10-CM

## 2019-06-27 DIAGNOSIS — Z98.890 STATUS POST SURGERY: Primary | ICD-10-CM

## 2019-06-27 DIAGNOSIS — T45.1X5A CHEMOTHERAPY INDUCED NEUTROPENIA: ICD-10-CM

## 2019-06-27 DIAGNOSIS — R91.1 LUNG NODULE: ICD-10-CM

## 2019-06-27 PROCEDURE — 96360 HYDRATION IV INFUSION INIT: CPT | Mod: PN

## 2019-06-27 PROCEDURE — 96361 HYDRATE IV INFUSION ADD-ON: CPT | Mod: PN

## 2019-06-27 PROCEDURE — 25000003 PHARM REV CODE 250: Mod: PN | Performed by: INTERNAL MEDICINE

## 2019-06-27 PROCEDURE — A4216 STERILE WATER/SALINE, 10 ML: HCPCS | Mod: PN | Performed by: INTERNAL MEDICINE

## 2019-06-27 PROCEDURE — 96372 THER/PROPH/DIAG INJ SC/IM: CPT | Mod: PN,59

## 2019-06-27 PROCEDURE — 63600175 PHARM REV CODE 636 W HCPCS: Mod: JG,PN | Performed by: INTERNAL MEDICINE

## 2019-06-27 RX ORDER — SODIUM CHLORIDE 0.9 % (FLUSH) 0.9 %
10 SYRINGE (ML) INJECTION
Status: DISCONTINUED | OUTPATIENT
Start: 2019-06-27 | End: 2019-06-27 | Stop reason: HOSPADM

## 2019-06-27 RX ORDER — HEPARIN 100 UNIT/ML
500 SYRINGE INTRAVENOUS
Status: DISCONTINUED | OUTPATIENT
Start: 2019-06-27 | End: 2019-06-27 | Stop reason: HOSPADM

## 2019-06-27 RX ADMIN — SODIUM CHLORIDE, PRESERVATIVE FREE 10 ML: 5 INJECTION INTRAVENOUS at 04:06

## 2019-06-27 RX ADMIN — FILGRASTIM 300 MCG: 300 INJECTION, SOLUTION INTRAVENOUS; SUBCUTANEOUS at 04:06

## 2019-06-27 RX ADMIN — SODIUM CHLORIDE 1000 ML: 9 INJECTION, SOLUTION INTRAVENOUS at 03:06

## 2019-06-28 ENCOUNTER — INFUSION (OUTPATIENT)
Dept: INFUSION THERAPY | Facility: HOSPITAL | Age: 69
End: 2019-06-28
Attending: INTERNAL MEDICINE
Payer: MEDICARE

## 2019-06-28 VITALS
TEMPERATURE: 98 F | DIASTOLIC BLOOD PRESSURE: 64 MMHG | HEART RATE: 79 BPM | SYSTOLIC BLOOD PRESSURE: 107 MMHG | RESPIRATION RATE: 20 BRPM

## 2019-06-28 DIAGNOSIS — T45.1X5A CHEMOTHERAPY INDUCED NEUTROPENIA: Primary | ICD-10-CM

## 2019-06-28 DIAGNOSIS — D70.1 CHEMOTHERAPY INDUCED NEUTROPENIA: Primary | ICD-10-CM

## 2019-06-28 DIAGNOSIS — R91.1 LUNG NODULE: ICD-10-CM

## 2019-06-28 PROCEDURE — 63600175 PHARM REV CODE 636 W HCPCS: Mod: JG,PN | Performed by: INTERNAL MEDICINE

## 2019-06-28 PROCEDURE — 96372 THER/PROPH/DIAG INJ SC/IM: CPT | Mod: PN

## 2019-06-28 RX ADMIN — FILGRASTIM 300 MCG: 300 INJECTION, SOLUTION INTRAVENOUS; SUBCUTANEOUS at 11:06

## 2019-07-03 ENCOUNTER — HOSPITAL ENCOUNTER (OUTPATIENT)
Dept: RADIOLOGY | Facility: HOSPITAL | Age: 69
Discharge: HOME | End: 2019-07-03
Attending: INTERNAL MEDICINE
Payer: MEDICARE

## 2019-07-03 DIAGNOSIS — D70.1 CHEMOTHERAPY INDUCED NEUTROPENIA: ICD-10-CM

## 2019-07-03 DIAGNOSIS — Z09 CHEMOTHERAPY FOLLOW-UP EXAMINATION: ICD-10-CM

## 2019-07-03 DIAGNOSIS — C34.91 SQUAMOUS CELL CARCINOMA OF LUNG, RIGHT: ICD-10-CM

## 2019-07-03 DIAGNOSIS — Z09 ONCOLOGY FOLLOW-UP ENCOUNTER: ICD-10-CM

## 2019-07-03 DIAGNOSIS — Z51.11 ENCOUNTER FOR ANTINEOPLASTIC CHEMOTHERAPY: ICD-10-CM

## 2019-07-03 DIAGNOSIS — T45.1X5A CHEMOTHERAPY INDUCED NEUTROPENIA: ICD-10-CM

## 2019-07-03 PROCEDURE — 25500020 PHARM REV CODE 255: Performed by: INTERNAL MEDICINE

## 2019-07-03 PROCEDURE — 74177 CT ABD & PELVIS W/CONTRAST: CPT | Mod: TC

## 2019-07-03 PROCEDURE — 71260 CT THORAX DX C+: CPT | Mod: 26,,, | Performed by: RADIOLOGY

## 2019-07-03 PROCEDURE — 74177 CT ABD & PELVIS W/CONTRAST: CPT | Mod: 26,,, | Performed by: RADIOLOGY

## 2019-07-03 PROCEDURE — 74177 CT CHEST ABDOMEN PELVIS WITH CONTRAST (XPD): ICD-10-PCS | Mod: 26,,, | Performed by: RADIOLOGY

## 2019-07-03 PROCEDURE — 71260 CT CHEST ABDOMEN PELVIS WITH CONTRAST (XPD): ICD-10-PCS | Mod: 26,,, | Performed by: RADIOLOGY

## 2019-07-03 RX ADMIN — IOHEXOL 30 ML: 350 INJECTION, SOLUTION INTRAVENOUS at 08:07

## 2019-07-03 RX ADMIN — IOHEXOL 100 ML: 350 INJECTION, SOLUTION INTRAVENOUS at 08:07

## 2019-07-05 DIAGNOSIS — C34.91 SQUAMOUS CELL CARCINOMA OF LUNG, RIGHT: ICD-10-CM

## 2019-07-05 RX ORDER — HYDROCODONE BITARTRATE AND ACETAMINOPHEN 10; 325 MG/1; MG/1
1 TABLET ORAL EVERY 8 HOURS PRN
Qty: 90 TABLET | Refills: 0 | Status: SHIPPED | OUTPATIENT
Start: 2019-07-05 | End: 2019-08-12 | Stop reason: SDUPTHER

## 2019-07-05 NOTE — TELEPHONE ENCOUNTER
----- Message from Kamila Coppola sent at 7/5/2019 12:01 PM CDT -----  Contact: patient  Type:  RX Refill Request    Who Called:  patient  Refill or New Rx:  refill  RX Name and Strength:  HYDROcodone-acetaminophen (NORCO)  mg per tablet  Is this a 30 day or 90 day RX:  30  Preferred Pharmacy with phone number:    [facility]/pharmacy #[id number] - [place] - [address] [facility]  [address] [address]  Phone: [phone] Fax: [phone]      Local or Mail Order:  local  Ordering Provider:  Karen Sommer Call Back Number:  [phone]  Additional Information:  Patient would also like the nurse to give him a call back with test results.

## 2019-07-10 ENCOUNTER — LAB VISIT (OUTPATIENT)
Dept: LAB | Facility: HOSPITAL | Age: 69
End: 2019-07-10
Attending: INTERNAL MEDICINE
Payer: MEDICARE

## 2019-07-10 ENCOUNTER — OFFICE VISIT (OUTPATIENT)
Dept: HEMATOLOGY/ONCOLOGY | Facility: CLINIC | Age: 69
End: 2019-07-10
Payer: MEDICARE

## 2019-07-10 VITALS
HEIGHT: 75 IN | SYSTOLIC BLOOD PRESSURE: 131 MMHG | TEMPERATURE: 98 F | HEART RATE: 78 BPM | RESPIRATION RATE: 18 BRPM | WEIGHT: 197.56 LBS | OXYGEN SATURATION: 97 % | BODY MASS INDEX: 24.56 KG/M2 | DIASTOLIC BLOOD PRESSURE: 66 MMHG

## 2019-07-10 DIAGNOSIS — Z09 CHEMOTHERAPY FOLLOW-UP EXAMINATION: Primary | ICD-10-CM

## 2019-07-10 DIAGNOSIS — Z09 ONCOLOGY FOLLOW-UP ENCOUNTER: ICD-10-CM

## 2019-07-10 DIAGNOSIS — T45.1X5A CHEMOTHERAPY INDUCED NEUTROPENIA: ICD-10-CM

## 2019-07-10 DIAGNOSIS — D70.1 CHEMOTHERAPY INDUCED NEUTROPENIA: ICD-10-CM

## 2019-07-10 LAB
ALBUMIN SERPL BCP-MCNC: 3.4 G/DL (ref 3.5–5.2)
ALP SERPL-CCNC: 100 U/L (ref 38–145)
ALT SERPL W/O P-5'-P-CCNC: 10 U/L (ref 10–44)
ANION GAP SERPL CALC-SCNC: 7 MMOL/L (ref 8–16)
AST SERPL-CCNC: 21 U/L (ref 17–59)
BASOPHILS # BLD AUTO: 0.02 K/UL (ref 0–0.2)
BASOPHILS NFR BLD: 0.3 % (ref 0–1.9)
BILIRUB SERPL-MCNC: 0.2 MG/DL (ref 0.2–1.3)
BUN SERPL-MCNC: 19 MG/DL (ref 9–21)
CALCIUM SERPL-MCNC: 9.3 MG/DL (ref 8.4–10.2)
CHLORIDE SERPL-SCNC: 104 MMOL/L (ref 95–110)
CO2 SERPL-SCNC: 25 MMOL/L (ref 22–31)
CREAT SERPL-MCNC: 1.32 MG/DL (ref 0.5–1.4)
DIFFERENTIAL METHOD: ABNORMAL
EOSINOPHIL # BLD AUTO: 0 K/UL (ref 0–0.5)
EOSINOPHIL NFR BLD: 0 % (ref 0–8)
ERYTHROCYTE [DISTWIDTH] IN BLOOD BY AUTOMATED COUNT: 18 % (ref 11.5–14.5)
EST. GFR  (AFRICAN AMERICAN): >60 ML/MIN/1.73 M^2
EST. GFR  (NON AFRICAN AMERICAN): 55 ML/MIN/1.73 M^2
GLUCOSE SERPL-MCNC: 130 MG/DL (ref 70–110)
HCT VFR BLD AUTO: 28.2 % (ref 40–54)
HGB BLD-MCNC: 9.2 G/DL (ref 14–18)
IMM GRANULOCYTES # BLD AUTO: 0.02 K/UL (ref 0–0.04)
IMM GRANULOCYTES NFR BLD AUTO: 0.3 % (ref 0–0.5)
LYMPHOCYTES # BLD AUTO: 2.4 K/UL (ref 1–4.8)
LYMPHOCYTES NFR BLD: 40.1 % (ref 18–48)
MAGNESIUM SERPL-MCNC: 1.7 MG/DL (ref 1.6–2.6)
MCH RBC QN AUTO: 27.9 PG (ref 27–31)
MCHC RBC AUTO-ENTMCNC: 32.6 G/DL (ref 32–36)
MCV RBC AUTO: 86 FL (ref 82–98)
MONOCYTES # BLD AUTO: 0.8 K/UL (ref 0.3–1)
MONOCYTES NFR BLD: 12.9 % (ref 4–15)
NEUTROPHILS # BLD AUTO: 2.8 K/UL (ref 1.8–7.7)
NEUTROPHILS NFR BLD: 46.4 % (ref 38–73)
NRBC BLD-RTO: 0 /100 WBC
PLATELET # BLD AUTO: 175 K/UL (ref 150–350)
PMV BLD AUTO: 8.7 FL (ref 9.2–12.9)
POTASSIUM SERPL-SCNC: 4.7 MMOL/L (ref 3.5–5.1)
PROT SERPL-MCNC: 6 G/DL (ref 6–8.4)
RBC # BLD AUTO: 3.3 M/UL (ref 4.6–6.2)
SODIUM SERPL-SCNC: 136 MMOL/L (ref 136–145)
WBC # BLD AUTO: 5.96 K/UL (ref 3.9–12.7)

## 2019-07-10 PROCEDURE — 1101F PR PT FALLS ASSESS DOC 0-1 FALLS W/OUT INJ PAST YR: ICD-10-PCS | Mod: CPTII,S$GLB,, | Performed by: INTERNAL MEDICINE

## 2019-07-10 PROCEDURE — 99999 PR PBB SHADOW E&M-EST. PATIENT-LVL III: ICD-10-PCS | Mod: PBBFAC,,, | Performed by: INTERNAL MEDICINE

## 2019-07-10 PROCEDURE — 85025 COMPLETE CBC W/AUTO DIFF WBC: CPT

## 2019-07-10 PROCEDURE — 83735 ASSAY OF MAGNESIUM: CPT | Mod: PN

## 2019-07-10 PROCEDURE — 99214 OFFICE O/P EST MOD 30 MIN: CPT | Mod: S$GLB,,, | Performed by: INTERNAL MEDICINE

## 2019-07-10 PROCEDURE — 80053 COMPREHEN METABOLIC PANEL: CPT | Mod: PN

## 2019-07-10 PROCEDURE — 36415 COLL VENOUS BLD VENIPUNCTURE: CPT | Mod: PN

## 2019-07-10 PROCEDURE — 1101F PT FALLS ASSESS-DOCD LE1/YR: CPT | Mod: CPTII,S$GLB,, | Performed by: INTERNAL MEDICINE

## 2019-07-10 PROCEDURE — 85025 COMPLETE CBC W/AUTO DIFF WBC: CPT | Mod: PN

## 2019-07-10 PROCEDURE — 83735 ASSAY OF MAGNESIUM: CPT

## 2019-07-10 PROCEDURE — 99999 PR PBB SHADOW E&M-EST. PATIENT-LVL III: CPT | Mod: PBBFAC,,, | Performed by: INTERNAL MEDICINE

## 2019-07-10 PROCEDURE — 80053 COMPREHEN METABOLIC PANEL: CPT

## 2019-07-10 PROCEDURE — 99214 PR OFFICE/OUTPT VISIT, EST, LEVL IV, 30-39 MIN: ICD-10-PCS | Mod: S$GLB,,, | Performed by: INTERNAL MEDICINE

## 2019-07-10 NOTE — PROGRESS NOTES
HPI    68 years old  male who was presented to Oncology Clinic for evaluation with squamous cell carcinoma of the lung.  Patient was recently diagnosed in January with fine-needle aspiration of the right lung nodule was found to have squamous cell carcinoma.  And recently patient had a which resection(by description) at [facility].  Since then patient was instructed to follow up with our medical oncologist for evaluation and treatment.  Patient was told that doing the which resection tumor is larger than expected compared to the images.  It might be present as late stage I or early stage II.      06/05/2019 completed cycle 3.    06/14/2019:  No acute event.  No complain.  Patient states that he is feeling better now since the chemotherapy about a week ago. Denies chest pain shortness breath.  Denies any nausea vomiting diarrhea.  Denies fever chills.  Fourteen point review system negative as above.    06/26/2019:  Patient here for cycle 4 day 1 of chemotherapy.  Patient states that he is little bit tired but denies any fever chills denies any recent illness.  Patient states that he is ready to go for his final cycle of chemo today.    07/10/2019:  Post cycle 4 treatment evaluation.  Patient has completed adjuvant treatment.  No major interruption is no complications.    Past Medical History:   Diagnosis Date    Arthritis     Cancer SKIN    Cardiac angina     COPD (chronic obstructive pulmonary disease)     Coronary artery disease ANGINA. HAD  [facility] 8/12. 40 % BLOCKAGE. DR RUBY IS CARDIOLOGIST.    Depression     GERD (gastroesophageal reflux disease)     Kidney cysts     Lung cancer     MVP (mitral valve prolapse)     Trigger thumb     BILAT    Wears dentures     UPPER    Wears glasses      Social History     Socioeconomic History    Marital status:      Spouse name: Not on file    Number of children: Not on file    Years of education: Not on file    Highest education  level: Not on file   Occupational History    Not on file   Social Needs    Financial resource strain: Not on file    Food insecurity:     Worry: Not on file     Inability: Not on file    Transportation needs:     Medical: Not on file     Non-medical: Not on file   Tobacco Use    Smoking status: Former Smoker     Years: 40.00     Last attempt to quit: 2019     Years since quittin.5    Smokeless tobacco: Never Used    Tobacco comment: 1-2 CIGT SOME DAYS/states last cigarette 20   Substance and Sexual Activity    Alcohol use: Yes     Comment: beer on occasion    Drug use: No    Sexual activity: Yes     Partners: Female   Lifestyle    Physical activity:     Days per week: Not on file     Minutes per session: Not on file    Stress: Not on file   Relationships    Social connections:     Talks on phone: Not on file     Gets together: Not on file     Attends Scientologist service: Not on file     Active member of club or organization: Not on file     Attends meetings of clubs or organizations: Not on file     Relationship status: Not on file   Other Topics Concern    Not on file   Social History Narrative    Not on file       Objective    Physical Exam   Vitals:    07/10/19 0829   BP: 131/66   Pulse: 78   Resp: 18   Temp: 98 °F (36.7 °C)       Constitutional: patient is oriented to person, place, and time. patient appears well-developed and well-nourished. No distress.   HENT:  Normocephalic atraumatic pupils equal round reactive to light extraocular muscle intact. No evidence of lesions on exam.  Cardiovascular:  Regular rate and rhythm     Pulmonary/Chest:  Symmetrical chest a rising  Abdominal:  Soft nontender nondistended bowel sounds x4   Musculoskeletal: Normal range of motion. Gait is normal No clubbing, cyanosis     Lymphadenopathy:  No evidence of lymphadenopathy  Neurological:  Sensorimotor grossly intact cranial nerves 2-12 grossly intact.    Skin:  Warm dry no rash no lesions    Psychiatric:  Normal affect    CMP  Sodium   Date Value Ref Range Status   07/10/2019 136 136 - 145 mmol/L Final     Potassium   Date Value Ref Range Status   07/10/2019 4.7 3.5 - 5.1 mmol/L Final     Chloride   Date Value Ref Range Status   07/10/2019 104 95 - 110 mmol/L Final     CO2   Date Value Ref Range Status   07/10/2019 25 22 - 31 mmol/L Final     Glucose   Date Value Ref Range Status   07/10/2019 130 (H) 70 - 110 mg/dL Final     Comment:     The ADA recommends the following guidelines for fasting glucose:  Normal:       less than 100 mg/dL  Prediabetes:  100 mg/dL to 125 mg/dL  Diabetes:     126 mg/dL or higher       BUN, Bld   Date Value Ref Range Status   07/10/2019 19 9 - 21 mg/dL Final     Creatinine   Date Value Ref Range Status   07/10/2019 1.32 0.50 - 1.40 mg/dL Final   01/18/2013 0.9 0.5 - 1.4 mg/dL Final     Calcium   Date Value Ref Range Status   07/10/2019 9.3 8.4 - 10.2 mg/dL Final   01/18/2013 10.7 (H) 8.7 - 10.5 mg/dL Final     Total Protein   Date Value Ref Range Status   07/10/2019 6.0 6.0 - 8.4 g/dL Final     Albumin   Date Value Ref Range Status   07/10/2019 3.4 (L) 3.5 - 5.2 g/dL Final     Total Bilirubin   Date Value Ref Range Status   07/10/2019 0.2 0.2 - 1.3 mg/dL Final     Alkaline Phosphatase   Date Value Ref Range Status   07/10/2019 100 38 - 145 U/L Final     AST   Date Value Ref Range Status   07/10/2019 21 17 - 59 U/L Final     ALT   Date Value Ref Range Status   07/10/2019 10 10 - 44 U/L Final     Anion Gap   Date Value Ref Range Status   07/10/2019 7 (L) 8 - 16 mmol/L Final   01/18/2013 11 5 - 15 meq/L Final     eGFR if    Date Value Ref Range Status   07/10/2019 >60 >60 mL/min/1.73 m^2 Final     eGFR if non    Date Value Ref Range Status   07/10/2019 55 (A) >60 mL/min/1.73 m^2 Final     Comment:     Calculation used to obtain the estimated glomerular filtration  rate (eGFR) is the CKD-EPI equation.        Lab Results   Component Value Date     WBC 5.96 07/10/2019    HGB 9.2 (L) 07/10/2019    HCT 28.2 (L) 07/10/2019    MCV 86 07/10/2019     07/10/2019       comparison is somewhat difficult with the interval surgery and technical differences.      Impression ct 3/29/2019       Interval changes of right lower lobectomy.  Severe emphysematous changes.  Few small scattered nodules largest measuring 5 mm.  At least some and possibly all these were present on the prior exam appearing similar today.  Strict comparison between the studies is difficult.  Trace right pleural effusion    Low-density lesions in the liver consistent with cysts.  Right renal cyst appearing very mildly complex with thin internal septation.  Mild circumferential wall thickening of the distal esophagus questioning esophagitis.  Additional findings as detailed above     Impression CT chest abdomen pelvis with IV contrast 07/03/2019      Chest; interval development of a couple small somewhat patchy areas of opacification right lung suggesting atelectasis or possibly infiltrate but not masslike in appearance.  The small scattered small nodular foci are not significantly changed.  Severe emphysematous changes as before along with postoperative change consistent with right lower lobectomy and mild scarring    Abdomen and pelvis; stable appearance of the low-density foci in the liver and right kidney consistent with cysts.  No findings suggesting metastatic disease.  Featureless appearance of the sigmoid colon likely due to merely incomplete distention although difficult to exclude acute colitis but with no appreciable wall thickening or pericolonic inflammatory change..  Also questionable mild diffuse bladder wall thickening versus incomplete distention as before.    Osseous structures show degenerative changes and bilateral femoral head avascular necrosis.         Assessment Plan    [] 03/30/2019 LUNG, RIGHT, FINE NEEDLE ASPIRATION:  - POSITIVE FOR SQUAMOUS CELL CARCINOMA    [] Status  post right lung resection [facility] 4 weeks prior to this visit.  Patient was told size of tumor is larger than expected he might be having late stage I or early stage II SCC.      []  [id number] stage IIa with pleural invasion present  -completed cycle 4 June 2019 without major complication or interruptions     Adjuvant Cisplatin and docetaxel q 21 days for 4 cycles    CT chest abdomen pelvis 07/03/2019 no evidence of metastatic disease or recurrence.    RTC 1 month    Surveillance CT chest with contrast every 6 months x2 years then low-dose CT annually after    [] Sore mouth but without oral lesion  -continue Magic mouthwash     [] Chemo induced neutropenia - resolved    [] severe emphysematous changes - Follow Pulm

## 2019-07-29 PROBLEM — Z09 ONCOLOGY FOLLOW-UP ENCOUNTER: Status: RESOLVED | Noted: 2019-04-24 | Resolved: 2019-07-29

## 2019-08-12 DIAGNOSIS — C34.91 SQUAMOUS CELL CARCINOMA OF LUNG, RIGHT: ICD-10-CM

## 2019-08-12 PROBLEM — Z09 CHEMOTHERAPY FOLLOW-UP EXAMINATION: Status: RESOLVED | Noted: 2019-05-08 | Resolved: 2019-08-12

## 2019-08-12 NOTE — TELEPHONE ENCOUNTER
----- Message from Rosalva Lyons sent at 8/12/2019 11:14 AM CDT -----  Contact: Patient  Type:  RX Refill Request    Who Called:  Patient  Refill or New Rx:  refill  RX Name and Strength:  HYDROcodone-acetaminophen (NORCO)  mg per tablet  How is the patient currently taking it? (ex. 1XDay):  3x day  Is this a 30 day or 90 day RX:  30 day  Preferred Pharmacy with phone number:    [facility]/pharmacy #[id number] - [place] - [address]  [address] [address]  Phone: [phone] Fax: [phone]     Local or Mail Order:  local  Ordering Provider:  Dr. Karen Sommer Call Back Number:  [phone] (home)    Additional Information:  livan

## 2019-08-14 RX ORDER — HYDROCODONE BITARTRATE AND ACETAMINOPHEN 10; 325 MG/1; MG/1
1 TABLET ORAL EVERY 8 HOURS PRN
Qty: 90 TABLET | Refills: 0 | Status: SHIPPED | OUTPATIENT
Start: 2019-08-14 | End: 2019-09-11 | Stop reason: SDUPTHER

## 2019-09-10 NOTE — PROGRESS NOTES
HPI    68 years old  male who was presented to Oncology Clinic for evaluation with squamous cell carcinoma of the lung.  Patient was recently diagnosed in January with fine-needle aspiration of the right lung nodule was found to have squamous cell carcinoma.  And recently patient had a which resection(by description) at [facility].  Since then patient was instructed to follow up with our medical oncologist for evaluation and treatment.  Patient was told that doing the which resection tumor is larger than expected compared to the images.  It might be present as late stage I or early stage II.      06/05/2019 completed cycle 3.    06/14/2019:  No acute event.  No complain.  Patient states that he is feeling better now since the chemotherapy about a week ago. Denies chest pain shortness breath.  Denies any nausea vomiting diarrhea.  Denies fever chills.  Fourteen point review system negative as above.    06/26/2019:  Patient here for cycle 4 day 1 of chemotherapy.  Patient states that he is little bit tired but denies any fever chills denies any recent illness.  Patient states that he is ready to go for his final cycle of chemo today.    07/10/2019:  Post cycle 4 treatment evaluation.  Patient has completed adjuvant treatment.  No major interruption is no complications.    09/11/2019 patient here to follow-up oncology visit.  Wishes to have Norco refilled.    Past Medical History:   Diagnosis Date    Arthritis     Cancer SKIN    Cardiac angina     COPD (chronic obstructive pulmonary disease)     Coronary artery disease ANGINA. HAD  [facility] 8/12. 40 % BLOCKAGE. DR RUBY IS CARDIOLOGIST.    Depression     GERD (gastroesophageal reflux disease)     Kidney cysts     Lung cancer     MVP (mitral valve prolapse)     Trigger thumb     BILAT    Wears dentures     UPPER    Wears glasses      Social History     Socioeconomic History    Marital status:      Spouse name: Not on file     Number of children: Not on file    Years of education: Not on file    Highest education level: Not on file   Occupational History    Not on file   Social Needs    Financial resource strain: Not on file    Food insecurity:     Worry: Not on file     Inability: Not on file    Transportation needs:     Medical: Not on file     Non-medical: Not on file   Tobacco Use    Smoking status: Former Smoker     Years: 40.00     Last attempt to quit: 2019     Years since quittin.6    Smokeless tobacco: Never Used    Tobacco comment: 1-2 CIGT SOME DAYS/states last cigarette 20   Substance and Sexual Activity    Alcohol use: Yes     Comment: beer on occasion    Drug use: No    Sexual activity: Yes     Partners: Female   Lifestyle    Physical activity:     Days per week: Not on file     Minutes per session: Not on file    Stress: Not on file   Relationships    Social connections:     Talks on phone: Not on file     Gets together: Not on file     Attends Oriental orthodox service: Not on file     Active member of club or organization: Not on file     Attends meetings of clubs or organizations: Not on file     Relationship status: Not on file   Other Topics Concern    Not on file   Social History Narrative    Not on file       Objective    Physical Exam   There were no vitals filed for this visit.    Constitutional: patient is oriented to person, place, and time. patient appears well-developed and well-nourished. No distress.   HENT:  Normocephalic atraumatic pupils equal round reactive to light extraocular muscle intact. No evidence of lesions on exam.  Cardiovascular:  Regular rate and rhythm     Pulmonary/Chest:  Symmetrical chest a rising  Abdominal:  Soft nontender nondistended bowel sounds x4   Musculoskeletal: Normal range of motion. Gait is normal No clubbing, cyanosis     Lymphadenopathy:  No evidence of lymphadenopathy  Neurological:  Sensorimotor grossly intact cranial nerves 2-12 grossly intact.     Skin:  Warm dry no rash no lesions   Psychiatric:  Normal affect    CMP  Sodium   Date Value Ref Range Status   07/10/2019 136 136 - 145 mmol/L Final     Potassium   Date Value Ref Range Status   07/10/2019 4.7 3.5 - 5.1 mmol/L Final     Chloride   Date Value Ref Range Status   07/10/2019 104 95 - 110 mmol/L Final     CO2   Date Value Ref Range Status   07/10/2019 25 22 - 31 mmol/L Final     Glucose   Date Value Ref Range Status   07/10/2019 130 (H) 70 - 110 mg/dL Final     Comment:     The ADA recommends the following guidelines for fasting glucose:  Normal:       less than 100 mg/dL  Prediabetes:  100 mg/dL to 125 mg/dL  Diabetes:     126 mg/dL or higher       BUN, Bld   Date Value Ref Range Status   07/10/2019 19 9 - 21 mg/dL Final     Creatinine   Date Value Ref Range Status   07/10/2019 1.32 0.50 - 1.40 mg/dL Final   01/18/2013 0.9 0.5 - 1.4 mg/dL Final     Calcium   Date Value Ref Range Status   07/10/2019 9.3 8.4 - 10.2 mg/dL Final   01/18/2013 10.7 (H) 8.7 - 10.5 mg/dL Final     Total Protein   Date Value Ref Range Status   07/10/2019 6.0 6.0 - 8.4 g/dL Final     Albumin   Date Value Ref Range Status   07/10/2019 3.4 (L) 3.5 - 5.2 g/dL Final     Total Bilirubin   Date Value Ref Range Status   07/10/2019 0.2 0.2 - 1.3 mg/dL Final     Alkaline Phosphatase   Date Value Ref Range Status   07/10/2019 100 38 - 145 U/L Final     AST   Date Value Ref Range Status   07/10/2019 21 17 - 59 U/L Final     ALT   Date Value Ref Range Status   07/10/2019 10 10 - 44 U/L Final     Anion Gap   Date Value Ref Range Status   07/10/2019 7 (L) 8 - 16 mmol/L Final   01/18/2013 11 5 - 15 meq/L Final     eGFR if    Date Value Ref Range Status   07/10/2019 >60 >60 mL/min/1.73 m^2 Final     eGFR if non    Date Value Ref Range Status   07/10/2019 55 (A) >60 mL/min/1.73 m^2 Final     Comment:     Calculation used to obtain the estimated glomerular filtration  rate (eGFR) is the CKD-EPI equation.        Lab  Results   Component Value Date    WBC 5.96 07/10/2019    HGB 9.2 (L) 07/10/2019    HCT 28.2 (L) 07/10/2019    MCV 86 07/10/2019     07/10/2019       comparison is somewhat difficult with the interval surgery and technical differences.      Impression ct 3/29/2019       Interval changes of right lower lobectomy.  Severe emphysematous changes.  Few small scattered nodules largest measuring 5 mm.  At least some and possibly all these were present on the prior exam appearing similar today.  Strict comparison between the studies is difficult.  Trace right pleural effusion    Low-density lesions in the liver consistent with cysts.  Right renal cyst appearing very mildly complex with thin internal septation.  Mild circumferential wall thickening of the distal esophagus questioning esophagitis.  Additional findings as detailed above     Impression CT chest abdomen pelvis with IV contrast 07/03/2019      Chest; interval development of a couple small somewhat patchy areas of opacification right lung suggesting atelectasis or possibly infiltrate but not masslike in appearance.  The small scattered small nodular foci are not significantly changed.  Severe emphysematous changes as before along with postoperative change consistent with right lower lobectomy and mild scarring    Abdomen and pelvis; stable appearance of the low-density foci in the liver and right kidney consistent with cysts.  No findings suggesting metastatic disease.  Featureless appearance of the sigmoid colon likely due to merely incomplete distention although difficult to exclude acute colitis but with no appreciable wall thickening or pericolonic inflammatory change..  Also questionable mild diffuse bladder wall thickening versus incomplete distention as before.    Osseous structures show degenerative changes and bilateral femoral head avascular necrosis.         Assessment Plan    [] 03/30/2019 LUNG, RIGHT, FINE NEEDLE ASPIRATION:  - POSITIVE FOR  SQUAMOUS CELL CARCINOMA    [] Status post lung resection, resection total size of tumor is greater than on images suspecting early stage II involvement.     []  Pathological staging [id number] stage IIa with pleural invasion present  -Adjuvant Cisplatin and docetaxel q 21 days for 4 cycles  -completed cycle 4 June 2019 without major complication or interruptions   -Surveillance CT chest with contrast every 6 months x2 years then low-dose CT annually after per NCCN guidelines  -will plan for repeat CT for surveillance evaluation and review results on next follow-up  -RTC 10 weeks with above studies.  -Neurontin 100 mg t.i.d. escalate as needed  Chemotherapy induced neuropathy treated as above.    [] Sore mouth but without oral lesion resolved  -continue Magic mouthwash as needed    [] Chemo induced neutropenia - resolved     [] severe emphysematous changes - Follow Pulm

## 2019-09-11 ENCOUNTER — INFUSION (OUTPATIENT)
Dept: INFUSION THERAPY | Facility: HOSPITAL | Age: 69
End: 2019-09-11
Attending: INTERNAL MEDICINE
Payer: MEDICARE

## 2019-09-11 ENCOUNTER — OFFICE VISIT (OUTPATIENT)
Dept: HEMATOLOGY/ONCOLOGY | Facility: CLINIC | Age: 69
End: 2019-09-11
Payer: MEDICARE

## 2019-09-11 VITALS
HEIGHT: 75 IN | WEIGHT: 194.44 LBS | RESPIRATION RATE: 18 BRPM | BODY MASS INDEX: 24.18 KG/M2 | HEART RATE: 82 BPM | SYSTOLIC BLOOD PRESSURE: 108 MMHG | TEMPERATURE: 99 F | DIASTOLIC BLOOD PRESSURE: 64 MMHG

## 2019-09-11 DIAGNOSIS — T45.1X5A CHEMOTHERAPY-INDUCED NEUROPATHY: ICD-10-CM

## 2019-09-11 DIAGNOSIS — T45.1X5A CHEMOTHERAPY INDUCED NEUTROPENIA: Primary | ICD-10-CM

## 2019-09-11 DIAGNOSIS — Z51.11 ENCOUNTER FOR ANTINEOPLASTIC CHEMOTHERAPY: ICD-10-CM

## 2019-09-11 DIAGNOSIS — D70.1 CHEMOTHERAPY INDUCED NEUTROPENIA: Primary | ICD-10-CM

## 2019-09-11 DIAGNOSIS — Z09 ONCOLOGY FOLLOW-UP ENCOUNTER: Primary | ICD-10-CM

## 2019-09-11 DIAGNOSIS — G62.0 CHEMOTHERAPY-INDUCED NEUROPATHY: ICD-10-CM

## 2019-09-11 DIAGNOSIS — C34.91 SQUAMOUS CELL CARCINOMA OF LUNG, RIGHT: ICD-10-CM

## 2019-09-11 PROCEDURE — 25000003 PHARM REV CODE 250: Mod: PN | Performed by: INTERNAL MEDICINE

## 2019-09-11 PROCEDURE — 99999 PR PBB SHADOW E&M-EST. PATIENT-LVL III: ICD-10-PCS | Mod: PBBFAC,,, | Performed by: INTERNAL MEDICINE

## 2019-09-11 PROCEDURE — 99214 OFFICE O/P EST MOD 30 MIN: CPT | Mod: S$GLB,,, | Performed by: INTERNAL MEDICINE

## 2019-09-11 PROCEDURE — 99214 PR OFFICE/OUTPT VISIT, EST, LEVL IV, 30-39 MIN: ICD-10-PCS | Mod: S$GLB,,, | Performed by: INTERNAL MEDICINE

## 2019-09-11 PROCEDURE — 1101F PR PT FALLS ASSESS DOC 0-1 FALLS W/OUT INJ PAST YR: ICD-10-PCS | Mod: CPTII,S$GLB,, | Performed by: INTERNAL MEDICINE

## 2019-09-11 PROCEDURE — A4216 STERILE WATER/SALINE, 10 ML: HCPCS | Mod: PN | Performed by: INTERNAL MEDICINE

## 2019-09-11 PROCEDURE — 99999 PR PBB SHADOW E&M-EST. PATIENT-LVL III: CPT | Mod: PBBFAC,,, | Performed by: INTERNAL MEDICINE

## 2019-09-11 PROCEDURE — 96523 IRRIG DRUG DELIVERY DEVICE: CPT | Mod: PN

## 2019-09-11 PROCEDURE — 1101F PT FALLS ASSESS-DOCD LE1/YR: CPT | Mod: CPTII,S$GLB,, | Performed by: INTERNAL MEDICINE

## 2019-09-11 RX ORDER — GABAPENTIN 100 MG/1
100 CAPSULE ORAL 3 TIMES DAILY
Qty: 90 CAPSULE | Refills: 2 | Status: SHIPPED | OUTPATIENT
Start: 2019-09-11 | End: 2022-12-06

## 2019-09-11 RX ORDER — HYDROCODONE BITARTRATE AND ACETAMINOPHEN 10; 325 MG/1; MG/1
1 TABLET ORAL EVERY 8 HOURS PRN
Qty: 90 TABLET | Refills: 0 | Status: SHIPPED | OUTPATIENT
Start: 2019-09-11 | End: 2019-11-07 | Stop reason: SDUPTHER

## 2019-09-11 RX ORDER — SODIUM CHLORIDE 0.9 % (FLUSH) 0.9 %
10 SYRINGE (ML) INJECTION
Status: COMPLETED | OUTPATIENT
Start: 2019-09-11 | End: 2019-09-11

## 2019-09-11 RX ORDER — SODIUM CHLORIDE 0.9 % (FLUSH) 0.9 %
10 SYRINGE (ML) INJECTION
Status: CANCELLED | OUTPATIENT
Start: 2019-09-11

## 2019-09-11 RX ORDER — HEPARIN 100 UNIT/ML
500 SYRINGE INTRAVENOUS
Status: CANCELLED | OUTPATIENT
Start: 2019-09-11

## 2019-09-11 RX ADMIN — Medication 10 ML: at 10:09

## 2019-10-14 PROBLEM — Z09 ONCOLOGY FOLLOW-UP ENCOUNTER: Status: RESOLVED | Noted: 2019-07-10 | Resolved: 2019-10-14

## 2019-11-05 DIAGNOSIS — C34.91 SQUAMOUS CELL CARCINOMA OF LUNG, RIGHT: ICD-10-CM

## 2019-11-05 RX ORDER — HYDROCODONE BITARTRATE AND ACETAMINOPHEN 10; 325 MG/1; MG/1
1 TABLET ORAL EVERY 8 HOURS PRN
Qty: 90 TABLET | Refills: 0 | Status: CANCELLED | OUTPATIENT
Start: 2019-11-05

## 2019-11-05 NOTE — TELEPHONE ENCOUNTER
Spoke w pt.  Informed Port flush not due until December 11.  Pt verbalized understanding.  Will leave appt as is.

## 2019-11-05 NOTE — TELEPHONE ENCOUNTER
----- Message from Sylvia Ziegler sent at 11/5/2019 12:35 PM CST -----  Contact: self  Type: Needs Medical Advice    Who Called:  self  Symptoms (please be specific):    How long has patient had these symptoms:    Pharmacy name and phone #:    Best Call Back Number: [phone] (home)   Additional Information: Patient would like to reschedule his port flush to 11/18/19. Patient states hospital needs a request to change it. Please call patient if any questions. Thanks!

## 2019-11-05 NOTE — TELEPHONE ENCOUNTER
----- Message from Sylvia Ziegler sent at 11/5/2019 12:32 PM CST -----  Contact: self  Type:  RX Refill Request    Who Called:  self  Refill or New Rx:  refill  RX Name and Strength: HYDROcodone-acetaminophen (NORCO)  mg per tablet  How is the patient currently taking it? (ex. 1XDay):  3Xday  Is this a 30 day or 90 day RX:  30  Preferred Pharmacy with phone number:  [facility]/pharmacy #[id number] - [place] - [address]  [address] [address]  Phone: [phone] Fax: [phone]  Local or Mail Order:  local  Ordering Provider:  Dr Sommer Call Back Number:  [phone]  Additional Information: Thanks!

## 2019-11-07 DIAGNOSIS — C34.91 SQUAMOUS CELL CARCINOMA OF LUNG, RIGHT: ICD-10-CM

## 2019-11-07 NOTE — TELEPHONE ENCOUNTER
----- Message from Mel Guerrero sent at 11/7/2019 12:43 PM CST -----  Contact: self  Patient requesting a refill on HYDROcodone-acetaminophen (NORCO)  mg per tablet       [facility]/pharmacy #[id number] - [place] - [address]  [address] [address]  Phone: [phone] Fax: [phone]        Patient contact is [phone] (home)

## 2019-11-08 RX ORDER — HYDROCODONE BITARTRATE AND ACETAMINOPHEN 10; 325 MG/1; MG/1
1 TABLET ORAL EVERY 8 HOURS PRN
Qty: 90 TABLET | Refills: 0 | Status: SHIPPED | OUTPATIENT
Start: 2019-11-08 | End: 2019-12-26 | Stop reason: SDUPTHER

## 2019-11-18 ENCOUNTER — HOSPITAL ENCOUNTER (OUTPATIENT)
Dept: RADIOLOGY | Facility: HOSPITAL | Age: 69
Discharge: HOME | End: 2019-11-18
Attending: INTERNAL MEDICINE
Payer: MEDICARE

## 2019-11-18 DIAGNOSIS — Z09 ONCOLOGY FOLLOW-UP ENCOUNTER: ICD-10-CM

## 2019-11-18 PROCEDURE — 71260 CT THORAX DX C+: CPT | Mod: TC

## 2019-11-18 PROCEDURE — 71260 CT THORAX DX C+: CPT | Mod: 26,,, | Performed by: RADIOLOGY

## 2019-11-18 PROCEDURE — 25500020 PHARM REV CODE 255: Performed by: INTERNAL MEDICINE

## 2019-11-18 PROCEDURE — 71260 CT CHEST WITH CONTRAST: ICD-10-PCS | Mod: 26,,, | Performed by: RADIOLOGY

## 2019-11-18 RX ADMIN — IOHEXOL 75 ML: 350 INJECTION, SOLUTION INTRAVENOUS at 09:11

## 2019-11-21 ENCOUNTER — OFFICE VISIT (OUTPATIENT)
Dept: HEMATOLOGY/ONCOLOGY | Facility: CLINIC | Age: 69
End: 2019-11-21
Payer: MEDICARE

## 2019-11-21 VITALS
HEART RATE: 89 BPM | HEIGHT: 75 IN | DIASTOLIC BLOOD PRESSURE: 61 MMHG | RESPIRATION RATE: 16 BRPM | SYSTOLIC BLOOD PRESSURE: 119 MMHG | WEIGHT: 196 LBS | OXYGEN SATURATION: 94 % | TEMPERATURE: 99 F | BODY MASS INDEX: 24.37 KG/M2

## 2019-11-21 DIAGNOSIS — Z98.890 STATUS POST SURGERY: ICD-10-CM

## 2019-11-21 DIAGNOSIS — T45.1X5A CHEMOTHERAPY-INDUCED NEUROPATHY: ICD-10-CM

## 2019-11-21 DIAGNOSIS — J43.8 OTHER EMPHYSEMA: ICD-10-CM

## 2019-11-21 DIAGNOSIS — Z09 ONCOLOGY FOLLOW-UP ENCOUNTER: Primary | ICD-10-CM

## 2019-11-21 DIAGNOSIS — L65.9 ALOPECIA OF SCALP: ICD-10-CM

## 2019-11-21 DIAGNOSIS — C34.91 SQUAMOUS CELL CARCINOMA OF LUNG, RIGHT: ICD-10-CM

## 2019-11-21 DIAGNOSIS — G62.0 CHEMOTHERAPY-INDUCED NEUROPATHY: ICD-10-CM

## 2019-11-21 DIAGNOSIS — R91.1 LUNG NODULE: ICD-10-CM

## 2019-11-21 PROCEDURE — 1159F PR MEDICATION LIST DOCUMENTED IN MEDICAL RECORD: ICD-10-PCS | Mod: S$GLB,,, | Performed by: INTERNAL MEDICINE

## 2019-11-21 PROCEDURE — 99999 PR PBB SHADOW E&M-EST. PATIENT-LVL III: ICD-10-PCS | Mod: PBBFAC,,, | Performed by: INTERNAL MEDICINE

## 2019-11-21 PROCEDURE — 1101F PR PT FALLS ASSESS DOC 0-1 FALLS W/OUT INJ PAST YR: ICD-10-PCS | Mod: CPTII,S$GLB,, | Performed by: INTERNAL MEDICINE

## 2019-11-21 PROCEDURE — 1125F PR PAIN SEVERITY QUANTIFIED, PAIN PRESENT: ICD-10-PCS | Mod: S$GLB,,, | Performed by: INTERNAL MEDICINE

## 2019-11-21 PROCEDURE — 1125F AMNT PAIN NOTED PAIN PRSNT: CPT | Mod: S$GLB,,, | Performed by: INTERNAL MEDICINE

## 2019-11-21 PROCEDURE — 1159F MED LIST DOCD IN RCRD: CPT | Mod: S$GLB,,, | Performed by: INTERNAL MEDICINE

## 2019-11-21 PROCEDURE — 99215 PR OFFICE/OUTPT VISIT, EST, LEVL V, 40-54 MIN: ICD-10-PCS | Mod: S$GLB,,, | Performed by: INTERNAL MEDICINE

## 2019-11-21 PROCEDURE — 99215 OFFICE O/P EST HI 40 MIN: CPT | Mod: S$GLB,,, | Performed by: INTERNAL MEDICINE

## 2019-11-21 PROCEDURE — 1101F PT FALLS ASSESS-DOCD LE1/YR: CPT | Mod: CPTII,S$GLB,, | Performed by: INTERNAL MEDICINE

## 2019-11-21 PROCEDURE — 99999 PR PBB SHADOW E&M-EST. PATIENT-LVL III: CPT | Mod: PBBFAC,,, | Performed by: INTERNAL MEDICINE

## 2019-11-21 RX ORDER — CYCLOBENZAPRINE HCL 10 MG
TABLET ORAL
COMMUNITY
Start: 2019-01-17 | End: 2023-04-04

## 2019-11-21 RX ORDER — KETOCONAZOLE 20 MG/G
CREAM TOPICAL
COMMUNITY
Start: 2018-10-03 | End: 2020-12-15

## 2019-11-21 NOTE — PROGRESS NOTES
HPI    68 years old  male who was presented to Oncology Clinic for evaluation with squamous cell carcinoma of the lung.  Patient was recently diagnosed in January with fine-needle aspiration of the right lung nodule was found to have squamous cell carcinoma.  And recently patient had a which resection(by description) at [facility].  Since then patient was instructed to follow up with our medical oncologist for evaluation and treatment.  Patient was told that doing the which resection tumor is larger than expected compared to the images.  It might be present as late stage I or early stage II.      06/05/2019 completed cycle 3.    06/14/2019:  No acute event.  No complain.  Patient states that he is feeling better now since the chemotherapy about a week ago. Denies chest pain shortness breath.  Denies any nausea vomiting diarrhea.  Denies fever chills.  Fourteen point review system negative as above.    06/26/2019:  Patient here for cycle 4 day 1 of chemotherapy.  Patient states that he is little bit tired but denies any fever chills denies any recent illness.  Patient states that he is ready to go for his final cycle of chemo today.    07/10/2019:  Post cycle 4 treatment evaluation.  Patient has completed adjuvant treatment.  No major interruption is no complications.    09/11/2019 patient here to follow-up oncology visit.  Wishes to have Norco refilled.    Past Medical History:   Diagnosis Date    Arthritis     Cancer SKIN    Cardiac angina     COPD (chronic obstructive pulmonary disease)     Coronary artery disease ANGINA. HAD  [facility] 8/12. 40 % BLOCKAGE. DR RUBY IS CARDIOLOGIST.    Depression     GERD (gastroesophageal reflux disease)     Kidney cysts     Lung cancer     MVP (mitral valve prolapse)     Trigger thumb     BILAT    Wears dentures     UPPER    Wears glasses      Social History     Socioeconomic History    Marital status:      Spouse name: Not on file     Number of children: Not on file    Years of education: Not on file    Highest education level: Not on file   Occupational History    Not on file   Social Needs    Financial resource strain: Not on file    Food insecurity:     Worry: Not on file     Inability: Not on file    Transportation needs:     Medical: Not on file     Non-medical: Not on file   Tobacco Use    Smoking status: Former Smoker     Years: 40.00     Last attempt to quit: 2019     Years since quittin.8    Smokeless tobacco: Never Used    Tobacco comment: 1-2 CIGT SOME DAYS/states last cigarette 19   Substance and Sexual Activity    Alcohol use: Yes     Comment: beer on occasion    Drug use: No    Sexual activity: Yes     Partners: Female   Lifestyle    Physical activity:     Days per week: Not on file     Minutes per session: Not on file    Stress: Not on file   Relationships    Social connections:     Talks on phone: Not on file     Gets together: Not on file     Attends [employer] service: Not on file     Active member of club or organization: Not on file     Attends meetings of clubs or organizations: Not on file     Relationship status: Not on file   Other Topics Concern    Not on file   Social History Narrative    Not on file       Objective    Physical Exam   Vitals:    19 0854   BP: 119/61   Pulse: 89   Resp: 16   Temp: 98.7 °F (37.1 °C)       Constitutional: patient is oriented to person, place, and time. patient appears well-developed and well-nourished. No distress.   HENT:  Normocephalic atraumatic pupils equal round reactive to light extraocular muscle intact. No evidence of lesions on exam.  Cardiovascular:  Regular rate and rhythm     Pulmonary/Chest:  Symmetrical chest a rising  Abdominal:  Soft nontender nondistended bowel sounds x4   Musculoskeletal: Normal range of motion. Gait is normal No clubbing, cyanosis     Lymphadenopathy:  No evidence of lymphadenopathy  Neurological:  Sensorimotor grossly  intact cranial nerves 2-12 grossly intact.    Skin:  Warm dry no rash no lesions   Psychiatric:  Normal affect    CMP  Sodium   Date Value Ref Range Status   11/18/2019 140 136 - 145 mmol/L Final     Potassium   Date Value Ref Range Status   11/18/2019 4.4 3.5 - 5.1 mmol/L Final     Chloride   Date Value Ref Range Status   11/18/2019 107 95 - 110 mmol/L Final     CO2   Date Value Ref Range Status   11/18/2019 26 23 - 29 mmol/L Final     Glucose   Date Value Ref Range Status   11/18/2019 103 70 - 110 mg/dL Final     BUN, Bld   Date Value Ref Range Status   11/18/2019 22 8 - 23 mg/dL Final     Creatinine   Date Value Ref Range Status   11/18/2019 1.5 (H) 0.5 - 1.4 mg/dL Final   01/18/2013 0.9 0.5 - 1.4 mg/dL Final     Calcium   Date Value Ref Range Status   11/18/2019 9.9 8.7 - 10.5 mg/dL Final   01/18/2013 10.7 (H) 8.7 - 10.5 mg/dL Final     Total Protein   Date Value Ref Range Status   11/18/2019 6.9 6.0 - 8.4 g/dL Final     Albumin   Date Value Ref Range Status   11/18/2019 4.0 3.5 - 5.2 g/dL Final     Total Bilirubin   Date Value Ref Range Status   11/18/2019 0.2 0.1 - 1.0 mg/dL Final     Comment:     For infants and newborns, interpretation of results should be based  on gestational age, weight and in agreement with clinical  observations.  Premature Infant recommended reference ranges:  Up to 24 hours.............<8.0 mg/dL  Up to 48 hours............<12.0 mg/dL  3-5 days..................<15.0 mg/dL  6-29 days.................<15.0 mg/dL       Alkaline Phosphatase   Date Value Ref Range Status   11/18/2019 117 55 - 135 U/L Final     AST   Date Value Ref Range Status   11/18/2019 19 10 - 40 U/L Final     ALT   Date Value Ref Range Status   11/18/2019 17 10 - 44 U/L Final     Anion Gap   Date Value Ref Range Status   11/18/2019 7 (L) 8 - 16 mmol/L Final   01/18/2013 11 5 - 15 meq/L Final     eGFR if    Date Value Ref Range Status   11/18/2019 55 (A) >60 mL/min/1.73 m^2 Final     eGFR if non     Date Value Ref Range Status   11/18/2019 47 (A) >60 mL/min/1.73 m^2 Final     Comment:     Calculation used to obtain the estimated glomerular filtration  rate (eGFR) is the CKD-EPI equation.        Lab Results   Component Value Date    WBC 7.14 11/18/2019    HGB 11.3 (L) 11/18/2019    HCT 35.7 (L) 11/18/2019    MCV 88 11/18/2019     11/18/2019       comparison is somewhat difficult with the interval surgery and technical differences.      Impression ct 3/29/2019       Interval changes of right lower lobectomy.  Severe emphysematous changes.  Few small scattered nodules largest measuring 5 mm.  At least some and possibly all these were present on the prior exam appearing similar today.  Strict comparison between the studies is difficult.  Trace right pleural effusion    Low-density lesions in the liver consistent with cysts.  Right renal cyst appearing very mildly complex with thin internal septation.  Mild circumferential wall thickening of the distal esophagus questioning esophagitis.  Additional findings as detailed above     Impression CT chest abdomen pelvis with IV contrast 07/03/2019      Chest; interval development of a couple small somewhat patchy areas of opacification right lung suggesting atelectasis or possibly infiltrate but not masslike in appearance.  The small scattered small nodular foci are not significantly changed.  Severe emphysematous changes as before along with postoperative change consistent with right lower lobectomy and mild scarring    Abdomen and pelvis; stable appearance of the low-density foci in the liver and right kidney consistent with cysts.  No findings suggesting metastatic disease.  Featureless appearance of the sigmoid colon likely due to merely incomplete distention although difficult to exclude acute colitis but with no appreciable wall thickening or pericolonic inflammatory change..  Also questionable mild diffuse bladder wall thickening versus  incomplete distention as before.    Osseous structures show degenerative changes and bilateral femoral head avascular necrosis.       Impression post adjuvant surveillance CT scan 11/18/2019.      Severe emphysema status post right lower lobectomy.    Unchanged sub solid right upper lobe opacity.  Continued attention on close follow-up with chest CT in 6 months is recommended.    Unchanged small pulmonary nodules for which follow up to 2 years of stability is recommended.         Assessment Plan    [] 03/30/2019 LUNG, RIGHT, FINE NEEDLE ASPIRATION:  - POSITIVE FOR SQUAMOUS CELL CARCINOMA    Status post lung resection, resection total size of tumor is greater than on images suspecting early stage II involvement.     Pathological staging [id number] stage IIa with pleural invasion present  -completed Adjuvant Cisplatin and docetaxel q 21 days for 4 cycles on June 2019 without major complication or interruptions   -Surveillance CT chest with contrast every 6 months x2 years then low-dose CT annually after per NCCN guidelines  -most recent scan 11/18/2019 shows no significant changes status post right lower lobectomy severe emphysema.  -RTC 3 months with laboratory studies next CT scan will be 6 from now.    [] Chemotherapy induced neuropathy treated as above.  -Neurontin 100 mg t.i.d. escalate as needed - patient are not taking Neurontin.    -patient request opiates refill.  October patient that I his current medical condition he does not require opiates.  Tylenol p.r.n. for should be sufficient.  Patient should also establish care with primary care physician's to address all other medical concerns.  Patient states that he has a primary care physician set up by his insurance will make appointment arrangement for visit.    [] Sore mouth but without oral lesion resolved  -continue Magic mouthwash as needed    [] Chemo induced neutropenia - resolved     [] severe emphysematous changes - Follow Pulm

## 2019-11-22 ENCOUNTER — TELEPHONE (OUTPATIENT)
Dept: PODIATRY | Facility: CLINIC | Age: 69
End: 2019-11-22

## 2019-11-22 NOTE — TELEPHONE ENCOUNTER
----- Message from Antonieta Ortega sent at 11/22/2019 10:39 AM CST -----  Contact: pt  Type: Needs Medical Advice    Who Called:  Patient  Best Call Back Number:[phone] (home)   pt said he is waiting on his antibiotics he seen the Dr yesterday and was told the Dr was faxing it right on over per pharm it is still not there

## 2019-12-11 ENCOUNTER — INFUSION (OUTPATIENT)
Dept: INFUSION THERAPY | Facility: HOSPITAL | Age: 69
End: 2019-12-11
Attending: INTERNAL MEDICINE
Payer: MEDICARE

## 2019-12-11 DIAGNOSIS — Z51.11 ENCOUNTER FOR ANTINEOPLASTIC CHEMOTHERAPY: ICD-10-CM

## 2019-12-11 DIAGNOSIS — T45.1X5A CHEMOTHERAPY INDUCED NEUTROPENIA: Primary | ICD-10-CM

## 2019-12-11 DIAGNOSIS — D70.1 CHEMOTHERAPY INDUCED NEUTROPENIA: Primary | ICD-10-CM

## 2019-12-11 PROCEDURE — A4216 STERILE WATER/SALINE, 10 ML: HCPCS | Mod: PN | Performed by: INTERNAL MEDICINE

## 2019-12-11 PROCEDURE — 96523 IRRIG DRUG DELIVERY DEVICE: CPT | Mod: PN

## 2019-12-11 PROCEDURE — 25000003 PHARM REV CODE 250: Mod: PN | Performed by: INTERNAL MEDICINE

## 2019-12-11 RX ORDER — SODIUM CHLORIDE 0.9 % (FLUSH) 0.9 %
10 SYRINGE (ML) INJECTION
Status: COMPLETED | OUTPATIENT
Start: 2019-12-11 | End: 2019-12-11

## 2019-12-11 RX ORDER — HEPARIN 100 UNIT/ML
500 SYRINGE INTRAVENOUS
Status: CANCELLED | OUTPATIENT
Start: 2019-12-11

## 2019-12-11 RX ORDER — SODIUM CHLORIDE 0.9 % (FLUSH) 0.9 %
10 SYRINGE (ML) INJECTION
Status: CANCELLED | OUTPATIENT
Start: 2019-12-11

## 2019-12-11 RX ADMIN — Medication 10 ML: at 08:12

## 2019-12-26 DIAGNOSIS — C34.91 SQUAMOUS CELL CARCINOMA OF LUNG, RIGHT: ICD-10-CM

## 2019-12-26 NOTE — TELEPHONE ENCOUNTER
----- Message from Nany Reyes sent at 12/26/2019 11:59 AM CST -----  Contact: Patient  Type:  RX Refill Request    Who Called:  Patient  Refill or New Rx:  Refill  RX Name and Strength:  HYDROcodone-acetaminophen (NORCO)  mg per tablet  How is the patient currently taking it? (ex. 1XDay):  Take 1 tablet by mouth every 8 (eight) hours as needed for Pain. - Oral  Is this a 30 day or 90 day RX: 90 tablets  Preferred Pharmacy with phone number:    [facility]/pharmacy #[id number] - [place] - [address]  [address]  [place] [address]  Phone: [phone] Fax: [phone]  Local or Mail Order:  Local  Ordering Provider:  Dr Kamron Sommer Call Back Number:  [phone]  Additional Information:  Calling to request a refill

## 2019-12-27 RX ORDER — HYDROCODONE BITARTRATE AND ACETAMINOPHEN 10; 325 MG/1; MG/1
1 TABLET ORAL EVERY 8 HOURS PRN
Qty: 90 TABLET | Refills: 0 | Status: SHIPPED | OUTPATIENT
Start: 2019-12-27 | End: 2020-02-10 | Stop reason: SDUPTHER

## 2020-02-05 PROBLEM — C34.31 MALIGNANT NEOPLASM OF LOWER LOBE OF RIGHT LUNG: Status: ACTIVE | Noted: 2019-03-29

## 2020-02-05 PROBLEM — J43.9 COPD WITH EMPHYSEMA: Status: ACTIVE | Noted: 2019-11-21

## 2020-02-10 DIAGNOSIS — C34.91 SQUAMOUS CELL CARCINOMA OF LUNG, RIGHT: ICD-10-CM

## 2020-02-10 NOTE — TELEPHONE ENCOUNTER
----- Message from Martha Chao sent at 2/10/2020 12:03 PM CST -----  Contact: pt  Type: RX Refill Request    Who Called: pt    Have you contacted your pharmacy:    Refill or New Rx:HYDROcodone-acetaminophen (NORCO)  mg per tablet     RX Name and Strength:    How is the patient currently taking it? (ex. 1XDay):    Is this a 30 day or 90 day RX:    Preferred Pharmacy with phone number:  [facility]/pharmacy #[id number] - [place] - [address]  [address] [address]  Phone: [phone] Fax: [phone]        Local or Mail Order:    Ordering Provider:    Would the patient rather a call back or a response via [facility]? call    Best Call Back Number:[phone]    Additional Information:

## 2020-02-11 RX ORDER — HYDROCODONE BITARTRATE AND ACETAMINOPHEN 10; 325 MG/1; MG/1
1 TABLET ORAL EVERY 8 HOURS PRN
Qty: 90 TABLET | Refills: 0 | Status: SHIPPED | OUTPATIENT
Start: 2020-02-11 | End: 2020-03-30 | Stop reason: SDUPTHER

## 2020-02-17 ENCOUNTER — LAB VISIT (OUTPATIENT)
Dept: LAB | Facility: HOSPITAL | Age: 70
End: 2020-02-17
Attending: INTERNAL MEDICINE
Payer: MEDICARE

## 2020-02-17 DIAGNOSIS — G62.0 CHEMOTHERAPY-INDUCED NEUROPATHY: ICD-10-CM

## 2020-02-17 DIAGNOSIS — Z09 ONCOLOGY FOLLOW-UP ENCOUNTER: ICD-10-CM

## 2020-02-17 DIAGNOSIS — C34.91 SQUAMOUS CELL CARCINOMA OF LUNG, RIGHT: ICD-10-CM

## 2020-02-17 DIAGNOSIS — T45.1X5A CHEMOTHERAPY-INDUCED NEUROPATHY: ICD-10-CM

## 2020-02-17 LAB
ALBUMIN SERPL BCP-MCNC: 4 G/DL (ref 3.5–5.2)
ALP SERPL-CCNC: 111 U/L (ref 55–135)
ALT SERPL W/O P-5'-P-CCNC: 18 U/L (ref 10–44)
ANION GAP SERPL CALC-SCNC: 6 MMOL/L (ref 8–16)
AST SERPL-CCNC: 18 U/L (ref 10–40)
BASOPHILS # BLD AUTO: 0.04 K/UL (ref 0–0.2)
BASOPHILS NFR BLD: 0.5 % (ref 0–1.9)
BILIRUB SERPL-MCNC: 0.2 MG/DL (ref 0.1–1)
BUN SERPL-MCNC: 28 MG/DL (ref 8–23)
CALCIUM SERPL-MCNC: 9.6 MG/DL (ref 8.7–10.5)
CHLORIDE SERPL-SCNC: 107 MMOL/L (ref 95–110)
CO2 SERPL-SCNC: 27 MMOL/L (ref 23–29)
CREAT SERPL-MCNC: 1.5 MG/DL (ref 0.5–1.4)
DIFFERENTIAL METHOD: ABNORMAL
EOSINOPHIL # BLD AUTO: 0.2 K/UL (ref 0–0.5)
EOSINOPHIL NFR BLD: 2.3 % (ref 0–8)
ERYTHROCYTE [DISTWIDTH] IN BLOOD BY AUTOMATED COUNT: 13.3 % (ref 11.5–14.5)
EST. GFR  (AFRICAN AMERICAN): 55 ML/MIN/1.73 M^2
EST. GFR  (NON AFRICAN AMERICAN): 47 ML/MIN/1.73 M^2
GLUCOSE SERPL-MCNC: 98 MG/DL (ref 70–110)
HCT VFR BLD AUTO: 36.4 % (ref 40–54)
HGB BLD-MCNC: 11.5 G/DL (ref 14–18)
IMM GRANULOCYTES # BLD AUTO: 0.02 K/UL (ref 0–0.04)
LYMPHOCYTES # BLD AUTO: 3.2 K/UL (ref 1–4.8)
LYMPHOCYTES NFR BLD: 40 % (ref 18–48)
MAGNESIUM SERPL-MCNC: 2.2 MG/DL (ref 1.6–2.6)
MCH RBC QN AUTO: 27.3 PG (ref 27–31)
MCHC RBC AUTO-ENTMCNC: 31.6 G/DL (ref 32–36)
MCV RBC AUTO: 87 FL (ref 82–98)
MONOCYTES # BLD AUTO: 0.7 K/UL (ref 0.3–1)
MONOCYTES NFR BLD: 8.3 % (ref 4–15)
NEUTROPHILS # BLD AUTO: 3.9 K/UL (ref 1.8–7.7)
NEUTROPHILS NFR BLD: 48.7 % (ref 38–73)
NRBC BLD-RTO: 0 /100 WBC
PLATELET # BLD AUTO: 252 K/UL (ref 150–350)
PMV BLD AUTO: 8.5 FL (ref 9.2–12.9)
POTASSIUM SERPL-SCNC: 4.6 MMOL/L (ref 3.5–5.1)
PROT SERPL-MCNC: 6.9 G/DL (ref 6–8.4)
RBC # BLD AUTO: 4.21 M/UL (ref 4.6–6.2)
SODIUM SERPL-SCNC: 140 MMOL/L (ref 136–145)
WBC # BLD AUTO: 8.1 K/UL (ref 3.9–12.7)

## 2020-02-17 PROCEDURE — 85025 COMPLETE CBC W/AUTO DIFF WBC: CPT

## 2020-02-17 PROCEDURE — 80053 COMPREHEN METABOLIC PANEL: CPT

## 2020-02-17 PROCEDURE — 83735 ASSAY OF MAGNESIUM: CPT

## 2020-02-17 PROCEDURE — 36415 COLL VENOUS BLD VENIPUNCTURE: CPT

## 2020-02-18 ENCOUNTER — OFFICE VISIT (OUTPATIENT)
Dept: HEMATOLOGY/ONCOLOGY | Facility: CLINIC | Age: 70
End: 2020-02-18
Payer: MEDICARE

## 2020-02-18 VITALS
OXYGEN SATURATION: 95 % | HEIGHT: 75 IN | BODY MASS INDEX: 24.01 KG/M2 | RESPIRATION RATE: 20 BRPM | WEIGHT: 193.13 LBS | SYSTOLIC BLOOD PRESSURE: 119 MMHG | DIASTOLIC BLOOD PRESSURE: 62 MMHG | HEART RATE: 72 BPM | TEMPERATURE: 98 F

## 2020-02-18 DIAGNOSIS — G62.0 CHEMOTHERAPY-INDUCED NEUROPATHY: ICD-10-CM

## 2020-02-18 DIAGNOSIS — C34.91 SQUAMOUS CELL CARCINOMA LUNG, RIGHT: Primary | ICD-10-CM

## 2020-02-18 DIAGNOSIS — T45.1X5A CHEMOTHERAPY-INDUCED NEUROPATHY: ICD-10-CM

## 2020-02-18 DIAGNOSIS — Z09 ONCOLOGY FOLLOW-UP ENCOUNTER: ICD-10-CM

## 2020-02-18 DIAGNOSIS — J43.9 PULMONARY EMPHYSEMA, UNSPECIFIED EMPHYSEMA TYPE: ICD-10-CM

## 2020-02-18 DIAGNOSIS — Z87.891 HISTORY OF TOBACCO ABUSE: ICD-10-CM

## 2020-02-18 DIAGNOSIS — Z79.899 ENCOUNTER FOR MEDICATION REVIEW: ICD-10-CM

## 2020-02-18 PROBLEM — K52.1 CHEMOTHERAPY-INDUCED DIARRHEA: Status: RESOLVED | Noted: 2019-05-08 | Resolved: 2020-02-18

## 2020-02-18 PROCEDURE — 99214 PR OFFICE/OUTPT VISIT, EST, LEVL IV, 30-39 MIN: ICD-10-PCS | Mod: S$GLB,,, | Performed by: INTERNAL MEDICINE

## 2020-02-18 PROCEDURE — 99214 OFFICE O/P EST MOD 30 MIN: CPT | Mod: S$GLB,,, | Performed by: INTERNAL MEDICINE

## 2020-02-18 PROCEDURE — 1159F MED LIST DOCD IN RCRD: CPT | Mod: S$GLB,,, | Performed by: INTERNAL MEDICINE

## 2020-02-18 PROCEDURE — 1125F AMNT PAIN NOTED PAIN PRSNT: CPT | Mod: S$GLB,,, | Performed by: INTERNAL MEDICINE

## 2020-02-18 PROCEDURE — 99999 PR PBB SHADOW E&M-EST. PATIENT-LVL III: ICD-10-PCS | Mod: PBBFAC,,, | Performed by: INTERNAL MEDICINE

## 2020-02-18 PROCEDURE — 1159F PR MEDICATION LIST DOCUMENTED IN MEDICAL RECORD: ICD-10-PCS | Mod: S$GLB,,, | Performed by: INTERNAL MEDICINE

## 2020-02-18 PROCEDURE — 1101F PR PT FALLS ASSESS DOC 0-1 FALLS W/OUT INJ PAST YR: ICD-10-PCS | Mod: CPTII,S$GLB,, | Performed by: INTERNAL MEDICINE

## 2020-02-18 PROCEDURE — 1101F PT FALLS ASSESS-DOCD LE1/YR: CPT | Mod: CPTII,S$GLB,, | Performed by: INTERNAL MEDICINE

## 2020-02-18 PROCEDURE — 99999 PR PBB SHADOW E&M-EST. PATIENT-LVL III: CPT | Mod: PBBFAC,,, | Performed by: INTERNAL MEDICINE

## 2020-02-18 PROCEDURE — 1125F PR PAIN SEVERITY QUANTIFIED, PAIN PRESENT: ICD-10-PCS | Mod: S$GLB,,, | Performed by: INTERNAL MEDICINE

## 2020-02-18 NOTE — PROGRESS NOTES
HPI    68 years old  male who was presented to Oncology Clinic for evaluation with squamous cell carcinoma of the lung.  Patient was recently diagnosed in January with fine-needle aspiration of the right lung nodule was found to have squamous cell carcinoma.  And recently patient had a which resection(by description) at [facility].  Since then patient was instructed to follow up with our medical oncologist for evaluation and treatment.  Patient was told that doing the which resection tumor is larger than expected compared to the images.  It might be present as late stage I or early stage II.      06/05/2019 completed cycle 3.    06/14/2019:  No acute event.  No complain.  Patient states that he is feeling better now since the chemotherapy about a week ago. Denies chest pain shortness breath.  Denies any nausea vomiting diarrhea.  Denies fever chills.  Fourteen point review system negative as above.    06/26/2019:  Patient here for cycle 4 day 1 of chemotherapy.  Patient states that he is little bit tired but denies any fever chills denies any recent illness.  Patient states that he is ready to go for his final cycle of chemo today.    07/10/2019:  Post cycle 4 treatment evaluation.  Patient has completed adjuvant treatment.  No major interruption is no complications.    09/11/2019 patient here to follow-up oncology visit.  Wishes to have Norco refilled.    Past Medical History:   Diagnosis Date    Arthritis     Cancer SKIN    Cardiac angina     COPD (chronic obstructive pulmonary disease)     Coronary artery disease ANGINA. HAD  [facility] 8/12. 40 % BLOCKAGE. DR RUBY IS CARDIOLOGIST.    Depression     GERD (gastroesophageal reflux disease)     Kidney cysts     Lung cancer     MVP (mitral valve prolapse)     Trigger thumb     BILAT    Wears dentures     UPPER    Wears glasses      Social History     Socioeconomic History    Marital status:      Spouse name: Not on file     Number of children: Not on file    Years of education: Not on file    Highest education level: Not on file   Occupational History    Not on file   Social Needs    Financial resource strain: Not on file    Food insecurity:     Worry: Not on file     Inability: Not on file    Transportation needs:     Medical: Not on file     Non-medical: Not on file   Tobacco Use    Smoking status: Former Smoker     Years: 40.00     Last attempt to quit: 2019     Years since quittin.1    Smokeless tobacco: Never Used    Tobacco comment: 1-2 CIGT SOME DAYS/states last cigarette 20   Substance and Sexual Activity    Alcohol use: Yes     Comment: beer on occasion    Drug use: No    Sexual activity: Yes     Partners: Female   Lifestyle    Physical activity:     Days per week: Not on file     Minutes per session: Not on file    Stress: Not on file   Relationships    Social connections:     Talks on phone: Not on file     Gets together: Not on file     Attends Hinduism service: Not on file     Active member of club or organization: Not on file     Attends meetings of clubs or organizations: Not on file     Relationship status: Not on file   Other Topics Concern    Not on file   Social History Narrative    Not on file       Objective    Physical Exam   Vitals:    20 0806   BP: 119/62   Pulse: 72   Resp: 20   Temp: 97.6 °F (36.4 °C)       Constitutional: patient is oriented to person, place, and time. patient appears well-developed and well-nourished. No distress.   HENT:  Normocephalic atraumatic pupils equal round reactive to light extraocular muscle intact. No evidence of lesions on exam.  Cardiovascular:  Regular rate and rhythm     Pulmonary/Chest:  Symmetrical chest a rising  Abdominal:  Soft nontender nondistended bowel sounds x4   Musculoskeletal: Normal range of motion. Gait is normal No clubbing, cyanosis     Lymphadenopathy:  No evidence of lymphadenopathy  Neurological:  Sensorimotor grossly  intact cranial nerves 2-12 grossly intact.    Skin:  Warm dry no rash no lesions   Psychiatric:  Normal affect    CMP  Sodium   Date Value Ref Range Status   02/17/2020 140 136 - 145 mmol/L Final     Potassium   Date Value Ref Range Status   02/17/2020 4.6 3.5 - 5.1 mmol/L Final     Chloride   Date Value Ref Range Status   02/17/2020 107 95 - 110 mmol/L Final     CO2   Date Value Ref Range Status   02/17/2020 27 23 - 29 mmol/L Final     Glucose   Date Value Ref Range Status   02/17/2020 98 70 - 110 mg/dL Final     BUN, Bld   Date Value Ref Range Status   02/17/2020 28 (H) 8 - 23 mg/dL Final     Creatinine   Date Value Ref Range Status   02/17/2020 1.5 (H) 0.5 - 1.4 mg/dL Final   01/18/2013 0.9 0.5 - 1.4 mg/dL Final     Calcium   Date Value Ref Range Status   02/17/2020 9.6 8.7 - 10.5 mg/dL Final   01/18/2013 10.7 (H) 8.7 - 10.5 mg/dL Final     Total Protein   Date Value Ref Range Status   02/17/2020 6.9 6.0 - 8.4 g/dL Final     Albumin   Date Value Ref Range Status   02/17/2020 4.0 3.5 - 5.2 g/dL Final     Total Bilirubin   Date Value Ref Range Status   02/17/2020 0.2 0.1 - 1.0 mg/dL Final     Comment:     For infants and newborns, interpretation of results should be based  on gestational age, weight and in agreement with clinical  observations.  Premature Infant recommended reference ranges:  Up to 24 hours.............<8.0 mg/dL  Up to 48 hours............<12.0 mg/dL  3-5 days..................<15.0 mg/dL  6-29 days.................<15.0 mg/dL       Alkaline Phosphatase   Date Value Ref Range Status   02/17/2020 111 55 - 135 U/L Final     AST   Date Value Ref Range Status   02/17/2020 18 10 - 40 U/L Final     ALT   Date Value Ref Range Status   02/17/2020 18 10 - 44 U/L Final     Anion Gap   Date Value Ref Range Status   02/17/2020 6 (L) 8 - 16 mmol/L Final   01/18/2013 11 5 - 15 meq/L Final     eGFR if    Date Value Ref Range Status   02/17/2020 55 (A) >60 mL/min/1.73 m^2 Final     eGFR if non     Date Value Ref Range Status   02/17/2020 47 (A) >60 mL/min/1.73 m^2 Final     Comment:     Calculation used to obtain the estimated glomerular filtration  rate (eGFR) is the CKD-EPI equation.        Lab Results   Component Value Date    WBC 8.10 02/17/2020    HGB 11.5 (L) 02/17/2020    HCT 36.4 (L) 02/17/2020    MCV 87 02/17/2020     02/17/2020       comparison is somewhat difficult with the interval surgery and technical differences.      Impression ct 3/29/2019       Interval changes of right lower lobectomy.  Severe emphysematous changes.  Few small scattered nodules largest measuring 5 mm.  At least some and possibly all these were present on the prior exam appearing similar today.  Strict comparison between the studies is difficult.  Trace right pleural effusion    Low-density lesions in the liver consistent with cysts.  Right renal cyst appearing very mildly complex with thin internal septation.  Mild circumferential wall thickening of the distal esophagus questioning esophagitis.  Additional findings as detailed above     Impression CT chest abdomen pelvis with IV contrast 07/03/2019      Chest; interval development of a couple small somewhat patchy areas of opacification right lung suggesting atelectasis or possibly infiltrate but not masslike in appearance.  The small scattered small nodular foci are not significantly changed.  Severe emphysematous changes as before along with postoperative change consistent with right lower lobectomy and mild scarring    Abdomen and pelvis; stable appearance of the low-density foci in the liver and right kidney consistent with cysts.  No findings suggesting metastatic disease.  Featureless appearance of the sigmoid colon likely due to merely incomplete distention although difficult to exclude acute colitis but with no appreciable wall thickening or pericolonic inflammatory change..  Also questionable mild diffuse bladder wall thickening versus  incomplete distention as before.    Osseous structures show degenerative changes and bilateral femoral head avascular necrosis.       Impression post adjuvant surveillance CT scan 11/18/2019.      Severe emphysema status post right lower lobectomy.    Unchanged sub solid right upper lobe opacity.  Continued attention on close follow-up with chest CT in 6 months is recommended.    Unchanged small pulmonary nodules for which follow up to 2 years of stability is recommended.         Assessment Plan    [] 03/30/2019 LUNG, RIGHT, FINE NEEDLE ASPIRATION:  - POSITIVE FOR SQUAMOUS CELL CARCINOMA    Status post lung resection, resection total size of tumor is greater than on images suspecting early stage II involvement.     Pathological staging [id number] stage IIa with pleural invasion present  -completed Adjuvant Cisplatin and docetaxel q 21 days for 4 cycles on June 2019 without major complication or interruptions   -Surveillance CT chest with contrast every 6 months x2 years then low-dose CT annually after per NCCN guidelines.  Next CT will be in May 2020  -on this visit she denies any chest pain shortness breath.  Patient denies any weight loss.  Patient denies any abdominal pain nausea vomiting or diarrhea.  Review of blood work appear to be stable.    [] Reviewed medications today.  No significant changes.    [] Former smoker.  Stop smoking x1 year.      [] Chemotherapy induced neuropathy dramatically improved.  Patient states that upper extremity 90% recover.  Lower extremities 70 per percent recovered.  -will continue to monitor.  -patient have Neurontin is but is not taking Neurontin.    [] severe emphysematous changes - Follow Pulm      [] M*Modal dictations.

## 2020-03-11 ENCOUNTER — INFUSION (OUTPATIENT)
Dept: INFUSION THERAPY | Facility: HOSPITAL | Age: 70
End: 2020-03-11
Attending: INTERNAL MEDICINE
Payer: MEDICARE

## 2020-03-11 DIAGNOSIS — D70.1 CHEMOTHERAPY INDUCED NEUTROPENIA: Primary | ICD-10-CM

## 2020-03-11 DIAGNOSIS — Z51.11 ENCOUNTER FOR ANTINEOPLASTIC CHEMOTHERAPY: ICD-10-CM

## 2020-03-11 DIAGNOSIS — T45.1X5A CHEMOTHERAPY INDUCED NEUTROPENIA: Primary | ICD-10-CM

## 2020-03-11 PROCEDURE — A4216 STERILE WATER/SALINE, 10 ML: HCPCS | Mod: PN | Performed by: INTERNAL MEDICINE

## 2020-03-11 PROCEDURE — 96523 IRRIG DRUG DELIVERY DEVICE: CPT | Mod: PN

## 2020-03-11 PROCEDURE — 25000003 PHARM REV CODE 250: Mod: PN | Performed by: INTERNAL MEDICINE

## 2020-03-11 RX ORDER — SODIUM CHLORIDE 0.9 % (FLUSH) 0.9 %
10 SYRINGE (ML) INJECTION
Status: CANCELLED | OUTPATIENT
Start: 2020-03-11

## 2020-03-11 RX ORDER — SODIUM CHLORIDE 0.9 % (FLUSH) 0.9 %
10 SYRINGE (ML) INJECTION
Status: COMPLETED | OUTPATIENT
Start: 2020-03-11 | End: 2020-03-11

## 2020-03-11 RX ORDER — HEPARIN 100 UNIT/ML
500 SYRINGE INTRAVENOUS
Status: CANCELLED | OUTPATIENT
Start: 2020-03-11

## 2020-03-11 RX ADMIN — Medication 10 ML: at 08:03

## 2020-03-30 DIAGNOSIS — C34.91 SQUAMOUS CELL CARCINOMA OF LUNG, RIGHT: ICD-10-CM

## 2020-03-30 RX ORDER — HYDROCODONE BITARTRATE AND ACETAMINOPHEN 10; 325 MG/1; MG/1
1 TABLET ORAL EVERY 8 HOURS PRN
Qty: 90 TABLET | Refills: 0 | Status: SHIPPED | OUTPATIENT
Start: 2020-03-30 | End: 2020-05-12 | Stop reason: SDUPTHER

## 2020-03-30 NOTE — TELEPHONE ENCOUNTER
----- Message from Kamila Coppola sent at 3/30/2020  9:32 AM CDT -----  Type:  RX Refill Request    Who Called:  patient  Refill or New Rx:  refill  RX Name and Strength:  HYDROcodone-acetaminophen (NORCO)  mg per tablet  How is the patient currently taking it? (ex. 1XDay):  4XDAY  Is this a 30 day or 90 day RX:  30  Preferred Pharmacy with phone number:    [facility]/pharmacy #[id number] - [place] - [address]  [address] [address]  Phone: [phone] Fax: [phone]  Local or Mail Order:  local  Ordering Provider:  Karen Sommer Call Back Number:  [phone]  Additional Information:

## 2020-04-08 ENCOUNTER — TELEPHONE (OUTPATIENT)
Dept: HEMATOLOGY/ONCOLOGY | Facility: CLINIC | Age: 70
End: 2020-04-08

## 2020-04-08 NOTE — TELEPHONE ENCOUNTER
LVM with contact information to call [facility] back if he has any questions about rescheduling his labs/scans.

## 2020-04-08 NOTE — TELEPHONE ENCOUNTER
----- Message from Chelsie Lemons sent at 4/8/2020 11:14 AM CDT -----  Pt is scheduled for a CT CHEST Due to concerns associated with Covid19 [facility]s Radiology Team is required to reschedule outpatient diagnostic exams/procedures that are not deemed EMERGENT. If the ordering provider feels the attached patients exam/ procedure is EMERGENT please document accordingly. If further discussion is needed, please provide a contact number for a Radiologist to contact you directly. Thank you for your consideration and cooperation during this time.

## 2020-05-12 DIAGNOSIS — C34.91 SQUAMOUS CELL CARCINOMA OF LUNG, RIGHT: ICD-10-CM

## 2020-05-12 RX ORDER — HYDROCODONE BITARTRATE AND ACETAMINOPHEN 10; 325 MG/1; MG/1
1 TABLET ORAL EVERY 8 HOURS PRN
Qty: 90 TABLET | Refills: 0 | Status: SHIPPED | OUTPATIENT
Start: 2020-05-12 | End: 2020-06-29 | Stop reason: SDUPTHER

## 2020-05-12 NOTE — TELEPHONE ENCOUNTER
----- Message from Justino Coello sent at 5/12/2020 10:17 AM CDT -----  Contact: Patient  Type:  RX Refill Request    Who Called:  Patient  Refill or New Rx:  Refill  RX Name and Strength:  HYDROcodone-acetaminophen (NORCO)  mg per tablet  How is the patient currently taking it? (ex. 1XDay):  As ordered  Is this a 30 day or 90 day RX:  90  Preferred Pharmacy with phone number:    [facility]/pharmacy #[id number] - [place] - [address]  [address] [address]  Phone: [phone] Fax: [phone]  Local or Mail Order:  Local  Ordering Provider:  Dr. Karen Sommer Call Back Number:  [phone]  Additional Information:  RAINA

## 2020-05-25 PROBLEM — Z09 ONCOLOGY FOLLOW-UP ENCOUNTER: Status: RESOLVED | Noted: 2020-02-18 | Resolved: 2020-05-25

## 2020-05-28 ENCOUNTER — HOSPITAL ENCOUNTER (OUTPATIENT)
Dept: RADIOLOGY | Facility: HOSPITAL | Age: 70
Discharge: HOME | End: 2020-05-28
Attending: INTERNAL MEDICINE
Payer: MEDICARE

## 2020-05-28 DIAGNOSIS — J43.9 PULMONARY EMPHYSEMA, UNSPECIFIED EMPHYSEMA TYPE: ICD-10-CM

## 2020-05-28 DIAGNOSIS — C34.91 SQUAMOUS CELL CARCINOMA LUNG, RIGHT: ICD-10-CM

## 2020-05-28 DIAGNOSIS — Z09 ONCOLOGY FOLLOW-UP ENCOUNTER: ICD-10-CM

## 2020-05-28 DIAGNOSIS — T45.1X5A CHEMOTHERAPY-INDUCED NEUROPATHY: ICD-10-CM

## 2020-05-28 DIAGNOSIS — Z87.891 HISTORY OF TOBACCO ABUSE: ICD-10-CM

## 2020-05-28 DIAGNOSIS — Z79.899 ENCOUNTER FOR MEDICATION REVIEW: ICD-10-CM

## 2020-05-28 DIAGNOSIS — G62.0 CHEMOTHERAPY-INDUCED NEUROPATHY: ICD-10-CM

## 2020-05-28 LAB
CREAT SERPL-MCNC: 1.4 MG/DL (ref 0.5–1.4)
SAMPLE: NORMAL

## 2020-05-28 PROCEDURE — 25500020 PHARM REV CODE 255

## 2020-05-28 PROCEDURE — 71260 CT THORAX DX C+: CPT | Mod: 26,,, | Performed by: RADIOLOGY

## 2020-05-28 PROCEDURE — 71260 CT THORAX DX C+: CPT | Mod: TC

## 2020-05-28 PROCEDURE — 71260 CT CHEST WITH CONTRAST: ICD-10-PCS | Mod: 26,,, | Performed by: RADIOLOGY

## 2020-05-28 RX ADMIN — IOHEXOL 75 ML: 350 INJECTION, SOLUTION INTRAVENOUS at 08:05

## 2020-06-01 NOTE — PROGRESS NOTES
HPI    68 years old  male who was presented to Oncology Clinic for evaluation with squamous cell carcinoma of the lung.  Patient was recently diagnosed in January with fine-needle aspiration of the right lung nodule was found to have squamous cell carcinoma.  And recently patient had a which resection(by description) at [facility].  Since then patient was instructed to follow up with our medical oncologist for evaluation and treatment.  Patient was told that doing the which resection tumor is larger than expected compared to the images.  It might be present as late stage I or early stage II.      06/05/2019 completed cycle 3.    06/14/2019:  No acute event.  No complain.  Patient states that he is feeling better now since the chemotherapy about a week ago. Denies chest pain shortness breath.  Denies any nausea vomiting diarrhea.  Denies fever chills.  Fourteen point review system negative as above.    06/26/2019:  Patient here for cycle 4 day 1 of chemotherapy.  Patient states that he is little bit tired but denies any fever chills denies any recent illness.  Patient states that he is ready to go for his final cycle of chemo today.    07/10/2019:  Post cycle 4 treatment evaluation.  Patient has completed adjuvant treatment.  No major interruption is no complications.    09/11/2019 patient here to follow-up oncology visit.  Wishes to have Norco refilled.    Past Medical History:   Diagnosis Date    Arthritis     Cancer SKIN    Cardiac angina     COPD (chronic obstructive pulmonary disease)     Coronary artery disease ANGINA. HAD  [facility] 8/12. 40 % BLOCKAGE. DR RUBY IS CARDIOLOGIST.    Depression     GERD (gastroesophageal reflux disease)     Kidney cysts     Lung cancer     MVP (mitral valve prolapse)     Trigger thumb     BILAT    Wears dentures     UPPER    Wears glasses      Social History     Socioeconomic History    Marital status:      Spouse name: Not on file     Number of children: Not on file    Years of education: Not on file    Highest education level: Not on file   Occupational History    Not on file   Social Needs    Financial resource strain: Not on file    Food insecurity:     Worry: Not on file     Inability: Not on file    Transportation needs:     Medical: Not on file     Non-medical: Not on file   Tobacco Use    Smoking status: Former Smoker     Years: 40.00     Last attempt to quit: 2019     Years since quittin.4    Smokeless tobacco: Never Used    Tobacco comment: 1-2 CIGT SOME DAYS/states last cigarette 20   Substance and Sexual Activity    Alcohol use: Yes     Comment: beer on occasion    Drug use: No    Sexual activity: Yes     Partners: Female   Lifestyle    Physical activity:     Days per week: Not on file     Minutes per session: Not on file    Stress: Not on file   Relationships    Social connections:     Talks on phone: Not on file     Gets together: Not on file     Attends Sabianist service: Not on file     Active member of club or organization: Not on file     Attends meetings of clubs or organizations: Not on file     Relationship status: Not on file   Other Topics Concern    Not on file   Social History Narrative    Not on file       Objective    Physical Exam   Vitals:    20 0846   BP: 120/73   Pulse: 68   Resp: 18   Temp: 98.6 °F (37 °C)       Constitutional: patient is oriented to person, place, and time. patient appears well-developed and well-nourished. No distress.   HENT:  Normocephalic atraumatic pupils equal round reactive to light extraocular muscle intact. No evidence of lesions on exam.  Cardiovascular:  Regular rate and rhythm     Pulmonary/Chest:  Symmetrical chest a rising  Abdominal:  Soft nontender nondistended bowel sounds x4   Musculoskeletal: Normal range of motion. Gait is normal No clubbing, cyanosis     Lymphadenopathy:  No evidence of lymphadenopathy  Neurological:  Sensorimotor grossly  intact cranial nerves 2-12 grossly intact.    Skin:  Warm dry no rash no lesions   Psychiatric:  Normal affect    CMP  Sodium   Date Value Ref Range Status   05/28/2020 141 136 - 145 mmol/L Final     Potassium   Date Value Ref Range Status   05/28/2020 4.3 3.5 - 5.1 mmol/L Final     Chloride   Date Value Ref Range Status   05/28/2020 107 95 - 110 mmol/L Final     CO2   Date Value Ref Range Status   05/28/2020 25 23 - 29 mmol/L Final     Glucose   Date Value Ref Range Status   05/28/2020 112 (H) 70 - 110 mg/dL Final     BUN, Bld   Date Value Ref Range Status   05/28/2020 22 8 - 23 mg/dL Final     Creatinine   Date Value Ref Range Status   05/28/2020 1.5 (H) 0.5 - 1.4 mg/dL Final   01/18/2013 0.9 0.5 - 1.4 mg/dL Final     Calcium   Date Value Ref Range Status   05/28/2020 9.5 8.7 - 10.5 mg/dL Final   01/18/2013 10.7 (H) 8.7 - 10.5 mg/dL Final     Total Protein   Date Value Ref Range Status   05/28/2020 6.9 6.0 - 8.4 g/dL Final     Albumin   Date Value Ref Range Status   05/28/2020 4.2 3.5 - 5.2 g/dL Final     Total Bilirubin   Date Value Ref Range Status   05/28/2020 0.3 0.1 - 1.0 mg/dL Final     Comment:     For infants and newborns, interpretation of results should be based  on gestational age, weight and in agreement with clinical  observations.  Premature Infant recommended reference ranges:  Up to 24 hours.............<8.0 mg/dL  Up to 48 hours............<12.0 mg/dL  3-5 days..................<15.0 mg/dL  6-29 days.................<15.0 mg/dL       Alkaline Phosphatase   Date Value Ref Range Status   05/28/2020 108 55 - 135 U/L Final     AST   Date Value Ref Range Status   05/28/2020 18 10 - 40 U/L Final     ALT   Date Value Ref Range Status   05/28/2020 16 10 - 44 U/L Final     Anion Gap   Date Value Ref Range Status   05/28/2020 9 8 - 16 mmol/L Final   01/18/2013 11 5 - 15 meq/L Final     eGFR if    Date Value Ref Range Status   05/28/2020 55 (A) >60 mL/min/1.73 m^2 Final     eGFR if non     Date Value Ref Range Status   05/28/2020 47 (A) >60 mL/min/1.73 m^2 Final     Comment:     Calculation used to obtain the estimated glomerular filtration  rate (eGFR) is the CKD-EPI equation.        Lab Results   Component Value Date    WBC 7.36 05/28/2020    HGB 11.8 (L) 05/28/2020    HCT 37.2 (L) 05/28/2020    MCV 87 05/28/2020     05/28/2020       comparison is somewhat difficult with the interval surgery and technical differences.      Impression ct 3/29/2019       Interval changes of right lower lobectomy.  Severe emphysematous changes.  Few small scattered nodules largest measuring 5 mm.  At least some and possibly all these were present on the prior exam appearing similar today.  Strict comparison between the studies is difficult.  Trace right pleural effusion    Low-density lesions in the liver consistent with cysts.  Right renal cyst appearing very mildly complex with thin internal septation.  Mild circumferential wall thickening of the distal esophagus questioning esophagitis.  Additional findings as detailed above     Impression CT chest abdomen pelvis with IV contrast 07/03/2019      Chest; interval development of a couple small somewhat patchy areas of opacification right lung suggesting atelectasis or possibly infiltrate but not masslike in appearance.  The small scattered small nodular foci are not significantly changed.  Severe emphysematous changes as before along with postoperative change consistent with right lower lobectomy and mild scarring    Abdomen and pelvis; stable appearance of the low-density foci in the liver and right kidney consistent with cysts.  No findings suggesting metastatic disease.  Featureless appearance of the sigmoid colon likely due to merely incomplete distention although difficult to exclude acute colitis but with no appreciable wall thickening or pericolonic inflammatory change..  Also questionable mild diffuse bladder wall thickening versus  incomplete distention as before.    Osseous structures show degenerative changes and bilateral femoral head avascular necrosis.       Impression post adjuvant surveillance CT scan 11/18/2019.      Severe emphysema status post right lower lobectomy.    Unchanged sub solid right upper lobe opacity.  Continued attention on close follow-up with chest CT in 6 months is recommended.    Unchanged small pulmonary nodules for which follow up to 2 years of stability is recommended.     Impression CT chest with contrast 05/28/2020       Status post right lobectomy for lung cancer.  A new or enlarging mass is not seen.  There remains a 1.3 cm sub solid density in the posterior right lung field and there is a small reticulonodular infiltrate at the base anteriorly.  Several solid nodules from 2-4 mm are seen on both sides.  Severe emphysema.  Mild basilar interstitial fibrotic changes.  Fatty infiltration of the liver parenchyma.  Small cyst of the left lobe of the liver.         Assessment Plan    [] 03/30/2019 LUNG, RIGHT, FINE NEEDLE ASPIRATION:  - POSITIVE FOR SQUAMOUS CELL CARCINOMA    Status post lung resection, resection total size of tumor is greater than on images suspecting early stage II involvement.     Pathological staging [id number] stage IIa with pleural invasion present  -completed Adjuvant Cisplatin and docetaxel q 21 days for 4 cycles on June 2019 without major complication or interruptions   -Surveillance CT chest with contrast every 6 months x2 years then low-dose CT annually after per NCCN guidelines.  Most recent CT chest with IV contrast shows unchanged lung nodule no new intrapulmonary masses are seen.  I will repeat CT in 3 months for stability    [] Reviewed medications today.  No significant changes.    [] Former smoker.  Stop smoking x1 year.    [] severe emphysematous changes - Follow Pulm      [] M*Modal dictations.

## 2020-06-02 ENCOUNTER — OFFICE VISIT (OUTPATIENT)
Dept: HEMATOLOGY/ONCOLOGY | Facility: CLINIC | Age: 70
End: 2020-06-02
Payer: MEDICARE

## 2020-06-02 VITALS
HEART RATE: 68 BPM | RESPIRATION RATE: 18 BRPM | SYSTOLIC BLOOD PRESSURE: 120 MMHG | DIASTOLIC BLOOD PRESSURE: 73 MMHG | TEMPERATURE: 99 F | OXYGEN SATURATION: 96 % | WEIGHT: 185.63 LBS | BODY MASS INDEX: 23.2 KG/M2

## 2020-06-02 DIAGNOSIS — Z87.891 HISTORY OF TOBACCO ABUSE: ICD-10-CM

## 2020-06-02 DIAGNOSIS — T45.1X5A CHEMOTHERAPY-INDUCED NEUROPATHY: ICD-10-CM

## 2020-06-02 DIAGNOSIS — C34.91 SQUAMOUS CELL CARCINOMA LUNG, RIGHT: Primary | ICD-10-CM

## 2020-06-02 DIAGNOSIS — G62.0 CHEMOTHERAPY-INDUCED NEUROPATHY: ICD-10-CM

## 2020-06-02 DIAGNOSIS — J43.9 PULMONARY EMPHYSEMA, UNSPECIFIED EMPHYSEMA TYPE: ICD-10-CM

## 2020-06-02 DIAGNOSIS — Z09 ONCOLOGY FOLLOW-UP ENCOUNTER: ICD-10-CM

## 2020-06-02 PROCEDURE — 99999 PR PBB SHADOW E&M-EST. PATIENT-LVL III: CPT | Mod: PBBFAC,,, | Performed by: INTERNAL MEDICINE

## 2020-06-02 PROCEDURE — 1101F PR PT FALLS ASSESS DOC 0-1 FALLS W/OUT INJ PAST YR: ICD-10-PCS | Mod: CPTII,S$GLB,, | Performed by: INTERNAL MEDICINE

## 2020-06-02 PROCEDURE — 99999 PR PBB SHADOW E&M-EST. PATIENT-LVL III: ICD-10-PCS | Mod: PBBFAC,,, | Performed by: INTERNAL MEDICINE

## 2020-06-02 PROCEDURE — 1159F MED LIST DOCD IN RCRD: CPT | Mod: S$GLB,,, | Performed by: INTERNAL MEDICINE

## 2020-06-02 PROCEDURE — 1125F PR PAIN SEVERITY QUANTIFIED, PAIN PRESENT: ICD-10-PCS | Mod: S$GLB,,, | Performed by: INTERNAL MEDICINE

## 2020-06-02 PROCEDURE — 99214 OFFICE O/P EST MOD 30 MIN: CPT | Mod: S$GLB,,, | Performed by: INTERNAL MEDICINE

## 2020-06-02 PROCEDURE — 1159F PR MEDICATION LIST DOCUMENTED IN MEDICAL RECORD: ICD-10-PCS | Mod: S$GLB,,, | Performed by: INTERNAL MEDICINE

## 2020-06-02 PROCEDURE — 1125F AMNT PAIN NOTED PAIN PRSNT: CPT | Mod: S$GLB,,, | Performed by: INTERNAL MEDICINE

## 2020-06-02 PROCEDURE — 1101F PT FALLS ASSESS-DOCD LE1/YR: CPT | Mod: CPTII,S$GLB,, | Performed by: INTERNAL MEDICINE

## 2020-06-02 PROCEDURE — 99214 PR OFFICE/OUTPT VISIT, EST, LEVL IV, 30-39 MIN: ICD-10-PCS | Mod: S$GLB,,, | Performed by: INTERNAL MEDICINE

## 2020-06-03 ENCOUNTER — DOCUMENTATION ONLY (OUTPATIENT)
Dept: INFUSION THERAPY | Facility: HOSPITAL | Age: 70
End: 2020-06-03

## 2020-06-03 ENCOUNTER — TELEPHONE (OUTPATIENT)
Dept: HEMATOLOGY/ONCOLOGY | Facility: CLINIC | Age: 70
End: 2020-06-03

## 2020-06-03 NOTE — TELEPHONE ENCOUNTER
Spoke with pt regarding his f/u appts. Pt asked to have his next port flush time moved to 1000. Pt was scheduled for his 3 month f/u along with his lab/CTscan per Dr Jones's orders. Pt is seen @ the [place] location.

## 2020-06-03 NOTE — PROGRESS NOTES
[3:12 PM] Ethan Johnson      Can you change [id number] [facility] port flush on 6/10 to 1000?  ?[3:13 PM] Amada Gallo      okay moving the apt now

## 2020-06-03 NOTE — TELEPHONE ENCOUNTER
----- Message from Jose Alejandro Hi sent at 6/3/2020  1:31 PM CDT -----  Type: Needs Medical Advice  Who Called:  Patient    Best Call Back Number: [phone]  Additional Information: Patient states that he would like to reschedule his appointment to a later time.  Patient would also like to discuss when his CT scan can be rescheduled  Please call to advise

## 2020-06-04 ENCOUNTER — PATIENT MESSAGE (OUTPATIENT)
Dept: HEMATOLOGY/ONCOLOGY | Facility: CLINIC | Age: 70
End: 2020-06-04

## 2020-06-04 DIAGNOSIS — Z03.818 ENCNTR FOR OBS FOR SUSP EXPSR TO OTH BIOLG AGENTS RULED OUT: Primary | ICD-10-CM

## 2020-06-08 ENCOUNTER — LAB VISIT (OUTPATIENT)
Dept: FAMILY MEDICINE | Facility: CLINIC | Age: 70
End: 2020-06-08
Payer: MEDICARE

## 2020-06-08 DIAGNOSIS — Z03.818 ENCNTR FOR OBS FOR SUSP EXPSR TO OTH BIOLG AGENTS RULED OUT: ICD-10-CM

## 2020-06-08 PROCEDURE — U0003 INFECTIOUS AGENT DETECTION BY NUCLEIC ACID (DNA OR RNA); SEVERE ACUTE RESPIRATORY SYNDROME CORONAVIRUS 2 (SARS-COV-2) (CORONAVIRUS DISEASE [COVID-19]), AMPLIFIED PROBE TECHNIQUE, MAKING USE OF HIGH THROUGHPUT TECHNOLOGIES AS DESCRIBED BY CMS-2020-01-R: HCPCS

## 2020-06-09 LAB — SARS-COV-2 RNA RESP QL NAA+PROBE: NOT DETECTED

## 2020-06-10 ENCOUNTER — INFUSION (OUTPATIENT)
Dept: INFUSION THERAPY | Facility: HOSPITAL | Age: 70
End: 2020-06-10
Attending: INTERNAL MEDICINE
Payer: MEDICARE

## 2020-06-10 DIAGNOSIS — D70.1 CHEMOTHERAPY INDUCED NEUTROPENIA: Primary | ICD-10-CM

## 2020-06-10 DIAGNOSIS — T45.1X5A CHEMOTHERAPY INDUCED NEUTROPENIA: Primary | ICD-10-CM

## 2020-06-10 DIAGNOSIS — Z51.11 ENCOUNTER FOR ANTINEOPLASTIC CHEMOTHERAPY: ICD-10-CM

## 2020-06-10 PROCEDURE — 25000003 PHARM REV CODE 250: Mod: PN | Performed by: INTERNAL MEDICINE

## 2020-06-10 PROCEDURE — 96523 IRRIG DRUG DELIVERY DEVICE: CPT | Mod: PN

## 2020-06-10 PROCEDURE — A4216 STERILE WATER/SALINE, 10 ML: HCPCS | Mod: PN | Performed by: INTERNAL MEDICINE

## 2020-06-10 RX ORDER — SODIUM CHLORIDE 0.9 % (FLUSH) 0.9 %
10 SYRINGE (ML) INJECTION
Status: COMPLETED | OUTPATIENT
Start: 2020-06-10 | End: 2020-06-10

## 2020-06-10 RX ORDER — SODIUM CHLORIDE 0.9 % (FLUSH) 0.9 %
10 SYRINGE (ML) INJECTION
Status: CANCELLED | OUTPATIENT
Start: 2020-06-10

## 2020-06-10 RX ORDER — HEPARIN 100 UNIT/ML
500 SYRINGE INTRAVENOUS
Status: CANCELLED | OUTPATIENT
Start: 2020-06-10

## 2020-06-10 RX ADMIN — Medication 10 ML: at 10:06

## 2020-06-29 DIAGNOSIS — C34.91 SQUAMOUS CELL CARCINOMA OF LUNG, RIGHT: ICD-10-CM

## 2020-06-29 RX ORDER — HYDROCODONE BITARTRATE AND ACETAMINOPHEN 10; 325 MG/1; MG/1
1 TABLET ORAL EVERY 8 HOURS PRN
Qty: 90 TABLET | Refills: 0 | Status: SHIPPED | OUTPATIENT
Start: 2020-06-29 | End: 2020-08-12 | Stop reason: SDUPTHER

## 2020-06-29 NOTE — TELEPHONE ENCOUNTER
This nurse notified pt RX for Noco has been e-scribed to his pharmacy.  Patient verbalized understanding.  No further questions/concerns noted at this time.          This nurse pended refill request to Dr. Jones.    ----- Message from Love Grijalva sent at 6/29/2020 11:17 AM CDT -----  Regarding: refil  Contact: self  Type:  RX Refill Request    Who Called:  self   Refill or New Rx:  refill   RX Name and Strength:  norco 10 mg   How is the patient currently taking it? (ex. 1XDay):   4 x day Is this a 30 day or 90 day RX:  30 day supply  Preferred Pharmacy with phone number:  [facility] Local or Mail Order:  local Ordering Provider:  Dr Jones  Best Call Back Number:   [phone] Additional Information:

## 2020-07-16 ENCOUNTER — OFFICE VISIT (OUTPATIENT)
Dept: ORTHOPEDICS | Facility: CLINIC | Age: 70
End: 2020-07-16
Payer: MEDICARE

## 2020-07-16 VITALS
DIASTOLIC BLOOD PRESSURE: 69 MMHG | SYSTOLIC BLOOD PRESSURE: 113 MMHG | BODY MASS INDEX: 23 KG/M2 | HEART RATE: 76 BPM | HEIGHT: 75 IN | WEIGHT: 185 LBS

## 2020-07-16 DIAGNOSIS — M65.341 TRIGGER RING FINGER OF RIGHT HAND: Primary | ICD-10-CM

## 2020-07-16 DIAGNOSIS — M65.352 TRIGGER LITTLE FINGER OF LEFT HAND: ICD-10-CM

## 2020-07-16 PROCEDURE — 1100F PR PT FALLS ASSESS DOC 2+ FALLS/FALL W/INJURY/YR: ICD-10-PCS | Mod: S$GLB,,, | Performed by: ORTHOPAEDIC SURGERY

## 2020-07-16 PROCEDURE — 20550 TENDON SHEATH: R RING MCP: ICD-10-PCS | Mod: 50,S$GLB,, | Performed by: ORTHOPAEDIC SURGERY

## 2020-07-16 PROCEDURE — 3288F PR FALLS RISK ASSESSMENT DOCUMENTED: ICD-10-PCS | Mod: S$GLB,,, | Performed by: ORTHOPAEDIC SURGERY

## 2020-07-16 PROCEDURE — 1159F MED LIST DOCD IN RCRD: CPT | Mod: S$GLB,,, | Performed by: ORTHOPAEDIC SURGERY

## 2020-07-16 PROCEDURE — 1125F AMNT PAIN NOTED PAIN PRSNT: CPT | Mod: S$GLB,,, | Performed by: ORTHOPAEDIC SURGERY

## 2020-07-16 PROCEDURE — 20550 NJX 1 TENDON SHEATH/LIGAMENT: CPT | Mod: 50,S$GLB,, | Performed by: ORTHOPAEDIC SURGERY

## 2020-07-16 PROCEDURE — 3008F BODY MASS INDEX DOCD: CPT | Mod: S$GLB,,, | Performed by: ORTHOPAEDIC SURGERY

## 2020-07-16 PROCEDURE — 1159F PR MEDICATION LIST DOCUMENTED IN MEDICAL RECORD: ICD-10-PCS | Mod: S$GLB,,, | Performed by: ORTHOPAEDIC SURGERY

## 2020-07-16 PROCEDURE — 3008F PR BODY MASS INDEX (BMI) DOCUMENTED: ICD-10-PCS | Mod: S$GLB,,, | Performed by: ORTHOPAEDIC SURGERY

## 2020-07-16 PROCEDURE — 3288F FALL RISK ASSESSMENT DOCD: CPT | Mod: S$GLB,,, | Performed by: ORTHOPAEDIC SURGERY

## 2020-07-16 PROCEDURE — 99213 PR OFFICE/OUTPT VISIT, EST, LEVL III, 20-29 MIN: ICD-10-PCS | Mod: 25,S$GLB,, | Performed by: ORTHOPAEDIC SURGERY

## 2020-07-16 PROCEDURE — 1100F PTFALLS ASSESS-DOCD GE2>/YR: CPT | Mod: S$GLB,,, | Performed by: ORTHOPAEDIC SURGERY

## 2020-07-16 PROCEDURE — 1125F PR PAIN SEVERITY QUANTIFIED, PAIN PRESENT: ICD-10-PCS | Mod: S$GLB,,, | Performed by: ORTHOPAEDIC SURGERY

## 2020-07-16 PROCEDURE — 99213 OFFICE O/P EST LOW 20 MIN: CPT | Mod: 25,S$GLB,, | Performed by: ORTHOPAEDIC SURGERY

## 2020-07-16 RX ORDER — METHYLPREDNISOLONE ACETATE 40 MG/ML
40 INJECTION, SUSPENSION INTRA-ARTICULAR; INTRALESIONAL; INTRAMUSCULAR; SOFT TISSUE
Status: DISCONTINUED | OUTPATIENT
Start: 2020-07-16 | End: 2020-07-16 | Stop reason: HOSPADM

## 2020-07-16 RX ADMIN — METHYLPREDNISOLONE ACETATE 40 MG: 40 INJECTION, SUSPENSION INTRA-ARTICULAR; INTRALESIONAL; INTRAMUSCULAR; SOFT TISSUE at 02:07

## 2020-07-16 NOTE — PROCEDURES
Tendon Sheath: R ring MCP    Date/Time: 7/16/2020 2:45 PM  Performed by: Henok Obregon MD  Authorized by: Henok Obregon MD     Consent Done?:  Yes (Verbal)  Indications:  Pain  Site marked: the procedure site was marked    Timeout: prior to procedure the correct patient, procedure, and site was verified    Prep: patient was prepped and draped in usual sterile fashion      Location:  Ring finger  Site:  R ring MCP  Ultrasonic guidance for needle placement?: No    Needle size:  25 G  Medications:  40 mg methylPREDNISolone acetate 40 mg/mL  Patient tolerance:  Patient tolerated the procedure well with no immediate complications  Tendon Sheath: L small MCP    Date/Time: 7/16/2020 2:45 PM  Performed by: Henok Obregon MD  Authorized by: Henok Obregon MD     Consent Done?:  Yes (Verbal)  Indications:  Pain  Site marked: the procedure site was marked    Timeout: prior to procedure the correct patient, procedure, and site was verified    Prep: patient was prepped and draped in usual sterile fashion      Location:  Small finger  Site:  L small MCP  Ultrasonic guidance for needle placement?: No    Needle size:  25 G  Medications:  40 mg methylPREDNISolone acetate 40 mg/mL  Patient tolerance:  Patient tolerated the procedure well with no immediate complications

## 2020-07-16 NOTE — PROGRESS NOTES
[facility]    Subjective:     Chief Complaint:   Chief Complaint   Patient presents with    Right Hand - Pain     Right hand ring finger pain x 3-4 months. States that if he bends his finger to far it locks up and is painful when he straightens it out.     Left Hand - Pain     Left hand 5th digit pain x 3--4 months. States that his finger pops on occasion, not as bad as the right hand        Past Medical History:   Diagnosis Date    Arthritis     Cancer SKIN    Cardiac angina     COPD (chronic obstructive pulmonary disease)     Coronary artery disease ANGINA. HAD  [facility] 8/12. 40 % BLOCKAGE. DR RUBY IS CARDIOLOGIST.    Depression     GERD (gastroesophageal reflux disease)     Kidney cysts     Lung cancer     MVP (mitral valve prolapse)     Trigger thumb     BILAT    Wears dentures     UPPER    Wears glasses        Past Surgical History:   Procedure Laterality Date    APPENDECTOMY      BACK SURGERY      INSERTION OF TUNNELED CENTRAL VENOUS CATHETER (CVC) WITH SUBCUTANEOUS PORT Left 4/11/2019    Procedure: [id number];  Surgeon: Reji Griffiths MD;  Location: [facility];  Service: General;  Laterality: Left;    KNEE SURGERY      BILAT    lung resection  02/27/2019    TRIGGER THUMB Bilateral        Current Outpatient Medications   Medication Sig    albuterol sulfate (VENTOLIN INHL) Inhale into the lungs daily as needed.    ALPRAZolam (XANAX) 0.25 MG tablet Take 0.25 mg by mouth once daily.    aspirin (ECOTRIN) 81 MG EC tablet Take 81 mg by mouth every evening.     atorvastatin (LIPITOR) 40 MG tablet Take 40 mg by mouth every evening.     chlorhexidine (PERIDEX) 0.12 % solution SWICH 10MLS IN MOUTH FOR 30 SECS AND SPIT TWICE A DAY FOR 5 DAYS    cyclobenzaprine (FLEXERIL) 10 MG tablet TAKE 1 TABLET BY MOUTH THREE TIMES A DAY AS NEEDED FOR MUSCLE SPASMS    diphenhydrAMINE-aluminum-magnesium hydroxide-simethicone-lidocaine HCl 2% Swish and spit 15 mLs every 4 (four) hours as  needed.    fluticasone-umeclidin-vilanter (TRELEGY ELLIPTA) 100-62.5-25 mcg DsDv Inhale 1 puff into the lungs once daily.    gabapentin (NEURONTIN) 100 MG capsule Take 1 capsule (100 mg total) by mouth 3 (three) times daily.    HYDROcodone-acetaminophen (NORCO)  mg per tablet Take 1 tablet by mouth every 8 (eight) hours as needed for Pain.    ketoconazole (NIZORAL) 2 % cream Apply topically.    lidocaine-prilocaine (EMLA) cream Apply topically as needed. For port access    multivitamin capsule Take 1 capsule by mouth once daily.      nitroGLYCERIN (NITROSTAT) 0.4 MG SL tablet Place 0.4 mg under the tongue every 5 (five) minutes as needed.      ondansetron (ZOFRAN-ODT) 8 MG TbDL Take 1 tablet (8 mg total) by mouth every 6 (six) hours as needed.    pantoprazole (PROTONIX) 40 MG tablet Take 40 mg by mouth every evening.     promethazine (PHENERGAN) 25 MG tablet Take 1 tablet (25 mg total) by mouth every 6 (six) hours as needed for Nausea.    sertraline (ZOLOFT) 50 MG tablet Take 50 mg by mouth once daily.      tamsulosin (FLOMAX) 0.4 mg Cp24 Take 0.4 mg by mouth every evening.     trazodone (DESYREL) 150 MG tablet Take 225 mg by mouth every evening.     FLUZONE HIGH-DOSE 2019-20, PF, 180 mcg/0.5 mL Syrg TO BE ADMINISTERED BY PHARMACIST FOR IMMUNIZATION     No current facility-administered medications for this visit.        Review of patient's allergies indicates:   Allergen Reactions    Penicillins      AS A CHILD - NOT SURE REACTION       Family History   Problem Relation Age of Onset    COPD Mother     Diabetes Mother     Heart disease Mother     Birth defects Father     Diabetes Father     Heart disease Father        Social History     Socioeconomic History    Marital status:      Spouse name: Not on file    Number of children: Not on file    Years of education: Not on file    Highest education level: Not on file   Occupational History    Not on file   Social Needs     Financial resource strain: Not on file    Food insecurity     Worry: Not on file     Inability: Not on file    Transportation needs     Medical: Not on file     Non-medical: Not on file   Tobacco Use    Smoking status: Former Smoker     Years: 40.00     Quit date: 2019     Years since quittin.5    Smokeless tobacco: Never Used    Tobacco comment: 1-2 CIGT SOME DAYS/states last cigarette 20   Substance and Sexual Activity    Alcohol use: Yes     Comment: beer on occasion    Drug use: No    Sexual activity: Yes     Partners: Female   Lifestyle    Physical activity     Days per week: Not on file     Minutes per session: Not on file    Stress: Not on file   Relationships    Social connections     Talks on phone: Not on file     Gets together: Not on file     Attends Yazidi service: Not on file     Active member of club or organization: Not on file     Attends meetings of clubs or organizations: Not on file     Relationship status: Not on file   Other Topics Concern    Not on file   Social History Narrative    Not on file       History of present illness:  Patient returns to clinic today for bilateral hand pain, patient has complaining of pain in the right ring finger since about the beginning of the year and the left small finger for several months.  He has a history of trigger finger, he has had trigger finger release in his bilateral thumbs and another finger he can not remember.      Review of Systems:    Constitution: Negative for chills, fever, and sweats.  Negative for unexplained weight loss.    HENT:  Negative for headaches and blurry vision.    Cardiovascular:Negative for chest pain or irregular heart beat. Negative for hypertension.    Respiratory:  Negative for cough and shortness of breath.    Gastrointestinal: Negative for abdominal pain, heartburn, melena, nausea, and vomitting.    Genitourinary:  Negative bladder incontinence and dysuria.    Musculoskeletal:  See HPI for  "details.     Neurological: Negative for numbness.    Psychiatric/Behavioral: Negative for depression.  The patient is not nervous/anxious.      Endocrine: Negative for polyuria    Hematologic/Lymphatic: Negative for bleeding problem.  Does not bruise/bleed easily.    Skin: Negative for poor would healing and rash    Objective:      Physical Examination:    Vital Signs:    Vitals:    07/16/20 1458   BP: 113/69   Pulse: 76       Body mass index is 23.12 kg/m².    This a well-developed, well nourished patient in no acute distress.  They are alert and oriented and cooperative to examination.        Examination of the bilateral hands, the patient does have deformities of the lesser joints of the hands consistent with osteoarthritis, he does have palpable triggering at the base of the right 4th finger.  The finger still however has full range of motion.  He has pain at the metacarpophalangeal joint.  Examination of the left hand, again he has pain at the metacarpophalangeal joint of the left little finger but no significant palpable triggering.  Pertinent New Results:    XRAY Report / Interpretation:   Three views of the bilateral hands taken today in the office, the patient has arthritic changes noted to the lesser joints of the bilateral hands.  No acute osseous abnormalities.    Assessment/Plan:      Trigger finger right ring finger, left little finger.  We injected the A1 pulley with lidocaine and Depo-Medrol today of the right and left hands.  Have patient follow up in 6 weeks to see how he responds to the steroids.  Instructed to contact the office sooner if this does not relieve his pains.  Will will set him up for trigger finger release.    Bobo Agudelo PA-C served as a scribe for this patient encounter. A "face to face" encounter occured with Dr. Henok Obregon on this date. The treatment plan and medical decision making are outlined as above:      This note was created using Dragon voice recognition software " that occasionally misinterpreted phrases or words.

## 2020-07-28 RX ORDER — METHYLPREDNISOLONE ACETATE 40 MG/ML
40 INJECTION, SUSPENSION INTRA-ARTICULAR; INTRALESIONAL; INTRAMUSCULAR; SOFT TISSUE
Status: SHIPPED | OUTPATIENT
Start: 2020-07-16

## 2020-08-12 DIAGNOSIS — C34.91 SQUAMOUS CELL CARCINOMA OF LUNG, RIGHT: ICD-10-CM

## 2020-08-12 RX ORDER — HYDROCODONE BITARTRATE AND ACETAMINOPHEN 10; 325 MG/1; MG/1
1 TABLET ORAL EVERY 8 HOURS PRN
Qty: 90 TABLET | Refills: 0 | Status: SHIPPED | OUTPATIENT
Start: 2020-08-12 | End: 2020-10-16 | Stop reason: SDUPTHER

## 2020-08-12 NOTE — TELEPHONE ENCOUNTER
----- Message from Antonieta Ortega sent at 8/12/2020 10:44 AM CDT -----  Type: Needs Medical Advice  Who Called:  Patient  Best Call Back Number: [phone] (home)   Additional Information: the patient is asking for his pain medication to be called in Norco 10

## 2020-09-03 ENCOUNTER — HOSPITAL ENCOUNTER (OUTPATIENT)
Dept: RADIOLOGY | Facility: HOSPITAL | Age: 70
Discharge: HOME | End: 2020-09-03
Attending: INTERNAL MEDICINE
Payer: MEDICARE

## 2020-09-03 DIAGNOSIS — Z09 ONCOLOGY FOLLOW-UP ENCOUNTER: ICD-10-CM

## 2020-09-03 DIAGNOSIS — Z87.891 HISTORY OF TOBACCO ABUSE: ICD-10-CM

## 2020-09-03 DIAGNOSIS — G62.0 CHEMOTHERAPY-INDUCED NEUROPATHY: ICD-10-CM

## 2020-09-03 DIAGNOSIS — T45.1X5A CHEMOTHERAPY-INDUCED NEUROPATHY: ICD-10-CM

## 2020-09-03 DIAGNOSIS — C34.91 SQUAMOUS CELL CARCINOMA LUNG, RIGHT: ICD-10-CM

## 2020-09-03 DIAGNOSIS — J43.9 PULMONARY EMPHYSEMA, UNSPECIFIED EMPHYSEMA TYPE: ICD-10-CM

## 2020-09-03 PROCEDURE — 71260 CT THORAX DX C+: CPT | Mod: 26,,, | Performed by: RADIOLOGY

## 2020-09-03 PROCEDURE — 25500020 PHARM REV CODE 255

## 2020-09-03 PROCEDURE — A9698 NON-RAD CONTRAST MATERIALNOC: HCPCS

## 2020-09-03 PROCEDURE — 74177 CT ABD & PELVIS W/CONTRAST: CPT | Mod: 26,,, | Performed by: RADIOLOGY

## 2020-09-03 PROCEDURE — 74177 CT ABD & PELVIS W/CONTRAST: CPT | Mod: TC

## 2020-09-03 PROCEDURE — 74177 CT CHEST ABDOMEN PELVIS WITH CONTRAST (XPD): ICD-10-PCS | Mod: 26,,, | Performed by: RADIOLOGY

## 2020-09-03 PROCEDURE — 71260 CT CHEST ABDOMEN PELVIS WITH CONTRAST (XPD): ICD-10-PCS | Mod: 26,,, | Performed by: RADIOLOGY

## 2020-09-03 RX ADMIN — IOHEXOL 1000 ML: 9 SOLUTION ORAL at 08:09

## 2020-09-03 RX ADMIN — IOHEXOL 75 ML: 350 INJECTION, SOLUTION INTRAVENOUS at 08:09

## 2020-09-07 NOTE — PROGRESS NOTES
HPI    68 years old  male who was presented to Oncology Clinic for evaluation with squamous cell carcinoma of the lung.  Patient was recently diagnosed in January with fine-needle aspiration of the right lung nodule was found to have squamous cell carcinoma.  And recently patient had a which resection(by description) at [facility].  Since then patient was instructed to follow up with our medical oncologist for evaluation and treatment.  Patient was told that doing the which resection tumor is larger than expected compared to the images.  It might be present as late stage I or early stage II.      06/05/2019 completed cycle 3.    06/14/2019:  No acute event.  No complain.  Patient states that he is feeling better now since the chemotherapy about a week ago. Denies chest pain shortness breath.  Denies any nausea vomiting diarrhea.  Denies fever chills.  Fourteen point review system negative as above.    06/26/2019:  Patient here for cycle 4 day 1 of chemotherapy.  Patient states that he is little bit tired but denies any fever chills denies any recent illness.  Patient states that he is ready to go for his final cycle of chemo today.    07/10/2019:  Post cycle 4 treatment evaluation.  Patient has completed adjuvant treatment.  No major interruption is no complications.    09/11/2019 patient here to follow-up oncology visit.  Wishes to have Norco refilled.    Past Medical History:   Diagnosis Date    Arthritis     Cancer SKIN    Cardiac angina     COPD (chronic obstructive pulmonary disease)     Coronary artery disease ANGINA. HAD  [facility] 8/12. 40 % BLOCKAGE. DR RUBY IS CARDIOLOGIST.    Depression     GERD (gastroesophageal reflux disease)     Kidney cysts     Lung cancer     MVP (mitral valve prolapse)     Trigger thumb     BILAT    Wears dentures     UPPER    Wears glasses      Social History     Socioeconomic History    Marital status:      Spouse name: Not on file     Number of children: Not on file    Years of education: Not on file    Highest education level: Not on file   Occupational History    Not on file   Social Needs    Financial resource strain: Not on file    Food insecurity     Worry: Not on file     Inability: Not on file    Transportation needs     Medical: Not on file     Non-medical: Not on file   Tobacco Use    Smoking status: Former Smoker     Years: 40.00     Quit date: 2019     Years since quittin.6    Smokeless tobacco: Never Used    Tobacco comment: 1-2 CIGT SOME DAYS/states last cigarette 20   Substance and Sexual Activity    Alcohol use: Yes     Comment: beer on occasion    Drug use: No    Sexual activity: Yes     Partners: Female   Lifestyle    Physical activity     Days per week: Not on file     Minutes per session: Not on file    Stress: Not on file   Relationships    Social connections     Talks on phone: Not on file     Gets together: Not on file     Attends Restoration service: Not on file     Active member of club or organization: Not on file     Attends meetings of clubs or organizations: Not on file     Relationship status: Not on file   Other Topics Concern    Not on file   Social History Narrative    Not on file       Objective    Physical Exam   Vitals:    20 0809   BP: 114/71   Pulse: 77   Resp: 18   Temp: 97.9 °F (36.6 °C)       Constitutional: patient is oriented to person, place, and time. patient appears well-developed and well-nourished. No distress.   HENT:  Normocephalic atraumatic pupils equal round reactive to light extraocular muscle intact. No evidence of lesions on exam.  Cardiovascular:  Regular rate and rhythm     Pulmonary/Chest:  Symmetrical chest a rising  Abdominal:  Soft nontender nondistended bowel sounds x4   Musculoskeletal: Normal range of motion. Gait is normal No clubbing, cyanosis     Lymphadenopathy:  No evidence of lymphadenopathy  Neurological:  Sensorimotor grossly intact cranial  nerves 2-12 grossly intact.    Skin:  Warm dry no rash no lesions   Psychiatric:  Normal affect    CMP  Sodium   Date Value Ref Range Status   09/03/2020 141 136 - 145 mmol/L Final     Potassium   Date Value Ref Range Status   09/03/2020 4.1 3.5 - 5.1 mmol/L Final     Chloride   Date Value Ref Range Status   09/03/2020 108 95 - 110 mmol/L Final     CO2   Date Value Ref Range Status   09/03/2020 24 23 - 29 mmol/L Final     Glucose   Date Value Ref Range Status   09/03/2020 104 70 - 110 mg/dL Final     BUN, Bld   Date Value Ref Range Status   09/03/2020 22 8 - 23 mg/dL Final     Creatinine   Date Value Ref Range Status   09/03/2020 1.5 (H) 0.5 - 1.4 mg/dL Final   01/18/2013 0.9 0.5 - 1.4 mg/dL Final     Calcium   Date Value Ref Range Status   09/03/2020 9.9 8.7 - 10.5 mg/dL Final   01/18/2013 10.7 (H) 8.7 - 10.5 mg/dL Final     Total Protein   Date Value Ref Range Status   09/03/2020 7.2 6.0 - 8.4 g/dL Final     Albumin   Date Value Ref Range Status   09/03/2020 4.2 3.5 - 5.2 g/dL Final     Total Bilirubin   Date Value Ref Range Status   09/03/2020 0.4 0.1 - 1.0 mg/dL Final     Comment:     For infants and newborns, interpretation of results should be based  on gestational age, weight and in agreement with clinical  observations.  Premature Infant recommended reference ranges:  Up to 24 hours.............<8.0 mg/dL  Up to 48 hours............<12.0 mg/dL  3-5 days..................<15.0 mg/dL  6-29 days.................<15.0 mg/dL       Alkaline Phosphatase   Date Value Ref Range Status   09/03/2020 119 55 - 135 U/L Final     AST   Date Value Ref Range Status   09/03/2020 19 10 - 40 U/L Final     ALT   Date Value Ref Range Status   09/03/2020 19 10 - 44 U/L Final     Anion Gap   Date Value Ref Range Status   09/03/2020 9 8 - 16 mmol/L Final   01/18/2013 11 5 - 15 meq/L Final     eGFR if    Date Value Ref Range Status   09/03/2020 55 (A) >60 mL/min/1.73 m^2 Final     eGFR if non    Date  Value Ref Range Status   09/03/2020 47 (A) >60 mL/min/1.73 m^2 Final     Comment:     Calculation used to obtain the estimated glomerular filtration  rate (eGFR) is the CKD-EPI equation.        Lab Results   Component Value Date    WBC 7.19 09/03/2020    HGB 12.6 (L) 09/03/2020    HCT 40.0 09/03/2020    MCV 88 09/03/2020     09/03/2020     CT 09/03/2020  Impression:     Findings are not significantly changed compared to the prior exam including     In the chest small scattered nodular foci not significantly changed.  Severe emphysematous changes     The abdomen and pelvis lesions suggesting cysts within the liver and the right kidney.     Additional findings as detailed above including a vascular necrosis of the femoral heads bilaterally.        Electronically signed by: Ruthie Rosas MD          Assessment Plan    [] 03/30/2019 LUNG, RIGHT, FINE NEEDLE ASPIRATION:  - POSITIVE FOR SQUAMOUS CELL CARCINOMA    Status post lung resection, resection total size of tumor is greater than on images suspecting early stage II involvement.     Pathological staging [id number] stage IIa with pleural invasion present  -completed Adjuvant Cisplatin and docetaxel q 21 days for 4 cycles on June 2019 without major complication or interruptions     -Surveillance CT chest with contrast every 6 months x2 years then low-dose CT annually after per NCCN guidelines.      RTC December port flush blood work.    CT 09/03/2020 no significant change compared to prior exam.     RTC 6 months with repeat scan and follow-up.    [] Reviewed medications today.  No significant changes.    [] Former smoker.  Stop smoking x1 year.    [] severe emphysematous changes - Follow Pulm    [] Follow-up with urology.  Patient states that he has appointment end of this month.    [] M*Modal dictations.

## 2020-09-08 ENCOUNTER — OFFICE VISIT (OUTPATIENT)
Dept: HEMATOLOGY/ONCOLOGY | Facility: CLINIC | Age: 70
End: 2020-09-08
Payer: MEDICARE

## 2020-09-08 VITALS
SYSTOLIC BLOOD PRESSURE: 114 MMHG | TEMPERATURE: 98 F | OXYGEN SATURATION: 96 % | RESPIRATION RATE: 18 BRPM | DIASTOLIC BLOOD PRESSURE: 71 MMHG | BODY MASS INDEX: 23.01 KG/M2 | WEIGHT: 184.06 LBS | HEART RATE: 77 BPM

## 2020-09-08 DIAGNOSIS — C34.31 MALIGNANT NEOPLASM OF LOWER LOBE OF RIGHT LUNG: ICD-10-CM

## 2020-09-08 DIAGNOSIS — Z87.891 HISTORY OF TOBACCO ABUSE: Primary | ICD-10-CM

## 2020-09-08 PROCEDURE — 1159F MED LIST DOCD IN RCRD: CPT | Mod: S$GLB,,, | Performed by: INTERNAL MEDICINE

## 2020-09-08 PROCEDURE — 1125F AMNT PAIN NOTED PAIN PRSNT: CPT | Mod: S$GLB,,, | Performed by: INTERNAL MEDICINE

## 2020-09-08 PROCEDURE — 99214 OFFICE O/P EST MOD 30 MIN: CPT | Mod: S$GLB,,, | Performed by: INTERNAL MEDICINE

## 2020-09-08 PROCEDURE — 1159F PR MEDICATION LIST DOCUMENTED IN MEDICAL RECORD: ICD-10-PCS | Mod: S$GLB,,, | Performed by: INTERNAL MEDICINE

## 2020-09-08 PROCEDURE — 3008F BODY MASS INDEX DOCD: CPT | Mod: CPTII,S$GLB,, | Performed by: INTERNAL MEDICINE

## 2020-09-08 PROCEDURE — 1125F PR PAIN SEVERITY QUANTIFIED, PAIN PRESENT: ICD-10-PCS | Mod: S$GLB,,, | Performed by: INTERNAL MEDICINE

## 2020-09-08 PROCEDURE — 99214 PR OFFICE/OUTPT VISIT, EST, LEVL IV, 30-39 MIN: ICD-10-PCS | Mod: S$GLB,,, | Performed by: INTERNAL MEDICINE

## 2020-09-08 PROCEDURE — 99999 PR PBB SHADOW E&M-EST. PATIENT-LVL V: CPT | Mod: PBBFAC,,, | Performed by: INTERNAL MEDICINE

## 2020-09-08 PROCEDURE — 99999 PR PBB SHADOW E&M-EST. PATIENT-LVL V: ICD-10-PCS | Mod: PBBFAC,,, | Performed by: INTERNAL MEDICINE

## 2020-09-08 PROCEDURE — 3008F PR BODY MASS INDEX (BMI) DOCUMENTED: ICD-10-PCS | Mod: CPTII,S$GLB,, | Performed by: INTERNAL MEDICINE

## 2020-09-08 PROCEDURE — 1101F PR PT FALLS ASSESS DOC 0-1 FALLS W/OUT INJ PAST YR: ICD-10-PCS | Mod: CPTII,S$GLB,, | Performed by: INTERNAL MEDICINE

## 2020-09-08 PROCEDURE — 1101F PT FALLS ASSESS-DOCD LE1/YR: CPT | Mod: CPTII,S$GLB,, | Performed by: INTERNAL MEDICINE

## 2020-09-08 NOTE — Clinical Note
RTC December for port flush and blood work.  RTC 6 months from now 4 CT scan.  WillHave visit on 3 months apart

## 2020-09-10 ENCOUNTER — INFUSION (OUTPATIENT)
Dept: INFUSION THERAPY | Facility: HOSPITAL | Age: 70
End: 2020-09-10
Attending: INTERNAL MEDICINE
Payer: MEDICARE

## 2020-09-10 VITALS — OXYGEN SATURATION: 98 %

## 2020-09-10 DIAGNOSIS — Z51.11 ENCOUNTER FOR ANTINEOPLASTIC CHEMOTHERAPY: ICD-10-CM

## 2020-09-10 DIAGNOSIS — T45.1X5A CHEMOTHERAPY INDUCED NEUTROPENIA: Primary | ICD-10-CM

## 2020-09-10 DIAGNOSIS — D70.1 CHEMOTHERAPY INDUCED NEUTROPENIA: Primary | ICD-10-CM

## 2020-09-10 PROCEDURE — 25000003 PHARM REV CODE 250: Mod: PN | Performed by: INTERNAL MEDICINE

## 2020-09-10 PROCEDURE — A4216 STERILE WATER/SALINE, 10 ML: HCPCS | Mod: PN | Performed by: INTERNAL MEDICINE

## 2020-09-10 PROCEDURE — 96523 IRRIG DRUG DELIVERY DEVICE: CPT | Mod: PN

## 2020-09-10 RX ORDER — SODIUM CHLORIDE 0.9 % (FLUSH) 0.9 %
10 SYRINGE (ML) INJECTION
Status: COMPLETED | OUTPATIENT
Start: 2020-09-10 | End: 2020-09-10

## 2020-09-10 RX ADMIN — Medication 10 ML: at 09:09

## 2020-09-14 ENCOUNTER — TELEPHONE (OUTPATIENT)
Dept: HEMATOLOGY/ONCOLOGY | Facility: CLINIC | Age: 70
End: 2020-09-14

## 2020-09-14 NOTE — TELEPHONE ENCOUNTER
----- Message from Marya MERCADO Overall, RN sent at 9/8/2020  5:00 PM CDT -----  Follow up in six months   ----- Message -----  From: Kamron Jones MD  Sent: 9/8/2020   8:19 AM CDT  To: Marya MERCADO Overall, RN    RTC 6 months with repeat CBC CMP and CT chest

## 2020-09-14 NOTE — TELEPHONE ENCOUNTER
Spoke with patient & scheduled Labs & CT--confirmed appt with Dr. Jones as well--patient voiced understanding--no further questions/concerns at this time

## 2020-10-14 DIAGNOSIS — C34.91 SQUAMOUS CELL CARCINOMA OF LUNG, RIGHT: ICD-10-CM

## 2020-10-14 RX ORDER — HYDROCODONE BITARTRATE AND ACETAMINOPHEN 10; 325 MG/1; MG/1
1 TABLET ORAL EVERY 8 HOURS PRN
Qty: 90 TABLET | Refills: 0 | OUTPATIENT
Start: 2020-10-14

## 2020-10-14 NOTE — TELEPHONE ENCOUNTER
----- Message from Florecita Van sent at 10/14/2020  9:56 AM CDT -----  Regarding: refill request  Type:  RX Refill Request    Who Called:  pt  Refill or New Rx: Refill  RX Name and Strength:  HYDROcodone-acetaminophen (NORCO)  mg per tablet      How is the patient currently taking it? (ex. 1XDay):   Is this a 30 day or 90 day RX:  tablet 0    Sig - Route: Take 1 tablet by mouth every 8 (eight) hours as needed for Pain. - Oral   Sent to pharmacy as: HYDROcodone-acetaminophen (NORCO)  mg per tablet   Preferred Pharmacy with phone number:    [id number] - [place] - [address]  [address] [address]  Phone: [phone] Fax: [phone]    [facility] - [place], [place] - [address]  [address] [address]  Phone: [phone] Fax: [phone]     Local or Mail Order:  local  Ordering Provider:  Karen Sommer Call Back Number:  [phone]  Additional Information:

## 2020-10-14 NOTE — TELEPHONE ENCOUNTER
Sent patient MyChart message letting him know that medication requested has been denied--see below

## 2020-10-15 ENCOUNTER — PATIENT MESSAGE (OUTPATIENT)
Dept: HEMATOLOGY/ONCOLOGY | Facility: CLINIC | Age: 70
End: 2020-10-15

## 2020-10-16 ENCOUNTER — PATIENT MESSAGE (OUTPATIENT)
Dept: HEMATOLOGY/ONCOLOGY | Facility: CLINIC | Age: 70
End: 2020-10-16

## 2020-10-16 DIAGNOSIS — C34.91 SQUAMOUS CELL CARCINOMA OF LUNG, RIGHT: ICD-10-CM

## 2020-10-16 RX ORDER — HYDROCODONE BITARTRATE AND ACETAMINOPHEN 10; 325 MG/1; MG/1
1 TABLET ORAL EVERY 8 HOURS PRN
Qty: 21 TABLET | Refills: 0 | Status: SHIPPED | OUTPATIENT
Start: 2020-10-16 | End: 2020-10-23

## 2020-12-07 ENCOUNTER — HOSPITAL ENCOUNTER (OUTPATIENT)
Dept: RADIOLOGY | Facility: HOSPITAL | Age: 70
Discharge: HOME | End: 2020-12-07
Attending: INTERNAL MEDICINE
Payer: MEDICARE

## 2020-12-07 DIAGNOSIS — C34.31 MALIGNANT NEOPLASM OF LOWER LOBE OF RIGHT LUNG: ICD-10-CM

## 2020-12-07 DIAGNOSIS — Z87.891 HISTORY OF TOBACCO ABUSE: ICD-10-CM

## 2020-12-07 PROCEDURE — 71260 CT THORAX DX C+: CPT | Mod: TC

## 2020-12-07 PROCEDURE — 71260 CT CHEST WITH CONTRAST: ICD-10-PCS | Mod: 26,,, | Performed by: RADIOLOGY

## 2020-12-07 PROCEDURE — 71260 CT THORAX DX C+: CPT | Mod: 26,,, | Performed by: RADIOLOGY

## 2020-12-07 PROCEDURE — 25500020 PHARM REV CODE 255

## 2020-12-07 RX ADMIN — IOHEXOL 70 ML: 350 INJECTION, SOLUTION INTRAVENOUS at 08:12

## 2020-12-08 RX ORDER — HEPARIN 100 UNIT/ML
500 SYRINGE INTRAVENOUS
Status: CANCELLED | OUTPATIENT
Start: 2020-12-08

## 2020-12-10 ENCOUNTER — INFUSION (OUTPATIENT)
Dept: INFUSION THERAPY | Facility: HOSPITAL | Age: 70
End: 2020-12-10
Attending: INTERNAL MEDICINE
Payer: MEDICARE

## 2020-12-10 VITALS — TEMPERATURE: 98 F

## 2020-12-10 DIAGNOSIS — Z51.11 ENCOUNTER FOR ANTINEOPLASTIC CHEMOTHERAPY: ICD-10-CM

## 2020-12-10 DIAGNOSIS — D70.1 CHEMOTHERAPY INDUCED NEUTROPENIA: Primary | ICD-10-CM

## 2020-12-10 DIAGNOSIS — T45.1X5A CHEMOTHERAPY INDUCED NEUTROPENIA: Primary | ICD-10-CM

## 2020-12-10 PROCEDURE — 25000003 PHARM REV CODE 250: Mod: PN | Performed by: INTERNAL MEDICINE

## 2020-12-10 PROCEDURE — 96523 IRRIG DRUG DELIVERY DEVICE: CPT | Mod: PN

## 2020-12-10 PROCEDURE — A4216 STERILE WATER/SALINE, 10 ML: HCPCS | Mod: PN | Performed by: INTERNAL MEDICINE

## 2020-12-10 RX ORDER — SODIUM CHLORIDE 0.9 % (FLUSH) 0.9 %
10 SYRINGE (ML) INJECTION
Status: COMPLETED | OUTPATIENT
Start: 2020-12-10 | End: 2020-12-10

## 2020-12-10 RX ORDER — SODIUM CHLORIDE 0.9 % (FLUSH) 0.9 %
10 SYRINGE (ML) INJECTION
Status: CANCELLED | OUTPATIENT
Start: 2020-12-10

## 2020-12-10 RX ORDER — HEPARIN 100 UNIT/ML
500 SYRINGE INTRAVENOUS
Status: CANCELLED | OUTPATIENT
Start: 2020-12-10

## 2020-12-10 RX ADMIN — Medication 10 ML: at 09:12

## 2020-12-14 NOTE — PROGRESS NOTES
HPI    70 years old  male who was presented to Oncology Clinic for evaluation with squamous cell carcinoma of the lung.  Patient was recently diagnosed in January with fine-needle aspiration of the right lung nodule was found to have squamous cell carcinoma.  And recently patient had a which resection(by description) at [facility].  Since then patient was instructed to follow up with our medical oncologist for evaluation and treatment.  Patient was told that doing the which resection tumor is larger than expected compared to the images.  It might be present as late stage I or early stage II.      06/05/2019 completed cycle 3.    06/14/2019:  No acute event.  No complain.  Patient states that he is feeling better now since the chemotherapy about a week ago. Denies chest pain shortness breath.  Denies any nausea vomiting diarrhea.  Denies fever chills.  Fourteen point review system negative as above.    06/26/2019:  Patient here for cycle 4 day 1 of chemotherapy.  Patient states that he is little bit tired but denies any fever chills denies any recent illness.  Patient states that he is ready to go for his final cycle of chemo today.    07/10/2019:  Post cycle 4 treatment evaluation.  Patient has completed adjuvant treatment.  No major interruption is no complications.    09/11/2019 patient here to follow-up oncology visit.  Wishes to have [place] refilled.    12/15/2020:  Follow-up exam.  No complaints.    Past Medical History:   Diagnosis Date    Arthritis     Cancer SKIN    Cardiac angina     COPD (chronic obstructive pulmonary disease)     Coronary artery disease ANGINA. HAD  [facility] 8/12. 40 % BLOCKAGE. DR RUBY IS CARDIOLOGIST.    Depression     GERD (gastroesophageal reflux disease)     Kidney cysts     Lung cancer 01/2019    Pathological staging [id number] stage IIa with pleural invasion present - s/p RLLectomy    MVP (mitral valve prolapse)     Trigger thumb     BILAT    Wears  dentures     UPPER    Wears glasses      Social History     Socioeconomic History    Marital status:      Spouse name: Not on file    Number of children: Not on file    Years of education: Not on file    Highest education level: Not on file   Occupational History    Not on file   Social Needs    Financial resource strain: Not on file    Food insecurity     Worry: Not on file     Inability: Not on file    Transportation needs     Medical: Not on file     Non-medical: Not on file   Tobacco Use    Smoking status: Former Smoker     Years: 40.00     Quit date: 2019     Years since quittin.9    Smokeless tobacco: Never Used    Tobacco comment: 1-2 CIGT SOME DAYS/states last cigarette 20   Substance and Sexual Activity    Alcohol use: Yes     Comment: beer on occasion    Drug use: No    Sexual activity: Yes     Partners: Female   Lifestyle    Physical activity     Days per week: Not on file     Minutes per session: Not on file    Stress: Not on file   Relationships    Social connections     Talks on phone: Not on file     Gets together: Not on file     Attends Judaism service: Not on file     Active member of club or organization: Not on file     Attends meetings of clubs or organizations: Not on file     Relationship status: Not on file   Other Topics Concern    Not on file   Social History Narrative    Not on file       Objective    Physical Exam   There were no vitals filed for this visit.    Constitutional: patient is oriented to person, place, and time. patient appears well-developed and well-nourished. No distress.   HENT:  Normocephalic atraumatic pupils equal round reactive to light extraocular muscle intact. No evidence of lesions on exam.  Cardiovascular:  Regular rate and rhythm     Pulmonary/Chest:  Symmetrical chest a rising  Abdominal:  Soft nontender nondistended bowel sounds x4   Musculoskeletal: Normal range of motion. Gait is normal No clubbing, cyanosis      Lymphadenopathy:  No evidence of lymphadenopathy  Neurological:  Sensorimotor grossly intact cranial nerves 2-12 grossly intact.    Skin:  Warm dry no rash no lesions   Psychiatric:  Normal affect    CMP  Sodium   Date Value Ref Range Status   12/07/2020 143 136 - 145 mmol/L Final     Potassium   Date Value Ref Range Status   12/07/2020 4.3 3.5 - 5.1 mmol/L Final     Chloride   Date Value Ref Range Status   12/07/2020 107 95 - 110 mmol/L Final     CO2   Date Value Ref Range Status   12/07/2020 24 23 - 29 mmol/L Final     Glucose   Date Value Ref Range Status   12/07/2020 107 70 - 110 mg/dL Final     BUN   Date Value Ref Range Status   12/07/2020 24 (H) 8 - 23 mg/dL Final     Creatinine   Date Value Ref Range Status   12/07/2020 1.5 (H) 0.5 - 1.4 mg/dL Final   01/18/2013 0.9 0.5 - 1.4 mg/dL Final     Calcium   Date Value Ref Range Status   12/07/2020 9.6 8.7 - 10.5 mg/dL Final   01/18/2013 10.7 (H) 8.7 - 10.5 mg/dL Final     Total Protein   Date Value Ref Range Status   12/07/2020 7.4 6.0 - 8.4 g/dL Final     Albumin   Date Value Ref Range Status   12/07/2020 3.9 3.5 - 5.2 g/dL Final     Total Bilirubin   Date Value Ref Range Status   12/07/2020 0.3 0.1 - 1.0 mg/dL Final     Comment:     For infants and newborns, interpretation of results should be based  on gestational age, weight and in agreement with clinical  observations.  Premature Infant recommended reference ranges:  Up to 24 hours.............<8.0 mg/dL  Up to 48 hours............<12.0 mg/dL  3-5 days..................<15.0 mg/dL  6-29 days.................<15.0 mg/dL       Alkaline Phosphatase   Date Value Ref Range Status   12/07/2020 157 (H) 55 - 135 U/L Final     AST   Date Value Ref Range Status   12/07/2020 16 10 - 40 U/L Final     ALT   Date Value Ref Range Status   12/07/2020 18 10 - 44 U/L Final     Anion Gap   Date Value Ref Range Status   12/07/2020 12 8 - 16 mmol/L Final   01/18/2013 11 5 - 15 meq/L Final     eGFR if    Date  Value Ref Range Status   12/07/2020 54 (A) >60 mL/min/1.73 m^2 Final     eGFR if non    Date Value Ref Range Status   12/07/2020 47 (A) >60 mL/min/1.73 m^2 Final     Comment:     Calculation used to obtain the estimated glomerular filtration  rate (eGFR) is the CKD-EPI equation.        Lab Results   Component Value Date    WBC 7.61 12/07/2020    HGB 12.4 (L) 12/07/2020    HCT 38.8 (L) 12/07/2020    MCV 87 12/07/2020     12/07/2020     CT 09/03/2020  Impression:     Findings are not significantly changed compared to the prior exam including     In the chest small scattered nodular foci not significantly changed.  Severe emphysematous changes     The abdomen and pelvis lesions suggesting cysts within the liver and the right kidney.     Additional findings as detailed above including a vascular necrosis of the femoral heads bilaterally.        Electronically signed by: Ruthie Rosas MD    CT chest with contrast 12/07/2020  Impression:     Findings are not significantly changed compared to the prior exam including     Chest; emphysematous changes, reticulation, bronchiectasis, small scattered nodular foci     Visualized upper abdomen low-density lesion in the liver again suggesting a cyst not significantly changed and septated right renal cyst not appearing significantly changed as visualized.    Assessment Plan    [] 03/30/2019 LUNG, RIGHT, FINE NEEDLE ASPIRATION:  - POSITIVE FOR SQUAMOUS CELL CARCINOMA    Status post lung resection, resection total size of tumor is greater than on images suspecting early stage II involvement.     Pathological staging [id number] stage IIa with pleural invasion present  -completed Adjuvant Cisplatin and docetaxel q 21 days for 4 cycles on June 2019 without major complication or interruptions       May d/c chemo-port     RTC 3 months with labs.  Will repeat scan in 6 months from now.    [] Reviewed medications today.  No significant changes.    [] Former smoker.   Stop smoking x1 year.    [] severe emphysematous changes - Follow Pulm    [] Follow-up with urology.  Patient states that he has appointment end of this month.    [] M*Modal dictations.

## 2020-12-15 ENCOUNTER — DOCUMENTATION ONLY (OUTPATIENT)
Dept: HEMATOLOGY/ONCOLOGY | Facility: CLINIC | Age: 70
End: 2020-12-15

## 2020-12-15 ENCOUNTER — OFFICE VISIT (OUTPATIENT)
Dept: HEMATOLOGY/ONCOLOGY | Facility: CLINIC | Age: 70
End: 2020-12-15
Payer: MEDICARE

## 2020-12-15 VITALS
HEIGHT: 75 IN | OXYGEN SATURATION: 96 % | TEMPERATURE: 98 F | BODY MASS INDEX: 24.07 KG/M2 | RESPIRATION RATE: 16 BRPM | DIASTOLIC BLOOD PRESSURE: 62 MMHG | WEIGHT: 193.56 LBS | SYSTOLIC BLOOD PRESSURE: 128 MMHG | HEART RATE: 66 BPM

## 2020-12-15 DIAGNOSIS — C34.91 SQUAMOUS CELL CARCINOMA LUNG, RIGHT: ICD-10-CM

## 2020-12-15 DIAGNOSIS — Z09 ONCOLOGY FOLLOW-UP ENCOUNTER: Primary | ICD-10-CM

## 2020-12-15 PROCEDURE — 99214 OFFICE O/P EST MOD 30 MIN: CPT | Mod: S$GLB,,, | Performed by: INTERNAL MEDICINE

## 2020-12-15 PROCEDURE — 3008F BODY MASS INDEX DOCD: CPT | Mod: CPTII,S$GLB,, | Performed by: INTERNAL MEDICINE

## 2020-12-15 PROCEDURE — 99999 PR PBB SHADOW E&M-EST. PATIENT-LVL IV: ICD-10-PCS | Mod: PBBFAC,,, | Performed by: INTERNAL MEDICINE

## 2020-12-15 PROCEDURE — 1125F AMNT PAIN NOTED PAIN PRSNT: CPT | Mod: S$GLB,,, | Performed by: INTERNAL MEDICINE

## 2020-12-15 PROCEDURE — 3288F PR FALLS RISK ASSESSMENT DOCUMENTED: ICD-10-PCS | Mod: CPTII,S$GLB,, | Performed by: INTERNAL MEDICINE

## 2020-12-15 PROCEDURE — 3008F PR BODY MASS INDEX (BMI) DOCUMENTED: ICD-10-PCS | Mod: CPTII,S$GLB,, | Performed by: INTERNAL MEDICINE

## 2020-12-15 PROCEDURE — 99214 PR OFFICE/OUTPT VISIT, EST, LEVL IV, 30-39 MIN: ICD-10-PCS | Mod: S$GLB,,, | Performed by: INTERNAL MEDICINE

## 2020-12-15 PROCEDURE — 1101F PT FALLS ASSESS-DOCD LE1/YR: CPT | Mod: CPTII,S$GLB,, | Performed by: INTERNAL MEDICINE

## 2020-12-15 PROCEDURE — 99999 PR PBB SHADOW E&M-EST. PATIENT-LVL IV: CPT | Mod: PBBFAC,,, | Performed by: INTERNAL MEDICINE

## 2020-12-15 PROCEDURE — 1159F PR MEDICATION LIST DOCUMENTED IN MEDICAL RECORD: ICD-10-PCS | Mod: S$GLB,,, | Performed by: INTERNAL MEDICINE

## 2020-12-15 PROCEDURE — 1125F PR PAIN SEVERITY QUANTIFIED, PAIN PRESENT: ICD-10-PCS | Mod: S$GLB,,, | Performed by: INTERNAL MEDICINE

## 2020-12-15 PROCEDURE — 3288F FALL RISK ASSESSMENT DOCD: CPT | Mod: CPTII,S$GLB,, | Performed by: INTERNAL MEDICINE

## 2020-12-15 PROCEDURE — 1101F PR PT FALLS ASSESS DOC 0-1 FALLS W/OUT INJ PAST YR: ICD-10-PCS | Mod: CPTII,S$GLB,, | Performed by: INTERNAL MEDICINE

## 2020-12-15 PROCEDURE — 1159F MED LIST DOCD IN RCRD: CPT | Mod: S$GLB,,, | Performed by: INTERNAL MEDICINE

## 2020-12-15 RX ORDER — TRIAMCINOLONE ACETONIDE 55 UG/1
2 SPRAY, METERED NASAL
COMMUNITY
Start: 2020-10-26 | End: 2021-10-26

## 2020-12-15 RX ORDER — CETIRIZINE HYDROCHLORIDE 10 MG/1
10 TABLET ORAL DAILY
COMMUNITY
Start: 2020-10-26

## 2020-12-15 RX ORDER — AZELASTINE 1 MG/ML
SPRAY, METERED NASAL
COMMUNITY
Start: 2020-10-26 | End: 2023-04-04

## 2020-12-16 ENCOUNTER — TELEPHONE (OUTPATIENT)
Dept: HEMATOLOGY/ONCOLOGY | Facility: CLINIC | Age: 70
End: 2020-12-16

## 2020-12-16 ENCOUNTER — PATIENT MESSAGE (OUTPATIENT)
Dept: HEMATOLOGY/ONCOLOGY | Facility: CLINIC | Age: 70
End: 2020-12-16

## 2020-12-16 NOTE — TELEPHONE ENCOUNTER
Appts made, portal message sent, appt reminder slips placed in outgoing mail.     ----- Message from Kamron Jones MD sent at 12/15/2020  9:45 AM CST -----  RTC 3 months follow-up with labs.

## 2021-01-22 ENCOUNTER — PATIENT MESSAGE (OUTPATIENT)
Dept: ADMINISTRATIVE | Facility: OTHER | Age: 71
End: 2021-01-22

## 2021-03-02 ENCOUNTER — OFFICE VISIT (OUTPATIENT)
Dept: ORTHOPEDICS | Facility: CLINIC | Age: 71
End: 2021-03-02
Payer: MEDICARE

## 2021-03-02 VITALS
HEIGHT: 75 IN | WEIGHT: 190 LBS | BODY MASS INDEX: 23.62 KG/M2 | SYSTOLIC BLOOD PRESSURE: 154 MMHG | DIASTOLIC BLOOD PRESSURE: 96 MMHG | HEART RATE: 75 BPM

## 2021-03-02 DIAGNOSIS — M65.352 TRIGGER LITTLE FINGER OF LEFT HAND: Primary | ICD-10-CM

## 2021-03-02 DIAGNOSIS — M65.341 TRIGGER RING FINGER OF RIGHT HAND: ICD-10-CM

## 2021-03-02 DIAGNOSIS — M79.641 BILATERAL HAND PAIN: ICD-10-CM

## 2021-03-02 DIAGNOSIS — M79.642 BILATERAL HAND PAIN: ICD-10-CM

## 2021-03-02 PROCEDURE — 3288F FALL RISK ASSESSMENT DOCD: CPT | Mod: S$GLB,,, | Performed by: ORTHOPAEDIC SURGERY

## 2021-03-02 PROCEDURE — 20550 NJX 1 TENDON SHEATH/LIGAMENT: CPT | Mod: 50,S$GLB,, | Performed by: ORTHOPAEDIC SURGERY

## 2021-03-02 PROCEDURE — 1101F PR PT FALLS ASSESS DOC 0-1 FALLS W/OUT INJ PAST YR: ICD-10-PCS | Mod: S$GLB,,, | Performed by: ORTHOPAEDIC SURGERY

## 2021-03-02 PROCEDURE — 1125F AMNT PAIN NOTED PAIN PRSNT: CPT | Mod: S$GLB,,, | Performed by: ORTHOPAEDIC SURGERY

## 2021-03-02 PROCEDURE — 3008F PR BODY MASS INDEX (BMI) DOCUMENTED: ICD-10-PCS | Mod: S$GLB,,, | Performed by: ORTHOPAEDIC SURGERY

## 2021-03-02 PROCEDURE — 20550 TENDON SHEATH: ICD-10-PCS | Mod: 50,S$GLB,, | Performed by: ORTHOPAEDIC SURGERY

## 2021-03-02 PROCEDURE — 1125F PR PAIN SEVERITY QUANTIFIED, PAIN PRESENT: ICD-10-PCS | Mod: S$GLB,,, | Performed by: ORTHOPAEDIC SURGERY

## 2021-03-02 PROCEDURE — 1159F PR MEDICATION LIST DOCUMENTED IN MEDICAL RECORD: ICD-10-PCS | Mod: S$GLB,,, | Performed by: ORTHOPAEDIC SURGERY

## 2021-03-02 PROCEDURE — 1101F PT FALLS ASSESS-DOCD LE1/YR: CPT | Mod: S$GLB,,, | Performed by: ORTHOPAEDIC SURGERY

## 2021-03-02 PROCEDURE — 99213 OFFICE O/P EST LOW 20 MIN: CPT | Mod: 25,S$GLB,, | Performed by: ORTHOPAEDIC SURGERY

## 2021-03-02 PROCEDURE — 3008F BODY MASS INDEX DOCD: CPT | Mod: S$GLB,,, | Performed by: ORTHOPAEDIC SURGERY

## 2021-03-02 PROCEDURE — 3288F PR FALLS RISK ASSESSMENT DOCUMENTED: ICD-10-PCS | Mod: S$GLB,,, | Performed by: ORTHOPAEDIC SURGERY

## 2021-03-02 PROCEDURE — 99213 PR OFFICE/OUTPT VISIT, EST, LEVL III, 20-29 MIN: ICD-10-PCS | Mod: 25,S$GLB,, | Performed by: ORTHOPAEDIC SURGERY

## 2021-03-02 PROCEDURE — 1159F MED LIST DOCD IN RCRD: CPT | Mod: S$GLB,,, | Performed by: ORTHOPAEDIC SURGERY

## 2021-03-02 RX ORDER — METHYLPREDNISOLONE ACETATE 40 MG/ML
40 INJECTION, SUSPENSION INTRA-ARTICULAR; INTRALESIONAL; INTRAMUSCULAR; SOFT TISSUE
Status: DISCONTINUED | OUTPATIENT
Start: 2021-03-02 | End: 2021-03-02 | Stop reason: HOSPADM

## 2021-03-02 RX ADMIN — METHYLPREDNISOLONE ACETATE 40 MG: 40 INJECTION, SUSPENSION INTRA-ARTICULAR; INTRALESIONAL; INTRAMUSCULAR; SOFT TISSUE at 01:03

## 2021-03-04 ENCOUNTER — INFUSION (OUTPATIENT)
Dept: INFUSION THERAPY | Facility: HOSPITAL | Age: 71
End: 2021-03-04
Attending: INTERNAL MEDICINE
Payer: MEDICARE

## 2021-03-04 VITALS — TEMPERATURE: 99 F

## 2021-03-04 DIAGNOSIS — D70.1 CHEMOTHERAPY INDUCED NEUTROPENIA: Primary | ICD-10-CM

## 2021-03-04 DIAGNOSIS — T45.1X5A CHEMOTHERAPY INDUCED NEUTROPENIA: Primary | ICD-10-CM

## 2021-03-04 DIAGNOSIS — Z51.11 ENCOUNTER FOR ANTINEOPLASTIC CHEMOTHERAPY: ICD-10-CM

## 2021-03-04 PROCEDURE — 96523 IRRIG DRUG DELIVERY DEVICE: CPT | Mod: PN

## 2021-03-04 PROCEDURE — 25000003 PHARM REV CODE 250: Mod: PN | Performed by: INTERNAL MEDICINE

## 2021-03-04 PROCEDURE — A4216 STERILE WATER/SALINE, 10 ML: HCPCS | Mod: PN | Performed by: INTERNAL MEDICINE

## 2021-03-04 RX ORDER — SODIUM CHLORIDE 0.9 % (FLUSH) 0.9 %
10 SYRINGE (ML) INJECTION
Status: COMPLETED | OUTPATIENT
Start: 2021-03-04 | End: 2021-03-04

## 2021-03-04 RX ORDER — HEPARIN 100 UNIT/ML
500 SYRINGE INTRAVENOUS
Status: CANCELLED | OUTPATIENT
Start: 2021-03-04

## 2021-03-04 RX ORDER — SODIUM CHLORIDE 0.9 % (FLUSH) 0.9 %
10 SYRINGE (ML) INJECTION
Status: CANCELLED | OUTPATIENT
Start: 2021-03-04

## 2021-03-04 RX ORDER — HEPARIN 100 UNIT/ML
500 SYRINGE INTRAVENOUS
Status: DISCONTINUED | OUTPATIENT
Start: 2021-03-04 | End: 2021-03-04 | Stop reason: HOSPADM

## 2021-03-04 RX ADMIN — Medication 10 ML: at 10:03

## 2021-03-15 ENCOUNTER — LAB VISIT (OUTPATIENT)
Dept: LAB | Facility: HOSPITAL | Age: 71
End: 2021-03-15
Attending: INTERNAL MEDICINE
Payer: MEDICARE

## 2021-03-15 DIAGNOSIS — C34.91 SQUAMOUS CELL CARCINOMA LUNG, RIGHT: ICD-10-CM

## 2021-03-15 DIAGNOSIS — Z09 ONCOLOGY FOLLOW-UP ENCOUNTER: ICD-10-CM

## 2021-03-15 LAB
ALBUMIN SERPL BCP-MCNC: 3.8 G/DL (ref 3.5–5.2)
ALP SERPL-CCNC: 156 U/L (ref 55–135)
ALT SERPL W/O P-5'-P-CCNC: 14 U/L (ref 10–44)
ANION GAP SERPL CALC-SCNC: 9 MMOL/L (ref 8–16)
AST SERPL-CCNC: 17 U/L (ref 10–40)
BASOPHILS # BLD AUTO: 0.05 K/UL (ref 0–0.2)
BASOPHILS NFR BLD: 0.5 % (ref 0–1.9)
BILIRUB SERPL-MCNC: 0.3 MG/DL (ref 0.1–1)
BUN SERPL-MCNC: 22 MG/DL (ref 8–23)
CALCIUM SERPL-MCNC: 9.2 MG/DL (ref 8.7–10.5)
CHLORIDE SERPL-SCNC: 107 MMOL/L (ref 95–110)
CO2 SERPL-SCNC: 24 MMOL/L (ref 23–29)
CREAT SERPL-MCNC: 1.4 MG/DL (ref 0.5–1.4)
DIFFERENTIAL METHOD: ABNORMAL
EOSINOPHIL # BLD AUTO: 0.3 K/UL (ref 0–0.5)
EOSINOPHIL NFR BLD: 2.8 % (ref 0–8)
ERYTHROCYTE [DISTWIDTH] IN BLOOD BY AUTOMATED COUNT: 14.5 % (ref 11.5–14.5)
EST. GFR  (AFRICAN AMERICAN): 59 ML/MIN/1.73 M^2
EST. GFR  (NON AFRICAN AMERICAN): 51 ML/MIN/1.73 M^2
GLUCOSE SERPL-MCNC: 107 MG/DL (ref 70–110)
HCT VFR BLD AUTO: 38.1 % (ref 40–54)
HGB BLD-MCNC: 12.1 G/DL (ref 14–18)
IMM GRANULOCYTES # BLD AUTO: 0.04 K/UL (ref 0–0.04)
IMM GRANULOCYTES NFR BLD AUTO: 0.4 % (ref 0–0.5)
LYMPHOCYTES # BLD AUTO: 4 K/UL (ref 1–4.8)
LYMPHOCYTES NFR BLD: 40.6 % (ref 18–48)
MAGNESIUM SERPL-MCNC: 2 MG/DL (ref 1.6–2.6)
MCH RBC QN AUTO: 27.1 PG (ref 27–31)
MCHC RBC AUTO-ENTMCNC: 31.8 G/DL (ref 32–36)
MCV RBC AUTO: 85 FL (ref 82–98)
MONOCYTES # BLD AUTO: 0.7 K/UL (ref 0.3–1)
MONOCYTES NFR BLD: 7.2 % (ref 4–15)
NEUTROPHILS # BLD AUTO: 4.7 K/UL (ref 1.8–7.7)
NEUTROPHILS NFR BLD: 48.5 % (ref 38–73)
NRBC BLD-RTO: 0 /100 WBC
PLATELET # BLD AUTO: 245 K/UL (ref 150–350)
PMV BLD AUTO: 8.6 FL (ref 9.2–12.9)
POTASSIUM SERPL-SCNC: 4.5 MMOL/L (ref 3.5–5.1)
PROT SERPL-MCNC: 7.1 G/DL (ref 6–8.4)
RBC # BLD AUTO: 4.46 M/UL (ref 4.6–6.2)
SODIUM SERPL-SCNC: 140 MMOL/L (ref 136–145)
WBC # BLD AUTO: 9.73 K/UL (ref 3.9–12.7)

## 2021-03-15 PROCEDURE — 85025 COMPLETE CBC W/AUTO DIFF WBC: CPT | Performed by: INTERNAL MEDICINE

## 2021-03-15 PROCEDURE — 83735 ASSAY OF MAGNESIUM: CPT | Performed by: INTERNAL MEDICINE

## 2021-03-15 PROCEDURE — 36415 COLL VENOUS BLD VENIPUNCTURE: CPT | Performed by: INTERNAL MEDICINE

## 2021-03-15 PROCEDURE — 80053 COMPREHEN METABOLIC PANEL: CPT | Performed by: INTERNAL MEDICINE

## 2021-03-18 ENCOUNTER — OFFICE VISIT (OUTPATIENT)
Dept: HEMATOLOGY/ONCOLOGY | Facility: CLINIC | Age: 71
End: 2021-03-18
Payer: MEDICARE

## 2021-03-18 VITALS
DIASTOLIC BLOOD PRESSURE: 80 MMHG | HEART RATE: 95 BPM | OXYGEN SATURATION: 98 % | TEMPERATURE: 98 F | HEIGHT: 75 IN | SYSTOLIC BLOOD PRESSURE: 138 MMHG | RESPIRATION RATE: 17 BRPM | WEIGHT: 190.69 LBS | BODY MASS INDEX: 23.71 KG/M2

## 2021-03-18 DIAGNOSIS — T45.1X5A CHEMOTHERAPY INDUCED NEUTROPENIA: ICD-10-CM

## 2021-03-18 DIAGNOSIS — C34.91 SQUAMOUS CELL CARCINOMA LUNG, RIGHT: Primary | ICD-10-CM

## 2021-03-18 DIAGNOSIS — D70.1 CHEMOTHERAPY INDUCED NEUTROPENIA: ICD-10-CM

## 2021-03-18 DIAGNOSIS — Z79.899 ENCOUNTER FOR MEDICATION REVIEW: ICD-10-CM

## 2021-03-18 DIAGNOSIS — R91.1 LUNG NODULE: ICD-10-CM

## 2021-03-18 PROCEDURE — 99214 OFFICE O/P EST MOD 30 MIN: CPT | Mod: S$GLB,,, | Performed by: INTERNAL MEDICINE

## 2021-03-18 PROCEDURE — 1101F PT FALLS ASSESS-DOCD LE1/YR: CPT | Mod: CPTII,S$GLB,, | Performed by: INTERNAL MEDICINE

## 2021-03-18 PROCEDURE — 3288F PR FALLS RISK ASSESSMENT DOCUMENTED: ICD-10-PCS | Mod: CPTII,S$GLB,, | Performed by: INTERNAL MEDICINE

## 2021-03-18 PROCEDURE — 99999 PR PBB SHADOW E&M-EST. PATIENT-LVL IV: ICD-10-PCS | Mod: PBBFAC,,, | Performed by: INTERNAL MEDICINE

## 2021-03-18 PROCEDURE — 1125F PR PAIN SEVERITY QUANTIFIED, PAIN PRESENT: ICD-10-PCS | Mod: S$GLB,,, | Performed by: INTERNAL MEDICINE

## 2021-03-18 PROCEDURE — 1125F AMNT PAIN NOTED PAIN PRSNT: CPT | Mod: S$GLB,,, | Performed by: INTERNAL MEDICINE

## 2021-03-18 PROCEDURE — 1159F PR MEDICATION LIST DOCUMENTED IN MEDICAL RECORD: ICD-10-PCS | Mod: S$GLB,,, | Performed by: INTERNAL MEDICINE

## 2021-03-18 PROCEDURE — 99999 PR PBB SHADOW E&M-EST. PATIENT-LVL IV: CPT | Mod: PBBFAC,,, | Performed by: INTERNAL MEDICINE

## 2021-03-18 PROCEDURE — 1159F MED LIST DOCD IN RCRD: CPT | Mod: S$GLB,,, | Performed by: INTERNAL MEDICINE

## 2021-03-18 PROCEDURE — 3008F PR BODY MASS INDEX (BMI) DOCUMENTED: ICD-10-PCS | Mod: CPTII,S$GLB,, | Performed by: INTERNAL MEDICINE

## 2021-03-18 PROCEDURE — 3008F BODY MASS INDEX DOCD: CPT | Mod: CPTII,S$GLB,, | Performed by: INTERNAL MEDICINE

## 2021-03-18 PROCEDURE — 1101F PR PT FALLS ASSESS DOC 0-1 FALLS W/OUT INJ PAST YR: ICD-10-PCS | Mod: CPTII,S$GLB,, | Performed by: INTERNAL MEDICINE

## 2021-03-18 PROCEDURE — 3288F FALL RISK ASSESSMENT DOCD: CPT | Mod: CPTII,S$GLB,, | Performed by: INTERNAL MEDICINE

## 2021-03-18 PROCEDURE — 99214 PR OFFICE/OUTPT VISIT, EST, LEVL IV, 30-39 MIN: ICD-10-PCS | Mod: S$GLB,,, | Performed by: INTERNAL MEDICINE

## 2021-03-18 RX ORDER — HYDROCODONE BITARTRATE AND ACETAMINOPHEN 10; 325 MG/1; MG/1
1 TABLET ORAL EVERY 8 HOURS PRN
Qty: 90 TABLET | Refills: 0 | Status: SHIPPED | OUTPATIENT
Start: 2021-03-18 | End: 2021-06-11

## 2021-06-03 ENCOUNTER — INFUSION (OUTPATIENT)
Dept: INFUSION THERAPY | Facility: HOSPITAL | Age: 71
End: 2021-06-03
Attending: INTERNAL MEDICINE
Payer: MEDICARE

## 2021-06-03 DIAGNOSIS — D70.1 CHEMOTHERAPY INDUCED NEUTROPENIA: Primary | ICD-10-CM

## 2021-06-03 DIAGNOSIS — T45.1X5A CHEMOTHERAPY INDUCED NEUTROPENIA: Primary | ICD-10-CM

## 2021-06-03 DIAGNOSIS — Z51.11 ENCOUNTER FOR ANTINEOPLASTIC CHEMOTHERAPY: ICD-10-CM

## 2021-06-03 PROCEDURE — 96523 IRRIG DRUG DELIVERY DEVICE: CPT | Mod: PN

## 2021-06-03 PROCEDURE — A4216 STERILE WATER/SALINE, 10 ML: HCPCS | Mod: PN | Performed by: INTERNAL MEDICINE

## 2021-06-03 PROCEDURE — 25000003 PHARM REV CODE 250: Mod: PN | Performed by: INTERNAL MEDICINE

## 2021-06-03 RX ORDER — SODIUM CHLORIDE 0.9 % (FLUSH) 0.9 %
10 SYRINGE (ML) INJECTION
Status: COMPLETED | OUTPATIENT
Start: 2021-06-03 | End: 2021-06-03

## 2021-06-03 RX ORDER — HEPARIN 100 UNIT/ML
500 SYRINGE INTRAVENOUS
Status: CANCELLED | OUTPATIENT
Start: 2021-06-03

## 2021-06-03 RX ORDER — SODIUM CHLORIDE 0.9 % (FLUSH) 0.9 %
10 SYRINGE (ML) INJECTION
Status: CANCELLED | OUTPATIENT
Start: 2021-06-03

## 2021-06-03 RX ADMIN — Medication 10 ML: at 10:06

## 2021-06-11 ENCOUNTER — OFFICE VISIT (OUTPATIENT)
Dept: ORTHOPEDICS | Facility: CLINIC | Age: 71
End: 2021-06-11
Payer: MEDICARE

## 2021-06-11 VITALS — BODY MASS INDEX: 23.62 KG/M2 | WEIGHT: 190 LBS | HEIGHT: 75 IN

## 2021-06-11 DIAGNOSIS — G89.29 CHRONIC BILATERAL LOW BACK PAIN WITH BILATERAL SCIATICA: ICD-10-CM

## 2021-06-11 DIAGNOSIS — Z98.890 HISTORY OF LUMBAR LAMINECTOMY: ICD-10-CM

## 2021-06-11 DIAGNOSIS — M54.41 CHRONIC BILATERAL LOW BACK PAIN WITH BILATERAL SCIATICA: ICD-10-CM

## 2021-06-11 DIAGNOSIS — M54.42 CHRONIC BILATERAL LOW BACK PAIN WITH BILATERAL SCIATICA: ICD-10-CM

## 2021-06-11 DIAGNOSIS — M51.36 DISC DEGENERATION, LUMBAR: ICD-10-CM

## 2021-06-11 DIAGNOSIS — M47.816 LUMBAR FACET ARTHROPATHY: ICD-10-CM

## 2021-06-11 DIAGNOSIS — M54.16 LUMBAR RADICULITIS: Primary | ICD-10-CM

## 2021-06-11 PROCEDURE — 99213 OFFICE O/P EST LOW 20 MIN: CPT | Mod: S$GLB,,, | Performed by: PHYSICIAN ASSISTANT

## 2021-06-11 PROCEDURE — 3288F PR FALLS RISK ASSESSMENT DOCUMENTED: ICD-10-PCS | Mod: S$GLB,,, | Performed by: PHYSICIAN ASSISTANT

## 2021-06-11 PROCEDURE — 1125F AMNT PAIN NOTED PAIN PRSNT: CPT | Mod: S$GLB,,, | Performed by: PHYSICIAN ASSISTANT

## 2021-06-11 PROCEDURE — 1159F MED LIST DOCD IN RCRD: CPT | Mod: S$GLB,,, | Performed by: PHYSICIAN ASSISTANT

## 2021-06-11 PROCEDURE — 3008F PR BODY MASS INDEX (BMI) DOCUMENTED: ICD-10-PCS | Mod: S$GLB,,, | Performed by: PHYSICIAN ASSISTANT

## 2021-06-11 PROCEDURE — 1125F PR PAIN SEVERITY QUANTIFIED, PAIN PRESENT: ICD-10-PCS | Mod: S$GLB,,, | Performed by: PHYSICIAN ASSISTANT

## 2021-06-11 PROCEDURE — 1101F PT FALLS ASSESS-DOCD LE1/YR: CPT | Mod: S$GLB,,, | Performed by: PHYSICIAN ASSISTANT

## 2021-06-11 PROCEDURE — 1101F PR PT FALLS ASSESS DOC 0-1 FALLS W/OUT INJ PAST YR: ICD-10-PCS | Mod: S$GLB,,, | Performed by: PHYSICIAN ASSISTANT

## 2021-06-11 PROCEDURE — 99213 PR OFFICE/OUTPT VISIT, EST, LEVL III, 20-29 MIN: ICD-10-PCS | Mod: S$GLB,,, | Performed by: PHYSICIAN ASSISTANT

## 2021-06-11 PROCEDURE — 3288F FALL RISK ASSESSMENT DOCD: CPT | Mod: S$GLB,,, | Performed by: PHYSICIAN ASSISTANT

## 2021-06-11 PROCEDURE — 1159F PR MEDICATION LIST DOCUMENTED IN MEDICAL RECORD: ICD-10-PCS | Mod: S$GLB,,, | Performed by: PHYSICIAN ASSISTANT

## 2021-06-11 PROCEDURE — 3008F BODY MASS INDEX DOCD: CPT | Mod: S$GLB,,, | Performed by: PHYSICIAN ASSISTANT

## 2021-06-11 RX ORDER — TRAZODONE HYDROCHLORIDE 150 MG/1
225 TABLET ORAL NIGHTLY
COMMUNITY
Start: 2021-04-30

## 2021-06-11 RX ORDER — HYDROCODONE BITARTRATE AND ACETAMINOPHEN 10; 325 MG/1; MG/1
1 TABLET ORAL EVERY 6 HOURS PRN
Qty: 28 TABLET | Refills: 0 | Status: SHIPPED | OUTPATIENT
Start: 2021-06-11 | End: 2021-06-23 | Stop reason: SDUPTHER

## 2021-06-11 RX ORDER — PANTOPRAZOLE SODIUM 40 MG/1
40 TABLET, DELAYED RELEASE ORAL EVERY MORNING
COMMUNITY
Start: 2021-04-30

## 2021-06-14 ENCOUNTER — HOSPITAL ENCOUNTER (OUTPATIENT)
Dept: RADIOLOGY | Facility: HOSPITAL | Age: 71
Discharge: HOME | End: 2021-06-14
Attending: INTERNAL MEDICINE
Payer: MEDICARE

## 2021-06-14 DIAGNOSIS — R91.1 LUNG NODULE: ICD-10-CM

## 2021-06-14 PROCEDURE — 71260 CT CHEST WITH CONTRAST: ICD-10-PCS | Mod: 26,,, | Performed by: RADIOLOGY

## 2021-06-14 PROCEDURE — 71260 CT THORAX DX C+: CPT | Mod: TC

## 2021-06-14 PROCEDURE — 71260 CT THORAX DX C+: CPT | Mod: 26,,, | Performed by: RADIOLOGY

## 2021-06-14 PROCEDURE — 25500020 PHARM REV CODE 255

## 2021-06-14 RX ADMIN — IOHEXOL 75 ML: 350 INJECTION, SOLUTION INTRAVENOUS at 10:06

## 2021-06-17 ENCOUNTER — OFFICE VISIT (OUTPATIENT)
Dept: HEMATOLOGY/ONCOLOGY | Facility: CLINIC | Age: 71
End: 2021-06-17
Payer: MEDICARE

## 2021-06-17 VITALS
SYSTOLIC BLOOD PRESSURE: 108 MMHG | BODY MASS INDEX: 22.47 KG/M2 | HEIGHT: 75 IN | RESPIRATION RATE: 18 BRPM | TEMPERATURE: 98 F | WEIGHT: 180.75 LBS | HEART RATE: 65 BPM | DIASTOLIC BLOOD PRESSURE: 68 MMHG | OXYGEN SATURATION: 97 %

## 2021-06-17 DIAGNOSIS — C34.31 MALIGNANT NEOPLASM OF LOWER LOBE OF RIGHT LUNG: Primary | ICD-10-CM

## 2021-06-17 DIAGNOSIS — Z87.891 HISTORY OF TOBACCO ABUSE: ICD-10-CM

## 2021-06-17 PROCEDURE — 3288F PR FALLS RISK ASSESSMENT DOCUMENTED: ICD-10-PCS | Mod: CPTII,S$GLB,, | Performed by: INTERNAL MEDICINE

## 2021-06-17 PROCEDURE — 3008F BODY MASS INDEX DOCD: CPT | Mod: CPTII,S$GLB,, | Performed by: INTERNAL MEDICINE

## 2021-06-17 PROCEDURE — 1125F PR PAIN SEVERITY QUANTIFIED, PAIN PRESENT: ICD-10-PCS | Mod: S$GLB,,, | Performed by: INTERNAL MEDICINE

## 2021-06-17 PROCEDURE — 3288F FALL RISK ASSESSMENT DOCD: CPT | Mod: CPTII,S$GLB,, | Performed by: INTERNAL MEDICINE

## 2021-06-17 PROCEDURE — 99214 OFFICE O/P EST MOD 30 MIN: CPT | Mod: S$GLB,,, | Performed by: INTERNAL MEDICINE

## 2021-06-17 PROCEDURE — 1101F PT FALLS ASSESS-DOCD LE1/YR: CPT | Mod: CPTII,S$GLB,, | Performed by: INTERNAL MEDICINE

## 2021-06-17 PROCEDURE — 1159F MED LIST DOCD IN RCRD: CPT | Mod: S$GLB,,, | Performed by: INTERNAL MEDICINE

## 2021-06-17 PROCEDURE — 99999 PR PBB SHADOW E&M-EST. PATIENT-LVL IV: ICD-10-PCS | Mod: PBBFAC,,, | Performed by: INTERNAL MEDICINE

## 2021-06-17 PROCEDURE — 1125F AMNT PAIN NOTED PAIN PRSNT: CPT | Mod: S$GLB,,, | Performed by: INTERNAL MEDICINE

## 2021-06-17 PROCEDURE — 1101F PR PT FALLS ASSESS DOC 0-1 FALLS W/OUT INJ PAST YR: ICD-10-PCS | Mod: CPTII,S$GLB,, | Performed by: INTERNAL MEDICINE

## 2021-06-17 PROCEDURE — 99999 PR PBB SHADOW E&M-EST. PATIENT-LVL IV: CPT | Mod: PBBFAC,,, | Performed by: INTERNAL MEDICINE

## 2021-06-17 PROCEDURE — 3008F PR BODY MASS INDEX (BMI) DOCUMENTED: ICD-10-PCS | Mod: CPTII,S$GLB,, | Performed by: INTERNAL MEDICINE

## 2021-06-17 PROCEDURE — 99214 PR OFFICE/OUTPT VISIT, EST, LEVL IV, 30-39 MIN: ICD-10-PCS | Mod: S$GLB,,, | Performed by: INTERNAL MEDICINE

## 2021-06-17 PROCEDURE — 1159F PR MEDICATION LIST DOCUMENTED IN MEDICAL RECORD: ICD-10-PCS | Mod: S$GLB,,, | Performed by: INTERNAL MEDICINE

## 2021-06-18 ENCOUNTER — HOSPITAL ENCOUNTER (OUTPATIENT)
Dept: RADIOLOGY | Facility: HOSPITAL | Age: 71
Discharge: HOME | End: 2021-06-18
Attending: PHYSICIAN ASSISTANT
Payer: MEDICARE

## 2021-06-18 DIAGNOSIS — M51.36 DISC DEGENERATION, LUMBAR: ICD-10-CM

## 2021-06-18 DIAGNOSIS — M54.16 LUMBAR RADICULITIS: ICD-10-CM

## 2021-06-18 DIAGNOSIS — Z98.890 HISTORY OF LUMBAR LAMINECTOMY: ICD-10-CM

## 2021-06-18 PROCEDURE — A9585 GADOBUTROL INJECTION: HCPCS | Performed by: PHYSICIAN ASSISTANT

## 2021-06-18 PROCEDURE — 72158 MRI LUMBAR SPINE W/O & W/DYE: CPT | Mod: TC

## 2021-06-18 PROCEDURE — 25500020 PHARM REV CODE 255: Performed by: PHYSICIAN ASSISTANT

## 2021-06-18 RX ORDER — GADOBUTROL 604.72 MG/ML
8.5 INJECTION INTRAVENOUS
Status: COMPLETED | OUTPATIENT
Start: 2021-06-18 | End: 2021-06-18

## 2021-06-18 RX ADMIN — GADOBUTROL 8.5 ML: 604.72 INJECTION INTRAVENOUS at 03:06

## 2021-06-23 ENCOUNTER — OFFICE VISIT (OUTPATIENT)
Dept: ORTHOPEDICS | Facility: CLINIC | Age: 71
End: 2021-06-23
Payer: MEDICARE

## 2021-06-23 VITALS — WEIGHT: 185 LBS | HEART RATE: 69 BPM | BODY MASS INDEX: 23.12 KG/M2 | OXYGEN SATURATION: 99 %

## 2021-06-23 DIAGNOSIS — G89.29 CHRONIC BILATERAL LOW BACK PAIN WITH BILATERAL SCIATICA: ICD-10-CM

## 2021-06-23 DIAGNOSIS — M51.36 DISC DEGENERATION, LUMBAR: ICD-10-CM

## 2021-06-23 DIAGNOSIS — M54.41 CHRONIC BILATERAL LOW BACK PAIN WITH BILATERAL SCIATICA: ICD-10-CM

## 2021-06-23 DIAGNOSIS — M48.061 FORAMINAL STENOSIS OF LUMBAR REGION: Primary | ICD-10-CM

## 2021-06-23 DIAGNOSIS — Z98.890 HISTORY OF LUMBAR LAMINECTOMY: ICD-10-CM

## 2021-06-23 DIAGNOSIS — M54.42 CHRONIC BILATERAL LOW BACK PAIN WITH BILATERAL SCIATICA: ICD-10-CM

## 2021-06-23 DIAGNOSIS — M47.816 LUMBAR FACET ARTHROPATHY: ICD-10-CM

## 2021-06-23 DIAGNOSIS — M54.16 LUMBAR RADICULITIS: ICD-10-CM

## 2021-06-23 PROCEDURE — 1159F MED LIST DOCD IN RCRD: CPT | Mod: S$GLB,,, | Performed by: ORTHOPAEDIC SURGERY

## 2021-06-23 PROCEDURE — 99213 PR OFFICE/OUTPT VISIT, EST, LEVL III, 20-29 MIN: ICD-10-PCS | Mod: S$GLB,,, | Performed by: ORTHOPAEDIC SURGERY

## 2021-06-23 PROCEDURE — 1159F PR MEDICATION LIST DOCUMENTED IN MEDICAL RECORD: ICD-10-PCS | Mod: S$GLB,,, | Performed by: ORTHOPAEDIC SURGERY

## 2021-06-23 PROCEDURE — 99213 OFFICE O/P EST LOW 20 MIN: CPT | Mod: S$GLB,,, | Performed by: ORTHOPAEDIC SURGERY

## 2021-06-23 PROCEDURE — 3288F FALL RISK ASSESSMENT DOCD: CPT | Mod: S$GLB,,, | Performed by: ORTHOPAEDIC SURGERY

## 2021-06-23 PROCEDURE — 3008F PR BODY MASS INDEX (BMI) DOCUMENTED: ICD-10-PCS | Mod: S$GLB,,, | Performed by: ORTHOPAEDIC SURGERY

## 2021-06-23 PROCEDURE — 3008F BODY MASS INDEX DOCD: CPT | Mod: S$GLB,,, | Performed by: ORTHOPAEDIC SURGERY

## 2021-06-23 PROCEDURE — 1101F PR PT FALLS ASSESS DOC 0-1 FALLS W/OUT INJ PAST YR: ICD-10-PCS | Mod: S$GLB,,, | Performed by: ORTHOPAEDIC SURGERY

## 2021-06-23 PROCEDURE — 3288F PR FALLS RISK ASSESSMENT DOCUMENTED: ICD-10-PCS | Mod: S$GLB,,, | Performed by: ORTHOPAEDIC SURGERY

## 2021-06-23 PROCEDURE — 1101F PT FALLS ASSESS-DOCD LE1/YR: CPT | Mod: S$GLB,,, | Performed by: ORTHOPAEDIC SURGERY

## 2021-06-23 RX ORDER — FLUTICASONE PROPIONATE 50 MCG
1 SPRAY, SUSPENSION (ML) NASAL
COMMUNITY
Start: 2021-06-16 | End: 2021-12-13

## 2021-06-23 RX ORDER — HYDROCODONE BITARTRATE AND ACETAMINOPHEN 10; 325 MG/1; MG/1
1 TABLET ORAL EVERY 6 HOURS PRN
Qty: 28 TABLET | Refills: 0 | Status: SHIPPED | OUTPATIENT
Start: 2021-06-23 | End: 2021-06-30

## 2021-06-23 RX ORDER — LEVOFLOXACIN 750 MG/1
750 TABLET ORAL
COMMUNITY
Start: 2021-06-16 | End: 2021-07-02

## 2021-08-30 ENCOUNTER — PATIENT MESSAGE (OUTPATIENT)
Dept: INFUSION THERAPY | Facility: HOSPITAL | Age: 71
End: 2021-08-30

## 2021-09-20 ENCOUNTER — LAB VISIT (OUTPATIENT)
Dept: LAB | Facility: HOSPITAL | Age: 71
End: 2021-09-20
Attending: INTERNAL MEDICINE
Payer: MEDICARE

## 2021-09-20 DIAGNOSIS — Z87.891 HISTORY OF TOBACCO ABUSE: ICD-10-CM

## 2021-09-20 DIAGNOSIS — C34.31 MALIGNANT NEOPLASM OF LOWER LOBE OF RIGHT LUNG: ICD-10-CM

## 2021-09-20 LAB
ALBUMIN SERPL BCP-MCNC: 4.1 G/DL (ref 3.5–5.2)
ALP SERPL-CCNC: 119 U/L (ref 55–135)
ALT SERPL W/O P-5'-P-CCNC: 16 U/L (ref 10–44)
ANION GAP SERPL CALC-SCNC: 10 MMOL/L (ref 8–16)
AST SERPL-CCNC: 17 U/L (ref 10–40)
BASOPHILS # BLD AUTO: 0.04 K/UL (ref 0–0.2)
BASOPHILS NFR BLD: 0.5 % (ref 0–1.9)
BILIRUB SERPL-MCNC: 0.3 MG/DL (ref 0.1–1)
BUN SERPL-MCNC: 30 MG/DL (ref 8–23)
CALCIUM SERPL-MCNC: 10.3 MG/DL (ref 8.7–10.5)
CHLORIDE SERPL-SCNC: 104 MMOL/L (ref 95–110)
CO2 SERPL-SCNC: 22 MMOL/L (ref 23–29)
CREAT SERPL-MCNC: 1.5 MG/DL (ref 0.5–1.4)
DIFFERENTIAL METHOD: ABNORMAL
EOSINOPHIL # BLD AUTO: 0.2 K/UL (ref 0–0.5)
EOSINOPHIL NFR BLD: 2 % (ref 0–8)
ERYTHROCYTE [DISTWIDTH] IN BLOOD BY AUTOMATED COUNT: 15 % (ref 11.5–14.5)
EST. GFR  (AFRICAN AMERICAN): 54 ML/MIN/1.73 M^2
EST. GFR  (NON AFRICAN AMERICAN): 47 ML/MIN/1.73 M^2
GLUCOSE SERPL-MCNC: 94 MG/DL (ref 70–110)
HCT VFR BLD AUTO: 40.6 % (ref 40–54)
HGB BLD-MCNC: 13 G/DL (ref 14–18)
IMM GRANULOCYTES # BLD AUTO: 0.01 K/UL (ref 0–0.04)
IMM GRANULOCYTES NFR BLD AUTO: 0.1 % (ref 0–0.5)
LYMPHOCYTES # BLD AUTO: 3.1 K/UL (ref 1–4.8)
LYMPHOCYTES NFR BLD: 41 % (ref 18–48)
MCH RBC QN AUTO: 27.2 PG (ref 27–31)
MCHC RBC AUTO-ENTMCNC: 32 G/DL (ref 32–36)
MCV RBC AUTO: 85 FL (ref 82–98)
MONOCYTES # BLD AUTO: 0.7 K/UL (ref 0.3–1)
MONOCYTES NFR BLD: 9 % (ref 4–15)
NEUTROPHILS # BLD AUTO: 3.6 K/UL (ref 1.8–7.7)
NEUTROPHILS NFR BLD: 47.4 % (ref 38–73)
NRBC BLD-RTO: 0 /100 WBC
PLATELET # BLD AUTO: 269 K/UL (ref 150–450)
PMV BLD AUTO: 8.5 FL (ref 9.2–12.9)
POTASSIUM SERPL-SCNC: 4.7 MMOL/L (ref 3.5–5.1)
PROT SERPL-MCNC: 7.5 G/DL (ref 6–8.4)
RBC # BLD AUTO: 4.78 M/UL (ref 4.6–6.2)
SODIUM SERPL-SCNC: 136 MMOL/L (ref 136–145)
WBC # BLD AUTO: 7.63 K/UL (ref 3.9–12.7)

## 2021-09-20 PROCEDURE — 85025 COMPLETE CBC W/AUTO DIFF WBC: CPT | Performed by: INTERNAL MEDICINE

## 2021-09-20 PROCEDURE — 80053 COMPREHEN METABOLIC PANEL: CPT | Performed by: INTERNAL MEDICINE

## 2021-09-20 PROCEDURE — 36415 COLL VENOUS BLD VENIPUNCTURE: CPT | Performed by: INTERNAL MEDICINE

## 2021-09-23 ENCOUNTER — OFFICE VISIT (OUTPATIENT)
Dept: HEMATOLOGY/ONCOLOGY | Facility: CLINIC | Age: 71
End: 2021-09-23
Payer: MEDICARE

## 2021-09-23 VITALS
HEART RATE: 65 BPM | BODY MASS INDEX: 23.58 KG/M2 | HEIGHT: 75 IN | SYSTOLIC BLOOD PRESSURE: 127 MMHG | TEMPERATURE: 98 F | WEIGHT: 189.63 LBS | DIASTOLIC BLOOD PRESSURE: 71 MMHG | OXYGEN SATURATION: 97 % | RESPIRATION RATE: 17 BRPM

## 2021-09-23 DIAGNOSIS — C34.91 SQUAMOUS CELL CARCINOMA LUNG, RIGHT: ICD-10-CM

## 2021-09-23 DIAGNOSIS — Z09 ONCOLOGY FOLLOW-UP ENCOUNTER: ICD-10-CM

## 2021-09-23 DIAGNOSIS — C34.31 MALIGNANT NEOPLASM OF LOWER LOBE OF RIGHT LUNG: Primary | ICD-10-CM

## 2021-09-23 DIAGNOSIS — Z12.89 ENCOUNTER FOR SCREENING FOR MALIGNANT NEOPLASM OF OTHER SITES: ICD-10-CM

## 2021-09-23 DIAGNOSIS — C34.90 MALIGNANT NEOPLASM OF UNSPECIFIED PART OF UNSPECIFIED BRONCHUS OR LUNG: ICD-10-CM

## 2021-09-23 PROCEDURE — 99214 PR OFFICE/OUTPT VISIT, EST, LEVL IV, 30-39 MIN: ICD-10-PCS | Mod: S$GLB,,, | Performed by: INTERNAL MEDICINE

## 2021-09-23 PROCEDURE — 99214 OFFICE O/P EST MOD 30 MIN: CPT | Mod: S$GLB,,, | Performed by: INTERNAL MEDICINE

## 2021-09-23 PROCEDURE — 1101F PR PT FALLS ASSESS DOC 0-1 FALLS W/OUT INJ PAST YR: ICD-10-PCS | Mod: CPTII,S$GLB,, | Performed by: INTERNAL MEDICINE

## 2021-09-23 PROCEDURE — 1159F PR MEDICATION LIST DOCUMENTED IN MEDICAL RECORD: ICD-10-PCS | Mod: CPTII,S$GLB,, | Performed by: INTERNAL MEDICINE

## 2021-09-23 PROCEDURE — 3074F SYST BP LT 130 MM HG: CPT | Mod: CPTII,S$GLB,, | Performed by: INTERNAL MEDICINE

## 2021-09-23 PROCEDURE — 3078F PR MOST RECENT DIASTOLIC BLOOD PRESSURE < 80 MM HG: ICD-10-PCS | Mod: CPTII,S$GLB,, | Performed by: INTERNAL MEDICINE

## 2021-09-23 PROCEDURE — 3288F FALL RISK ASSESSMENT DOCD: CPT | Mod: CPTII,S$GLB,, | Performed by: INTERNAL MEDICINE

## 2021-09-23 PROCEDURE — 1101F PT FALLS ASSESS-DOCD LE1/YR: CPT | Mod: CPTII,S$GLB,, | Performed by: INTERNAL MEDICINE

## 2021-09-23 PROCEDURE — 3078F DIAST BP <80 MM HG: CPT | Mod: CPTII,S$GLB,, | Performed by: INTERNAL MEDICINE

## 2021-09-23 PROCEDURE — 1125F PR PAIN SEVERITY QUANTIFIED, PAIN PRESENT: ICD-10-PCS | Mod: CPTII,S$GLB,, | Performed by: INTERNAL MEDICINE

## 2021-09-23 PROCEDURE — 3008F PR BODY MASS INDEX (BMI) DOCUMENTED: ICD-10-PCS | Mod: CPTII,S$GLB,, | Performed by: INTERNAL MEDICINE

## 2021-09-23 PROCEDURE — 99999 PR PBB SHADOW E&M-EST. PATIENT-LVL IV: CPT | Mod: PBBFAC,,, | Performed by: INTERNAL MEDICINE

## 2021-09-23 PROCEDURE — 99999 PR PBB SHADOW E&M-EST. PATIENT-LVL IV: ICD-10-PCS | Mod: PBBFAC,,, | Performed by: INTERNAL MEDICINE

## 2021-09-23 PROCEDURE — 3074F PR MOST RECENT SYSTOLIC BLOOD PRESSURE < 130 MM HG: ICD-10-PCS | Mod: CPTII,S$GLB,, | Performed by: INTERNAL MEDICINE

## 2021-09-23 PROCEDURE — 1125F AMNT PAIN NOTED PAIN PRSNT: CPT | Mod: CPTII,S$GLB,, | Performed by: INTERNAL MEDICINE

## 2021-09-23 PROCEDURE — 1159F MED LIST DOCD IN RCRD: CPT | Mod: CPTII,S$GLB,, | Performed by: INTERNAL MEDICINE

## 2021-09-23 PROCEDURE — 3008F BODY MASS INDEX DOCD: CPT | Mod: CPTII,S$GLB,, | Performed by: INTERNAL MEDICINE

## 2021-09-23 PROCEDURE — 3288F PR FALLS RISK ASSESSMENT DOCUMENTED: ICD-10-PCS | Mod: CPTII,S$GLB,, | Performed by: INTERNAL MEDICINE

## 2022-01-04 ENCOUNTER — HOSPITAL ENCOUNTER (OUTPATIENT)
Dept: RADIOLOGY | Facility: HOSPITAL | Age: 72
Discharge: HOME | End: 2022-01-04
Attending: INTERNAL MEDICINE
Payer: MEDICARE

## 2022-01-04 DIAGNOSIS — Z12.89 ENCOUNTER FOR SCREENING FOR MALIGNANT NEOPLASM OF OTHER SITES: ICD-10-CM

## 2022-01-04 DIAGNOSIS — C34.90 MALIGNANT NEOPLASM OF UNSPECIFIED PART OF UNSPECIFIED BRONCHUS OR LUNG: ICD-10-CM

## 2022-01-04 PROCEDURE — 74177 CT ABD & PELVIS W/CONTRAST: CPT | Mod: TC

## 2022-01-04 PROCEDURE — 71260 CT THORAX DX C+: CPT | Mod: TC

## 2022-01-04 PROCEDURE — 74177 CT ABD & PELVIS W/CONTRAST: CPT | Mod: 26,,, | Performed by: RADIOLOGY

## 2022-01-04 PROCEDURE — A9698 NON-RAD CONTRAST MATERIALNOC: HCPCS

## 2022-01-04 PROCEDURE — 74177 CT CHEST ABDOMEN PELVIS WITH CONTRAST (XPD): ICD-10-PCS | Mod: 26,,, | Performed by: RADIOLOGY

## 2022-01-04 PROCEDURE — 71260 CT CHEST ABDOMEN PELVIS WITH CONTRAST (XPD): ICD-10-PCS | Mod: 26,,, | Performed by: RADIOLOGY

## 2022-01-04 PROCEDURE — 25500020 PHARM REV CODE 255

## 2022-01-04 PROCEDURE — 71260 CT THORAX DX C+: CPT | Mod: 26,,, | Performed by: RADIOLOGY

## 2022-01-04 RX ADMIN — IOHEXOL 75 ML: 350 INJECTION, SOLUTION INTRAVENOUS at 12:01

## 2022-01-04 RX ADMIN — IOHEXOL 1000 ML: 9 SOLUTION ORAL at 12:01

## 2022-01-06 ENCOUNTER — OFFICE VISIT (OUTPATIENT)
Dept: HEMATOLOGY/ONCOLOGY | Facility: CLINIC | Age: 72
End: 2022-01-06
Payer: MEDICARE

## 2022-01-06 VITALS
BODY MASS INDEX: 24.48 KG/M2 | HEIGHT: 75 IN | TEMPERATURE: 98 F | HEART RATE: 72 BPM | WEIGHT: 196.88 LBS | RESPIRATION RATE: 17 BRPM | OXYGEN SATURATION: 97 % | SYSTOLIC BLOOD PRESSURE: 127 MMHG | DIASTOLIC BLOOD PRESSURE: 76 MMHG

## 2022-01-06 DIAGNOSIS — Z71.2 ENCOUNTER TO DISCUSS TEST RESULTS: ICD-10-CM

## 2022-01-06 DIAGNOSIS — C34.31 MALIGNANT NEOPLASM OF LOWER LOBE OF RIGHT LUNG: Primary | ICD-10-CM

## 2022-01-06 DIAGNOSIS — T81.82XS SUBCUTANEOUS EMPHYSEMA RESULTING FROM A PROCEDURE, SEQUELA: ICD-10-CM

## 2022-01-06 DIAGNOSIS — Z87.891 HISTORY OF TOBACCO ABUSE: ICD-10-CM

## 2022-01-06 PROCEDURE — 3078F DIAST BP <80 MM HG: CPT | Mod: CPTII,S$GLB,, | Performed by: INTERNAL MEDICINE

## 2022-01-06 PROCEDURE — 1126F AMNT PAIN NOTED NONE PRSNT: CPT | Mod: CPTII,S$GLB,, | Performed by: INTERNAL MEDICINE

## 2022-01-06 PROCEDURE — 3074F PR MOST RECENT SYSTOLIC BLOOD PRESSURE < 130 MM HG: ICD-10-PCS | Mod: CPTII,S$GLB,, | Performed by: INTERNAL MEDICINE

## 2022-01-06 PROCEDURE — 3008F BODY MASS INDEX DOCD: CPT | Mod: CPTII,S$GLB,, | Performed by: INTERNAL MEDICINE

## 2022-01-06 PROCEDURE — 1126F PR PAIN SEVERITY QUANTIFIED, NO PAIN PRESENT: ICD-10-PCS | Mod: CPTII,S$GLB,, | Performed by: INTERNAL MEDICINE

## 2022-01-06 PROCEDURE — 1159F MED LIST DOCD IN RCRD: CPT | Mod: CPTII,S$GLB,, | Performed by: INTERNAL MEDICINE

## 2022-01-06 PROCEDURE — 3078F PR MOST RECENT DIASTOLIC BLOOD PRESSURE < 80 MM HG: ICD-10-PCS | Mod: CPTII,S$GLB,, | Performed by: INTERNAL MEDICINE

## 2022-01-06 PROCEDURE — 3008F PR BODY MASS INDEX (BMI) DOCUMENTED: ICD-10-PCS | Mod: CPTII,S$GLB,, | Performed by: INTERNAL MEDICINE

## 2022-01-06 PROCEDURE — 1101F PR PT FALLS ASSESS DOC 0-1 FALLS W/OUT INJ PAST YR: ICD-10-PCS | Mod: CPTII,S$GLB,, | Performed by: INTERNAL MEDICINE

## 2022-01-06 PROCEDURE — 99214 PR OFFICE/OUTPT VISIT, EST, LEVL IV, 30-39 MIN: ICD-10-PCS | Mod: S$GLB,,, | Performed by: INTERNAL MEDICINE

## 2022-01-06 PROCEDURE — 99999 PR PBB SHADOW E&M-EST. PATIENT-LVL V: ICD-10-PCS | Mod: PBBFAC,,, | Performed by: INTERNAL MEDICINE

## 2022-01-06 PROCEDURE — 99214 OFFICE O/P EST MOD 30 MIN: CPT | Mod: S$GLB,,, | Performed by: INTERNAL MEDICINE

## 2022-01-06 PROCEDURE — 3288F FALL RISK ASSESSMENT DOCD: CPT | Mod: CPTII,S$GLB,, | Performed by: INTERNAL MEDICINE

## 2022-01-06 PROCEDURE — 99999 PR PBB SHADOW E&M-EST. PATIENT-LVL V: CPT | Mod: PBBFAC,,, | Performed by: INTERNAL MEDICINE

## 2022-01-06 PROCEDURE — 1101F PT FALLS ASSESS-DOCD LE1/YR: CPT | Mod: CPTII,S$GLB,, | Performed by: INTERNAL MEDICINE

## 2022-01-06 PROCEDURE — 3288F PR FALLS RISK ASSESSMENT DOCUMENTED: ICD-10-PCS | Mod: CPTII,S$GLB,, | Performed by: INTERNAL MEDICINE

## 2022-01-06 PROCEDURE — 3074F SYST BP LT 130 MM HG: CPT | Mod: CPTII,S$GLB,, | Performed by: INTERNAL MEDICINE

## 2022-01-06 PROCEDURE — 1159F PR MEDICATION LIST DOCUMENTED IN MEDICAL RECORD: ICD-10-PCS | Mod: CPTII,S$GLB,, | Performed by: INTERNAL MEDICINE

## 2022-01-06 NOTE — PROGRESS NOTES
HPI    Initial consult 70 years old  male who was presented to Oncology Clinic for evaluation with squamous cell carcinoma of the lung.  Patient was recently diagnosed in January with fine-needle aspiration of the right lung nodule was found to have squamous cell carcinoma.  And recently patient had a which resection(by description) at [facility].  Since then patient was instructed to follow up with our medical oncologist for evaluation and treatment.  Patient was told that doing the which resection tumor is larger than expected compared to the images.  It might be present as late stage I or early stage II.      Patient completed adjuvant therapy July 2019 without any interruption.  Currently patient is on observation per NCCN guideline.    Interval follow-up  No acute events.        Past Medical History:   Diagnosis Date    Arthritis     Cancer SKIN    Cardiac angina     COPD (chronic obstructive pulmonary disease)     Coronary artery disease ANGINA. HAD  [facility] 8/12. 40 % BLOCKAGE. DR RUBY IS CARDIOLOGIST.    Depression     GERD (gastroesophageal reflux disease)     Kidney cysts     Lung cancer 01/2019    Pathological staging [id number] stage IIa with pleural invasion present - s/p RLLectomy    MVP (mitral valve prolapse)     Trigger thumb     BILAT    Wears dentures     UPPER    Wears glasses      Social History     Socioeconomic History    Marital status:    Tobacco Use    Smoking status: Former Smoker     Years: 40.00     Quit date: 1/2/2019     Years since quitting: 3.0    Smokeless tobacco: Never Used    Tobacco comment: 1-2 CIGT SOME DAYS/states last cigarette 1/2/19   Substance and Sexual Activity    Alcohol use: Yes     Comment: beer on occasion    Drug use: No    Sexual activity: Yes     Partners: Female       Objective    Physical Exam     Vitals:    01/06/22 0928   BP: 127/76   Pulse: 72   Resp: 17   Temp: 98 °F (36.7 °C)       Constitutional: patient is  oriented to person, place, and time. patient appears well-developed and well-nourished. No distress.   HENT:  Normocephalic atraumatic pupils equal round reactive to light extraocular muscle intact. No evidence of lesions on exam.  Cardiovascular:  Regular rate and rhythm     Pulmonary/Chest:  Symmetrical chest a rising  Abdominal:  Soft nontender nondistended bowel sounds x4   Musculoskeletal: Normal range of motion. Gait is normal No clubbing, cyanosis     Lymphadenopathy:  No evidence of lymphadenopathy  Neurological:  Sensorimotor grossly intact cranial nerves 2-12 grossly intact.    Skin:  Warm dry no rash no lesions   Psychiatric:  Normal affect    CMP  Sodium   Date Value Ref Range Status   01/04/2022 142 136 - 145 mmol/L Final     Potassium   Date Value Ref Range Status   01/04/2022 4.8 3.5 - 5.1 mmol/L Final     Chloride   Date Value Ref Range Status   01/04/2022 109 95 - 110 mmol/L Final     CO2   Date Value Ref Range Status   01/04/2022 22 (L) 23 - 29 mmol/L Final     Glucose   Date Value Ref Range Status   01/04/2022 126 (H) 70 - 110 mg/dL Final     BUN   Date Value Ref Range Status   01/04/2022 23 8 - 23 mg/dL Final     Creatinine   Date Value Ref Range Status   01/04/2022 1.4 0.5 - 1.4 mg/dL Final   01/18/2013 0.9 0.5 - 1.4 mg/dL Final     Calcium   Date Value Ref Range Status   01/04/2022 10.1 8.7 - 10.5 mg/dL Final   01/18/2013 10.7 (H) 8.7 - 10.5 mg/dL Final     Total Protein   Date Value Ref Range Status   01/04/2022 7.3 6.0 - 8.4 g/dL Final     Albumin   Date Value Ref Range Status   01/04/2022 3.9 3.5 - 5.2 g/dL Final     Total Bilirubin   Date Value Ref Range Status   01/04/2022 0.3 0.1 - 1.0 mg/dL Final     Comment:     For infants and newborns, interpretation of results should be based  on gestational age, weight and in agreement with clinical  observations.    Premature Infant recommended reference ranges:  Up to 24 hours.............<8.0 mg/dL  Up to 48 hours............<12.0 mg/dL  3-5  days..................<15.0 mg/dL  6-29 days.................<15.0 mg/dL       Alkaline Phosphatase   Date Value Ref Range Status   01/04/2022 123 55 - 135 U/L Final     AST   Date Value Ref Range Status   01/04/2022 18 10 - 40 U/L Final     ALT   Date Value Ref Range Status   01/04/2022 25 10 - 44 U/L Final     Anion Gap   Date Value Ref Range Status   01/04/2022 11 8 - 16 mmol/L Final   01/18/2013 11 5 - 15 meq/L Final     eGFR if    Date Value Ref Range Status   01/04/2022 58 (A) >60 mL/min/1.73 m^2 Final     eGFR if non    Date Value Ref Range Status   01/04/2022 51 (A) >60 mL/min/1.73 m^2 Final     Comment:     Calculation used to obtain the estimated glomerular filtration  rate (eGFR) is the CKD-EPI equation.        Lab Results   Component Value Date    WBC 6.80 01/04/2022    HGB 13.1 (L) 01/04/2022    HCT 41.3 01/04/2022    MCV 86 01/04/2022     01/04/2022       CT chest with contrast 12/07/2020  Impression:     Findings are not significantly changed compared to the prior exam including     Chest; emphysematous changes, reticulation, bronchiectasis, small scattered nodular foci     Visualized upper abdomen low-density lesion in the liver again suggesting a cyst not significantly changed and septated right renal cyst not appearing significantly changed as visualized.    Assessment Plan    [] 03/30/2019 LUNG, RIGHT, FINE NEEDLE ASPIRATION:  - POSITIVE FOR SQUAMOUS CELL CARCINOMA    Status post lung resection, resection total size of tumor is greater than on images suspecting early stage II involvement.     Pathological staging [id number] stage IIa with pleural invasion present  -completed Adjuvant Cisplatin and docetaxel q 21 days for 4 cycles on June 2019 without major complication or interruptions     CT chest 12/07/2020 no significant change to study.  No evidence malignancies however it dose demonstrate emphysematous change.      CT chest with contrast 06/14/2021  Status  post partial right pneumonectomy.  Severe emphysema.  Bilateral pulmonary nodules without detrimental change.  Largest is a 1.1 cm sub solid right upper lobe opacity.  Additional follow-up in 1 year is recommended.     Partially imaged right renal cyst with imaged portion demonstrating mild complexity.  This has been previously imaged by CT abdomen 03/30/2019, with the imaged portion demonstrating no significant change.    > RTC 6 months with labs     [] Reviewed medications today.  No significant changes.    [] Former smoker.  Stop smoking x2 year.    [] M*Modal dictations.

## 2022-07-05 ENCOUNTER — LAB VISIT (OUTPATIENT)
Dept: LAB | Facility: HOSPITAL | Age: 72
End: 2022-07-05
Attending: INTERNAL MEDICINE
Payer: MEDICARE

## 2022-07-05 DIAGNOSIS — C34.31 MALIGNANT NEOPLASM OF LOWER LOBE OF RIGHT LUNG: ICD-10-CM

## 2022-07-05 DIAGNOSIS — Z71.2 ENCOUNTER TO DISCUSS TEST RESULTS: ICD-10-CM

## 2022-07-05 DIAGNOSIS — Z87.891 HISTORY OF TOBACCO ABUSE: ICD-10-CM

## 2022-07-05 DIAGNOSIS — T81.82XS SUBCUTANEOUS EMPHYSEMA RESULTING FROM A PROCEDURE, SEQUELA: ICD-10-CM

## 2022-07-05 LAB
ALBUMIN SERPL BCP-MCNC: 3.9 G/DL (ref 3.5–5.2)
ALP SERPL-CCNC: 126 U/L (ref 55–135)
ALT SERPL W/O P-5'-P-CCNC: 19 U/L (ref 10–44)
ANION GAP SERPL CALC-SCNC: 8 MMOL/L (ref 8–16)
AST SERPL-CCNC: 18 U/L (ref 10–40)
BASOPHILS # BLD AUTO: 0.04 K/UL (ref 0–0.2)
BASOPHILS NFR BLD: 0.5 % (ref 0–1.9)
BILIRUB SERPL-MCNC: 0.4 MG/DL (ref 0.1–1)
BUN SERPL-MCNC: 17 MG/DL (ref 8–23)
CALCIUM SERPL-MCNC: 9.7 MG/DL (ref 8.7–10.5)
CHLORIDE SERPL-SCNC: 106 MMOL/L (ref 95–110)
CO2 SERPL-SCNC: 25 MMOL/L (ref 23–29)
CREAT SERPL-MCNC: 1.5 MG/DL (ref 0.5–1.4)
DIFFERENTIAL METHOD: ABNORMAL
EOSINOPHIL # BLD AUTO: 0.2 K/UL (ref 0–0.5)
EOSINOPHIL NFR BLD: 3.1 % (ref 0–8)
ERYTHROCYTE [DISTWIDTH] IN BLOOD BY AUTOMATED COUNT: 14.3 % (ref 11.5–14.5)
EST. GFR  (AFRICAN AMERICAN): 54 ML/MIN/1.73 M^2
EST. GFR  (NON AFRICAN AMERICAN): 46 ML/MIN/1.73 M^2
GLUCOSE SERPL-MCNC: 121 MG/DL (ref 70–110)
HCT VFR BLD AUTO: 40.4 % (ref 40–54)
HGB BLD-MCNC: 13.7 G/DL (ref 14–18)
IMM GRANULOCYTES # BLD AUTO: 0.02 K/UL (ref 0–0.04)
IMM GRANULOCYTES NFR BLD AUTO: 0.3 % (ref 0–0.5)
LYMPHOCYTES # BLD AUTO: 3.5 K/UL (ref 1–4.8)
LYMPHOCYTES NFR BLD: 45.3 % (ref 18–48)
MCH RBC QN AUTO: 27.8 PG (ref 27–31)
MCHC RBC AUTO-ENTMCNC: 33.9 G/DL (ref 32–36)
MCV RBC AUTO: 82 FL (ref 82–98)
MONOCYTES # BLD AUTO: 0.7 K/UL (ref 0.3–1)
MONOCYTES NFR BLD: 8.7 % (ref 4–15)
NEUTROPHILS # BLD AUTO: 3.2 K/UL (ref 1.8–7.7)
NEUTROPHILS NFR BLD: 42.1 % (ref 38–73)
NRBC BLD-RTO: 0 /100 WBC
PLATELET # BLD AUTO: 252 K/UL (ref 150–450)
PMV BLD AUTO: 8.2 FL (ref 9.2–12.9)
POTASSIUM SERPL-SCNC: 4.1 MMOL/L (ref 3.5–5.1)
PROT SERPL-MCNC: 7.1 G/DL (ref 6–8.4)
RBC # BLD AUTO: 4.92 M/UL (ref 4.6–6.2)
SODIUM SERPL-SCNC: 139 MMOL/L (ref 136–145)
WBC # BLD AUTO: 7.66 K/UL (ref 3.9–12.7)

## 2022-07-05 PROCEDURE — 36415 COLL VENOUS BLD VENIPUNCTURE: CPT | Performed by: INTERNAL MEDICINE

## 2022-07-05 PROCEDURE — 80053 COMPREHEN METABOLIC PANEL: CPT | Performed by: INTERNAL MEDICINE

## 2022-07-05 PROCEDURE — 85025 COMPLETE CBC W/AUTO DIFF WBC: CPT | Performed by: INTERNAL MEDICINE

## 2022-07-05 NOTE — PROGRESS NOTES
HPI    Initial consult 70 years old  male who was presented to Oncology Clinic for evaluation with squamous cell carcinoma of the lung.  Patient was recently diagnosed in January with fine-needle aspiration of the right lung nodule was found to have squamous cell carcinoma.  And recently patient had a which resection(by description) at [facility].  Since then patient was instructed to follow up with our medical oncologist for evaluation and treatment.  Patient was told that doing the which resection tumor is larger than expected compared to the images.  It might be present as late stage I or early stage II.      Patient completed adjuvant therapy July 2019 without any interruption.  Currently patient is on observation per NCCN guideline.    Interval follow-up  No acute events.        Past Medical History:   Diagnosis Date    Arthritis     Cancer SKIN    Cardiac angina     COPD (chronic obstructive pulmonary disease)     Coronary artery disease ANGINA. HAD  [facility] 8/12. 40 % BLOCKAGE. DR RUBY IS CARDIOLOGIST.    Depression     GERD (gastroesophageal reflux disease)     Kidney cysts     Lung cancer 01/2019    Pathological staging [id number] stage IIa with pleural invasion present - s/p RLLectomy    MVP (mitral valve prolapse)     Trigger thumb     BILAT    Wears dentures     UPPER    Wears glasses      Social History     Socioeconomic History    Marital status:    Tobacco Use    Smoking status: Former Smoker     Years: 40.00     Quit date: 1/2/2019     Years since quitting: 3.5    Smokeless tobacco: Never Used    Tobacco comment: 1-2 CIGT SOME DAYS/states last cigarette 1/2/19   Substance and Sexual Activity    Alcohol use: Yes     Comment: beer on occasion    Drug use: No    Sexual activity: Yes     Partners: Female       Objective    Physical Exam     There were no vitals filed for this visit.    Constitutional: patient is oriented to person, place, and time. patient  appears well-developed and well-nourished. No distress.   HENT:  Normocephalic atraumatic pupils equal round reactive to light extraocular muscle intact. No evidence of lesions on exam.  Cardiovascular:  Regular rate and rhythm     Pulmonary/Chest:  Symmetrical chest a rising  Abdominal:  Soft nontender nondistended bowel sounds x4   Musculoskeletal: Normal range of motion. Gait is normal No clubbing, cyanosis     Lymphadenopathy:  No evidence of lymphadenopathy  Neurological:  Sensorimotor grossly intact cranial nerves 2-12 grossly intact.    Skin:  Warm dry no rash no lesions   Psychiatric:  Normal affect    CMP  Sodium   Date Value Ref Range Status   07/05/2022 139 136 - 145 mmol/L Final     Potassium   Date Value Ref Range Status   07/05/2022 4.1 3.5 - 5.1 mmol/L Final     Chloride   Date Value Ref Range Status   07/05/2022 106 95 - 110 mmol/L Final     CO2   Date Value Ref Range Status   07/05/2022 25 23 - 29 mmol/L Final     Glucose   Date Value Ref Range Status   07/05/2022 121 (H) 70 - 110 mg/dL Final     BUN   Date Value Ref Range Status   07/05/2022 17 8 - 23 mg/dL Final     Creatinine   Date Value Ref Range Status   07/05/2022 1.5 (H) 0.5 - 1.4 mg/dL Final   01/18/2013 0.9 0.5 - 1.4 mg/dL Final     Calcium   Date Value Ref Range Status   07/05/2022 9.7 8.7 - 10.5 mg/dL Final   01/18/2013 10.7 (H) 8.7 - 10.5 mg/dL Final     Total Protein   Date Value Ref Range Status   07/05/2022 7.1 6.0 - 8.4 g/dL Final     Albumin   Date Value Ref Range Status   07/05/2022 3.9 3.5 - 5.2 g/dL Final     Total Bilirubin   Date Value Ref Range Status   07/05/2022 0.4 0.1 - 1.0 mg/dL Final     Comment:     For infants and newborns, interpretation of results should be based  on gestational age, weight and in agreement with clinical  observations.    Premature Infant recommended reference ranges:  Up to 24 hours.............<8.0 mg/dL  Up to 48 hours............<12.0 mg/dL  3-5 days..................<15.0 mg/dL  6-29  days.................<15.0 mg/dL       Alkaline Phosphatase   Date Value Ref Range Status   07/05/2022 126 55 - 135 U/L Final     AST   Date Value Ref Range Status   07/05/2022 18 10 - 40 U/L Final     ALT   Date Value Ref Range Status   07/05/2022 19 10 - 44 U/L Final     Anion Gap   Date Value Ref Range Status   07/05/2022 8 8 - 16 mmol/L Final   01/18/2013 11 5 - 15 meq/L Final     eGFR if    Date Value Ref Range Status   07/05/2022 54 (A) >60 mL/min/1.73 m^2 Final     eGFR if non    Date Value Ref Range Status   07/05/2022 46 (A) >60 mL/min/1.73 m^2 Final     Comment:     Calculation used to obtain the estimated glomerular filtration  rate (eGFR) is the CKD-EPI equation.        Lab Results   Component Value Date    WBC 7.66 07/05/2022    HGB 13.7 (L) 07/05/2022    HCT 40.4 07/05/2022    MCV 82 07/05/2022     07/05/2022       CT chest with contrast 12/07/2020  Impression:     Findings are not significantly changed compared to the prior exam including     Chest; emphysematous changes, reticulation, bronchiectasis, small scattered nodular foci     Visualized upper abdomen low-density lesion in the liver again suggesting a cyst not significantly changed and septated right renal cyst not appearing significantly changed as visualized.    Assessment Plan    [] 03/30/2019 LUNG, RIGHT, FINE NEEDLE ASPIRATION:  - POSITIVE FOR SQUAMOUS CELL CARCINOMA    Status post lung resection, resection total size of tumor is greater than on images suspecting early stage II involvement.     Pathological staging [id number] stage IIa with pleural invasion present  -completed Adjuvant Cisplatin and docetaxel q 21 days for 4 cycles on June 2019 without major complication or interruptions     CT chest 12/07/2020 no significant change to study.  No evidence malignancies however it dose demonstrate emphysematous change.      CT chest with contrast 06/14/2021  Status post partial right pneumonectomy.  Severe  emphysema.  Bilateral pulmonary nodules without detrimental change.  Largest is a 1.1 cm sub solid right upper lobe opacity.  Additional follow-up in 1 year is recommended.     Partially imaged right renal cyst with imaged portion demonstrating mild complexity.  This has been previously imaged by CT abdomen 03/30/2019, with the imaged portion demonstrating no significant change.    > RTC 6 months with labs + scan     [] Reviewed medications today.  No significant changes.    [] Former smoker.  Stop smoking x 3 year.    [] M*Modal dictations.

## 2022-07-06 ENCOUNTER — OFFICE VISIT (OUTPATIENT)
Dept: HEMATOLOGY/ONCOLOGY | Facility: CLINIC | Age: 72
End: 2022-07-06
Payer: MEDICARE

## 2022-07-06 ENCOUNTER — TELEPHONE (OUTPATIENT)
Dept: HEMATOLOGY/ONCOLOGY | Facility: CLINIC | Age: 72
End: 2022-07-06

## 2022-07-06 VITALS
DIASTOLIC BLOOD PRESSURE: 79 MMHG | OXYGEN SATURATION: 98 % | RESPIRATION RATE: 18 BRPM | HEIGHT: 75 IN | HEART RATE: 82 BPM | SYSTOLIC BLOOD PRESSURE: 127 MMHG | BODY MASS INDEX: 23.41 KG/M2 | WEIGHT: 188.25 LBS | TEMPERATURE: 98 F

## 2022-07-06 DIAGNOSIS — C34.31 MALIGNANT NEOPLASM OF LOWER LOBE OF RIGHT LUNG: Primary | ICD-10-CM

## 2022-07-06 DIAGNOSIS — Z09 ONCOLOGY FOLLOW-UP ENCOUNTER: ICD-10-CM

## 2022-07-06 PROCEDURE — 3078F PR MOST RECENT DIASTOLIC BLOOD PRESSURE < 80 MM HG: ICD-10-PCS | Mod: CPTII,S$GLB,, | Performed by: INTERNAL MEDICINE

## 2022-07-06 PROCEDURE — 1126F PR PAIN SEVERITY QUANTIFIED, NO PAIN PRESENT: ICD-10-PCS | Mod: CPTII,S$GLB,, | Performed by: INTERNAL MEDICINE

## 2022-07-06 PROCEDURE — 1159F MED LIST DOCD IN RCRD: CPT | Mod: CPTII,S$GLB,, | Performed by: INTERNAL MEDICINE

## 2022-07-06 PROCEDURE — 3078F DIAST BP <80 MM HG: CPT | Mod: CPTII,S$GLB,, | Performed by: INTERNAL MEDICINE

## 2022-07-06 PROCEDURE — 3074F SYST BP LT 130 MM HG: CPT | Mod: CPTII,S$GLB,, | Performed by: INTERNAL MEDICINE

## 2022-07-06 PROCEDURE — 1101F PR PT FALLS ASSESS DOC 0-1 FALLS W/OUT INJ PAST YR: ICD-10-PCS | Mod: CPTII,S$GLB,, | Performed by: INTERNAL MEDICINE

## 2022-07-06 PROCEDURE — 1159F PR MEDICATION LIST DOCUMENTED IN MEDICAL RECORD: ICD-10-PCS | Mod: CPTII,S$GLB,, | Performed by: INTERNAL MEDICINE

## 2022-07-06 PROCEDURE — 1126F AMNT PAIN NOTED NONE PRSNT: CPT | Mod: CPTII,S$GLB,, | Performed by: INTERNAL MEDICINE

## 2022-07-06 PROCEDURE — 99999 PR PBB SHADOW E&M-EST. PATIENT-LVL IV: CPT | Mod: PBBFAC,,, | Performed by: INTERNAL MEDICINE

## 2022-07-06 PROCEDURE — 1101F PT FALLS ASSESS-DOCD LE1/YR: CPT | Mod: CPTII,S$GLB,, | Performed by: INTERNAL MEDICINE

## 2022-07-06 PROCEDURE — 99999 PR PBB SHADOW E&M-EST. PATIENT-LVL IV: ICD-10-PCS | Mod: PBBFAC,,, | Performed by: INTERNAL MEDICINE

## 2022-07-06 PROCEDURE — 3288F FALL RISK ASSESSMENT DOCD: CPT | Mod: CPTII,S$GLB,, | Performed by: INTERNAL MEDICINE

## 2022-07-06 PROCEDURE — 3008F BODY MASS INDEX DOCD: CPT | Mod: CPTII,S$GLB,, | Performed by: INTERNAL MEDICINE

## 2022-07-06 PROCEDURE — 99213 OFFICE O/P EST LOW 20 MIN: CPT | Mod: S$GLB,,, | Performed by: INTERNAL MEDICINE

## 2022-07-06 PROCEDURE — 3074F PR MOST RECENT SYSTOLIC BLOOD PRESSURE < 130 MM HG: ICD-10-PCS | Mod: CPTII,S$GLB,, | Performed by: INTERNAL MEDICINE

## 2022-07-06 PROCEDURE — 3008F PR BODY MASS INDEX (BMI) DOCUMENTED: ICD-10-PCS | Mod: CPTII,S$GLB,, | Performed by: INTERNAL MEDICINE

## 2022-07-06 PROCEDURE — 3288F PR FALLS RISK ASSESSMENT DOCUMENTED: ICD-10-PCS | Mod: CPTII,S$GLB,, | Performed by: INTERNAL MEDICINE

## 2022-07-06 PROCEDURE — 99213 PR OFFICE/OUTPT VISIT, EST, LEVL III, 20-29 MIN: ICD-10-PCS | Mod: S$GLB,,, | Performed by: INTERNAL MEDICINE

## 2022-07-06 NOTE — TELEPHONE ENCOUNTER
Scheduled patient in late November per Dr. Jones verbal orders.       ----- Message from Kamron Jones MD sent at 7/6/2022  9:08 AM CDT -----  RTC 6 month with lab + scan

## 2023-01-13 ENCOUNTER — TELEPHONE (OUTPATIENT)
Dept: HEMATOLOGY/ONCOLOGY | Facility: CLINIC | Age: 73
End: 2023-01-13
Payer: MEDICARE

## 2023-01-13 NOTE — TELEPHONE ENCOUNTER
Outgoing call to patient. Rescheduled his missed CT scan, lab and MD appointment. Pt verbalized understanding.     ----- Message from Tonja Rodas sent at 1/13/2023  1:13 PM CST -----  Regarding: pt called  Name of Who is Calling:    Pt called      What is the request in detail the pt had to cancel there cat scan and would to reschedule.  please advise         Can the clinic reply by [facility]: no          What [facility] to [facility] Back [facility] [phone] (home)

## 2023-01-18 ENCOUNTER — HOSPITAL ENCOUNTER (OUTPATIENT)
Dept: RADIOLOGY | Facility: HOSPITAL | Age: 73
Discharge: HOME | End: 2023-01-18
Attending: INTERNAL MEDICINE
Payer: MEDICARE

## 2023-01-18 DIAGNOSIS — Z09 ONCOLOGY FOLLOW-UP ENCOUNTER: ICD-10-CM

## 2023-01-18 DIAGNOSIS — C34.31 MALIGNANT NEOPLASM OF LOWER LOBE OF RIGHT LUNG: ICD-10-CM

## 2023-01-18 PROCEDURE — 71250 CT CHEST WITHOUT CONTRAST: ICD-10-PCS | Mod: 26,,, | Performed by: RADIOLOGY

## 2023-01-18 PROCEDURE — 71250 CT THORAX DX C-: CPT | Mod: TC

## 2023-01-18 PROCEDURE — 71250 CT THORAX DX C-: CPT | Mod: 26,,, | Performed by: RADIOLOGY

## 2023-01-20 ENCOUNTER — OFFICE VISIT (OUTPATIENT)
Dept: HEMATOLOGY/ONCOLOGY | Facility: CLINIC | Age: 73
End: 2023-01-20
Payer: MEDICARE

## 2023-01-20 VITALS
HEIGHT: 75 IN | RESPIRATION RATE: 12 BRPM | WEIGHT: 180.31 LBS | SYSTOLIC BLOOD PRESSURE: 132 MMHG | TEMPERATURE: 96 F | HEART RATE: 101 BPM | BODY MASS INDEX: 22.42 KG/M2 | DIASTOLIC BLOOD PRESSURE: 85 MMHG | OXYGEN SATURATION: 99 %

## 2023-01-20 DIAGNOSIS — Z71.2 ENCOUNTER TO DISCUSS TEST RESULTS: ICD-10-CM

## 2023-01-20 DIAGNOSIS — J43.9 PULMONARY EMPHYSEMA, UNSPECIFIED EMPHYSEMA TYPE: ICD-10-CM

## 2023-01-20 DIAGNOSIS — C34.31 MALIGNANT NEOPLASM OF LOWER LOBE OF RIGHT LUNG: Primary | ICD-10-CM

## 2023-01-20 PROCEDURE — 99214 PR OFFICE/OUTPT VISIT, EST, LEVL IV, 30-39 MIN: ICD-10-PCS | Mod: S$GLB,,, | Performed by: INTERNAL MEDICINE

## 2023-01-20 PROCEDURE — 1101F PT FALLS ASSESS-DOCD LE1/YR: CPT | Mod: CPTII,S$GLB,, | Performed by: INTERNAL MEDICINE

## 2023-01-20 PROCEDURE — 1125F PR PAIN SEVERITY QUANTIFIED, PAIN PRESENT: ICD-10-PCS | Mod: CPTII,S$GLB,, | Performed by: INTERNAL MEDICINE

## 2023-01-20 PROCEDURE — 1159F PR MEDICATION LIST DOCUMENTED IN MEDICAL RECORD: ICD-10-PCS | Mod: CPTII,S$GLB,, | Performed by: INTERNAL MEDICINE

## 2023-01-20 PROCEDURE — 3288F FALL RISK ASSESSMENT DOCD: CPT | Mod: CPTII,S$GLB,, | Performed by: INTERNAL MEDICINE

## 2023-01-20 PROCEDURE — 1125F AMNT PAIN NOTED PAIN PRSNT: CPT | Mod: CPTII,S$GLB,, | Performed by: INTERNAL MEDICINE

## 2023-01-20 PROCEDURE — 3075F PR MOST RECENT SYSTOLIC BLOOD PRESS GE 130-139MM HG: ICD-10-PCS | Mod: CPTII,S$GLB,, | Performed by: INTERNAL MEDICINE

## 2023-01-20 PROCEDURE — 3079F PR MOST RECENT DIASTOLIC BLOOD PRESSURE 80-89 MM HG: ICD-10-PCS | Mod: CPTII,S$GLB,, | Performed by: INTERNAL MEDICINE

## 2023-01-20 PROCEDURE — 3008F BODY MASS INDEX DOCD: CPT | Mod: CPTII,S$GLB,, | Performed by: INTERNAL MEDICINE

## 2023-01-20 PROCEDURE — 99999 PR PBB SHADOW E&M-EST. PATIENT-LVL IV: CPT | Mod: PBBFAC,,, | Performed by: INTERNAL MEDICINE

## 2023-01-20 PROCEDURE — 3075F SYST BP GE 130 - 139MM HG: CPT | Mod: CPTII,S$GLB,, | Performed by: INTERNAL MEDICINE

## 2023-01-20 PROCEDURE — 3288F PR FALLS RISK ASSESSMENT DOCUMENTED: ICD-10-PCS | Mod: CPTII,S$GLB,, | Performed by: INTERNAL MEDICINE

## 2023-01-20 PROCEDURE — 99214 OFFICE O/P EST MOD 30 MIN: CPT | Mod: S$GLB,,, | Performed by: INTERNAL MEDICINE

## 2023-01-20 PROCEDURE — 3079F DIAST BP 80-89 MM HG: CPT | Mod: CPTII,S$GLB,, | Performed by: INTERNAL MEDICINE

## 2023-01-20 PROCEDURE — 1101F PR PT FALLS ASSESS DOC 0-1 FALLS W/OUT INJ PAST YR: ICD-10-PCS | Mod: CPTII,S$GLB,, | Performed by: INTERNAL MEDICINE

## 2023-01-20 PROCEDURE — 99999 PR PBB SHADOW E&M-EST. PATIENT-LVL IV: ICD-10-PCS | Mod: PBBFAC,,, | Performed by: INTERNAL MEDICINE

## 2023-01-20 PROCEDURE — 3008F PR BODY MASS INDEX (BMI) DOCUMENTED: ICD-10-PCS | Mod: CPTII,S$GLB,, | Performed by: INTERNAL MEDICINE

## 2023-01-20 PROCEDURE — 1159F MED LIST DOCD IN RCRD: CPT | Mod: CPTII,S$GLB,, | Performed by: INTERNAL MEDICINE

## 2023-01-20 NOTE — PROGRESS NOTES
HPI    Initial consult 70 years old  male who was presented to Oncology Clinic for evaluation with squamous cell carcinoma of the lung.  Patient was recently diagnosed in January with fine-needle aspiration of the right lung nodule was found to have squamous cell carcinoma.  And recently patient had a which resection(by description) at [facility].  Since then patient was instructed to follow up with our medical oncologist for evaluation and treatment.  Patient was told that doing the which resection tumor is larger than expected compared to the images.  It might be present as late stage I or early stage II.      Patient completed adjuvant therapy 2019 without any interruption.  Currently patient is on observation per NCCN guideline.    Interval follow-up  No acute events.        Past Medical History:   Diagnosis Date    Arthritis     Cancer SKIN    Cardiac angina     COPD (chronic obstructive pulmonary disease)     Coronary artery disease ANGINA. HAD  [facility] . 40 % BLOCKAGE. DR RUBY IS CARDIOLOGIST.    Depression     GERD (gastroesophageal reflux disease)     Kidney cysts     Lung cancer 2019    Pathological staging [id number] stage IIa with pleural invasion present - s/p RLLectomy    MVP (mitral valve prolapse)     Trigger thumb     BILAT    Wears dentures     UPPER    Wears glasses      Social History     Socioeconomic History    Marital status:    Tobacco Use    Smoking status: Former     Years: 40.00     Types: Cigarettes     Quit date: 2019     Years since quittin.0    Smokeless tobacco: Never    Tobacco comments:     1-2 CIGT SOME DAYS/states last cigarette 20   Substance and Sexual Activity    Alcohol use: Yes     Comment: beer on occasion    Drug use: No    Sexual activity: Yes     Partners: Female       Objective    Physical Exam     Vitals:    23 1336   BP: 132/85   Pulse: 101   Resp: 12   Temp: (!) 95.5 °F (35.3 °C)       Constitutional: patient is  oriented to person, place, and time. patient appears well-developed and well-nourished. No distress.   HENT:  Normocephalic atraumatic pupils equal round reactive to light extraocular muscle intact. No evidence of lesions on exam.  Cardiovascular:  Regular rate and rhythm     Pulmonary/Chest:  Symmetrical chest a rising  Abdominal:  Soft nontender nondistended bowel sounds x4   Musculoskeletal: Normal range of motion. Gait is normal No clubbing, cyanosis     Lymphadenopathy:  No evidence of lymphadenopathy  Neurological:  Sensorimotor grossly intact cranial nerves 2-12 grossly intact.    Skin:  Warm dry no rash no lesions   Psychiatric:  Normal affect    CMP  Sodium   Date Value Ref Range Status   01/18/2023 139 136 - 145 mmol/L Final     Potassium   Date Value Ref Range Status   01/18/2023 4.3 3.5 - 5.1 mmol/L Final     Chloride   Date Value Ref Range Status   01/18/2023 107 95 - 110 mmol/L Final     CO2   Date Value Ref Range Status   01/18/2023 23 23 - 29 mmol/L Final     Glucose   Date Value Ref Range Status   01/18/2023 110 70 - 110 mg/dL Final     BUN   Date Value Ref Range Status   01/18/2023 18 8 - 23 mg/dL Final     Creatinine   Date Value Ref Range Status   01/18/2023 1.2 0.5 - 1.4 mg/dL Final   01/18/2013 0.9 0.5 - 1.4 mg/dL Final     Calcium   Date Value Ref Range Status   01/18/2023 9.8 8.7 - 10.5 mg/dL Final   01/18/2013 10.7 (H) 8.7 - 10.5 mg/dL Final     Total Protein   Date Value Ref Range Status   01/18/2023 7.0 6.0 - 8.4 g/dL Final     Albumin   Date Value Ref Range Status   01/18/2023 3.9 3.5 - 5.2 g/dL Final     Total Bilirubin   Date Value Ref Range Status   01/18/2023 0.3 0.1 - 1.0 mg/dL Final     Comment:     For infants and newborns, interpretation of results should be based  on gestational age, weight and in agreement with clinical  observations.    Premature Infant recommended reference ranges:  Up to 24 hours.............<8.0 mg/dL  Up to 48 hours............<12.0 mg/dL  3-5  days..................<15.0 mg/dL  6-29 days.................<15.0 mg/dL       Alkaline Phosphatase   Date Value Ref Range Status   01/18/2023 110 55 - 135 U/L Final     AST   Date Value Ref Range Status   01/18/2023 15 10 - 40 U/L Final     ALT   Date Value Ref Range Status   01/18/2023 14 10 - 44 U/L Final     Anion Gap   Date Value Ref Range Status   01/18/2023 9 8 - 16 mmol/L Final   01/18/2013 11 5 - 15 meq/L Final     eGFR if    Date Value Ref Range Status   07/05/2022 54 (A) >60 mL/min/1.73 m^2 Final     eGFR if non    Date Value Ref Range Status   07/05/2022 46 (A) >60 mL/min/1.73 m^2 Final     Comment:     Calculation used to obtain the estimated glomerular filtration  rate (eGFR) is the CKD-EPI equation.        Lab Results   Component Value Date    WBC 8.04 01/18/2023    HGB 13.0 (L) 01/18/2023    HCT 40.6 01/18/2023    MCV 86 01/18/2023     01/18/2023       CT chest with contrast 12/07/2020  Impression:     Findings are not significantly changed compared to the prior exam including     Chest; emphysematous changes, reticulation, bronchiectasis, small scattered nodular foci     Visualized upper abdomen low-density lesion in the liver again suggesting a cyst not significantly changed and septated right renal cyst not appearing significantly changed as visualized.    Assessment Plan    [] 03/30/2019 LUNG, RIGHT, FINE NEEDLE ASPIRATION:  - POSITIVE FOR SQUAMOUS CELL CARCINOMA    Status post lung resection, resection total size of tumor is greater than on images suspecting early stage II involvement.     Pathological staging [id number] stage IIa with pleural invasion present  -completed Adjuvant Cisplatin and docetaxel q 21 days for 4 cycles on June 2019 without major complication or interruptions     CT chest 12/07/2020 no significant change to study.  No evidence malignancies however it dose demonstrate emphysematous change.      CT chest with contrast 06/14/2021  Status  post partial right pneumonectomy.  Severe emphysema.  Bilateral pulmonary nodules without detrimental change.  Largest is a 1.1 cm sub solid right upper lobe opacity.  Additional follow-up in 1 year is recommended.     Partially imaged right renal cyst with imaged portion demonstrating mild complexity.  This has been previously imaged by CT abdomen 03/30/2019, with the imaged portion demonstrating no significant change.    > RTC 6 months with labs + scan     [] Reviewed medications today.  No significant changes.    [] Former smoker.  Stop smoking x 3 year.    [] M*Modal dictations.

## 2023-02-14 ENCOUNTER — PATIENT MESSAGE (OUTPATIENT)
Dept: HEMATOLOGY/ONCOLOGY | Facility: CLINIC | Age: 73
End: 2023-02-14
Payer: MEDICARE

## 2023-04-04 ENCOUNTER — HOSPITAL ENCOUNTER (OUTPATIENT)
Facility: HOSPITAL | Age: 73
Discharge: HOME | End: 2023-04-05
Attending: EMERGENCY MEDICINE | Admitting: STUDENT IN AN ORGANIZED HEALTH CARE EDUCATION/TRAINING PROGRAM
Payer: MEDICARE

## 2023-04-04 DIAGNOSIS — R07.9 CHEST PAIN: ICD-10-CM

## 2023-04-04 DIAGNOSIS — I25.119 CHEST PAIN DUE TO CORONARY ARTERY DISEASE: ICD-10-CM

## 2023-04-04 PROBLEM — R73.09 ELEVATED GLUCOSE: Status: ACTIVE | Noted: 2023-04-04

## 2023-04-04 PROBLEM — Z85.118 HISTORY OF LUNG CANCER: Status: ACTIVE | Noted: 2023-04-04

## 2023-04-04 PROBLEM — J44.9 COPD WITHOUT EXACERBATION: Status: ACTIVE | Noted: 2019-11-21

## 2023-04-04 LAB
ALBUMIN SERPL BCP-MCNC: 3.6 G/DL (ref 3.5–5.2)
ALP SERPL-CCNC: 75 U/L (ref 55–135)
ALT SERPL W/O P-5'-P-CCNC: 21 U/L (ref 10–44)
ANION GAP SERPL CALC-SCNC: 6 MMOL/L (ref 8–16)
AST SERPL-CCNC: 20 U/L (ref 10–40)
BASOPHILS # BLD AUTO: 0.03 K/UL (ref 0–0.2)
BASOPHILS NFR BLD: 0.5 % (ref 0–1.9)
BILIRUB SERPL-MCNC: 0.3 MG/DL (ref 0.1–1)
BNP SERPL-MCNC: 7 PG/ML (ref 0–99)
BUN SERPL-MCNC: 25 MG/DL (ref 8–23)
CALCIUM SERPL-MCNC: 9.3 MG/DL (ref 8.7–10.5)
CHLORIDE SERPL-SCNC: 106 MMOL/L (ref 95–110)
CO2 SERPL-SCNC: 22 MMOL/L (ref 23–29)
CREAT SERPL-MCNC: 1.3 MG/DL (ref 0.5–1.4)
DIFFERENTIAL METHOD: ABNORMAL
EOSINOPHIL # BLD AUTO: 0.1 K/UL (ref 0–0.5)
EOSINOPHIL NFR BLD: 0.9 % (ref 0–8)
ERYTHROCYTE [DISTWIDTH] IN BLOOD BY AUTOMATED COUNT: 14.2 % (ref 11.5–14.5)
EST. GFR  (NO RACE VARIABLE): 58.7 ML/MIN/1.73 M^2
GLUCOSE SERPL-MCNC: 116 MG/DL (ref 70–110)
HCT VFR BLD AUTO: 37.3 % (ref 40–54)
HGB BLD-MCNC: 12.4 G/DL (ref 14–18)
IMM GRANULOCYTES # BLD AUTO: 0.04 K/UL (ref 0–0.04)
IMM GRANULOCYTES NFR BLD AUTO: 0.6 % (ref 0–0.5)
LYMPHOCYTES # BLD AUTO: 2.7 K/UL (ref 1–4.8)
LYMPHOCYTES NFR BLD: 42 % (ref 18–48)
MAGNESIUM SERPL-MCNC: 1.6 MG/DL (ref 1.6–2.6)
MCH RBC QN AUTO: 28.2 PG (ref 27–31)
MCHC RBC AUTO-ENTMCNC: 33.2 G/DL (ref 32–36)
MCV RBC AUTO: 85 FL (ref 82–98)
MONOCYTES # BLD AUTO: 0.6 K/UL (ref 0.3–1)
MONOCYTES NFR BLD: 8.6 % (ref 4–15)
NEUTROPHILS # BLD AUTO: 3 K/UL (ref 1.8–7.7)
NEUTROPHILS NFR BLD: 47.4 % (ref 38–73)
NRBC BLD-RTO: 0 /100 WBC
PLATELET # BLD AUTO: 211 K/UL (ref 150–450)
PMV BLD AUTO: 8.1 FL (ref 9.2–12.9)
POTASSIUM SERPL-SCNC: 4.1 MMOL/L (ref 3.5–5.1)
PROT SERPL-MCNC: 6.2 G/DL (ref 6–8.4)
RBC # BLD AUTO: 4.39 M/UL (ref 4.6–6.2)
SODIUM SERPL-SCNC: 134 MMOL/L (ref 136–145)
TROPONIN I SERPL HS-MCNC: 3.4 PG/ML (ref 0–14.9)
WBC # BLD AUTO: 6.38 K/UL (ref 3.9–12.7)

## 2023-04-04 PROCEDURE — 85025 COMPLETE CBC W/AUTO DIFF WBC: CPT | Performed by: EMERGENCY MEDICINE

## 2023-04-04 PROCEDURE — 83880 ASSAY OF NATRIURETIC PEPTIDE: CPT | Performed by: EMERGENCY MEDICINE

## 2023-04-04 PROCEDURE — 93010 EKG 12-LEAD: ICD-10-PCS | Mod: ,,, | Performed by: INTERNAL MEDICINE

## 2023-04-04 PROCEDURE — 25500020 PHARM REV CODE 255: Performed by: EMERGENCY MEDICINE

## 2023-04-04 PROCEDURE — G0378 HOSPITAL OBSERVATION PER HR: HCPCS

## 2023-04-04 PROCEDURE — 83735 ASSAY OF MAGNESIUM: CPT | Performed by: EMERGENCY MEDICINE

## 2023-04-04 PROCEDURE — 93005 ELECTROCARDIOGRAM TRACING: CPT | Performed by: INTERNAL MEDICINE

## 2023-04-04 PROCEDURE — 93010 ELECTROCARDIOGRAM REPORT: CPT | Mod: ,,, | Performed by: INTERNAL MEDICINE

## 2023-04-04 PROCEDURE — 80053 COMPREHEN METABOLIC PANEL: CPT | Performed by: EMERGENCY MEDICINE

## 2023-04-04 PROCEDURE — 25000242 PHARM REV CODE 250 ALT 637 W/ HCPCS: Performed by: EMERGENCY MEDICINE

## 2023-04-04 PROCEDURE — 84484 ASSAY OF TROPONIN QUANT: CPT | Performed by: EMERGENCY MEDICINE

## 2023-04-04 PROCEDURE — 25000003 PHARM REV CODE 250: Performed by: EMERGENCY MEDICINE

## 2023-04-04 PROCEDURE — 99285 EMERGENCY DEPT VISIT HI MDM: CPT | Mod: 25

## 2023-04-04 RX ORDER — CETIRIZINE HYDROCHLORIDE 10 MG/1
10 TABLET ORAL DAILY
Status: DISCONTINUED | OUTPATIENT
Start: 2023-04-05 | End: 2023-04-05 | Stop reason: HOSPADM

## 2023-04-04 RX ORDER — PROCHLORPERAZINE EDISYLATE 5 MG/ML
5 INJECTION INTRAMUSCULAR; INTRAVENOUS EVERY 6 HOURS PRN
Status: DISCONTINUED | OUTPATIENT
Start: 2023-04-04 | End: 2023-04-05 | Stop reason: HOSPADM

## 2023-04-04 RX ORDER — NALOXONE HCL 0.4 MG/ML
0.02 VIAL (ML) INJECTION
Status: DISCONTINUED | OUTPATIENT
Start: 2023-04-04 | End: 2023-04-05 | Stop reason: HOSPADM

## 2023-04-04 RX ORDER — TALC
6 POWDER (GRAM) TOPICAL NIGHTLY PRN
Status: DISCONTINUED | OUTPATIENT
Start: 2023-04-04 | End: 2023-04-05 | Stop reason: HOSPADM

## 2023-04-04 RX ORDER — CYCLOBENZAPRINE HCL 10 MG
10 TABLET ORAL 2 TIMES DAILY PRN
COMMUNITY

## 2023-04-04 RX ORDER — ASPIRIN 81 MG/1
81 TABLET ORAL NIGHTLY
Status: DISCONTINUED | OUTPATIENT
Start: 2023-04-05 | End: 2023-04-05 | Stop reason: HOSPADM

## 2023-04-04 RX ORDER — SIMETHICONE 80 MG
1 TABLET,CHEWABLE ORAL 4 TIMES DAILY PRN
Status: DISCONTINUED | OUTPATIENT
Start: 2023-04-04 | End: 2023-04-05 | Stop reason: HOSPADM

## 2023-04-04 RX ORDER — POLYETHYLENE GLYCOL 3350 17 G/17G
17 POWDER, FOR SOLUTION ORAL 2 TIMES DAILY PRN
Status: DISCONTINUED | OUTPATIENT
Start: 2023-04-04 | End: 2023-04-05 | Stop reason: HOSPADM

## 2023-04-04 RX ORDER — SODIUM CHLORIDE 0.9 % (FLUSH) 0.9 %
10 SYRINGE (ML) INJECTION
Status: DISCONTINUED | OUTPATIENT
Start: 2023-04-04 | End: 2023-04-05 | Stop reason: HOSPADM

## 2023-04-04 RX ORDER — NITROGLYCERIN 0.4 MG/1
0.4 TABLET SUBLINGUAL EVERY 5 MIN PRN
Status: DISCONTINUED | OUTPATIENT
Start: 2023-04-04 | End: 2023-04-04

## 2023-04-04 RX ORDER — IPRATROPIUM BROMIDE AND ALBUTEROL SULFATE 2.5; .5 MG/3ML; MG/3ML
3 SOLUTION RESPIRATORY (INHALATION) EVERY 6 HOURS PRN
Status: DISCONTINUED | OUTPATIENT
Start: 2023-04-04 | End: 2023-04-05 | Stop reason: HOSPADM

## 2023-04-04 RX ORDER — NITROGLYCERIN 0.4 MG/1
0.4 TABLET SUBLINGUAL EVERY 5 MIN PRN
Status: DISCONTINUED | OUTPATIENT
Start: 2023-04-04 | End: 2023-04-05 | Stop reason: HOSPADM

## 2023-04-04 RX ORDER — SERTRALINE HYDROCHLORIDE 50 MG/1
50 TABLET, FILM COATED ORAL DAILY
Status: DISCONTINUED | OUTPATIENT
Start: 2023-04-05 | End: 2023-04-05 | Stop reason: HOSPADM

## 2023-04-04 RX ORDER — ENOXAPARIN SODIUM 100 MG/ML
40 INJECTION SUBCUTANEOUS EVERY 24 HOURS
Status: DISCONTINUED | OUTPATIENT
Start: 2023-04-04 | End: 2023-04-05 | Stop reason: HOSPADM

## 2023-04-04 RX ORDER — ONDANSETRON 2 MG/ML
4 INJECTION INTRAMUSCULAR; INTRAVENOUS EVERY 6 HOURS PRN
Status: DISCONTINUED | OUTPATIENT
Start: 2023-04-04 | End: 2023-04-05 | Stop reason: HOSPADM

## 2023-04-04 RX ORDER — PANTOPRAZOLE SODIUM 40 MG/1
40 TABLET, DELAYED RELEASE ORAL EVERY MORNING
Status: DISCONTINUED | OUTPATIENT
Start: 2023-04-05 | End: 2023-04-05 | Stop reason: HOSPADM

## 2023-04-04 RX ORDER — MAGNESIUM SULFATE HEPTAHYDRATE 40 MG/ML
2 INJECTION, SOLUTION INTRAVENOUS ONCE
Status: COMPLETED | OUTPATIENT
Start: 2023-04-04 | End: 2023-04-05

## 2023-04-04 RX ORDER — HYDROCODONE BITARTRATE AND ACETAMINOPHEN 10; 325 MG/1; MG/1
1 TABLET ORAL EVERY 6 HOURS PRN
Status: DISCONTINUED | OUTPATIENT
Start: 2023-04-04 | End: 2023-04-05 | Stop reason: HOSPADM

## 2023-04-04 RX ORDER — HYDROCODONE BITARTRATE AND ACETAMINOPHEN 5; 325 MG/1; MG/1
1 TABLET ORAL EVERY 6 HOURS PRN
Status: DISCONTINUED | OUTPATIENT
Start: 2023-04-04 | End: 2023-04-05 | Stop reason: HOSPADM

## 2023-04-04 RX ORDER — ACETAMINOPHEN 325 MG/1
650 TABLET ORAL EVERY 4 HOURS PRN
Status: DISCONTINUED | OUTPATIENT
Start: 2023-04-04 | End: 2023-04-05 | Stop reason: HOSPADM

## 2023-04-04 RX ORDER — ATORVASTATIN CALCIUM 40 MG/1
40 TABLET, FILM COATED ORAL NIGHTLY
Status: DISCONTINUED | OUTPATIENT
Start: 2023-04-04 | End: 2023-04-05 | Stop reason: HOSPADM

## 2023-04-04 RX ORDER — CYCLOBENZAPRINE HCL 10 MG
10 TABLET ORAL 2 TIMES DAILY PRN
Status: DISCONTINUED | OUTPATIENT
Start: 2023-04-04 | End: 2023-04-05 | Stop reason: HOSPADM

## 2023-04-04 RX ORDER — ASPIRIN 325 MG
325 TABLET ORAL
Status: COMPLETED | OUTPATIENT
Start: 2023-04-04 | End: 2023-04-04

## 2023-04-04 RX ADMIN — ASPIRIN 325 MG ORAL TABLET 325 MG: 325 PILL ORAL at 07:04

## 2023-04-04 RX ADMIN — NITROGLYCERIN 0.4 MG: 0.4 TABLET SUBLINGUAL at 07:04

## 2023-04-04 RX ADMIN — IOHEXOL 100 ML: 350 INJECTION, SOLUTION INTRAVENOUS at 09:04

## 2023-04-05 ENCOUNTER — CLINICAL SUPPORT (OUTPATIENT)
Dept: CARDIOLOGY | Facility: HOSPITAL | Age: 73
End: 2023-04-05
Payer: MEDICARE

## 2023-04-05 ENCOUNTER — HOSPITAL ENCOUNTER (OUTPATIENT)
Dept: RADIOLOGY | Facility: HOSPITAL | Age: 73
Discharge: HOME | End: 2023-04-05
Payer: MEDICARE

## 2023-04-05 VITALS
OXYGEN SATURATION: 96 % | BODY MASS INDEX: 22.58 KG/M2 | HEART RATE: 72 BPM | HEIGHT: 75 IN | DIASTOLIC BLOOD PRESSURE: 78 MMHG | RESPIRATION RATE: 16 BRPM | TEMPERATURE: 99 F | SYSTOLIC BLOOD PRESSURE: 130 MMHG | WEIGHT: 181.63 LBS

## 2023-04-05 VITALS — BODY MASS INDEX: 22.5 KG/M2 | HEIGHT: 75 IN | WEIGHT: 181 LBS

## 2023-04-05 LAB
ANION GAP SERPL CALC-SCNC: 7 MMOL/L (ref 8–16)
AV INDEX (PROSTH): 0.79
AV MEAN GRADIENT: 3 MMHG
AV PEAK GRADIENT: 5 MMHG
AV VALVE AREA: 2.73 CM2
AV VELOCITY RATIO: 0.75
BASOPHILS # BLD AUTO: 0.03 K/UL (ref 0–0.2)
BASOPHILS NFR BLD: 0.5 % (ref 0–1.9)
BSA FOR ECHO PROCEDURE: 2.08 M2
BUN SERPL-MCNC: 24 MG/DL (ref 8–23)
CALCIUM SERPL-MCNC: 9.6 MG/DL (ref 8.7–10.5)
CHLORIDE SERPL-SCNC: 108 MMOL/L (ref 95–110)
CHOLEST SERPL-MCNC: 165 MG/DL (ref 120–199)
CHOLEST/HDLC SERPL: 2.5 {RATIO} (ref 2–5)
CO2 SERPL-SCNC: 25 MMOL/L (ref 23–29)
CREAT SERPL-MCNC: 1.3 MG/DL (ref 0.5–1.4)
CV ECHO LV RWT: 0.62 CM
CV PHARM DOSE: 0.4 MG
CV STRESS BASE HR: 72 BPM
DIASTOLIC BLOOD PRESSURE: 86 MMHG
DIFFERENTIAL METHOD: ABNORMAL
DOP CALC AO PEAK VEL: 1.16 M/S
DOP CALC AO VTI: 24.2 CM
DOP CALC LVOT AREA: 3.5 CM2
DOP CALC LVOT DIAMETER: 2.1 CM
DOP CALC LVOT PEAK VEL: 0.87 M/S
DOP CALC LVOT STROKE VOLUME: 66.12 CM3
DOP CALC MV VTI: 25.9 CM
DOP CALCLVOT PEAK VEL VTI: 19.1 CM
E WAVE DECELERATION TIME: 266 MSEC
E/A RATIO: 0.7
E/E' RATIO: 6.71 M/S
ECHO LV POSTERIOR WALL: 1.43 CM (ref 0.6–1.1)
EJECTION FRACTION: 55 %
EOSINOPHIL # BLD AUTO: 0.1 K/UL (ref 0–0.5)
EOSINOPHIL NFR BLD: 1.1 % (ref 0–8)
ERYTHROCYTE [DISTWIDTH] IN BLOOD BY AUTOMATED COUNT: 14.3 % (ref 11.5–14.5)
EST. GFR  (NO RACE VARIABLE): 58.7 ML/MIN/1.73 M^2
ESTIMATED AVG GLUCOSE: 134 MG/DL (ref 68–131)
FRACTIONAL SHORTENING: 28 % (ref 28–44)
GLUCOSE SERPL-MCNC: 114 MG/DL (ref 70–110)
HBA1C MFR BLD: 6.3 % (ref 4.5–6.2)
HCT VFR BLD AUTO: 36.2 % (ref 40–54)
HDLC SERPL-MCNC: 67 MG/DL (ref 40–75)
HDLC SERPL: 40.6 % (ref 20–50)
HGB BLD-MCNC: 12.1 G/DL (ref 14–18)
IMM GRANULOCYTES # BLD AUTO: 0.05 K/UL (ref 0–0.04)
IMM GRANULOCYTES NFR BLD AUTO: 0.8 % (ref 0–0.5)
INTERVENTRICULAR SEPTUM: 1.31 CM (ref 0.6–1.1)
IVC DIAMETER: 2.64 CM
LDLC SERPL CALC-MCNC: 88.4 MG/DL (ref 63–159)
LEFT ATRIUM SIZE: 3.4 CM
LEFT INTERNAL DIMENSION IN SYSTOLE: 3.29 CM (ref 2.1–4)
LEFT VENTRICLE DIASTOLIC VOLUME INDEX: 46.33 ML/M2
LEFT VENTRICLE DIASTOLIC VOLUME: 97.3 ML
LEFT VENTRICLE MASS INDEX: 118 G/M2
LEFT VENTRICLE SYSTOLIC VOLUME INDEX: 20.9 ML/M2
LEFT VENTRICLE SYSTOLIC VOLUME: 43.8 ML
LEFT VENTRICULAR INTERNAL DIMENSION IN DIASTOLE: 4.6 CM (ref 3.5–6)
LEFT VENTRICULAR MASS: 248.63 G
LV LATERAL E/E' RATIO: 6.71 M/S
LV SEPTAL E/E' RATIO: 6.71 M/S
LVOT MG: 2 MMHG
LVOT MV: 0.54 CM/S
LYMPHOCYTES # BLD AUTO: 2.9 K/UL (ref 1–4.8)
LYMPHOCYTES NFR BLD: 48 % (ref 18–48)
MAGNESIUM SERPL-MCNC: 2.4 MG/DL (ref 1.6–2.6)
MCH RBC QN AUTO: 28.4 PG (ref 27–31)
MCHC RBC AUTO-ENTMCNC: 33.4 G/DL (ref 32–36)
MCV RBC AUTO: 85 FL (ref 82–98)
MONOCYTES # BLD AUTO: 0.5 K/UL (ref 0.3–1)
MONOCYTES NFR BLD: 8.2 % (ref 4–15)
MV MEAN GRADIENT: 1 MMHG
MV PEAK A VEL: 0.67 M/S
MV PEAK E VEL: 0.47 M/S
MV PEAK GRADIENT: 2 MMHG
MV STENOSIS PRESSURE HALF TIME: 149 MS
MV VALVE AREA BY CONTINUITY EQUATION: 2.55 CM2
MV VALVE AREA P 1/2 METHOD: 1.48 CM2
NEUTROPHILS # BLD AUTO: 2.5 K/UL (ref 1.8–7.7)
NEUTROPHILS NFR BLD: 41.4 % (ref 38–73)
NONHDLC SERPL-MCNC: 98 MG/DL
NRBC BLD-RTO: 0 /100 WBC
OHS CV CPX 1 MINUTE RECOVERY HEART RATE: 85 BPM
OHS CV CPX 85 PERCENT MAX PREDICTED HEART RATE MALE: 127
OHS CV CPX MAX PREDICTED HEART RATE: 149
OHS CV CPX PATIENT IS FEMALE: 0
OHS CV CPX PATIENT IS MALE: 1
OHS CV CPX PEAK DIASTOLIC BLOOD PRESSURE: 84 MMHG
OHS CV CPX PEAK HEAR RATE: 93 BPM
OHS CV CPX PEAK RATE PRESSURE PRODUCT: NORMAL
OHS CV CPX PEAK SYSTOLIC BLOOD PRESSURE: 137 MMHG
OHS CV CPX PERCENT MAX PREDICTED HEART RATE ACHIEVED: 62
OHS CV CPX RATE PRESSURE PRODUCT PRESENTING: NORMAL
PISA TR MAX VEL: 2.14 M/S
PLATELET # BLD AUTO: 217 K/UL (ref 150–450)
PMV BLD AUTO: 8.4 FL (ref 9.2–12.9)
POTASSIUM SERPL-SCNC: 4.5 MMOL/L (ref 3.5–5.1)
PV MV: 0.62 M/S
PV PEAK VELOCITY: 0.98 CM/S
RA PRESSURE: 3 MMHG
RBC # BLD AUTO: 4.26 M/UL (ref 4.6–6.2)
RV TISSUE DOPPLER FREE WALL SYSTOLIC VELOCITY 1 (APICAL 4 CHAMBER VIEW): 0.01 CM/S
SODIUM SERPL-SCNC: 140 MMOL/L (ref 136–145)
SYSTOLIC BLOOD PRESSURE: 139 MMHG
TDI LATERAL: 0.07 M/S
TDI SEPTAL: 0.07 M/S
TDI: 0.07 M/S
TR MAX PG: 18 MMHG
TRICUSPID ANNULAR PLANE SYSTOLIC EXCURSION: 1.85 CM
TRIGL SERPL-MCNC: 48 MG/DL (ref 30–150)
TROPONIN I SERPL HS-MCNC: 3.9 PG/ML (ref 0–14.9)
TROPONIN I SERPL HS-MCNC: 4.5 PG/ML (ref 0–14.9)
TV REST PULMONARY ARTERY PRESSURE: 21 MMHG
WBC # BLD AUTO: 6.1 K/UL (ref 3.9–12.7)

## 2023-04-05 PROCEDURE — 80048 BASIC METABOLIC PNL TOTAL CA: CPT | Performed by: NURSE PRACTITIONER

## 2023-04-05 PROCEDURE — 78452 HT MUSCLE IMAGE SPECT MULT: CPT | Mod: TC

## 2023-04-05 PROCEDURE — 85025 COMPLETE CBC W/AUTO DIFF WBC: CPT | Performed by: NURSE PRACTITIONER

## 2023-04-05 PROCEDURE — 80061 LIPID PANEL: CPT | Performed by: NURSE PRACTITIONER

## 2023-04-05 PROCEDURE — 96366 THER/PROPH/DIAG IV INF ADDON: CPT

## 2023-04-05 PROCEDURE — G0378 HOSPITAL OBSERVATION PER HR: HCPCS

## 2023-04-05 PROCEDURE — 93017 CV STRESS TEST TRACING ONLY: CPT

## 2023-04-05 PROCEDURE — 83036 HEMOGLOBIN GLYCOSYLATED A1C: CPT | Performed by: NURSE PRACTITIONER

## 2023-04-05 PROCEDURE — 93018 NUCLEAR STRESS TEST (CUPID ONLY): ICD-10-PCS | Mod: ,,, | Performed by: INTERNAL MEDICINE

## 2023-04-05 PROCEDURE — 84484 ASSAY OF TROPONIN QUANT: CPT | Performed by: NURSE PRACTITIONER

## 2023-04-05 PROCEDURE — 83735 ASSAY OF MAGNESIUM: CPT | Performed by: NURSE PRACTITIONER

## 2023-04-05 PROCEDURE — 93018 CV STRESS TEST I&R ONLY: CPT | Mod: ,,, | Performed by: INTERNAL MEDICINE

## 2023-04-05 PROCEDURE — 93306 TTE W/DOPPLER COMPLETE: CPT | Mod: 26,,, | Performed by: INTERNAL MEDICINE

## 2023-04-05 PROCEDURE — 96365 THER/PROPH/DIAG IV INF INIT: CPT | Mod: 59

## 2023-04-05 PROCEDURE — 25000003 PHARM REV CODE 250: Performed by: NURSE PRACTITIONER

## 2023-04-05 PROCEDURE — 84484 ASSAY OF TROPONIN QUANT: CPT | Mod: 91 | Performed by: NURSE PRACTITIONER

## 2023-04-05 PROCEDURE — A9502 TC99M TETROFOSMIN: HCPCS

## 2023-04-05 PROCEDURE — 36415 COLL VENOUS BLD VENIPUNCTURE: CPT | Performed by: NURSE PRACTITIONER

## 2023-04-05 PROCEDURE — 93306 TTE W/DOPPLER COMPLETE: CPT

## 2023-04-05 PROCEDURE — 63600175 PHARM REV CODE 636 W HCPCS: Performed by: NURSE PRACTITIONER

## 2023-04-05 PROCEDURE — 93016 CV STRESS TEST SUPVJ ONLY: CPT | Mod: ,,, | Performed by: INTERNAL MEDICINE

## 2023-04-05 PROCEDURE — 63600175 PHARM REV CODE 636 W HCPCS: Performed by: INTERNAL MEDICINE

## 2023-04-05 PROCEDURE — 93306 ECHO (CUPID ONLY): ICD-10-PCS | Mod: 26,,, | Performed by: INTERNAL MEDICINE

## 2023-04-05 PROCEDURE — 93016 NUCLEAR STRESS TEST (CUPID ONLY): ICD-10-PCS | Mod: ,,, | Performed by: INTERNAL MEDICINE

## 2023-04-05 PROCEDURE — 96372 THER/PROPH/DIAG INJ SC/IM: CPT | Mod: 59 | Performed by: NURSE PRACTITIONER

## 2023-04-05 RX ORDER — REGADENOSON 0.08 MG/ML
0.4 INJECTION, SOLUTION INTRAVENOUS
Status: COMPLETED | OUTPATIENT
Start: 2023-04-05 | End: 2023-04-05

## 2023-04-05 RX ADMIN — Medication 6 MG: at 12:04

## 2023-04-05 RX ADMIN — ENOXAPARIN SODIUM 40 MG: 100 INJECTION SUBCUTANEOUS at 12:04

## 2023-04-05 RX ADMIN — TRAZODONE HYDROCHLORIDE 225 MG: 50 TABLET ORAL at 12:04

## 2023-04-05 RX ADMIN — CYCLOBENZAPRINE 10 MG: 10 TABLET, FILM COATED ORAL at 12:04

## 2023-04-05 RX ADMIN — SERTRALINE HYDROCHLORIDE 50 MG: 50 TABLET ORAL at 12:04

## 2023-04-05 RX ADMIN — ATORVASTATIN CALCIUM 40 MG: 40 TABLET, FILM COATED ORAL at 12:04

## 2023-04-05 RX ADMIN — REGADENOSON 0.4 MG: 0.08 INJECTION, SOLUTION INTRAVENOUS at 10:04

## 2023-04-05 RX ADMIN — MAGNESIUM SULFATE HEPTAHYDRATE 2 G: 40 INJECTION, SOLUTION INTRAVENOUS at 12:04

## 2023-04-05 RX ADMIN — CETIRIZINE HYDROCHLORIDE 10 MG: 10 TABLET, FILM COATED ORAL at 12:04

## 2023-04-05 NOTE — PLAN OF CARE
[facility]  Initial Discharge Assessment       Primary Care Provider: Judy Muro MD    Admission Diagnosis: Chest pain [R07.9]    Admission Date: 4/4/2023  Expected Discharge Date:     Discharge Barriers Identified: None    Payor: HU[employer] MANAGED MEDICARE / Plan: HU[employer] MEDICARE HMO / Product Type: Capitation /     Extended Emergency Contact Information  Primary Emergency Contact: Kaye Goode  Address: [address]           [place] [address] [address] [place] [address]  Home Phone: [phone]  Mobile Phone: [phone]  Relation: Daughter  Preferred language: English   needed? No  Secondary Emergency Contact: Charissa Steele  Mobile Phone: [phone]  Relation: Daughter  Preferred language: English   needed? No    Discharge Plan A: Home  Discharge Plan B: Home Health      [id number] - [place] - [address]  [address] [address]  Phone: [phone] Fax: [phone]    [facility] - [address]  [address] [address]  Phone: [phone] Fax: [phone]    Completed DC assessment with patient at bedside. Verified information on facesheet as correct. Denies POA. NOK 3 children. Lives at listed address alone. Reports that his daughter lives about 20 minutes away that he can call if needs help and also has local friends.  PCP is Dr. Nash. Pharm is [facility] at [address]. Denies hh/hd/dme/blood thinners/outpt services. Independent at baseline. Drives himself to [place] and plans to drive himself home upon DC. Back up transportation is his daughter, Charissa. Reports taking all home medications as prescribed and able to afford them. Denies inpt stay in last 30 days. Verified insurance on file. DC plan is home.     Initial Assessment (most recent)       Adult Discharge Assessment - 04/05/23 4302          Discharge Assessment    Assessment Type Discharge Planning Assessment      Confirmed/corrected address, phone number and insurance Yes     Confirmed Demographics Correct on Facesheet     Source of Information patient     Does patient/caregiver understand observation status Yes     Communicated GLEN with patient/caregiver Yes     Reason For Admission chest pain     People in Home alone     Facility Arrived From: ER     Do you expect to return to your current living situation? Yes     Do you have help at home or someone to help you manage your care at home? Yes     Prior to hospitilization cognitive status: Alert/Oriented     Current cognitive status: Alert/Oriented     Equipment Currently Used at Home none     Readmission within 30 days? No     Patient currently being followed by outpatient case management? No     Do you currently have service(s) that help you manage your care at home? No     Do you take prescription medications? Yes     Do you have prescription coverage? Yes     Do you have any problems affording any of your prescribed medications? No     Is the patient taking medications as prescribed? yes     Who is going to help you get home at discharge? self     How do you get to doctors appointments? car, drives self     Are you on dialysis? No     Do you take coumadin? No     Discharge Plan A Home     Discharge Plan B Home Health     DME Needed Upon Discharge  none     Discharge Plan discussed with: Patient     Discharge Barriers Identified None

## 2023-04-05 NOTE — PLAN OF CARE
CM attempted to complete a discharge planning assessment however the pt was having a procedure completed at bedside. CM will return to follow up.     11:20- second attempt made to complete assessment and pt is out of the room- CM will return .   04/05/23 1031   Discharge Assessment   Assessment Type Discharge Planning Assessment

## 2023-04-05 NOTE — ED PROVIDER NOTES
Encounter Date: 4/4/2023       History     Chief Complaint   Patient presents with    Chest Pain     X 20 MIN AGO, TOOK 2 NITRO WITH SOME RELIEF      Chief complaint- is chest pain, it started 45 minutes prior to arrival located in the midsternal area described as pressure radiating to the back and jaw.  Started hurting like a charley horse he states.  He did receive a nitroglycerin at home and another nitroglycerin as well.  Had the pain is mild at the present time but is still present.  He states he had 2 episodes of chest pain similar to this about a month ago 1 was relieved by nitroglycerin the patient states proximally 10 12 days ago he had chest pain that also was relieved by nitroglycerin.  This is unusual for him have the symptoms.  He feels tired and short of breath.  He does have COPD from smoking from age [age].  Takes puffers every day but continues to have slight tightness and shortness of breath.  He did feet lung cancer to the right lung and is cancer free after 4 years after getting a lobectomy and chemo x5 he states.  He takes daily aspirin but takes a low-dose aspirin at night.      Review of patient's allergies indicates:   Allergen Reactions    Penicillins      AS A CHILD - NOT SURE REACTION     Past Medical History:   Diagnosis Date    Arthritis     Cancer SKIN    Cardiac angina     COPD (chronic obstructive pulmonary disease)     Coronary artery disease ANGINA. HAD  [facility] 8/12. 40 % BLOCKAGE. DR RUBY IS CARDIOLOGIST.    Depression     GERD (gastroesophageal reflux disease)     Kidney cysts     Lung cancer 01/2019    Pathological staging [id number] stage IIa with pleural invasion present - s/p RLLectomy    MVP (mitral valve prolapse)     Trigger thumb     BILAT    Wears dentures     UPPER    Wears glasses      Past Surgical History:   Procedure Laterality Date    APPENDECTOMY      BACK SURGERY      INSERTION OF TUNNELED CENTRAL VENOUS CATHETER (CVC) WITH SUBCUTANEOUS PORT Left 4/11/2019     Procedure: [id number];  Surgeon: Reji Griffiths MD;  Location: [facility];  Service: General;  Laterality: Left;    KNEE SURGERY      BILAT    lung resection  2019    TRIGGER THUMB Bilateral      Family History   Problem Relation Age of Onset    COPD Mother     Diabetes Mother     Heart disease Mother     Birth defects Father     Diabetes Father     Heart disease Father      Social History     Tobacco Use    Smoking status: Former     Years: 40.00     Types: Cigarettes     Quit date: 2019     Years since quittin.2    Smokeless tobacco: Never    Tobacco comments:     1-2 CIGT SOME DAYS/states last cigarette 20   Substance Use Topics    Alcohol use: Yes     Comment: beer on occasion    Drug use: No     Review of Systems   Constitutional:  Negative for chills and fever.   HENT:  Negative for ear pain, rhinorrhea and sore throat.    Eyes:  Negative for pain and visual disturbance.   Respiratory:  Negative for cough and shortness of breath.    Cardiovascular:  Positive for chest pain. Negative for palpitations.   Gastrointestinal:  Negative for abdominal pain, constipation, diarrhea, nausea and vomiting.   Genitourinary:  Negative for dysuria, frequency, hematuria and urgency.   Musculoskeletal:  Negative for back pain, joint swelling and myalgias.   Skin:  Negative for rash.   Neurological:  Negative for dizziness, seizures, weakness and headaches.   Psychiatric/Behavioral:  Negative for dysphoric mood. The patient is not nervous/anxious.      Physical Exam     Initial Vitals [23 1903]   BP Pulse Resp Temp SpO2   (!) 158/99 80 18 98.1 °F (36.7 °C) 98 %      MAP       --         Physical Exam    Nursing note and vitals reviewed.  Constitutional: He appears well-developed and well-nourished.   HENT:   Head: Normocephalic and atraumatic.   Eyes: Conjunctivae, EOM and lids are normal. Pupils are equal, round, and reactive to light.   Neck: Trachea normal. Neck supple. No thyroid mass  present.   Cardiovascular:  Normal rate, regular rhythm and normal heart sounds.           Pulmonary/Chest: Breath sounds normal. No respiratory distress.   Abdominal: Abdomen is soft. There is no abdominal tenderness.   Musculoskeletal:         General: Normal range of motion.      Cervical back: Neck supple.     Neurological: He is alert and oriented to person, place, and time. He has normal strength and normal reflexes. No cranial nerve deficit or sensory deficit.   Skin: Skin is warm and dry.   Psychiatric: He has a normal mood and affect. His speech is normal and behavior is normal. Judgment and thought content normal.       ED Course   Procedures  Labs Reviewed   CBC W/ AUTO DIFFERENTIAL - Abnormal; Notable for the following components:       Result Value    RBC 4.39 (*)     Hemoglobin 12.4 (*)     Hematocrit 37.3 (*)     MPV 8.1 (*)     Immature Granulocytes 0.6 (*)     All other components within normal limits   COMPREHENSIVE METABOLIC PANEL - Abnormal; Notable for the following components:    Sodium 134 (*)     CO2 22 (*)     Glucose 116 (*)     BUN 25 (*)     Anion Gap 6 (*)     eGFR 58.7 (*)     All other components within normal limits   MAGNESIUM   TROPONIN I HIGH SENSITIVITY   B-TYPE NATRIURETIC PEPTIDE   TROPONIN I HIGH SENSITIVITY          Imaging Results              CTA Chest Aorta Non Coronary (In process)    Procedure changed from CT Chest With Contrast                    X-Ray Chest AP Portable (In process)                      Medications   nitroGLYCERIN SL tablet 0.4 mg (0.4 mg Sublingual Given 4/4/23 1956)   aspirin tablet 325 mg (325 mg Oral Given 4/4/23 1956)   iohexoL (OMNIPAQUE 350) injection 100 mL (100 mLs Intravenous Given 4/4/23 2114)     Medical Decision Making:   ED Management:  Differential diagnosis for chest pain includes unstable angina, acute MI, cardiac arrhythmia, pericarditis, aortic dissection, pulmonary embolism, pneumothorax, among others.  The patient here had  improvement with nitro and aspirin and will be admitted for evaluation of his chest pain.  Case discussed with the hospitalist and the patient is to be admitted.                        Clinical Impression:   Final diagnoses:  [R07.9] Chest pain               Lenin Vieyra MD  04/04/23 2120

## 2023-04-05 NOTE — PLAN OF CARE
Pt clear for DC from case management standpoint. Discharging to home         04/05/23 1613   Final Note   Assessment Type Final Discharge Note   Anticipated Discharge Disposition Home   Hospital Resources/Appts/Education Provided Appointments scheduled and added to AVS

## 2023-04-05 NOTE — H&P
[facility] - Emergency Dept  Hospital Medicine  History & Physical    Patient Name: Adam Goode  MRN: [id number]  Patient Class: OP- Observation  Admission Date: 4/4/2023  Attending Physician: Dov Feng MD   Primary Care Provider: Judy Muro MD         Patient information was obtained from patient, relative(s), past medical records and ER records.     Subjective:     Principal Problem:Chest pain due to coronary artery disease    Chief Complaint:   Chief Complaint   Patient presents with    Chest Pain     X 20 MIN AGO, TOOK 2 NITRO WITH SOME RELIEF         HPI: Adam Goode Is a 72-year-old male with chronic medical problems including COPD, CAD, depression and history of squamous cell carcinoma of lung who presents to the emergency room complaining of chest pain. Approximately 45 minutes prior to arrival patient had midsternal chest pain radiating to his back and up to his jaws that feels like a tightness and squeezing.  He took a nitroglycerin at home and had some relief.  He was given another nitroglycerin in the emergency room and it resolved.  Associated symptoms are shortness of breath, fatigue and right arm pain for the whole week prior that resolved.  He said the shortness of breath with exertion and fatigue have gotten worse over the last week and he has felt sob at rest today with chest pain.  Similar chest pain has happened twice early in the month, last week and then today. Usually chest pain awakens him from sleep.  He has had a couple of episodes of this in the last 10-15 years and has had two caths in the past with 40% blockages.  He has COPD, quit smoking 4 years ago and rarely has to use a rescue inhaler.  He continues to see Oncology and pulmonology for surveillance lung cancer.  He denies abdominal pain, reflux headaches or dizziness cough or congestion, fevers or chills.    In the ED, BNP 7, initial troponin  3.4, repeat  troponin   , glucose 116, BUN/CR 25/1.3 with  estimated GFR 58.7 serum bicarb 22, sodium 134 potassium 4.1, magnesium 1.6, H&H 12.4/373, WBC 6 point in platelets 211.  vital signs with temperature 98.1, heart rate 80, blood pressure 158/99, respiratory rate 18 and O2 sat 98% on room air.  Chest x-ray pending read but appears clear and CTA of chest with no PE or aneurysm and moderate emphysema.  12 lead EKG with normal sinus rhythm, ventricular rate 74 and .  In the ER he was given 325 mg aspirin and nitroglycerin x1 with relief of chest pain. He said that chest pain returned while in CT but resolved spontaneously. He will be admitted to the hospitalist service for further evaluation and treatment.      Past Medical History:   Diagnosis Date    Arthritis     Cancer SKIN    Cardiac angina     COPD (chronic obstructive pulmonary disease)     Coronary artery disease ANGINA. HAD  [facility] 8/12. 40 % BLOCKAGE. DR RUBY IS CARDIOLOGIST.    Depression     GERD (gastroesophageal reflux disease)     Kidney cysts     Lung cancer 01/2019    Pathological staging [id number] stage IIa with pleural invasion present - s/p RLLectomy    MVP (mitral valve prolapse)     Trigger thumb     BILAT    Wears dentures     UPPER    Wears glasses        Past Surgical History:   Procedure Laterality Date    APPENDECTOMY      BACK SURGERY      INSERTION OF TUNNELED CENTRAL VENOUS CATHETER (CVC) WITH SUBCUTANEOUS PORT Left 4/11/2019    Procedure: [id number];  Surgeon: Reji Griffiths MD;  Location: [facility];  Service: General;  Laterality: Left;    KNEE SURGERY      BILAT    lung resection  02/27/2019    TRIGGER THUMB Bilateral        Review of patient's allergies indicates:   Allergen Reactions    Penicillins      AS A CHILD - NOT SURE REACTION     Scheduled Meds:   [START ON 4/5/2023] aspirin  81 mg Oral QHS    atorvastatin  40 mg Oral QHS    [START ON 4/5/2023] cetirizine  10 mg Oral Daily    enoxaparin  40 mg Subcutaneous Daily    [START ON 4/5/2023] pantoprazole  40 mg Oral  QAM    [START ON 4/5/2023] sertraline  50 mg Oral Daily    [START ON 4/5/2023] tiotropium-olodateroL  2 puff Inhalation Daily    traZODone  225 mg Oral Nightly     Continuous Infusions:  PRN Meds:.acetaminophen, albuterol-ipratropium, cyclobenzaprine, HYDROcodone-acetaminophen, HYDROcodone-acetaminophen, melatonin, naloxone, nitroGLYCERIN, nitroGLYCERIN, ondansetron, polyethylene glycol, prochlorperazine, simethicone, sodium chloride 0.9%      Current Outpatient Medications on File Prior to Encounter   Medication Sig    albuterol (PROVENTIL/VENTOLIN HFA) 90 mcg/actuation inhaler Inhale 2 puffs into the lungs every 6 (six) hours as needed for Wheezing or Shortness of Breath. Rescue    aspirin (ECOTRIN) 81 MG EC tablet Take 81 mg by mouth every evening.    atorvastatin (LIPITOR) 40 MG tablet Take 40 mg by mouth every evening.     cetirizine (ZYRTEC) 10 MG tablet Take 10 mg by mouth once daily.    cyclobenzaprine (FLEXERIL) 10 MG tablet Take 10 mg by mouth 2 (two) times daily as needed for Muscle spasms.    HYDROcodone-acetaminophen (NORCO)  mg per tablet Take 1 tablet by mouth 2 (two) times daily as needed.    multivitamin capsule Take 1 capsule by mouth once daily.    nitroGLYCERIN (NITROSTAT) 0.4 MG SL tablet Place 0.4 mg under the tongue every 5 (five) minutes as needed.    pantoprazole (PROTONIX) 40 MG tablet Take 40 mg by mouth every morning.    sertraline (ZOLOFT) 50 MG tablet Take 50 mg by mouth once daily.    tiotropium-olodateroL (STIOLTO RESPIMAT) 2.5-2.5 mcg/actuation Mist Inhale 2 puffs into the lungs once daily.    traZODone (DESYREL) 150 MG tablet Take 225 mg by mouth nightly.    albuterol sulfate (VENTOLIN INHL) Inhale into the lungs daily as needed.    azelastine (ASTELIN) 137 mcg (0.1 %) nasal spray as needed.    cyclobenzaprine (FLEXERIL) 10 MG tablet TAKE 1 TABLET BY MOUTH THREE TIMES A DAY AS NEEDED FOR MUSCLE SPASMS    FLUZONE HIGH-DOSE 2019-20, PF, 180 mcg/0.5 mL Syrg TO BE ADMINISTERED BY  PHARMACIST FOR IMMUNIZATION     Family History       Problem Relation (Age of Onset)    Birth defects Father    COPD Mother    Diabetes Mother, Father    Heart disease Mother, Father          Tobacco Use    Smoking status: Former     Years: 40.00     Types: Cigarettes     Quit date: 2019     Years since quittin.2    Smokeless tobacco: Never    Tobacco comments:     1-2 CIGT SOME DAYS/states last cigarette 20   Substance and Sexual Activity    Alcohol use: Yes     Comment: beer on occasion    Drug use: No    Sexual activity: Yes     Partners: Female     Review of Systems   All other systems reviewed and are negative.  Twelve point review of systems obtained and negative except as stated above in HPI      Objective:     Vital Signs (Most Recent):  Temp: 98.1 °F (36.7 °C) (23)  Pulse: 73 (23)  Resp: 20 (23)  BP: (!) 101/57 (23)  SpO2: 95 % (23)   Vital Signs (24h Range):  Temp:  [98.1 °F (36.7 °C)] 98.1 °F (36.7 °C)  Pulse:  [73-80] 73  Resp:  [18-20] 20  SpO2:  [95 %-98 %] 95 %  BP: (101-158)/(57-99) 101/57     Weight: 83.9 kg (185 lb)  Body mass index is 23.12 kg/m².    Physical Exam  Vitals and nursing note reviewed.   Constitutional:       General: He is awake. He is not in acute distress.     Appearance: Normal appearance. He is well-groomed. He is not ill-appearing.      Comments: Thin older male, lying in bed awake alert, he is in no acute distress in his a good historian, his daughter is at bedside.   HENT:      Head: Normocephalic.      Right Ear: Hearing and external ear normal.      Left Ear: Hearing and external ear normal.      Nose: Nose normal.      Mouth/Throat:      Lips: Pink.      Mouth: Mucous membranes are moist.   Eyes:      General: Lids are normal. Vision grossly intact. Gaze aligned appropriately.   Cardiovascular:      Rate and Rhythm: Normal rate and regular rhythm.      Pulses: Normal pulses.      Heart sounds: Normal heart  sounds. No murmur heard.  Pulmonary:      Effort: Pulmonary effort is normal.      Breath sounds: Normal breath sounds and air entry. No wheezing, rhonchi or rales.      Comments: No tachypnea or increased work of breathing, he is on room air and O2 sat is 98%.  Chest:      Chest wall: No tenderness.   Abdominal:      General: Abdomen is flat. Bowel sounds are normal. There is no distension.      Palpations: Abdomen is soft.      Tenderness: There is no abdominal tenderness.      Comments: No tenderness to palpation.   Musculoskeletal:         General: Normal range of motion.      Cervical back: Normal range of motion.      Right lower leg: No edema.      Left lower leg: No edema.   Skin:     General: Skin is warm and dry.      Capillary Refill: Capillary refill takes less than 2 seconds.   Neurological:      General: No focal deficit present.      Mental Status: He is alert and oriented to person, place, and time.      GCS: GCS eye subscore is 4. GCS verbal subscore is 5. GCS motor subscore is 6.      Sensory: Sensation is intact.      Motor: Motor function is intact.      Coordination: Coordination is intact.   Psychiatric:         Attention and Perception: Attention and perception normal.         Mood and Affect: Mood and affect normal.         Speech: Speech normal.         Behavior: Behavior normal. Behavior is cooperative.         Thought Content: Thought content normal.         Cognition and Memory: Cognition and memory normal.         Judgment: Judgment normal.           Significant Labs: All pertinent labs within the past 24 hours have been reviewed.    Bilirubin:   Recent Labs   Lab 04/04/23 1947   BILITOT 0.3     BMP:   Recent Labs   Lab 04/04/23 1947   *   *   K 4.1      CO2 22*   BUN 25*   CREATININE 1.3   CALCIUM 9.3   MG 1.6     CBC:   Recent Labs   Lab 04/04/23 1947   WBC 6.38   HGB 12.4*   HCT 37.3*        CMP:   Recent Labs   Lab 04/04/23 1947   *   K 4.1   CL  106   CO2 22*   *   BUN 25*   CREATININE 1.3   CALCIUM 9.3   PROT 6.2   ALBUMIN 3.6   BILITOT 0.3   ALKPHOS 75   AST 20   ALT 21   ANIONGAP 6*     Cardiac Markers:   Recent Labs   Lab 04/04/23 1947   BNP 7     Magnesium:   Recent Labs   Lab 04/04/23 1947   MG 1.6     Troponin:   Recent Labs   Lab 04/04/23 1947   TROPONINIHS 3.4         Significant Imaging: I have reviewed all pertinent imaging results/findings within the past 24 hours.    CTA Chest Aorta Non Coronary [[id number]]Collected: 04/04/23 2042     TECHNIQUE:   Axial computed tomographic angiography images of the chest with intravenous contrast.  Sagittal and coronal reformatted images were created and reviewed.  This CT exam was performed using one or more of the following dose reduction techniques:  automated exposure control, adjustment of the mA and/or kV according to patient size, and/or use of iterative reconstruction technique.   MIP reconstructed images were created and reviewed.     COMPARISON:   No relevant prior studies available.     FINDINGS:   PULMONARY ARTERIES:  No pulmonary embolism.   AORTA:  Mild atherosclerotic calcifications.  No thoracic aortic aneurysm.   LUNGS:  Moderate emphysema.  No mass.   PLEURAL SPACE:  Unremarkable.  No significant effusion.  No pneumothorax.   HEART:  Unremarkable.  No cardiomegaly.  No significant pericardial effusion.  No evidence of RV dysfunction.   BONES/JOINTS:  No acute fracture.  No dislocation.   SOFT TISSUES:  Unremarkable.   LYMPH NODES:  Unremarkable.  No enlarged lymph nodes.     IMPRESSION:   1. No aortic dissection or other acute finding in the chest.   2.  Moderate emphysema.       Assessment/Plan:     * Chest pain due to coronary artery disease  Monitor on telemetry, nitroglycerin sublingual every 5 minutes as needed for chest pain, NPO after midnight for stress test in a.m., obtain echocardiogram.  Initial troponin 3.4 and repeat troponin is pending, history angiogram in 2014 with  40% blockage of ?, resume aspirin and statin, obtain lipid panel in a.m., replace magnesium - 2 gram IV for mag 1.6      History of lung cancer  Treated in 2019, continues regular surveillance      COPD without exacerbation  Resume Stiolto, duo nebs every 6 hours as needed for wheezing or shortness of breath      DDD (degenerative disc disease), cervical  Resume Norco and Flexeril as needed      Elevated glucose  116 chemistry, Repeat BNP in a.m., obtain hemoglobin A1c      VTE Risk Mitigation (From admission, onward)           Ordered     enoxaparin injection 40 mg  Daily         04/04/23 2205     IP VTE HIGH RISK PATIENT  Once         04/04/23 2205     Place sequential compression device  Until discontinued         04/04/23 2205                   Patient was examined at 2140    Code discussion with patient and/or caregiver regarding intubation, CPR, medications and emergency life support.  Patient elected to be: FULL CODE          On 04/04/2023, patient should be placed in hospital observation services under my care in collaboration with Dov Feng MD.      Brenna Velez NP  Department of Hospital Medicine  Replaced by [facility] - Emergency Dept

## 2023-04-05 NOTE — ASSESSMENT & PLAN NOTE
Monitor on telemetry, nitroglycerin sublingual every 5 minutes as needed for chest pain, NPO after midnight for stress test in a.m., obtain echocardiogram.  Initial troponin 3.4 and repeat troponin is pending, history angiogram in 2014 with 40% blockage of ?

## 2023-04-05 NOTE — HPI
Adam Goode Is a 72-year-old male with chronic medical problems including COPD, CAD, depression and history of squamous cell carcinoma of lung who presents to the emergency room complaining of chest pain. Approximately 45 minutes prior to arrival patient had midsternal chest pain radiating to his back and up to his jaws that feels like a tightness and squeezing.  He took a nitroglycerin at home and had some relief.  He was given another nitroglycerin in the emergency room and it resolved.  Associated symptoms are shortness of breath, fatigue and right arm pain for the whole week prior that resolved.  He said the shortness of breath with exertion and fatigue have gotten worse over the last week and he has felt sob at rest today with chest pain.  Similar chest pain has happened twice early in the month, last week and then today. Usually chest pain awakens him from sleep.  He has had a couple of episodes of this in the last 10-15 years and has had two caths in the past with 40% blockages.  He has COPD, quit smoking 4 years ago and rarely has to use a rescue inhaler.  He continues to see Oncology and pulmonology for surveillance lung cancer.  He denies abdominal pain, reflux headaches or dizziness cough or congestion, fevers or chills.    In the ED, BNP 7, initial troponin  3.4, repeat  troponin   , glucose 116, BUN/CR 25/1.3 with estimated GFR 58.7 serum bicarb 22, sodium 134 potassium 4.1, magnesium 1.6, H&H 12.4/373, WBC 6 point in platelets 211.  vital signs with temperature 98.1, heart rate 80, blood pressure 158/99, respiratory rate 18 and O2 sat 98% on room air.  Chest x-ray pending read but appears clear and CTA of chest with no PE or aneurysm and moderate emphysema.  12 lead EKG with normal sinus rhythm, ventricular rate 74 and .  In the ER he was given 325 mg aspirin and nitroglycerin x1 with relief of chest pain. He said that chest pain returned while in CT but resolved spontaneously. He will be  admitted to the hospitalist service for further evaluation and treatment.

## 2023-04-05 NOTE — DISCHARGE SUMMARY
[facility]  Discharge Summary      Patient Name: Adam Goode  MRN: [id number]  TAM: [id number]  Patient Class: OP- Observation  Admission Date: 4/4/2023  Hospital Length of Stay: 0 days  Discharge Date and Time:  04/05/2023 4:11 PM  Attending Physician: Vannesa Winkler MD   Discharging Provider: Chelsie Moody NP  Primary Care Provider: Judy Muro MD    Primary Care Team: Networked reference to record PCT     HPI:   Adam Goode Is a 72-year-old male with chronic medical problems including COPD, CAD, depression and history of squamous cell carcinoma of lung who presents to the emergency room complaining of chest pain. Approximately 45 minutes prior to arrival patient had midsternal chest pain radiating to his back and up to his jaws that feels like a tightness and squeezing.  He took a nitroglycerin at home and had some relief.  He was given another nitroglycerin in the emergency room and it resolved.  Associated symptoms are shortness of breath, fatigue and right arm pain for the whole week prior that resolved.  He said the shortness of breath with exertion and fatigue have gotten worse over the last week and he has felt sob at rest today with chest pain.  Similar chest pain has happened twice early in the month, last week and then today. Usually chest pain awakens him from sleep.  He has had a couple of episodes of this in the last 10-15 years and has had two caths in the past with 40% blockages.  He has COPD, quit smoking 4 years ago and rarely has to use a rescue inhaler.  He continues to see Oncology and pulmonology for surveillance lung cancer.  He denies abdominal pain, reflux headaches or dizziness cough or congestion, fevers or chills.    In the ED, BNP 7, initial troponin  3.4, repeat  troponin   , glucose 116, BUN/CR 25/1.3 with estimated GFR 58.7 serum bicarb 22, sodium 134 potassium 4.1, magnesium 1.6, H&H 12.4/373, WBC 6 point in platelets 211.  vital signs  with temperature 98.1, heart rate 80, blood pressure 158/99, respiratory rate 18 and O2 sat 98% on room air.  Chest x-ray pending read but appears clear and CTA of chest with no PE or aneurysm and moderate emphysema.  12 lead EKG with normal sinus rhythm, ventricular rate 74 and .  In the ER he was given 325 mg aspirin and nitroglycerin x1 with relief of chest pain. He said that chest pain returned while in CT but resolved spontaneously. He will be admitted to the hospitalist service for further evaluation and treatment.      * No surgery found *      Hospital Course:   Patient was monitored closely during his stay.  Chest pain resolved.  High sensitivity troponin remained in low range.  Given history, underwent stress test which was negative for any acute reversible ischemic changes. CTA chest negative for acute findings, as well. Patient remained hemodynamically stable and was discharged home with close follow up with PCP and cardiology.  His HgbA1c was found to be mildly elevated. He was counseled on monitoring his sugar intake and following up with PCP for repeat. Return precautions were discussed.  Patient verbalized understanding and agreement with discharge plan.    Patient seen on day of discharge and deemed appropriate for discharge.       Goals of Care Treatment Preferences:  Code Status: Full Code      Consults:   Consults (From admission, onward)          Status Ordering Provider     Inpatient consult to Registered Dietitian/Nutritionist  Once        Provider:  (Not yet assigned)    Completed CARROLL BAL     Inpatient consult to Hospitalist  Once        Provider:  (Not yet assigned)    Acknowledged HILARIA MORA            No new Assessment & Plan notes have been filed under this hospital service since the last note was generated.  Service: Hospital Medicine    Final Active Diagnoses:    Diagnosis Date Noted POA    PRINCIPAL PROBLEM:  Chest pain due to coronary artery disease [I25.119]  04/04/2023 Unknown    History of lung cancer [Z85.118] 04/04/2023 Not Applicable    Elevated glucose [R73.09] 04/04/2023 Unknown    COPD without exacerbation [J44.9] 11/21/2019 Unknown    DDD (degenerative disc disease), cervical [M50.30] 05/01/2017 Yes      Problems Resolved During this Admission:       Discharged Condition: good    Disposition: Home or Self Care    Follow Up:   Follow-up Information       Judy Muro MD Follow up on 4/10/2023.    Specialties: Hospitalist, Internal Medicine  Why: 11:20 AM for hospital follow up  Contact information:  [address]  [address]  [place] [address]  [phone]               Jose Juan Yu MD Follow up on 4/6/2023.    Specialties: Cardiology, Interventional Cardiology  Why: 10:15 AM  Contact information:  [address]  [place] [address]  [phone]                           Patient Instructions:      Diet Cardiac     Notify your health care provider if you experience any of the following:  difficulty breathing or increased cough     Notify your health care provider if you experience any of the following:  severe uncontrolled pain     Notify your health care provider if you experience any of the following:  persistent dizziness, light-headedness, or visual disturbances     Notify your health care provider if you experience any of the following:  increased confusion or weakness     Activity as tolerated       Significant Diagnostic Studies: Labs:   CMP   Recent Labs   Lab 04/04/23 1947 04/05/23  0340   * 140   K 4.1 4.5    108   CO2 22* 25   * 114*   BUN 25* 24*   CREATININE 1.3 1.3   CALCIUM 9.3 9.6   PROT 6.2  --    ALBUMIN 3.6  --    BILITOT 0.3  --    ALKPHOS 75  --    AST 20  --    ALT 21  --    ANIONGAP 6* 7*   , CBC   Recent Labs   Lab 04/04/23 1947 04/05/23  0340   WBC 6.38 6.10   HGB 12.4* 12.1*   HCT 37.3* 36.2*    217   , Lipid Panel   Lab Results   Component Value Date    CHOL 165 04/05/2023    HDL 67  04/05/2023    LDLCALC 88.4 04/05/2023    TRIG 48 04/05/2023    CHOLHDL 40.6 04/05/2023   , Troponin No results for input(s): TROPONINI in the last 168 hours. and A1C:   Recent Labs   Lab 04/05/23  0340   HGBA1C 6.3*     NM Myocardial Perfusion Spect Multi Pharmacologic [[id number]] Collected: 04/05/23 0724   Order Status: Completed Updated: 04/05/23 1153   Narrative:     Myocardial Perfusion Imaging with SPECT Gated acquisition and Ejection Fraction single day protocol     CLINICAL INFORMATION:  Chest pain.     PROCEDURE:     Lexiscan is utilized as a pharmacologic stress agent. At peak stress, 27.5 millicuries of technetium Myoview were administered intravenously.  The rest portion of the study is accomplished on the same day, utilizing 10.4 millicuries of technetium Myoview intravenously.     FINDINGS:     There is a matched decrease in tracer accumulation within the inferior wall. This is associated with abnormal thickening of the myocardium on the gated acquisition potentially related to inferior wall myocardial scarring. The distribution of activity elsewhere appears unremarkable. There are no findings of pharmacologically induced reversibility. The chamber size is within normal limits. The estimated ejection fraction is 65%.     IMPRESSION:     1. MATCHED DECREASE IN TRACER ACCUMULATION WITHIN THE INFERIOR WALL ASSOCIATED WITH ABNORMAL THICKENING OF THE MYOCARDIUM ON THE GATED ACQUISITION.     NO FINDINGS OF PHARMACOLOGICALLY INDUCED REVERSIBILITY.     2.  ESTIMATED EJECTION FRACTION OF 65%.     Electronically signed by:  Kristina Reyna MD  4/5/2023 11:51 AM CDT Workstation: [id number]   X-Ray Chest AP Portable [[id number]] Collected: 04/04/23 1950   Order Status: Completed Updated: 04/05/23 0739   Narrative:     Reason: Chest Pain Chest Pain     FINDINGS:   Portable chest at 1950 compared with 12/6/2022 shows normal cardiomediastinal silhouette.     Emphysema unchanged, with lung showing no new confluent alveolar  consolidation, pleural effusion, or pneumothorax. Pulmonary vasculature is normal. No acute osseous abnormality.     IMPRESSION:   Emphysema.     Electronically signed by:  Norberto Donovna MD  4/5/2023 7:36 AM CDT Workstation: [id number]   CTA Chest Aorta Non Coronary [[id number]] Collected: 04/04/23 2042   Order Status: Completed Updated: 04/04/23 2153   Narrative:     EXAM:   CT Angiography Chest With Intravenous Contrast     CLINICAL HISTORY:   The patient is 72 years old and is Male; rule out dissection     TECHNIQUE:   Axial computed tomographic angiography images of the chest with intravenous contrast.  Sagittal and coronal reformatted images were created and reviewed.  This CT exam was performed using one or more of the following dose reduction techniques:  automated exposure control, adjustment of the mA and/or kV according to patient size, and/or use of iterative reconstruction technique.   MIP reconstructed images were created and reviewed.     COMPARISON:   No relevant prior studies available.     FINDINGS:   PULMONARY ARTERIES:  No pulmonary embolism.   AORTA:  Mild atherosclerotic calcifications.  No thoracic aortic aneurysm.   LUNGS:  Moderate emphysema.  No mass.   PLEURAL SPACE:  Unremarkable.  No significant effusion.  No pneumothorax.   HEART:  Unremarkable.  No cardiomegaly.  No significant pericardial effusion.  No evidence of RV dysfunction.   BONES/JOINTS:  No acute fracture.  No dislocation.   SOFT TISSUES:  Unremarkable.   LYMPH NODES:  Unremarkable.  No enlarged lymph nodes.     IMPRESSION:   1.  No aortic dissection or other acute finding in the chest.   2.  Moderate emphysema.     Electronically signed by:  Erick Hall MD  4/4/2023 9:51 PM CDT Workstation: [id number]     Pending Diagnostic Studies:       Procedure Component Value Units Date/Time    Troponin I High Sensitivity #2 [[id number]] Collected: 04/04/23 1947    Order Status: Sent Lab Status: In process Updated: 04/04/23 1954     Specimen: Blood            Medications:  Reconciled Home Medications:      Medication List        CONTINUE taking these medications      albuterol 90 mcg/actuation inhaler  Commonly known as: PROVENTIL/VENTOLIN HFA  Inhale 2 puffs into the lungs every 6 (six) hours as needed for Wheezing or Shortness of Breath. Rescue     aspirin 81 MG EC tablet  Commonly known as: ECOTRIN  Take 81 mg by mouth every evening.     atorvastatin 40 MG tablet  Commonly known as: LIPITOR  Take 40 mg by mouth every evening.     cetirizine 10 MG tablet  Commonly known as: ZYRTEC  Take 10 mg by mouth once daily.     cyclobenzaprine 10 MG tablet  Commonly known as: FLEXERIL  Take 10 mg by mouth 2 (two) times daily as needed for Muscle spasms.     HYDROcodone-acetaminophen  mg per tablet  Commonly known as: NORCO  Take 1 tablet by mouth 2 (two) times daily as needed.     multivitamin capsule  Take 1 capsule by mouth once daily.     nitroGLYCERIN 0.4 MG SL tablet  Commonly known as: NITROSTAT  Place 0.4 mg under the tongue every 5 (five) minutes as needed.     pantoprazole 40 MG tablet  Commonly known as: PROTONIX  Take 40 mg by mouth every morning.     sertraline 50 MG tablet  Commonly known as: ZOLOFT  Take 50 mg by mouth once daily.     STIOLTO RESPIMAT 2.5-2.5 mcg/actuation Mist  Generic drug: tiotropium-olodateroL  Inhale 2 puffs into the lungs once daily.     traZODone 150 MG tablet  Commonly known as: DESYREL  Take 225 mg by mouth nightly.              Indwelling Lines/Drains at time of discharge:   Lines/Drains/Airways       Central Venous Catheter Line  Duration                  Port A Cath Single Lumen 04/11/19 0721 left subclavian 1455 days                    Time spent on the discharge of patient: 35 minutes         Chelsie Moody NP  Department of Hospital Medicine  [facility]

## 2023-04-05 NOTE — PLAN OF CARE
Verified that pt has apt scheduled tomorrow with Dr. Yu' NP. Updated AVS     PCP apt scheduled. Apt on AVS

## 2023-04-05 NOTE — HOSPITAL COURSE
Patient was monitored closely during his stay.  Chest pain resolved.  High sensitivity troponin remained in low range.  Given history, underwent stress test which was negative for any acute reversible ischemic changes. CTA chest negative for acute findings, as well. Patient remained hemodynamically stable and was discharged home with close follow up with PCP and cardiology.  His HgbA1c was found to be mildly elevated. He was counseled on monitoring his sugar intake and following up with PCP for repeat. Return precautions were discussed.  Patient verbalized understanding and agreement with discharge plan.    Patient seen on day of discharge and deemed appropriate for discharge.

## 2023-04-05 NOTE — PLAN OF CARE
04/05/23 1430   CHAWLA Message   Medicare Outpatient and Observation Notification regarding financial responsibility Given to patient/caregiver;Explained to patient/caregiver;Signed/date by patient/caregiver   Date CHAWLA was signed 04/05/23   Time CHAWLA was signed 1430     CHAWLA explained to pt. Pt verbalized understanding and signed form.

## 2023-04-27 ENCOUNTER — TELEPHONE (OUTPATIENT)
Dept: CARDIAC REHAB | Facility: HOSPITAL | Age: 73
End: 2023-04-27

## 2023-12-18 ENCOUNTER — TELEPHONE (OUTPATIENT)
Dept: HEMATOLOGY/ONCOLOGY | Facility: CLINIC | Age: 73
End: 2023-12-18
Payer: MEDICARE

## 2023-12-18 NOTE — TELEPHONE ENCOUNTER
Pt requesting a PET scan to be done in place of the CT scan. Message routed to Dr. Jones for advisement.      ----- Message from Michelle Driver sent at 12/18/2023  1:12 PM CST -----  Type: Needs Medical Advice  Who Called:  Patient  Symptoms (please be specific):    How long has patient had these symptoms:    Pharmacy name and phone #:    Best Call Back Number: [phone]  Additional Information: Patient is requesting a cll back from the nurse regarding upcoming CT appt. On 1/12.

## 2023-12-21 ENCOUNTER — PATIENT MESSAGE (OUTPATIENT)
Dept: HEMATOLOGY/ONCOLOGY | Facility: CLINIC | Age: 73
End: 2023-12-21
Payer: MEDICARE

## 2024-01-03 ENCOUNTER — TELEPHONE (OUTPATIENT)
Dept: HEMATOLOGY/ONCOLOGY | Facility: CLINIC | Age: 74
End: 2024-01-03
Payer: MEDICARE

## 2024-01-03 NOTE — TELEPHONE ENCOUNTER
Spoke to pt. Explained that CT scan is still at [facility] but that hospital is renamed [facility]. Pt verbalized understanding.    ----- Message from Mikala Ramirez, Patient Care Assistant sent at 1/3/2024  1:15 PM CST -----  Type: Needs Medical Advice  Who Called:  Adam  Kemal Call Back Number: [phone]    Additional Information: patient states he wants to speak to nurse about up coming scans , please call to further discuss, thank you .

## 2024-01-11 ENCOUNTER — TELEPHONE (OUTPATIENT)
Dept: HEMATOLOGY/ONCOLOGY | Facility: CLINIC | Age: 74
End: 2024-01-11
Payer: MEDICARE

## 2024-01-11 NOTE — TELEPHONE ENCOUNTER
----- Message from Rhonda Bello sent at 1/11/2024  3:32 PM CST -----  Regarding: orders  Contact: patient  Type: Needs Medical Advice  Who Called:  patient  Symptoms (please be specific):    How long has patient had these symptoms:    Pharmacy name and phone #:    Best Call Back Number: [phone] (home)     Additional Information: Patient states he wants an abdomin and pelvic Ct Scan with his chest CT scan.  Please order and call patient to advise.  Thanks!

## 2024-01-12 DIAGNOSIS — C34.31 MALIGNANT NEOPLASM OF LOWER LOBE OF RIGHT LUNG: Primary | ICD-10-CM

## 2024-01-12 NOTE — TELEPHONE ENCOUNTER
Called the pt. He is requesting chest abd pelvis CT, and not just CT chest as usual for the past 5 yrs. Wants message relayed to physician and then to be rescheduled. This nurse has relayed message and awaiting orders from Dr Jones.     ----- Message from Rosalia Wesley sent at 1/12/2024  8:57 AM CST -----  Contact: pt  Type:  Needs Medical Advice    Who Called: the patient  Symptoms (please be specific):  How long has patient had these symptoms:    Pharmacy name and phone #:    Would the patient rather a call back or a response via [facility]? call back  Best Call Back Number: [phone]   Additional Information: Pt states he wants to add and abdominal and pelvic CT scan added to Chest CT order  Pt has appt scheduled for 10 and would like to know before appt   Please advise  Thanks

## 2024-01-24 ENCOUNTER — TELEPHONE (OUTPATIENT)
Dept: HEMATOLOGY/ONCOLOGY | Facility: CLINIC | Age: 74
End: 2024-01-24
Payer: MEDICARE

## 2024-01-24 NOTE — TELEPHONE ENCOUNTER
Called pt   Pt will be out of town until 2/14/24.  I rescheduled CT for 2/15/24 at 8am   Appt moved to 2/21/24     Pt agreed                 ----- Message from Michelle Driver sent at 1/24/2024  9:13 AM CST -----  Type: Needs Medical Advice  Who Called:  Patient   Symptoms (please be specific):    How long has patient had these symptoms:    Pharmacy name and phone #:    Best Call Back Number: [phone]  Additional Information: Patient is requesting a call back to reschedule his upcoming appt.

## 2024-02-15 ENCOUNTER — HOSPITAL ENCOUNTER (OUTPATIENT)
Dept: RADIOLOGY | Facility: HOSPITAL | Age: 74
Discharge: HOME | End: 2024-02-15
Attending: INTERNAL MEDICINE
Payer: MEDICARE

## 2024-02-15 DIAGNOSIS — Z01.812 PRE-PROCEDURE LAB EXAM: Primary | ICD-10-CM

## 2024-02-15 DIAGNOSIS — C34.31 MALIGNANT NEOPLASM OF LOWER LOBE OF RIGHT LUNG: ICD-10-CM

## 2024-02-15 PROCEDURE — 25500020 PHARM REV CODE 255: Performed by: INTERNAL MEDICINE

## 2024-02-15 PROCEDURE — 74177 CT ABD & PELVIS W/CONTRAST: CPT | Mod: TC

## 2024-02-15 RX ADMIN — IOHEXOL 100 ML: 350 INJECTION, SOLUTION INTRAVENOUS at 10:02

## 2024-02-19 ENCOUNTER — TELEPHONE (OUTPATIENT)
Dept: GASTROENTEROLOGY | Facility: CLINIC | Age: 74
End: 2024-02-19
Payer: MEDICARE

## 2024-02-19 DIAGNOSIS — Z86.010 HISTORY OF COLON POLYPS: Primary | ICD-10-CM

## 2024-02-19 NOTE — TELEPHONE ENCOUNTER
----- Message from Jessi Phipps sent at 2/19/2024 11:11 AM CST -----  Contact: Patient  Type:  Appointment Request    Caller is requesting a sooner appointment.  Caller declined first available appointment listed below.  Caller will not accept being placed on the waitlist and is requesting a message be sent to doctor.    Name of Caller:  Patient  When is the first available appointment?  N/A    Would the patient rather a call back or a response via [facility]?   Call back  Best Call Back Number:  [phone]    Additional Information:   States he would like to speak with someone about scheduling a colonoscopy - please call to advise - thank you

## 2024-02-19 NOTE — TELEPHONE ENCOUNTER
Returned call to patient to assist. Colonoscopy date set for 4/19. Prep instructions sent to portal.

## 2024-02-20 ENCOUNTER — LAB VISIT (OUTPATIENT)
Dept: LAB | Facility: HOSPITAL | Age: 74
End: 2024-02-20
Attending: INTERNAL MEDICINE
Payer: MEDICARE

## 2024-02-20 DIAGNOSIS — Z71.2 ENCOUNTER TO DISCUSS TEST RESULTS: ICD-10-CM

## 2024-02-20 DIAGNOSIS — J43.9 PULMONARY EMPHYSEMA, UNSPECIFIED EMPHYSEMA TYPE: ICD-10-CM

## 2024-02-20 DIAGNOSIS — C34.31 MALIGNANT NEOPLASM OF LOWER LOBE OF RIGHT LUNG: ICD-10-CM

## 2024-02-20 LAB
ALBUMIN SERPL BCP-MCNC: 4.5 G/DL (ref 3.5–5.2)
ALP SERPL-CCNC: 104 U/L (ref 55–135)
ALT SERPL W/O P-5'-P-CCNC: 17 U/L (ref 10–44)
ANION GAP SERPL CALC-SCNC: 8 MMOL/L (ref 8–16)
AST SERPL-CCNC: 17 U/L (ref 10–40)
BASOPHILS # BLD AUTO: 0.04 K/UL (ref 0–0.2)
BASOPHILS NFR BLD: 0.5 % (ref 0–1.9)
BILIRUB SERPL-MCNC: 0.4 MG/DL (ref 0.1–1)
BUN SERPL-MCNC: 20 MG/DL (ref 8–23)
CALCIUM SERPL-MCNC: 10.3 MG/DL (ref 8.7–10.5)
CHLORIDE SERPL-SCNC: 105 MMOL/L (ref 95–110)
CO2 SERPL-SCNC: 25 MMOL/L (ref 23–29)
CREAT SERPL-MCNC: 1.4 MG/DL (ref 0.5–1.4)
DIFFERENTIAL METHOD BLD: ABNORMAL
EOSINOPHIL # BLD AUTO: 0.1 K/UL (ref 0–0.5)
EOSINOPHIL NFR BLD: 1.3 % (ref 0–8)
ERYTHROCYTE [DISTWIDTH] IN BLOOD BY AUTOMATED COUNT: 13.3 % (ref 11.5–14.5)
EST. GFR  (NO RACE VARIABLE): 53.4 ML/MIN/1.73 M^2
GLUCOSE SERPL-MCNC: 73 MG/DL (ref 70–110)
HCT VFR BLD AUTO: 42.6 % (ref 40–54)
HGB BLD-MCNC: 13.8 G/DL (ref 14–18)
IMM GRANULOCYTES # BLD AUTO: 0.04 K/UL (ref 0–0.04)
IMM GRANULOCYTES NFR BLD AUTO: 0.5 % (ref 0–0.5)
LYMPHOCYTES # BLD AUTO: 3.1 K/UL (ref 1–4.8)
LYMPHOCYTES NFR BLD: 39.8 % (ref 18–48)
MCH RBC QN AUTO: 27.9 PG (ref 27–31)
MCHC RBC AUTO-ENTMCNC: 32.4 G/DL (ref 32–36)
MCV RBC AUTO: 86 FL (ref 82–98)
MONOCYTES # BLD AUTO: 0.7 K/UL (ref 0.3–1)
MONOCYTES NFR BLD: 8.7 % (ref 4–15)
NEUTROPHILS # BLD AUTO: 3.8 K/UL (ref 1.8–7.7)
NEUTROPHILS NFR BLD: 49.2 % (ref 38–73)
NRBC BLD-RTO: 0 /100 WBC
PLATELET # BLD AUTO: 269 K/UL (ref 150–450)
PMV BLD AUTO: 8 FL (ref 9.2–12.9)
POTASSIUM SERPL-SCNC: 4.2 MMOL/L (ref 3.5–5.1)
PROT SERPL-MCNC: 7.4 G/DL (ref 6–8.4)
RBC # BLD AUTO: 4.94 M/UL (ref 4.6–6.2)
SODIUM SERPL-SCNC: 138 MMOL/L (ref 136–145)
WBC # BLD AUTO: 7.73 K/UL (ref 3.9–12.7)

## 2024-02-20 PROCEDURE — 85025 COMPLETE CBC W/AUTO DIFF WBC: CPT | Performed by: INTERNAL MEDICINE

## 2024-02-20 PROCEDURE — 36415 COLL VENOUS BLD VENIPUNCTURE: CPT | Performed by: INTERNAL MEDICINE

## 2024-02-20 PROCEDURE — 80053 COMPREHEN METABOLIC PANEL: CPT | Performed by: INTERNAL MEDICINE

## 2024-02-21 ENCOUNTER — OFFICE VISIT (OUTPATIENT)
Dept: HEMATOLOGY/ONCOLOGY | Facility: CLINIC | Age: 74
End: 2024-02-21
Payer: MEDICARE

## 2024-02-21 VITALS
TEMPERATURE: 98 F | WEIGHT: 183.44 LBS | BODY MASS INDEX: 22.81 KG/M2 | HEART RATE: 72 BPM | HEIGHT: 75 IN | RESPIRATION RATE: 14 BRPM | SYSTOLIC BLOOD PRESSURE: 133 MMHG | OXYGEN SATURATION: 98 % | DIASTOLIC BLOOD PRESSURE: 82 MMHG

## 2024-02-21 DIAGNOSIS — J43.9 PULMONARY EMPHYSEMA, UNSPECIFIED EMPHYSEMA TYPE: ICD-10-CM

## 2024-02-21 DIAGNOSIS — C34.31 MALIGNANT NEOPLASM OF LOWER LOBE OF RIGHT LUNG: Primary | ICD-10-CM

## 2024-02-21 PROCEDURE — 99214 OFFICE O/P EST MOD 30 MIN: CPT | Mod: S$GLB,,, | Performed by: INTERNAL MEDICINE

## 2024-02-21 PROCEDURE — 3008F BODY MASS INDEX DOCD: CPT | Mod: CPTII,S$GLB,, | Performed by: INTERNAL MEDICINE

## 2024-02-21 PROCEDURE — 1101F PT FALLS ASSESS-DOCD LE1/YR: CPT | Mod: CPTII,S$GLB,, | Performed by: INTERNAL MEDICINE

## 2024-02-21 PROCEDURE — 1125F AMNT PAIN NOTED PAIN PRSNT: CPT | Mod: CPTII,S$GLB,, | Performed by: INTERNAL MEDICINE

## 2024-02-21 PROCEDURE — 99999 PR PBB SHADOW E&M-EST. PATIENT-LVL III: CPT | Mod: PBBFAC,,, | Performed by: INTERNAL MEDICINE

## 2024-02-21 PROCEDURE — 1159F MED LIST DOCD IN RCRD: CPT | Mod: CPTII,S$GLB,, | Performed by: INTERNAL MEDICINE

## 2024-02-21 PROCEDURE — 3079F DIAST BP 80-89 MM HG: CPT | Mod: CPTII,S$GLB,, | Performed by: INTERNAL MEDICINE

## 2024-02-21 PROCEDURE — 3288F FALL RISK ASSESSMENT DOCD: CPT | Mod: CPTII,S$GLB,, | Performed by: INTERNAL MEDICINE

## 2024-02-21 PROCEDURE — 3075F SYST BP GE 130 - 139MM HG: CPT | Mod: CPTII,S$GLB,, | Performed by: INTERNAL MEDICINE

## 2024-02-21 NOTE — PROGRESS NOTES
HPI    Initial consult 70 years old  male who was presented to Oncology Clinic for evaluation with squamous cell carcinoma of the lung.  Patient was recently diagnosed in January with fine-needle aspiration of the right lung nodule was found to have squamous cell carcinoma.  And recently patient had a which resection(by description) at [facility].  Since then patient was instructed to follow up with our medical oncologist for evaluation and treatment.  Patient was told that doing the which resection tumor is larger than expected compared to the images.  It might be present as late stage I or early stage II.      Patient completed adjuvant therapy 2019 without any interruption.  Currently patient is on observation per NCCN guideline.    Interval follow-up  No acute events.        Past Medical History:   Diagnosis Date    Arthritis     Cancer SKIN    Cardiac angina     COPD (chronic obstructive pulmonary disease)     Coronary artery disease ANGINA. HAD  [facility] . 40 % BLOCKAGE. DR RUBY IS CARDIOLOGIST.    Depression     GERD (gastroesophageal reflux disease)     Kidney cysts     Lung cancer 2019    Pathological staging [id number] stage IIa with pleural invasion present - s/p RLLectomy    MVP (mitral valve prolapse)     Trigger thumb     BILAT    Wears dentures     UPPER    Wears glasses      Social History     Socioeconomic History    Marital status:    Tobacco Use    Smoking status: Former     Current packs/day: 0.00     Types: Cigarettes     Start date: 1979     Quit date: 2019     Years since quittin.1    Smokeless tobacco: Never    Tobacco comments:     1-2 CIGT SOME DAYS/states last cigarette 19   Substance and Sexual Activity    Alcohol use: Yes     Comment: beer on occasion    Drug use: No    Sexual activity: Yes     Partners: Female     Social Determinants of Health     Financial Resource Strain: Low Risk  (2023)    Overall Financial Resource Strain  (CARDIA)     Difficulty of Paying Living Expenses: Not hard at all   Food Insecurity: No Food Insecurity (4/5/2023)    Hunger Vital Sign     Worried About Running Out of Food in the Last Year: Never true     Ran Out of Food in the Last Year: Never true   Transportation Needs: No Transportation Needs (4/5/2023)    PRAPARE - Transportation     Lack of Transportation (Medical): No     Lack of Transportation (Non-Medical): No   Physical Activity: Insufficiently Active (4/5/2023)    Exercise Vital Sign     Days of Exercise per Week: 3 days     Minutes of Exercise per Session: 30 min   Stress: No Stress Concern Present (4/5/2023)    [employer] - Occupational Stress Questionnaire     Feeling of Stress : Not at all   Social Connections: Moderately Isolated (4/5/2023)    Social Connection and Isolation Panel [NHANES]     Frequency of Communication with Friends and Family: More than three times a week     Frequency of Social Gatherings with Friends and Family: Once a week     Attends Muslim Services: More than 4 times per year     Active Member of Clubs or Organizations: No     Attends Club or Organization Meetings: Never     Marital Status:    Housing Stability: Low Risk  (4/5/2023)    Housing Stability Vital Sign     Unable to Pay for Housing in the Last Year: No     Number of Places Lived in the Last Year: 1     Unstable Housing in the Last Year: No       Objective    Physical Exam     Vitals:    02/21/24 1009   BP: 133/82   Pulse: 72   Resp: 14   Temp: 97.5 °F (36.4 °C)       Constitutional: patient is oriented to person, place, and time. patient appears well-developed and well-nourished. No distress.   HENT:  Normocephalic atraumatic pupils equal round reactive to light extraocular muscle intact. No evidence of lesions on exam.  Cardiovascular:  Regular rate and rhythm     Pulmonary/Chest:  Symmetrical chest a rising  Abdominal:  Soft nontender nondistended bowel sounds x4    Musculoskeletal: Normal range of motion. Gait is normal No clubbing, cyanosis     Lymphadenopathy:  No evidence of lymphadenopathy  Neurological:  Sensorimotor grossly intact cranial nerves 2-12 grossly intact.    Skin:  Warm dry no rash no lesions   Psychiatric:  Normal affect    CMP  Sodium   Date Value Ref Range Status   02/20/2024 138 136 - 145 mmol/L Final     Potassium   Date Value Ref Range Status   02/20/2024 4.2 3.5 - 5.1 mmol/L Final     Chloride   Date Value Ref Range Status   02/20/2024 105 95 - 110 mmol/L Final     CO2   Date Value Ref Range Status   02/20/2024 25 23 - 29 mmol/L Final     Glucose   Date Value Ref Range Status   02/20/2024 73 70 - 110 mg/dL Final     BUN   Date Value Ref Range Status   02/20/2024 20 8 - 23 mg/dL Final     Creatinine   Date Value Ref Range Status   02/20/2024 1.4 0.5 - 1.4 mg/dL Final   01/18/2013 0.9 0.5 - 1.4 mg/dL Final     Calcium   Date Value Ref Range Status   02/20/2024 10.3 8.7 - 10.5 mg/dL Final   01/18/2013 10.7 (H) 8.7 - 10.5 mg/dL Final     Total Protein   Date Value Ref Range Status   02/20/2024 7.4 6.0 - 8.4 g/dL Final     Albumin   Date Value Ref Range Status   02/20/2024 4.5 3.5 - 5.2 g/dL Final     Total Bilirubin   Date Value Ref Range Status   02/20/2024 0.4 0.1 - 1.0 mg/dL Final     Comment:     For infants and newborns, interpretation of results should be based  on gestational age, weight and in agreement with clinical  observations.    Premature Infant recommended reference ranges:  Up to 24 hours.............<8.0 mg/dL  Up to 48 hours............<12.0 mg/dL  3-5 days..................<15.0 mg/dL  6-29 days.................<15.0 mg/dL       Alkaline Phosphatase   Date Value Ref Range Status   02/20/2024 104 55 - 135 U/L Final     AST   Date Value Ref Range Status   02/20/2024 17 10 - 40 U/L Final     ALT   Date Value Ref Range Status   02/20/2024 17 10 - 44 U/L Final     Anion Gap   Date Value Ref Range Status   02/20/2024 8 8 - 16 mmol/L Final    01/18/2013 11 5 - 15 meq/L Final     eGFR if    Date Value Ref Range Status   07/05/2022 54 (A) >60 mL/min/1.73 m^2 Final     eGFR if non    Date Value Ref Range Status   07/05/2022 46 (A) >60 mL/min/1.73 m^2 Final     Comment:     Calculation used to obtain the estimated glomerular filtration  rate (eGFR) is the CKD-EPI equation.        Lab Results   Component Value Date    WBC 7.73 02/20/2024    HGB 13.8 (L) 02/20/2024    HCT 42.6 02/20/2024    MCV 86 02/20/2024     02/20/2024       CT chest with contrast 12/07/2020  Impression:     Findings are not significantly changed compared to the prior exam including     Chest; emphysematous changes, reticulation, bronchiectasis, small scattered nodular foci     Visualized upper abdomen low-density lesion in the liver again suggesting a cyst not significantly changed and septated right renal cyst not appearing significantly changed as visualized.    Assessment Plan    [] 03/30/2019 LUNG, RIGHT, FINE NEEDLE ASPIRATION:  - POSITIVE FOR SQUAMOUS CELL CARCINOMA    Status post lung resection, resection total size of tumor is greater than on images suspecting early stage II involvement.     Pathological staging [id number] stage IIa with pleural invasion present  -completed Adjuvant Cisplatin and docetaxel q 21 days for 4 cycles on June 2019 without major complication or interruptions     CT chest 12/07/2020 no significant change to study.  No evidence malignancies however it dose demonstrate emphysematous change.      CT chest with contrast 06/14/2021  Status post partial right pneumonectomy.  Severe emphysema.  Bilateral pulmonary nodules without detrimental change.  Largest is a 1.1 cm sub solid right upper lobe opacity.  Additional follow-up in 1 year is recommended.     Partially imaged right renal cyst with imaged portion demonstrating mild complexity.  This has been previously imaged by CT abdomen 03/30/2019, with the imaged portion  demonstrating no significant change.    CT chest abd pelvis 2/15/24  No evidence of malignancy stable 4 mm pleural-based nodule right middle lobe.    RTC 6-12 month with lab and     [] Reviewed medications today.  No significant changes.    [] Former smoker.  Stop smoking x 3 year.    [] M*Modal dictations.

## 2024-03-11 DIAGNOSIS — M70.62 TROCHANTERIC BURSITIS OF LEFT HIP: Primary | ICD-10-CM

## 2024-03-12 ENCOUNTER — HOSPITAL ENCOUNTER (OUTPATIENT)
Dept: RADIOLOGY | Facility: HOSPITAL | Age: 74
Discharge: HOME | End: 2024-03-12
Attending: ANESTHESIOLOGY
Payer: MEDICARE

## 2024-03-12 DIAGNOSIS — M70.62 TROCHANTERIC BURSITIS OF LEFT HIP: ICD-10-CM

## 2024-03-12 PROCEDURE — 73502 X-RAY EXAM HIP UNI 2-3 VIEWS: CPT | Mod: TC,LT

## 2024-04-03 PROBLEM — R09.02 HYPOXEMIA: Status: ACTIVE | Noted: 2024-04-03

## 2024-04-19 ENCOUNTER — HOSPITAL ENCOUNTER (OUTPATIENT)
Facility: HOSPITAL | Age: 74
Discharge: HOME | End: 2024-04-19
Attending: INTERNAL MEDICINE | Admitting: INTERNAL MEDICINE
Payer: MEDICARE

## 2024-04-19 ENCOUNTER — ANESTHESIA (OUTPATIENT)
Dept: ENDOSCOPY | Facility: HOSPITAL | Age: 74
End: 2024-04-19
Payer: MEDICARE

## 2024-04-19 ENCOUNTER — ANESTHESIA EVENT (OUTPATIENT)
Dept: ENDOSCOPY | Facility: HOSPITAL | Age: 74
End: 2024-04-19
Payer: MEDICARE

## 2024-04-19 DIAGNOSIS — K64.8 INTERNAL HEMORRHOIDS: ICD-10-CM

## 2024-04-19 DIAGNOSIS — K63.5 POLYP OF COLON, UNSPECIFIED PART OF COLON, UNSPECIFIED TYPE: Primary | ICD-10-CM

## 2024-04-19 DIAGNOSIS — Z86.010 HISTORY OF COLON POLYPS: ICD-10-CM

## 2024-04-19 PROCEDURE — 37000008 HC ANESTHESIA 1ST 15 MINUTES: Performed by: INTERNAL MEDICINE

## 2024-04-19 PROCEDURE — 88305 TISSUE EXAM BY PATHOLOGIST: CPT | Mod: TC,59 | Performed by: PATHOLOGY

## 2024-04-19 PROCEDURE — 45385 COLONOSCOPY W/LESION REMOVAL: CPT | Mod: PT,,, | Performed by: INTERNAL MEDICINE

## 2024-04-19 PROCEDURE — 27201089 HC SNARE, DISP (ANY): Performed by: INTERNAL MEDICINE

## 2024-04-19 PROCEDURE — 25000003 PHARM REV CODE 250: Performed by: INTERNAL MEDICINE

## 2024-04-19 PROCEDURE — 45380 COLONOSCOPY AND BIOPSY: CPT | Mod: PT,59 | Performed by: INTERNAL MEDICINE

## 2024-04-19 PROCEDURE — 45385 COLONOSCOPY W/LESION REMOVAL: CPT | Mod: PT | Performed by: INTERNAL MEDICINE

## 2024-04-19 PROCEDURE — 37000009 HC ANESTHESIA EA ADD 15 MINS: Performed by: INTERNAL MEDICINE

## 2024-04-19 PROCEDURE — 45380 COLONOSCOPY AND BIOPSY: CPT | Mod: PT,59,, | Performed by: INTERNAL MEDICINE

## 2024-04-19 PROCEDURE — 25000003 PHARM REV CODE 250: Performed by: NURSE ANESTHETIST, CERTIFIED REGISTERED

## 2024-04-19 PROCEDURE — 63600175 PHARM REV CODE 636 W HCPCS: Performed by: NURSE ANESTHETIST, CERTIFIED REGISTERED

## 2024-04-19 RX ORDER — LIDOCAINE HYDROCHLORIDE 20 MG/ML
INJECTION INTRAVENOUS
Status: DISCONTINUED | OUTPATIENT
Start: 2024-04-19 | End: 2024-04-19

## 2024-04-19 RX ORDER — PROPOFOL 10 MG/ML
VIAL (ML) INTRAVENOUS
Status: DISCONTINUED | OUTPATIENT
Start: 2024-04-19 | End: 2024-04-19

## 2024-04-19 RX ORDER — SODIUM CHLORIDE 9 MG/ML
INJECTION, SOLUTION INTRAVENOUS CONTINUOUS
Status: DISCONTINUED | OUTPATIENT
Start: 2024-04-19 | End: 2024-04-19 | Stop reason: HOSPADM

## 2024-04-19 RX ADMIN — PROPOFOL 40 MG: 10 INJECTION, EMULSION INTRAVENOUS at 03:04

## 2024-04-19 RX ADMIN — PROPOFOL 30 MG: 10 INJECTION, EMULSION INTRAVENOUS at 03:04

## 2024-04-19 RX ADMIN — PROPOFOL 50 MG: 10 INJECTION, EMULSION INTRAVENOUS at 03:04

## 2024-04-19 RX ADMIN — SODIUM CHLORIDE: 9 INJECTION, SOLUTION INTRAVENOUS at 01:04

## 2024-04-19 RX ADMIN — PROPOFOL 100 MG: 10 INJECTION, EMULSION INTRAVENOUS at 03:04

## 2024-04-19 RX ADMIN — LIDOCAINE HYDROCHLORIDE 75 MG: 20 INJECTION, SOLUTION INTRAVENOUS at 03:04

## 2024-04-19 NOTE — H&P
CC: History of colon polyps    73 year old male with above. States that symptoms are absent, no alleviating/exacerbating factors. No family history of colorectal CA. Positive personal history of polyps. No bleeding or weight loss.     ROS:  No headache, no fever/chills, no chest pain/SOB, no nausea/vomiting/diarrhea/constipation/GI bleeding/abdominal pain, no dysuria/hematuria.    VSSAF   Exam:   Alert and oriented x 3; no apparent distress   PERRLA, sclera anicteric  CV: Regular rate/rhythm, normal PMI   Lungs: Clear bilaterally with no wheeze/rales   Abdomen: Soft, NT/ND, normal bowel sounds   Ext: No cyanosis, clubbing     Impression:   As above    Plan:   Proceed with endoscopy. Further recs to follow.

## 2024-04-19 NOTE — ANESTHESIA PREPROCEDURE EVALUATION
04/19/2024  Adam Goode is a 73 y.o., male.      Pre-op Assessment    I have reviewed the Patient Summary Reports.     I have reviewed the Nursing Notes. I have reviewed the NPO Status.   I have reviewed the Medications.     Review of Systems  Anesthesia Hx:  No problems with previous Anesthesia                Social:  Former Smoker       Hematology/Oncology:                        --  Cancer in past history (Lung CA 5 years ago):              chemotherapy and surgery       Cardiovascular:      Valvular problems/Murmurs, MVP  CAD  asymptomatic    Angina, with exertion                                  Pulmonary:   COPD, mild      Lung CA - s/p resection and chemo                 Renal/:  Chronic Renal Disease                Hepatic/GI:     GERD, well controlled             Musculoskeletal:  Arthritis          Spine Disorders: cervical and lumbar Degenerative disease and Disc disease           Neurological:    Neuromuscular Disease,                                   Endocrine:  Endocrine Normal            Psych:  Psychiatric History                Physical Exam  General: Well nourished, Cooperative, Alert and Oriented    Airway:  Mallampati: II   Mouth Opening: Normal  TM Distance: Normal  Neck ROM: Normal ROM    Anesthesia Plan  Type of Anesthesia, risks & benefits discussed:    Anesthesia Type: Gen ETT, Gen Supraglottic Airway, Gen Natural Airway, MAC  Intra-op Monitoring Plan: Standard ASA Monitors  Post Op Pain Control Plan: multimodal analgesia  Induction:  IV  Airway Plan: Direct, Video and Fiberoptic, Post-Induction  Informed Consent: Informed consent signed with the Patient and all parties understand the risks and agree with anesthesia plan.  All questions answered.   ASA Score: 3    Ready For Surgery From Anesthesia Perspective.   .

## 2024-04-19 NOTE — PROVATION PATIENT INSTRUCTIONS
Discharge Summary/Instructions after an Endoscopic Procedure  Patient Name: Adam Goode  Patient MRN: [id number]  Patient YOB: 1950 Friday, April 19, 2024  Blayne Mason MD  Dear patient,  As a result of recent federal legislation (The Federal Cures Act), you may   receive lab or pathology results from your procedure in your [facility]   account before your physician is able to contact you. Your physician or   their representative will relay the results to you with their   recommendations at their soonest availability.  Thank you,  RESTRICTIONS:  During your procedure today, you received medications for sedation.  These   medications may affect your judgment, balance and coordination.  Therefore,   for 24 hours, you have the following restrictions:   - DO NOT drive a car, operate machinery, make legal/financial decisions,   sign important papers or drink alcohol.    ACTIVITY:  Today: no heavy lifting, straining or running due to procedural   sedation/anesthesia.  The following day: return to full activity including work.  DIET:  Eat and drink normally unless instructed otherwise.     TREATMENT FOR COMMON SIDE EFFECTS:  - Mild abdominal pain, nausea, belching, bloating or excessive gas:  rest,   eat lightly and use a heating pad.  - Sore Throat: treat with throat lozenges and/or gargle with warm salt   water.  - Because air was used during the procedure, expelling large amounts of air   from your rectum or belching is normal.  - If a bowel prep was taken, you may not have a bowel movement for 1-3 days.    This is normal.  SYMPTOMS TO WATCH FOR AND REPORT TO YOUR PHYSICIAN:  1. Abdominal pain or bloating, other than gas cramps.  2. Chest pain.  3. Back pain.  4. Signs of infection such as: chills or fever occurring within 24 hours   after the procedure.  5. Rectal bleeding, which would show as bright red, maroon, or black stools.   (A tablespoon of blood from the rectum is not serious, especially if    hemorrhoids are present.)  6. Vomiting.  7. Weakness or dizziness.  GO DIRECTLY TO THE NEAREST EMERGENCY ROOM IF YOU HAVE ANY OF THE FOLLOWING:      Difficulty breathing              Chills and/or fever over 101 F   Persistent vomiting and/or vomiting blood   Severe abdominal pain   Severe chest pain   Black, tarry stools   Bleeding- more than one tablespoon   Any other symptom or condition that you feel may need urgent attention  Your doctor recommends these additional instructions:  If any biopsies were taken, your doctors clinic will contact you in 1 to 2   weeks with any results.  - Patient has a contact number available for emergencies.  The signs and   symptoms of potential delayed complications were discussed with the   patient.  Return to normal activities tomorrow.  Written discharge   instructions were provided to the patient.   - High fiber diet.   - Continue present medications.   - Await pathology results.   - Repeat colonoscopy in 3 years for surveillance.   - Discharge patient to home (ambulatory).   - Return to GI office PRN.  For questions, problems or results please call your physician - Blayne Mason MD at Work:  [phone].  [facility], EMERGENCY ROOM PHONE NUMBER: [phone]  IF A COMPLICATION OR EMERGENCY SITUATION ARISES AND YOU ARE UNABLE TO REACH   YOUR PHYSICIAN - GO DIRECTLY TO THE EMERGENCY ROOM.  Blayne Mason MD  4/19/2024 3:38:34 PM  This report has been verified and signed electronically.  Dear patient,  As a result of recent federal legislation (The Federal Cures Act), you may   receive lab or pathology results from your procedure in your [facility]   account before your physician is able to contact you. Your physician or   their representative will relay the results to you with their   recommendations at their soonest availability.  Thank you,  PROVATION

## 2024-04-19 NOTE — TRANSFER OF CARE
"Anesthesia Transfer of Care Note    Patient: Adam Goode    Procedure(s) Performed: Procedure(s) (LRB):  COLONOSCOPY (N/A)    Patient location: GI    Anesthesia Type: general    Transport from OR: Transported from OR on room air with adequate spontaneous ventilation    Post pain: adequate analgesia    Post assessment: no apparent anesthetic complications    Post vital signs: stable    Level of consciousness: responds to stimulation    Nausea/Vomiting: no nausea/vomiting    Complications: none    Transfer of care protocol was followed      Last vitals: Visit Vitals  BP (!) 169/81 (BP Location: Left arm, Patient Position: Lying)   Pulse 85   Temp 36.9 °C (98.4 °F) (Skin)   Resp 18   Ht 6' 3" (1.905 m)   Wt 83.9 kg (185 lb)   SpO2 97%   BMI 23.12 kg/m²     "

## 2024-04-19 NOTE — ANESTHESIA POSTPROCEDURE EVALUATION
Anesthesia Post Evaluation    Patient: Adam Goode    Procedure(s) Performed: Procedure(s) (LRB):  COLONOSCOPY (N/A)    Final Anesthesia Type: general      Patient location during evaluation: PACU  Patient participation: Yes- Able to Participate  Level of consciousness: awake and alert and oriented  Post-procedure vital signs: reviewed and stable  Pain management: adequate  Airway patency: patent    PONV status at discharge: No PONV  Anesthetic complications: no      Cardiovascular status: blood pressure returned to baseline and stable  Respiratory status: unassisted and spontaneous ventilation  Hydration status: euvolemic  Follow-up not needed.              Vitals Value Taken Time   /77 04/19/24 1556   Temp 36.3 °C (97.3 °F) 04/19/24 1556   Pulse 68 04/19/24 1556   Resp 18 04/19/24 1556   SpO2 95 % 04/19/24 1556         No case tracking events are documented in the log.      Pain/Eric Score: Eric Score: 10 (4/19/2024  3:56 PM)

## 2024-04-22 VITALS
OXYGEN SATURATION: 95 % | HEIGHT: 75 IN | WEIGHT: 185 LBS | SYSTOLIC BLOOD PRESSURE: 133 MMHG | RESPIRATION RATE: 18 BRPM | DIASTOLIC BLOOD PRESSURE: 77 MMHG | BODY MASS INDEX: 23 KG/M2 | TEMPERATURE: 97 F | HEART RATE: 68 BPM

## 2024-09-20 ENCOUNTER — TELEPHONE (OUTPATIENT)
Dept: GASTROENTEROLOGY | Facility: CLINIC | Age: 74
End: 2024-09-20
Payer: MEDICARE

## 2024-09-20 NOTE — TELEPHONE ENCOUNTER
----- Message from Mitra Juan sent at 9/20/2024 10:24 AM CDT -----  Contact: self  Type:  Needs Medical Advice    Who Called: self  Symptoms (please be specific): pt sts he wants to do a procedure maybe a endoscopy, he states he needs to speak to someone about it.   Would the patient rather a call back or a response via [facility]? call  Best Call Back Number: [phone] (home)     Additional Information: please advise and thank you.

## 2024-09-23 ENCOUNTER — OFFICE VISIT (OUTPATIENT)
Dept: GASTROENTEROLOGY | Facility: CLINIC | Age: 74
End: 2024-09-23
Payer: MEDICARE

## 2024-09-23 VITALS
HEIGHT: 75 IN | WEIGHT: 184.31 LBS | BODY MASS INDEX: 22.92 KG/M2 | DIASTOLIC BLOOD PRESSURE: 78 MMHG | SYSTOLIC BLOOD PRESSURE: 161 MMHG | HEART RATE: 64 BPM

## 2024-09-23 DIAGNOSIS — K59.09 INTERMITTENT CONSTIPATION: ICD-10-CM

## 2024-09-23 DIAGNOSIS — R13.10 DYSPHAGIA, UNSPECIFIED TYPE: Primary | ICD-10-CM

## 2024-09-23 DIAGNOSIS — K21.9 GASTROESOPHAGEAL REFLUX DISEASE, UNSPECIFIED WHETHER ESOPHAGITIS PRESENT: ICD-10-CM

## 2024-09-23 DIAGNOSIS — Z86.010 HISTORY OF COLON POLYPS: ICD-10-CM

## 2024-09-23 DIAGNOSIS — Z85.118 HISTORY OF LUNG CANCER: ICD-10-CM

## 2024-09-23 DIAGNOSIS — R12 WATERBRASH: ICD-10-CM

## 2024-09-23 PROCEDURE — 1159F MED LIST DOCD IN RCRD: CPT | Mod: CPTII,S$GLB,,

## 2024-09-23 PROCEDURE — 3288F FALL RISK ASSESSMENT DOCD: CPT | Mod: CPTII,S$GLB,,

## 2024-09-23 PROCEDURE — 1126F AMNT PAIN NOTED NONE PRSNT: CPT | Mod: CPTII,S$GLB,,

## 2024-09-23 PROCEDURE — 99203 OFFICE O/P NEW LOW 30 MIN: CPT | Mod: S$GLB,,,

## 2024-09-23 PROCEDURE — 3077F SYST BP >= 140 MM HG: CPT | Mod: CPTII,S$GLB,,

## 2024-09-23 PROCEDURE — 1160F RVW MEDS BY RX/DR IN RCRD: CPT | Mod: CPTII,S$GLB,,

## 2024-09-23 PROCEDURE — 3008F BODY MASS INDEX DOCD: CPT | Mod: CPTII,S$GLB,,

## 2024-09-23 PROCEDURE — 99999 PR PBB SHADOW E&M-EST. PATIENT-LVL IV: CPT | Mod: PBBFAC,,,

## 2024-09-23 PROCEDURE — 3078F DIAST BP <80 MM HG: CPT | Mod: CPTII,S$GLB,,

## 2024-09-23 PROCEDURE — 1101F PT FALLS ASSESS-DOCD LE1/YR: CPT | Mod: CPTII,S$GLB,,

## 2024-09-23 NOTE — PROGRESS NOTES
Subjective:       Patient ID: Adam Goode is a 73 y.o. male Body mass index is 23.04 kg/m².    Chief Complaint: Dysphagia    This patient is new to me.  Referring Provider: Aaareferral Self for dysphagia.  Established patient of Dr. Piper.     Gastroesophageal Reflux  He complains of dysphagia (new onset of dysphagia with solid foods in general meats, breads, no issues with pills or liquids no hx of EGD with dilation) and water brash (if eats late at night). He reports no abdominal pain, no belching, no chest pain, no choking, no coughing, no early satiety, no globus sensation, no heartburn, no hoarse voice or no nausea. This is a recurrent problem. The current episode started more than 1 year ago. The problem has been waxing and waning. The heartburn is located in the substernum. The heartburn is of moderate intensity. The heartburn wakes him from sleep. The heartburn does not limit his activity. The heartburn doesn't change with position. The symptoms are aggravated by caffeine. Pertinent negatives include no anemia, fatigue, melena (no rectal bleeding), muscle weakness, orthopnea or weight loss. The patient has a history of lung cancer, which was treated 5 years ago with chemotherapy but not radiation. He had part of his right lung removed due to the malignancy.  Followed by ENT for history postnasal drip, chronic sinus issues, and LPR, reports family history of throat cancer in father, history of chronic GERD on Protonix daily up-to-date with colonoscopy last colonoscopy on 04/20/2024 history of colon polyps per pathology report: benign and is the adenomatous type, reports intermittent constipation has 3-4 BMs per week, just started taking MiraLax and it helps denies any family history of colon cancer. Risk factors include caffeine use. He has tried a PPI for the symptoms. The treatment provided moderate relief. Past procedures do not include an abdominal ultrasound, an EGD, esophageal manometry or  esophageal pH monitoring. Past invasive treatments do not include gastroplasty, gastroplication or reflux surgery.       Review of Systems   Constitutional:  Negative for fatigue and weight loss.   HENT:  Negative for hoarse voice.    Respiratory:  Negative for cough and choking.    Cardiovascular:  Negative for chest pain.   Gastrointestinal:  Positive for constipation and dysphagia (new onset of dysphagia with solid foods in general meats, breads, no issues with pills or liquids no hx of EGD with dilation). Negative for abdominal distention, abdominal pain, anal bleeding, blood in stool, diarrhea, heartburn, melena (no rectal bleeding), nausea, rectal pain and vomiting.   Musculoskeletal:  Negative for muscle weakness.         No LMP for male patient.  Past Medical History:   Diagnosis Date    Arthritis     Cancer SKIN    Cardiac angina     Colon polyp     COPD (chronic obstructive pulmonary disease)     Coronary artery disease ANGINA. HAD  [facility] 8/12. 40 % BLOCKAGE. DR RUBY IS CARDIOLOGIST.    Depression     GERD (gastroesophageal reflux disease)     Kidney cysts     Lung cancer 01/2019    Pathological staging [id number] stage IIa with pleural invasion present - s/p RLLectomy    MVP (mitral valve prolapse)     Trigger thumb     BILAT    Wears dentures     UPPER    Wears glasses      Past Surgical History:   Procedure Laterality Date    APPENDECTOMY      BACK SURGERY      COLONOSCOPY N/A 04/19/2024    Procedure: COLONOSCOPY;  Surgeon: Blayne Piper MD;  Location: [facility];  Service: Endoscopy;  Laterality: N/A;    INSERTION OF TUNNELED CENTRAL VENOUS CATHETER (CVC) WITH SUBCUTANEOUS PORT Left 04/11/2019    Procedure: [id number];  Surgeon: Reij Griffiths MD;  Location: [facility];  Service: General;  Laterality: Left;    KNEE SURGERY      BILAT    lung resection  02/27/2019    TRIGGER THUMB Bilateral     UPPER GASTROINTESTINAL ENDOSCOPY       Family History   Problem Relation Name Age of Onset    COPD  Mother      Diabetes Mother      Heart disease Mother      Birth defects Father      Diabetes Father      Heart disease Father      Colon cancer Neg Hx       Social History     Tobacco Use    Smoking status: Former     Current packs/day: 0.00     Types: Cigarettes     Start date: 1979     Quit date: 2019     Years since quittin.7    Smokeless tobacco: Never    Tobacco comments:     1-2 CIGT SOME DAYS/states last cigarette 20   Substance Use Topics    Alcohol use: Not Currently     Comment: no ETOH for 12 years    Drug use: Yes     Types: Hydrocodone     Wt Readings from Last 10 Encounters:   24 83.6 kg (184 lb 4.9 oz)   24 83.9 kg (185 lb)   24 83.6 kg (184 lb 6.4 oz)   24 83.2 kg (183 lb 6.8 oz)   23 82.4 kg (181 lb 9.6 oz)   23 82.1 kg (181 lb)   23 81.8 kg (180 lb 5.4 oz)   22 80.7 kg (178 lb)   22 85.4 kg (188 lb 4.4 oz)   22 88.5 kg (195 lb)     Lab Results   Component Value Date    WBC 7.73 2024    HGB 13.8 (L) 2024    HCT 42.6 2024    MCV 86 2024     2024     CMP  Sodium   Date Value Ref Range Status   2024 138 136 - 145 mmol/L Final     Potassium   Date Value Ref Range Status   2024 4.2 3.5 - 5.1 mmol/L Final     Chloride   Date Value Ref Range Status   2024 105 95 - 110 mmol/L Final     CO2   Date Value Ref Range Status   2024 25 23 - 29 mmol/L Final     Glucose   Date Value Ref Range Status   2024 73 70 - 110 mg/dL Final     BUN   Date Value Ref Range Status   2024 20 8 - 23 mg/dL Final     Creatinine   Date Value Ref Range Status   2024 1.4 0.5 - 1.4 mg/dL Final   2013 0.9 0.5 - 1.4 mg/dL Final     Calcium   Date Value Ref Range Status   2024 10.3 8.7 - 10.5 mg/dL Final   2013 10.7 (H) 8.7 - 10.5 mg/dL Final     Total Protein   Date Value Ref Range Status   2024 7.4 6.0 - 8.4 g/dL Final     Albumin   Date Value Ref Range Status  "  02/20/2024 4.5 3.5 - 5.2 g/dL Final     Total Bilirubin   Date Value Ref Range Status   02/20/2024 0.4 0.1 - 1.0 mg/dL Final     Comment:     For infants and newborns, interpretation of results should be based  on gestational age, weight and in agreement with clinical  observations.    Premature Infant recommended reference ranges:  Up to 24 hours.............<8.0 mg/dL  Up to 48 hours............<12.0 mg/dL  3-5 days..................<15.0 mg/dL  6-29 days.................<15.0 mg/dL       Alkaline Phosphatase   Date Value Ref Range Status   02/20/2024 104 55 - 135 U/L Final     AST   Date Value Ref Range Status   02/20/2024 17 10 - 40 U/L Final     ALT   Date Value Ref Range Status   02/20/2024 17 10 - 44 U/L Final     Anion Gap   Date Value Ref Range Status   02/20/2024 8 8 - 16 mmol/L Final   01/18/2013 11 5 - 15 meq/L Final     eGFR if    Date Value Ref Range Status   07/05/2022 54 (A) >60 mL/min/1.73 m^2 Final     eGFR if non    Date Value Ref Range Status   07/05/2022 46 (A) >60 mL/min/1.73 m^2 Final     Comment:     Calculation used to obtain the estimated glomerular filtration  rate (eGFR) is the CKD-EPI equation.        No results found for: "LIPASE"  No results found for: "LIPASERES"  Lab Results   Component Value Date    TSH 3.070 02/02/2023       Reviewed prior medical records including radiology report of CT chest abdomen 1/12/24 & endoscopy history (see surgical history/procedures).    Objective:      Physical Exam  Vitals and nursing note reviewed.   Constitutional:       Appearance: Normal appearance. He is normal weight.   Cardiovascular:      Rate and Rhythm: Normal rate and regular rhythm.      Heart sounds: Normal heart sounds.   Pulmonary:      Breath sounds: Normal breath sounds.   Abdominal:      General: Bowel sounds are normal.      Palpations: Abdomen is soft.      Tenderness: There is no abdominal tenderness.   Skin:     General: Skin is warm and dry.     "  Coloration: Skin is not jaundiced.   Neurological:      Mental Status: He is alert and oriented to person, place, and time.   Psychiatric:         Mood and Affect: Mood normal.         Behavior: Behavior normal.         Assessment:       1. Dysphagia, unspecified type    2. Gastroesophageal reflux disease, unspecified whether esophagitis present    3. Waterbrash    4. Intermittent constipation    5. History of colon polyps    6. History of lung cancer        Plan:       Dysphagia, unspecified type   - schedule EGD, discussed procedure with patient and possible esophageal dilation may be performed during procedure if indicated, patient verbalized understanding  - recommend to eat smaller more frequent meals and to eat slowly and advised to eat a soft diet. Take medications one at a time with a full glass of water.  - possible UGI/esophagram/esophageal manometry if symptoms persist    Gastroesophageal reflux disease, unspecified whether esophagitis present, Waterbrash   - schedule EGD, discussed procedure with patient, including risks and benefits, patient verbalized understanding   -Take PPI 30min-1hr before eating breakfast  -Educated patient on lifestyle modifications to help control/reduce reflux/abdominal pain including: avoid large meals, avoid eating within 2-3 hours of bedtime (avoid late night eating & lying down soon after eating), elevate head of bed if nocturnal symptoms are present, smoking cessation (if current smoker), & weight loss (if overweight).   -Educated to avoid known foods which trigger reflux symptoms & to minimize/avoid high-fat foods, chocolate, caffeine, citrus, alcohol, & tomato products.  -Advised to avoid/limit use of NSAID's, since they can cause GI upset, bleeding, and/or ulcers. If needed, take with food.     Intermittent constipation   -Recommend daily exercise as tolerated, adequate water intake (six 8-oz glasses of water daily), and high fiber diet. OTC fiber supplements are  recommended if diet does not reach daily fiber goal (20-30 grams daily), such as Metamucil, Citrucel, or FiberCon (take as directed, separate from other oral medications by >2 hours).  -Recommend trying OTC MiraLax once daily (17g PO) as directed  -If no improvement with above recommendations, try intermittently dosed Dulcolax OTC as directed (every 3-4 days) PRN to facilitate bowel movements  -If no relief with this, consider adding a emollient laxative (castor oil or mineral oil) +/- enema  -If still no improvement with these measures, call/follow-up    History of colon polyps   Next surveillance colonoscopy due on 04/19/2027    History of lung cancer   Continue follow up with Heme-Onc     Follow up if symptoms worsen or fail to improve.      If no improvement in symptoms or symptoms worsen, call/follow-up at clinic or go to ER.       [facility] - GASTROENTEROLOGY  [facility] [place]     Dictation software program was used for this note. Please expect some simple typographical  errors in this note.    Encounter includes face to face time and non-face to face time preparing to see the patient (eg, review of tests), obtaining and/or reviewing separately obtained history, documenting clinical information in the electronic or other health record, independently interpreting results (not separately reported) and communicating results to the patient/family/caregiver, or care coordination (not separately reported).

## 2024-09-23 NOTE — H&P (VIEW-ONLY)
Subjective:       Patient ID: Adam Goode is a 73 y.o. male Body mass index is 23.04 kg/m².    Chief Complaint: Dysphagia    This patient is new to me.  Referring Provider: Aaareferral Self for dysphagia.  Established patient of Dr. Piper.     Gastroesophageal Reflux  He complains of dysphagia (new onset of dysphagia with solid foods in general meats, breads, no issues with pills or liquids no hx of EGD with dilation) and water brash (if eats late at night). He reports no abdominal pain, no belching, no chest pain, no choking, no coughing, no early satiety, no globus sensation, no heartburn, no hoarse voice or no nausea. This is a recurrent problem. The current episode started more than 1 year ago. The problem has been waxing and waning. The heartburn is located in the substernum. The heartburn is of moderate intensity. The heartburn wakes him from sleep. The heartburn does not limit his activity. The heartburn doesn't change with position. The symptoms are aggravated by caffeine. Pertinent negatives include no anemia, fatigue, melena (no rectal bleeding), muscle weakness, orthopnea or weight loss. The patient has a history of lung cancer, which was treated 5 years ago with chemotherapy but not radiation. He had part of his right lung removed due to the malignancy.  Followed by ENT for history postnasal drip, chronic sinus issues, and LPR, reports family history of throat cancer in father, history of chronic GERD on Protonix daily up-to-date with colonoscopy last colonoscopy on 04/20/2024 history of colon polyps per pathology report: benign and is the adenomatous type, reports intermittent constipation has 3-4 BMs per week, just started taking MiraLax and it helps denies any family history of colon cancer. Risk factors include caffeine use. He has tried a PPI for the symptoms. The treatment provided moderate relief. Past procedures do not include an abdominal ultrasound, an EGD, esophageal manometry or  esophageal pH monitoring. Past invasive treatments do not include gastroplasty, gastroplication or reflux surgery.       Review of Systems   Constitutional:  Negative for fatigue and weight loss.   HENT:  Negative for hoarse voice.    Respiratory:  Negative for cough and choking.    Cardiovascular:  Negative for chest pain.   Gastrointestinal:  Positive for constipation and dysphagia (new onset of dysphagia with solid foods in general meats, breads, no issues with pills or liquids no hx of EGD with dilation). Negative for abdominal distention, abdominal pain, anal bleeding, blood in stool, diarrhea, heartburn, melena (no rectal bleeding), nausea, rectal pain and vomiting.   Musculoskeletal:  Negative for muscle weakness.         No LMP for male patient.  Past Medical History:   Diagnosis Date    Arthritis     Cancer SKIN    Cardiac angina     Colon polyp     COPD (chronic obstructive pulmonary disease)     Coronary artery disease ANGINA. HAD  [facility] 8/12. 40 % BLOCKAGE. DR RUBY IS CARDIOLOGIST.    Depression     GERD (gastroesophageal reflux disease)     Kidney cysts     Lung cancer 01/2019    Pathological staging [id number] stage IIa with pleural invasion present - s/p RLLectomy    MVP (mitral valve prolapse)     Trigger thumb     BILAT    Wears dentures     UPPER    Wears glasses      Past Surgical History:   Procedure Laterality Date    APPENDECTOMY      BACK SURGERY      COLONOSCOPY N/A 04/19/2024    Procedure: COLONOSCOPY;  Surgeon: Blayne Piper MD;  Location: [facility];  Service: Endoscopy;  Laterality: N/A;    INSERTION OF TUNNELED CENTRAL VENOUS CATHETER (CVC) WITH SUBCUTANEOUS PORT Left 04/11/2019    Procedure: [id number];  Surgeon: Reji Griffiths MD;  Location: [facility];  Service: General;  Laterality: Left;    KNEE SURGERY      BILAT    lung resection  02/27/2019    TRIGGER THUMB Bilateral     UPPER GASTROINTESTINAL ENDOSCOPY       Family History   Problem Relation Name Age of Onset    COPD  Mother      Diabetes Mother      Heart disease Mother      Birth defects Father      Diabetes Father      Heart disease Father      Colon cancer Neg Hx       Social History     Tobacco Use    Smoking status: Former     Current packs/day: 0.00     Types: Cigarettes     Start date: 1979     Quit date: 2019     Years since quittin.7    Smokeless tobacco: Never    Tobacco comments:     1-2 CIGT SOME DAYS/states last cigarette 20   Substance Use Topics    Alcohol use: Not Currently     Comment: no ETOH for 12 years    Drug use: Yes     Types: Hydrocodone     Wt Readings from Last 10 Encounters:   24 83.6 kg (184 lb 4.9 oz)   24 83.9 kg (185 lb)   24 83.6 kg (184 lb 6.4 oz)   24 83.2 kg (183 lb 6.8 oz)   23 82.4 kg (181 lb 9.6 oz)   23 82.1 kg (181 lb)   23 81.8 kg (180 lb 5.4 oz)   22 80.7 kg (178 lb)   22 85.4 kg (188 lb 4.4 oz)   22 88.5 kg (195 lb)     Lab Results   Component Value Date    WBC 7.73 2024    HGB 13.8 (L) 2024    HCT 42.6 2024    MCV 86 2024     2024     CMP  Sodium   Date Value Ref Range Status   2024 138 136 - 145 mmol/L Final     Potassium   Date Value Ref Range Status   2024 4.2 3.5 - 5.1 mmol/L Final     Chloride   Date Value Ref Range Status   2024 105 95 - 110 mmol/L Final     CO2   Date Value Ref Range Status   2024 25 23 - 29 mmol/L Final     Glucose   Date Value Ref Range Status   2024 73 70 - 110 mg/dL Final     BUN   Date Value Ref Range Status   2024 20 8 - 23 mg/dL Final     Creatinine   Date Value Ref Range Status   2024 1.4 0.5 - 1.4 mg/dL Final   2013 0.9 0.5 - 1.4 mg/dL Final     Calcium   Date Value Ref Range Status   2024 10.3 8.7 - 10.5 mg/dL Final   2013 10.7 (H) 8.7 - 10.5 mg/dL Final     Total Protein   Date Value Ref Range Status   2024 7.4 6.0 - 8.4 g/dL Final     Albumin   Date Value Ref Range Status  "  02/20/2024 4.5 3.5 - 5.2 g/dL Final     Total Bilirubin   Date Value Ref Range Status   02/20/2024 0.4 0.1 - 1.0 mg/dL Final     Comment:     For infants and newborns, interpretation of results should be based  on gestational age, weight and in agreement with clinical  observations.    Premature Infant recommended reference ranges:  Up to 24 hours.............<8.0 mg/dL  Up to 48 hours............<12.0 mg/dL  3-5 days..................<15.0 mg/dL  6-29 days.................<15.0 mg/dL       Alkaline Phosphatase   Date Value Ref Range Status   02/20/2024 104 55 - 135 U/L Final     AST   Date Value Ref Range Status   02/20/2024 17 10 - 40 U/L Final     ALT   Date Value Ref Range Status   02/20/2024 17 10 - 44 U/L Final     Anion Gap   Date Value Ref Range Status   02/20/2024 8 8 - 16 mmol/L Final   01/18/2013 11 5 - 15 meq/L Final     eGFR if    Date Value Ref Range Status   07/05/2022 54 (A) >60 mL/min/1.73 m^2 Final     eGFR if non    Date Value Ref Range Status   07/05/2022 46 (A) >60 mL/min/1.73 m^2 Final     Comment:     Calculation used to obtain the estimated glomerular filtration  rate (eGFR) is the CKD-EPI equation.        No results found for: "LIPASE"  No results found for: "LIPASERES"  Lab Results   Component Value Date    TSH 3.070 02/02/2023       Reviewed prior medical records including radiology report of CT chest abdomen 1/12/24 & endoscopy history (see surgical history/procedures).    Objective:      Physical Exam  Vitals and nursing note reviewed.   Constitutional:       Appearance: Normal appearance. He is normal weight.   Cardiovascular:      Rate and Rhythm: Normal rate and regular rhythm.      Heart sounds: Normal heart sounds.   Pulmonary:      Breath sounds: Normal breath sounds.   Abdominal:      General: Bowel sounds are normal.      Palpations: Abdomen is soft.      Tenderness: There is no abdominal tenderness.   Skin:     General: Skin is warm and dry.     "  Coloration: Skin is not jaundiced.   Neurological:      Mental Status: He is alert and oriented to person, place, and time.   Psychiatric:         Mood and Affect: Mood normal.         Behavior: Behavior normal.         Assessment:       1. Dysphagia, unspecified type    2. Gastroesophageal reflux disease, unspecified whether esophagitis present    3. Waterbrash    4. Intermittent constipation    5. History of colon polyps    6. History of lung cancer        Plan:       Dysphagia, unspecified type   - schedule EGD, discussed procedure with patient and possible esophageal dilation may be performed during procedure if indicated, patient verbalized understanding  - recommend to eat smaller more frequent meals and to eat slowly and advised to eat a soft diet. Take medications one at a time with a full glass of water.  - possible UGI/esophagram/esophageal manometry if symptoms persist    Gastroesophageal reflux disease, unspecified whether esophagitis present, Waterbrash   - schedule EGD, discussed procedure with patient, including risks and benefits, patient verbalized understanding   -Take PPI 30min-1hr before eating breakfast  -Educated patient on lifestyle modifications to help control/reduce reflux/abdominal pain including: avoid large meals, avoid eating within 2-3 hours of bedtime (avoid late night eating & lying down soon after eating), elevate head of bed if nocturnal symptoms are present, smoking cessation (if current smoker), & weight loss (if overweight).   -Educated to avoid known foods which trigger reflux symptoms & to minimize/avoid high-fat foods, chocolate, caffeine, citrus, alcohol, & tomato products.  -Advised to avoid/limit use of NSAID's, since they can cause GI upset, bleeding, and/or ulcers. If needed, take with food.     Intermittent constipation   -Recommend daily exercise as tolerated, adequate water intake (six 8-oz glasses of water daily), and high fiber diet. OTC fiber supplements are  recommended if diet does not reach daily fiber goal (20-30 grams daily), such as Metamucil, Citrucel, or FiberCon (take as directed, separate from other oral medications by >2 hours).  -Recommend trying OTC MiraLax once daily (17g PO) as directed  -If no improvement with above recommendations, try intermittently dosed Dulcolax OTC as directed (every 3-4 days) PRN to facilitate bowel movements  -If no relief with this, consider adding a emollient laxative (castor oil or mineral oil) +/- enema  -If still no improvement with these measures, call/follow-up    History of colon polyps   Next surveillance colonoscopy due on 04/19/2027    History of lung cancer   Continue follow up with Heme-Onc     Follow up if symptoms worsen or fail to improve.      If no improvement in symptoms or symptoms worsen, call/follow-up at clinic or go to ER.       [facility] - GASTROENTEROLOGY  [facility], [place]     Dictation software program was used for this note. Please expect some simple typographical  errors in this note.    Encounter includes face to face time and non-face to face time preparing to see the patient (eg, review of tests), obtaining and/or reviewing separately obtained history, documenting clinical information in the electronic or other health record, independently interpreting results (not separately reported) and communicating results to the patient/family/caregiver, or care coordination (not separately reported).

## 2024-09-23 NOTE — PATIENT INSTRUCTIONS
..  Instructions for Outpatient Endoscopy    PROCEDURE DATE: 10/08/24  REPORT TO [facility] ([address] [place] [address] [place]. [id number]) Your arrival time will be provided by the ENDO department about 1 week prior to your procedure date. If you do not hear from them, they can be reached at [phone] Mon- Friday 8a-2p.      NO BLOOD THINNERS/NO ASPIRIN OR MEDICATIONS CONTAINING ASPIRIN, MOTRIN, IBUPROFEN, ALEVE OR ANY ANTI-INFLAMMATORY MEDICATIONS FOR 7 DAYS PRIOR TO PROCEDURE. TYLENOL IS OK.      Eat a light evening meal the night before your Endoscopy          (Soup, Salad, [place], or grilled chicken)     Nothing by mouth after midnight or the morning of EGD.                 Ok to take morning medications with small sip water by 5:30am (except no Diabetic medications)              SINCE SEDATION IS USED, YOU MUST HAVE SOMEONE TO DRIVE YOU HOME/NO TAXI

## 2024-10-08 ENCOUNTER — ANESTHESIA EVENT (OUTPATIENT)
Dept: ENDOSCOPY | Facility: HOSPITAL | Age: 74
End: 2024-10-08
Payer: MEDICARE

## 2024-10-08 ENCOUNTER — ANESTHESIA (OUTPATIENT)
Dept: ENDOSCOPY | Facility: HOSPITAL | Age: 74
End: 2024-10-08
Payer: MEDICARE

## 2024-10-08 ENCOUNTER — HOSPITAL ENCOUNTER (OUTPATIENT)
Facility: HOSPITAL | Age: 74
Discharge: HOME | End: 2024-10-08
Attending: INTERNAL MEDICINE | Admitting: INTERNAL MEDICINE
Payer: MEDICARE

## 2024-10-08 VITALS
RESPIRATION RATE: 14 BRPM | SYSTOLIC BLOOD PRESSURE: 156 MMHG | DIASTOLIC BLOOD PRESSURE: 81 MMHG | TEMPERATURE: 98 F | HEART RATE: 63 BPM | OXYGEN SATURATION: 97 %

## 2024-10-08 DIAGNOSIS — K44.9 HIATAL HERNIA: ICD-10-CM

## 2024-10-08 DIAGNOSIS — K29.70 GASTRITIS, PRESENCE OF BLEEDING UNSPECIFIED, UNSPECIFIED CHRONICITY, UNSPECIFIED GASTRITIS TYPE: ICD-10-CM

## 2024-10-08 DIAGNOSIS — K22.2 SCHATZKI RING OF DISTAL ESOPHAGUS: Primary | ICD-10-CM

## 2024-10-08 DIAGNOSIS — R13.10 DYSPHAGIA: ICD-10-CM

## 2024-10-08 PROCEDURE — 88305 TISSUE EXAM BY PATHOLOGIST: CPT | Mod: TC,59 | Performed by: PATHOLOGY

## 2024-10-08 PROCEDURE — C1769 GUIDE WIRE: HCPCS | Performed by: INTERNAL MEDICINE

## 2024-10-08 PROCEDURE — 25000003 PHARM REV CODE 250: Performed by: INTERNAL MEDICINE

## 2024-10-08 PROCEDURE — 43248 EGD GUIDE WIRE INSERTION: CPT | Mod: ,,, | Performed by: INTERNAL MEDICINE

## 2024-10-08 PROCEDURE — 43251 EGD REMOVE LESION SNARE: CPT | Performed by: INTERNAL MEDICINE

## 2024-10-08 PROCEDURE — 43239 EGD BIOPSY SINGLE/MULTIPLE: CPT | Mod: 59,,, | Performed by: INTERNAL MEDICINE

## 2024-10-08 PROCEDURE — 37000008 HC ANESTHESIA 1ST 15 MINUTES: Performed by: INTERNAL MEDICINE

## 2024-10-08 PROCEDURE — 63600175 PHARM REV CODE 636 W HCPCS: Performed by: STUDENT IN AN ORGANIZED HEALTH CARE EDUCATION/TRAINING PROGRAM

## 2024-10-08 PROCEDURE — 43251 EGD REMOVE LESION SNARE: CPT | Mod: ,,, | Performed by: INTERNAL MEDICINE

## 2024-10-08 PROCEDURE — 27201012 HC FORCEPS, HOT/COLD, DISP: Performed by: INTERNAL MEDICINE

## 2024-10-08 PROCEDURE — 43248 EGD GUIDE WIRE INSERTION: CPT | Performed by: INTERNAL MEDICINE

## 2024-10-08 PROCEDURE — 27201089 HC SNARE, DISP (ANY): Performed by: INTERNAL MEDICINE

## 2024-10-08 PROCEDURE — 43239 EGD BIOPSY SINGLE/MULTIPLE: CPT | Mod: 59 | Performed by: INTERNAL MEDICINE

## 2024-10-08 RX ORDER — SODIUM CHLORIDE 9 MG/ML
INJECTION, SOLUTION INTRAVENOUS CONTINUOUS
Status: DISCONTINUED | OUTPATIENT
Start: 2024-10-08 | End: 2024-10-08 | Stop reason: HOSPADM

## 2024-10-08 RX ORDER — LIDOCAINE HYDROCHLORIDE 20 MG/ML
INJECTION INTRAVENOUS
Status: DISCONTINUED | OUTPATIENT
Start: 2024-10-08 | End: 2024-10-08

## 2024-10-08 RX ORDER — PROPOFOL 10 MG/ML
VIAL (ML) INTRAVENOUS
Status: DISCONTINUED | OUTPATIENT
Start: 2024-10-08 | End: 2024-10-08

## 2024-10-08 RX ADMIN — SODIUM CHLORIDE: 9 INJECTION, SOLUTION INTRAVENOUS at 11:10

## 2024-10-08 RX ADMIN — LIDOCAINE HYDROCHLORIDE 80 MG: 20 INJECTION, SOLUTION INTRAVENOUS at 11:10

## 2024-10-08 RX ADMIN — PROPOFOL 10 MG: 10 INJECTION, EMULSION INTRAVENOUS at 11:10

## 2024-10-08 RX ADMIN — PROPOFOL 100 MG: 10 INJECTION, EMULSION INTRAVENOUS at 11:10

## 2024-10-08 RX ADMIN — SODIUM CHLORIDE: 9 INJECTION, SOLUTION INTRAVENOUS at 10:10

## 2024-10-08 NOTE — PROVATION PATIENT INSTRUCTIONS
Discharge Summary/Instructions after an Endoscopic Procedure  Patient Name: Adam Goode  Patient MRN: [id number]  Patient YOB: 1950 Tuesday, October 8, 2024  Blayne Mason MD  Dear patient,  As a result of recent federal legislation (The Federal Cures Act), you may   receive lab or pathology results from your procedure in your [facility]   account before your physician is able to contact you. Your physician or   their representative will relay the results to you with their   recommendations at their soonest availability.  Thank you,  RESTRICTIONS:  During your procedure today, you received medications for sedation.  These   medications may affect your judgment, balance and coordination.  Therefore,   for 24 hours, you have the following restrictions:   - DO NOT drive a car, operate machinery, make legal/financial decisions,   sign important papers or drink alcohol.    ACTIVITY:  Today: no heavy lifting, straining or running due to procedural   sedation/anesthesia.  The following day: return to full activity including work.  DIET:  Eat and drink normally unless instructed otherwise.     TREATMENT FOR COMMON SIDE EFFECTS:  - Mild abdominal pain, nausea, belching, bloating or excessive gas:  rest,   eat lightly and use a heating pad.  - Sore Throat: treat with throat lozenges and/or gargle with warm salt   water.  - Because air was used during the procedure, expelling large amounts of air   from your rectum or belching is normal.  - If a bowel prep was taken, you may not have a bowel movement for 1-3 days.    This is normal.  SYMPTOMS TO WATCH FOR AND REPORT TO YOUR PHYSICIAN:  1. Abdominal pain or bloating, other than gas cramps.  2. Chest pain.  3. Back pain.  4. Signs of infection such as: chills or fever occurring within 24 hours   after the procedure.  5. Rectal bleeding, which would show as bright red, maroon, or black stools.   (A tablespoon of blood from the rectum is not serious, especially if    hemorrhoids are present.)  6. Vomiting.  7. Weakness or dizziness.  GO DIRECTLY TO THE NEAREST EMERGENCY ROOM IF YOU HAVE ANY OF THE FOLLOWING:      Difficulty breathing              Chills and/or fever over 101 F   Persistent vomiting and/or vomiting blood   Severe abdominal pain   Severe chest pain   Black, tarry stools   Bleeding- more than one tablespoon   Any other symptom or condition that you feel may need urgent attention  Your doctor recommends these additional instructions:  If any biopsies were taken, your doctors clinic will contact you in 1 to 2   weeks with any results.  - Patient has a contact number available for emergencies.  The signs and   symptoms of potential delayed complications were discussed with the   patient.  Return to normal activities tomorrow.  Written discharge   instructions were provided to the patient.   - Resume previous diet.   - Continue present medications.   - No aspirin, ibuprofen, naproxen, or other non-steroidal anti-inflammatory   drugs.   - Await pathology results.   - Discharge patient to home (ambulatory).   - Follow an antireflux regimen.   - Return to GI office after studies are complete.  For questions, problems or results please call your physician - Blayne Mason MD at Work:  [phone].  [facility], EMERGENCY ROOM PHONE NUMBER: [phone]  IF A COMPLICATION OR EMERGENCY SITUATION ARISES AND YOU ARE UNABLE TO REACH   YOUR PHYSICIAN - GO DIRECTLY TO THE EMERGENCY ROOM.  Blayne Mason MD  10/8/2024 11:42:07 AM  This report has been verified and signed electronically.  Dear patient,  As a result of recent federal legislation (The Federal Cures Act), you may   receive lab or pathology results from your procedure in your [facility]   account before your physician is able to contact you. Your physician or   their representative will relay the results to you with their   recommendations at their soonest availability.  Thank you,  PROVATION

## 2024-10-08 NOTE — TRANSFER OF CARE
Anesthesia Transfer of Care Note    Patient: Adam Goode    Procedure(s) Performed: Procedure(s) (LRB):  EGD (ESOPHAGOGASTRODUODENOSCOPY) (N/A)    Patient location: GI    Anesthesia Type: general    Transport from OR: Transported from OR on room air with adequate spontaneous ventilation    Post pain: adequate analgesia    Post assessment: no apparent anesthetic complications    Post vital signs: stable    Level of consciousness: lethargic and responds to stimulation    Nausea/Vomiting: no nausea/vomiting    Complications: none    Transfer of care protocol was followed      Last vitals: Visit Vitals  BP (!) 173/96 (BP Location: Left arm, Patient Position: Lying)   Pulse 80   Temp 36.7 °C (98.1 °F) (Skin)   Resp 16   SpO2 96%

## 2024-10-08 NOTE — ANESTHESIA PREPROCEDURE EVALUATION
10/08/2024  Adam Goode is a 74 y.o., male.      Pre-op Assessment          Review of Systems  Cardiovascular:        CAD      Angina           Cardiovascular Symptoms: Angina       Coronary Artery Disease:                                  Pulmonary:   COPD         Chronic Obstructive Pulmonary Disease (COPD):                      Renal/:  Chronic Renal Disease        Kidney Function/Disease             Hepatic/GI:     GERD      Gerd          Musculoskeletal:  Arthritis        Arthritis          Neurological:    Neuromuscular Disease,           Arthritis                         Neuromuscular Disease   Psych:  Psychiatric History                  Physical Exam  General: Well nourished        Anesthesia Plan  Type of Anesthesia, risks & benefits discussed:    Anesthesia Type: Gen Natural Airway  Intra-op Monitoring Plan: Standard ASA Monitors  Induction:  IV  Informed Consent: Informed consent signed with the Patient and all parties understand the risks and agree with anesthesia plan.  All questions answered.   ASA Score: 3  Day of Surgery Review of History & Physical: H&P Update referred to the surgeon/provider.    Ready For Surgery From Anesthesia Perspective.     .

## 2024-10-08 NOTE — PLAN OF CARE
Vss, salma po fluids, denies pain, ambulates easily. IV removed, catheter intact. Discharge instructions provided and states understanding. States ready to go home.  Discharged from facility with family per wheelchair.

## 2024-10-09 NOTE — ANESTHESIA POSTPROCEDURE EVALUATION
Anesthesia Post Evaluation    Patient: Adam Goode    Procedure(s) Performed: Procedure(s) (LRB):  EGD (ESOPHAGOGASTRODUODENOSCOPY) (N/A)    Final Anesthesia Type: general      Patient location during evaluation: PACU  Patient participation: Yes- Able to Participate  Level of consciousness: awake and alert  Post-procedure vital signs: reviewed and stable  Pain management: adequate  Airway patency: patent    PONV status at discharge: No PONV  Anesthetic complications: no      Cardiovascular status: blood pressure returned to baseline  Respiratory status: unassisted and room air  Hydration status: euvolemic  Follow-up not needed.              Vitals Value Taken Time   /81 10/08/24 1205   Temp 36.7 °C (98.1 °F) 10/08/24 1205   Pulse 63 10/08/24 1205   Resp 14 10/08/24 1205   SpO2 97 % 10/08/24 1205         Event Time   Out of Recovery 12:11:10         Pain/Eric Score: Eric Score: 10 (10/8/2024 12:05 PM)

## 2024-10-15 NOTE — PROGRESS NOTES
Please notify patient that biopsies reviewed and showed no bacteria.  Polyp removed from the small bowel was an adenoma which is a precancerous polyp.  Recommend repeat EGD in 6-8 weeks to re-evaluate for any residual polyp.  Continue current meds and follow up as previously planned.

## 2024-10-18 ENCOUNTER — PATIENT MESSAGE (OUTPATIENT)
Dept: GASTROENTEROLOGY | Facility: CLINIC | Age: 74
End: 2024-10-18
Payer: MEDICARE

## 2024-10-18 DIAGNOSIS — K31.7 GASTRIC POLYPS: Primary | ICD-10-CM

## 2024-11-15 ENCOUNTER — HOSPITAL ENCOUNTER (INPATIENT)
Facility: HOSPITAL | Age: 74
LOS: 2 days | Discharge: HOME | DRG: 387 | End: 2024-11-18
Attending: EMERGENCY MEDICINE | Admitting: FAMILY MEDICINE
Payer: MEDICARE

## 2024-11-15 DIAGNOSIS — R10.30 LOWER ABDOMINAL PAIN: Primary | ICD-10-CM

## 2024-11-15 DIAGNOSIS — K52.9 COLITIS: ICD-10-CM

## 2024-11-15 LAB
ANION GAP SERPL CALC-SCNC: 14 MMOL/L (ref 8–16)
BASOPHILS # BLD AUTO: 0.07 K/UL (ref 0–0.2)
BASOPHILS NFR BLD: 0.4 % (ref 0–1.9)
BUN SERPL-MCNC: 16 MG/DL (ref 8–23)
CALCIUM SERPL-MCNC: 9.9 MG/DL (ref 8.7–10.5)
CHLORIDE SERPL-SCNC: 103 MMOL/L (ref 95–110)
CO2 SERPL-SCNC: 19 MMOL/L (ref 23–29)
CREAT SERPL-MCNC: 1.3 MG/DL (ref 0.5–1.4)
CREAT SERPL-MCNC: 1.4 MG/DL (ref 0.5–1.4)
DIFFERENTIAL METHOD BLD: ABNORMAL
EOSINOPHIL # BLD AUTO: 0.1 K/UL (ref 0–0.5)
EOSINOPHIL NFR BLD: 0.7 % (ref 0–8)
ERYTHROCYTE [DISTWIDTH] IN BLOOD BY AUTOMATED COUNT: 14.1 % (ref 11.5–14.5)
EST. GFR  (NO RACE VARIABLE): 58 ML/MIN/1.73 M^2
GLUCOSE SERPL-MCNC: 154 MG/DL (ref 70–110)
HCT VFR BLD AUTO: 41 % (ref 40–54)
HGB BLD-MCNC: 13.4 G/DL (ref 14–18)
IMM GRANULOCYTES # BLD AUTO: 0.09 K/UL (ref 0–0.04)
IMM GRANULOCYTES NFR BLD AUTO: 0.6 % (ref 0–0.5)
LDH SERPL L TO P-CCNC: 2.35 MMOL/L (ref 0.5–2.2)
LYMPHOCYTES # BLD AUTO: 2.7 K/UL (ref 1–4.8)
LYMPHOCYTES NFR BLD: 16.3 % (ref 18–48)
MCH RBC QN AUTO: 28.3 PG (ref 27–31)
MCHC RBC AUTO-ENTMCNC: 32.7 G/DL (ref 32–36)
MCV RBC AUTO: 87 FL (ref 82–98)
MONOCYTES # BLD AUTO: 0.9 K/UL (ref 0.3–1)
MONOCYTES NFR BLD: 5.6 % (ref 4–15)
NEUTROPHILS # BLD AUTO: 12.5 K/UL (ref 1.8–7.7)
NEUTROPHILS NFR BLD: 76.4 % (ref 38–73)
NRBC BLD-RTO: 0 /100 WBC
PLATELET # BLD AUTO: 307 K/UL (ref 150–450)
PMV BLD AUTO: 8.7 FL (ref 9.2–12.9)
POTASSIUM SERPL-SCNC: 4.2 MMOL/L (ref 3.5–5.1)
RBC # BLD AUTO: 4.73 M/UL (ref 4.6–6.2)
SAMPLE: ABNORMAL
SAMPLE: NORMAL
SODIUM SERPL-SCNC: 136 MMOL/L (ref 136–145)
WBC # BLD AUTO: 16.34 K/UL (ref 3.9–12.7)

## 2024-11-15 PROCEDURE — 80048 BASIC METABOLIC PNL TOTAL CA: CPT | Performed by: EMERGENCY MEDICINE

## 2024-11-15 PROCEDURE — 82565 ASSAY OF CREATININE: CPT

## 2024-11-15 PROCEDURE — 25500020 PHARM REV CODE 255: Performed by: EMERGENCY MEDICINE

## 2024-11-15 PROCEDURE — 96376 TX/PRO/DX INJ SAME DRUG ADON: CPT

## 2024-11-15 PROCEDURE — 85025 COMPLETE CBC W/AUTO DIFF WBC: CPT | Performed by: EMERGENCY MEDICINE

## 2024-11-15 PROCEDURE — 63600175 PHARM REV CODE 636 W HCPCS: Performed by: EMERGENCY MEDICINE

## 2024-11-15 PROCEDURE — 96375 TX/PRO/DX INJ NEW DRUG ADDON: CPT

## 2024-11-15 PROCEDURE — 99285 EMERGENCY DEPT VISIT HI MDM: CPT | Mod: 25

## 2024-11-15 PROCEDURE — 96374 THER/PROPH/DIAG INJ IV PUSH: CPT

## 2024-11-15 RX ORDER — IPRATROPIUM BROMIDE 42 UG/1
2 SPRAY, METERED NASAL 3 TIMES DAILY
COMMUNITY
Start: 2024-08-13 | End: 2025-09-09

## 2024-11-15 RX ORDER — MORPHINE SULFATE 2 MG/ML
6 INJECTION, SOLUTION INTRAMUSCULAR; INTRAVENOUS
Status: COMPLETED | OUTPATIENT
Start: 2024-11-15 | End: 2024-11-15

## 2024-11-15 RX ORDER — NOREPINEPHRINE BITARTRATE/D5W 4MG/250ML
0-3 PLASTIC BAG, INJECTION (ML) INTRAVENOUS CONTINUOUS
Status: DISCONTINUED | OUTPATIENT
Start: 2024-11-15 | End: 2024-11-15

## 2024-11-15 RX ORDER — MORPHINE SULFATE 4 MG/ML
8 INJECTION, SOLUTION INTRAMUSCULAR; INTRAVENOUS
Status: COMPLETED | OUTPATIENT
Start: 2024-11-15 | End: 2024-11-15

## 2024-11-15 RX ORDER — FLUTICASONE PROPIONATE 50 MCG
1 SPRAY, SUSPENSION (ML) NASAL DAILY
COMMUNITY

## 2024-11-15 RX ORDER — ESZOPICLONE 2 MG/1
2 TABLET, FILM COATED ORAL NIGHTLY
COMMUNITY

## 2024-11-15 RX ADMIN — IOHEXOL 100 ML: 350 INJECTION, SOLUTION INTRAVENOUS at 09:11

## 2024-11-15 RX ADMIN — MORPHINE SULFATE 6 MG: 2 INJECTION, SOLUTION INTRAMUSCULAR; INTRAVENOUS at 09:11

## 2024-11-15 RX ADMIN — MORPHINE SULFATE 8 MG: 4 INJECTION, SOLUTION INTRAMUSCULAR; INTRAVENOUS at 10:11

## 2024-11-16 PROBLEM — T40.2X5A OPIOID-INDUCED CONSTIPATION: Status: ACTIVE | Noted: 2024-11-16

## 2024-11-16 PROBLEM — K59.03 OPIOID-INDUCED CONSTIPATION: Status: ACTIVE | Noted: 2024-11-16

## 2024-11-16 PROBLEM — K52.9 COLITIS: Status: ACTIVE | Noted: 2024-11-16

## 2024-11-16 PROBLEM — I25.10 CAD (CORONARY ARTERY DISEASE): Status: ACTIVE | Noted: 2024-11-16

## 2024-11-16 PROBLEM — K21.00 GASTROESOPHAGEAL REFLUX DISEASE WITH ESOPHAGITIS: Status: ACTIVE | Noted: 2024-11-16

## 2024-11-16 LAB
ANION GAP SERPL CALC-SCNC: 6 MMOL/L (ref 8–16)
BASOPHILS # BLD AUTO: 0.02 K/UL (ref 0–0.2)
BASOPHILS NFR BLD: 0.2 % (ref 0–1.9)
BUN SERPL-MCNC: 18 MG/DL (ref 8–23)
CALCIUM SERPL-MCNC: 9 MG/DL (ref 8.7–10.5)
CHLORIDE SERPL-SCNC: 104 MMOL/L (ref 95–110)
CO2 SERPL-SCNC: 25 MMOL/L (ref 23–29)
CREAT SERPL-MCNC: 1.3 MG/DL (ref 0.5–1.4)
DIFFERENTIAL METHOD BLD: ABNORMAL
EOSINOPHIL # BLD AUTO: 0 K/UL (ref 0–0.5)
EOSINOPHIL NFR BLD: 0.2 % (ref 0–8)
ERYTHROCYTE [DISTWIDTH] IN BLOOD BY AUTOMATED COUNT: 14.2 % (ref 11.5–14.5)
EST. GFR  (NO RACE VARIABLE): 58 ML/MIN/1.73 M^2
ESTIMATED AVG GLUCOSE: 120 MG/DL (ref 68–131)
GLUCOSE SERPL-MCNC: 118 MG/DL (ref 70–110)
HBA1C MFR BLD: 5.8 % (ref 4.5–6.2)
HCT VFR BLD AUTO: 36.1 % (ref 40–54)
HGB BLD-MCNC: 12 G/DL (ref 14–18)
IMM GRANULOCYTES # BLD AUTO: 0.07 K/UL (ref 0–0.04)
IMM GRANULOCYTES NFR BLD AUTO: 0.6 % (ref 0–0.5)
LYMPHOCYTES # BLD AUTO: 2 K/UL (ref 1–4.8)
LYMPHOCYTES NFR BLD: 16.8 % (ref 18–48)
MAGNESIUM SERPL-MCNC: 1.7 MG/DL (ref 1.6–2.6)
MCH RBC QN AUTO: 28 PG (ref 27–31)
MCHC RBC AUTO-ENTMCNC: 33.2 G/DL (ref 32–36)
MCV RBC AUTO: 84 FL (ref 82–98)
MONOCYTES # BLD AUTO: 0.9 K/UL (ref 0.3–1)
MONOCYTES NFR BLD: 7.8 % (ref 4–15)
NEUTROPHILS # BLD AUTO: 9 K/UL (ref 1.8–7.7)
NEUTROPHILS NFR BLD: 74.4 % (ref 38–73)
NRBC BLD-RTO: 0 /100 WBC
PHOSPHATE SERPL-MCNC: 3.6 MG/DL (ref 2.7–4.5)
PLATELET # BLD AUTO: 239 K/UL (ref 150–450)
PMV BLD AUTO: 8.4 FL (ref 9.2–12.9)
POTASSIUM SERPL-SCNC: 4 MMOL/L (ref 3.5–5.1)
RBC # BLD AUTO: 4.29 M/UL (ref 4.6–6.2)
SODIUM SERPL-SCNC: 135 MMOL/L (ref 136–145)
TSH SERPL DL<=0.005 MIU/L-ACNC: 1.93 UIU/ML (ref 0.34–5.6)
WBC # BLD AUTO: 12.04 K/UL (ref 3.9–12.7)

## 2024-11-16 PROCEDURE — 85025 COMPLETE CBC W/AUTO DIFF WBC: CPT | Performed by: STUDENT IN AN ORGANIZED HEALTH CARE EDUCATION/TRAINING PROGRAM

## 2024-11-16 PROCEDURE — 25000003 PHARM REV CODE 250: Performed by: STUDENT IN AN ORGANIZED HEALTH CARE EDUCATION/TRAINING PROGRAM

## 2024-11-16 PROCEDURE — 80048 BASIC METABOLIC PNL TOTAL CA: CPT | Performed by: STUDENT IN AN ORGANIZED HEALTH CARE EDUCATION/TRAINING PROGRAM

## 2024-11-16 PROCEDURE — 96367 TX/PROPH/DG ADDL SEQ IV INF: CPT

## 2024-11-16 PROCEDURE — 25000242 PHARM REV CODE 250 ALT 637 W/ HCPCS: Performed by: STUDENT IN AN ORGANIZED HEALTH CARE EDUCATION/TRAINING PROGRAM

## 2024-11-16 PROCEDURE — 12000002 HC ACUTE/MED SURGE SEMI-PRIVATE ROOM

## 2024-11-16 PROCEDURE — 94640 AIRWAY INHALATION TREATMENT: CPT | Mod: XB

## 2024-11-16 PROCEDURE — 96366 THER/PROPH/DIAG IV INF ADDON: CPT

## 2024-11-16 PROCEDURE — 83036 HEMOGLOBIN GLYCOSYLATED A1C: CPT | Performed by: STUDENT IN AN ORGANIZED HEALTH CARE EDUCATION/TRAINING PROGRAM

## 2024-11-16 PROCEDURE — 96375 TX/PRO/DX INJ NEW DRUG ADDON: CPT

## 2024-11-16 PROCEDURE — 63600175 PHARM REV CODE 636 W HCPCS: Performed by: STUDENT IN AN ORGANIZED HEALTH CARE EDUCATION/TRAINING PROGRAM

## 2024-11-16 PROCEDURE — 94640 AIRWAY INHALATION TREATMENT: CPT

## 2024-11-16 PROCEDURE — 99223 1ST HOSP IP/OBS HIGH 75: CPT | Mod: ,,, | Performed by: SURGERY

## 2024-11-16 PROCEDURE — 84443 ASSAY THYROID STIM HORMONE: CPT | Performed by: STUDENT IN AN ORGANIZED HEALTH CARE EDUCATION/TRAINING PROGRAM

## 2024-11-16 PROCEDURE — 84100 ASSAY OF PHOSPHORUS: CPT | Performed by: STUDENT IN AN ORGANIZED HEALTH CARE EDUCATION/TRAINING PROGRAM

## 2024-11-16 PROCEDURE — 94799 UNLISTED PULMONARY SVC/PX: CPT

## 2024-11-16 PROCEDURE — 96361 HYDRATE IV INFUSION ADD-ON: CPT

## 2024-11-16 PROCEDURE — 94761 N-INVAS EAR/PLS OXIMETRY MLT: CPT

## 2024-11-16 PROCEDURE — 25000003 PHARM REV CODE 250: Performed by: INTERNAL MEDICINE

## 2024-11-16 PROCEDURE — 99900031 HC PATIENT EDUCATION (STAT)

## 2024-11-16 PROCEDURE — 96365 THER/PROPH/DIAG IV INF INIT: CPT

## 2024-11-16 PROCEDURE — 87040 BLOOD CULTURE FOR BACTERIA: CPT | Performed by: STUDENT IN AN ORGANIZED HEALTH CARE EDUCATION/TRAINING PROGRAM

## 2024-11-16 PROCEDURE — 99900035 HC TECH TIME PER 15 MIN (STAT)

## 2024-11-16 PROCEDURE — 96376 TX/PRO/DX INJ SAME DRUG ADON: CPT

## 2024-11-16 PROCEDURE — 83735 ASSAY OF MAGNESIUM: CPT | Performed by: STUDENT IN AN ORGANIZED HEALTH CARE EDUCATION/TRAINING PROGRAM

## 2024-11-16 PROCEDURE — 36415 COLL VENOUS BLD VENIPUNCTURE: CPT | Performed by: STUDENT IN AN ORGANIZED HEALTH CARE EDUCATION/TRAINING PROGRAM

## 2024-11-16 RX ORDER — LANOLIN ALCOHOL/MO/W.PET/CERES
800 CREAM (GRAM) TOPICAL
Status: DISCONTINUED | OUTPATIENT
Start: 2024-11-16 | End: 2024-11-18 | Stop reason: HOSPADM

## 2024-11-16 RX ORDER — ASPIRIN 81 MG/1
81 TABLET ORAL NIGHTLY
Status: DISCONTINUED | OUTPATIENT
Start: 2024-11-16 | End: 2024-11-18 | Stop reason: HOSPADM

## 2024-11-16 RX ORDER — SODIUM,POTASSIUM PHOSPHATES 280-250MG
2 POWDER IN PACKET (EA) ORAL
Status: DISCONTINUED | OUTPATIENT
Start: 2024-11-16 | End: 2024-11-18 | Stop reason: HOSPADM

## 2024-11-16 RX ORDER — CIPROFLOXACIN 2 MG/ML
400 INJECTION, SOLUTION INTRAVENOUS
Status: DISCONTINUED | OUTPATIENT
Start: 2024-11-16 | End: 2024-11-18 | Stop reason: HOSPADM

## 2024-11-16 RX ORDER — CIPROFLOXACIN 2 MG/ML
400 INJECTION, SOLUTION INTRAVENOUS
Status: DISCONTINUED | OUTPATIENT
Start: 2024-11-16 | End: 2024-11-16

## 2024-11-16 RX ORDER — PANTOPRAZOLE SODIUM 40 MG/1
40 TABLET, DELAYED RELEASE ORAL NIGHTLY
Status: DISCONTINUED | OUTPATIENT
Start: 2024-11-16 | End: 2024-11-18 | Stop reason: HOSPADM

## 2024-11-16 RX ORDER — LACTULOSE 10 G/15ML
20 SOLUTION ORAL EVERY 6 HOURS PRN
Status: DISCONTINUED | OUTPATIENT
Start: 2024-11-16 | End: 2024-11-18 | Stop reason: HOSPADM

## 2024-11-16 RX ORDER — AMOXICILLIN 250 MG
1 CAPSULE ORAL 2 TIMES DAILY
Status: DISCONTINUED | OUTPATIENT
Start: 2024-11-16 | End: 2024-11-16

## 2024-11-16 RX ORDER — IBUPROFEN 200 MG
400 TABLET ORAL EVERY 6 HOURS PRN
Status: DISCONTINUED | OUTPATIENT
Start: 2024-11-16 | End: 2024-11-18 | Stop reason: HOSPADM

## 2024-11-16 RX ORDER — ACETAMINOPHEN 325 MG/1
650 TABLET ORAL EVERY 4 HOURS PRN
Status: DISCONTINUED | OUTPATIENT
Start: 2024-11-16 | End: 2024-11-18 | Stop reason: HOSPADM

## 2024-11-16 RX ORDER — KETOROLAC TROMETHAMINE 30 MG/ML
15 INJECTION, SOLUTION INTRAMUSCULAR; INTRAVENOUS EVERY 6 HOURS PRN
Status: DISCONTINUED | OUTPATIENT
Start: 2024-11-16 | End: 2024-11-18 | Stop reason: HOSPADM

## 2024-11-16 RX ORDER — SODIUM CHLORIDE 0.9 % (FLUSH) 0.9 %
10 SYRINGE (ML) INJECTION EVERY 8 HOURS PRN
Status: DISCONTINUED | OUTPATIENT
Start: 2024-11-16 | End: 2024-11-18 | Stop reason: HOSPADM

## 2024-11-16 RX ORDER — ONDANSETRON HYDROCHLORIDE 2 MG/ML
4 INJECTION, SOLUTION INTRAVENOUS EVERY 8 HOURS PRN
Status: DISCONTINUED | OUTPATIENT
Start: 2024-11-16 | End: 2024-11-18 | Stop reason: HOSPADM

## 2024-11-16 RX ORDER — ARFORMOTEROL TARTRATE 15 UG/2ML
15 SOLUTION RESPIRATORY (INHALATION) 2 TIMES DAILY
Status: DISCONTINUED | OUTPATIENT
Start: 2024-11-16 | End: 2024-11-18 | Stop reason: HOSPADM

## 2024-11-16 RX ORDER — IBUPROFEN 200 MG
24 TABLET ORAL
Status: DISCONTINUED | OUTPATIENT
Start: 2024-11-16 | End: 2024-11-18 | Stop reason: HOSPADM

## 2024-11-16 RX ORDER — SERTRALINE HYDROCHLORIDE 50 MG/1
50 TABLET, FILM COATED ORAL DAILY
Status: DISCONTINUED | OUTPATIENT
Start: 2024-11-16 | End: 2024-11-18 | Stop reason: HOSPADM

## 2024-11-16 RX ORDER — IPRATROPIUM BROMIDE 0.5 MG/2.5ML
0.5 SOLUTION RESPIRATORY (INHALATION) EVERY 6 HOURS
Status: DISCONTINUED | OUTPATIENT
Start: 2024-11-16 | End: 2024-11-18 | Stop reason: HOSPADM

## 2024-11-16 RX ORDER — NALOXONE HCL 0.4 MG/ML
0.02 VIAL (ML) INJECTION
Status: DISCONTINUED | OUTPATIENT
Start: 2024-11-16 | End: 2024-11-18 | Stop reason: HOSPADM

## 2024-11-16 RX ORDER — SODIUM CHLORIDE 9 MG/ML
INJECTION, SOLUTION INTRAVENOUS CONTINUOUS
Status: DISCONTINUED | OUTPATIENT
Start: 2024-11-16 | End: 2024-11-17

## 2024-11-16 RX ORDER — POLYETHYLENE GLYCOL 3350 17 G/17G
17 POWDER, FOR SOLUTION ORAL 3 TIMES DAILY
Status: DISCONTINUED | OUTPATIENT
Start: 2024-11-16 | End: 2024-11-18 | Stop reason: HOSPADM

## 2024-11-16 RX ORDER — METRONIDAZOLE 500 MG/100ML
500 INJECTION, SOLUTION INTRAVENOUS
Status: DISCONTINUED | OUTPATIENT
Start: 2024-11-16 | End: 2024-11-18 | Stop reason: HOSPADM

## 2024-11-16 RX ORDER — GLUCAGON 1 MG
1 KIT INJECTION
Status: DISCONTINUED | OUTPATIENT
Start: 2024-11-16 | End: 2024-11-18 | Stop reason: HOSPADM

## 2024-11-16 RX ORDER — IBUPROFEN 200 MG
16 TABLET ORAL
Status: DISCONTINUED | OUTPATIENT
Start: 2024-11-16 | End: 2024-11-18 | Stop reason: HOSPADM

## 2024-11-16 RX ORDER — ATORVASTATIN CALCIUM 40 MG/1
40 TABLET, FILM COATED ORAL NIGHTLY
Status: DISCONTINUED | OUTPATIENT
Start: 2024-11-16 | End: 2024-11-18 | Stop reason: HOSPADM

## 2024-11-16 RX ORDER — TALC
9 POWDER (GRAM) TOPICAL NIGHTLY PRN
Status: DISCONTINUED | OUTPATIENT
Start: 2024-11-16 | End: 2024-11-18 | Stop reason: HOSPADM

## 2024-11-16 RX ORDER — BUDESONIDE 0.5 MG/2ML
0.5 INHALANT ORAL EVERY 12 HOURS
Status: DISCONTINUED | OUTPATIENT
Start: 2024-11-16 | End: 2024-11-18 | Stop reason: HOSPADM

## 2024-11-16 RX ORDER — ENOXAPARIN SODIUM 100 MG/ML
40 INJECTION SUBCUTANEOUS EVERY 24 HOURS
Status: DISCONTINUED | OUTPATIENT
Start: 2024-11-16 | End: 2024-11-18 | Stop reason: HOSPADM

## 2024-11-16 RX ADMIN — CIPROFLOXACIN 400 MG: 2 INJECTION, SOLUTION INTRAVENOUS at 12:11

## 2024-11-16 RX ADMIN — BUDESONIDE INHALATION 0.5 MG: 0.5 SUSPENSION RESPIRATORY (INHALATION) at 08:11

## 2024-11-16 RX ADMIN — METRONIDAZOLE 500 MG: 500 INJECTION, SOLUTION INTRAVENOUS at 09:11

## 2024-11-16 RX ADMIN — KETOROLAC TROMETHAMINE 15 MG: 30 INJECTION, SOLUTION INTRAMUSCULAR at 01:11

## 2024-11-16 RX ADMIN — SENNOSIDES AND DOCUSATE SODIUM 1 TABLET: 8.6; 5 TABLET ORAL at 12:11

## 2024-11-16 RX ADMIN — METRONIDAZOLE 500 MG: 500 INJECTION, SOLUTION INTRAVENOUS at 05:11

## 2024-11-16 RX ADMIN — ENOXAPARIN SODIUM 40 MG: 40 INJECTION SUBCUTANEOUS at 05:11

## 2024-11-16 RX ADMIN — ASPIRIN 81 MG: 81 TABLET, COATED ORAL at 08:11

## 2024-11-16 RX ADMIN — PANTOPRAZOLE SODIUM 40 MG: 40 TABLET, DELAYED RELEASE ORAL at 08:11

## 2024-11-16 RX ADMIN — TRAZODONE HYDROCHLORIDE 225 MG: 50 TABLET ORAL at 11:11

## 2024-11-16 RX ADMIN — SERTRALINE HYDROCHLORIDE 50 MG: 50 TABLET ORAL at 07:11

## 2024-11-16 RX ADMIN — IPRATROPIUM BROMIDE 0.5 MG: 0.5 SOLUTION RESPIRATORY (INHALATION) at 08:11

## 2024-11-16 RX ADMIN — IPRATROPIUM BROMIDE 0.5 MG: 0.5 SOLUTION RESPIRATORY (INHALATION) at 01:11

## 2024-11-16 RX ADMIN — ATORVASTATIN CALCIUM 40 MG: 40 TABLET, FILM COATED ORAL at 08:11

## 2024-11-16 RX ADMIN — PANTOPRAZOLE SODIUM 40 MG: 40 TABLET, DELAYED RELEASE ORAL at 12:11

## 2024-11-16 RX ADMIN — SODIUM CHLORIDE: 9 INJECTION, SOLUTION INTRAVENOUS at 08:11

## 2024-11-16 RX ADMIN — ARFORMOTEROL TARTRATE 15 MCG: 15 SOLUTION RESPIRATORY (INHALATION) at 08:11

## 2024-11-16 RX ADMIN — ARFORMOTEROL TARTRATE 15 MCG: 15 SOLUTION RESPIRATORY (INHALATION) at 07:11

## 2024-11-16 RX ADMIN — KETOROLAC TROMETHAMINE 15 MG: 30 INJECTION, SOLUTION INTRAMUSCULAR at 07:11

## 2024-11-16 RX ADMIN — METRONIDAZOLE 500 MG: 500 INJECTION, SOLUTION INTRAVENOUS at 01:11

## 2024-11-16 RX ADMIN — BUDESONIDE INHALATION 0.5 MG: 0.5 SUSPENSION RESPIRATORY (INHALATION) at 07:11

## 2024-11-16 RX ADMIN — KETOROLAC TROMETHAMINE 15 MG: 30 INJECTION, SOLUTION INTRAMUSCULAR at 08:11

## 2024-11-16 RX ADMIN — ASPIRIN 81 MG: 81 TABLET, COATED ORAL at 01:11

## 2024-11-16 RX ADMIN — KETOROLAC TROMETHAMINE 15 MG: 30 INJECTION, SOLUTION INTRAMUSCULAR at 12:11

## 2024-11-16 RX ADMIN — TRAZODONE HYDROCHLORIDE 225 MG: 50 TABLET ORAL at 12:11

## 2024-11-16 RX ADMIN — ATORVASTATIN CALCIUM 40 MG: 40 TABLET, FILM COATED ORAL at 12:11

## 2024-11-16 RX ADMIN — POLYETHYLENE GLYCOL 3350 17 G: 17 POWDER, FOR SOLUTION ORAL at 08:11

## 2024-11-16 RX ADMIN — IPRATROPIUM BROMIDE 0.5 MG: 0.5 SOLUTION RESPIRATORY (INHALATION) at 07:11

## 2024-11-16 RX ADMIN — POLYETHYLENE GLYCOL 3350 17 G: 17 POWDER, FOR SOLUTION ORAL at 05:11

## 2024-11-16 RX ADMIN — CIPROFLOXACIN 400 MG: 2 INJECTION, SOLUTION INTRAVENOUS at 02:11

## 2024-11-16 RX ADMIN — SODIUM CHLORIDE: 9 INJECTION, SOLUTION INTRAVENOUS at 01:11

## 2024-11-16 NOTE — HPI
74 year old male with comorbidities of COPD, CAD s/p stent, lung Ca s/p lobex, follows GI for  schatzki's ring 2/2 GERD (has EGD scheduled in December), CKD, chronic back/hip pain on norco/steroid injections presents with new onset lower abdominal pain extending from umbilicus to bilateral lower quadrants.  Pain started around 7 PM, is constant, initially located in bilateral lower quadrants/umbilicus but has localized to LLQ. Denies previous occurrences.  Reports small hard stools for the last two weeks.  Took colace and fleet enema at home without relief.  States for the last few days he noticed mucus while wiping after bowel movements.  Denies history of inflammatory bowel disease or family history of autoimmune/inflammotory conditions.    Denies fever, radiation, migration, back/flank pain, difficulty with urination, dysuria, urgency, hematuria, hematochezia/melena.    CT Abdomen/pelvis with IV contrast suggested nonspecific colitis of descending and sigmoid colon.  Labs revealed elevated WBC count and lactate.  Was given morphine in ED for pain.

## 2024-11-16 NOTE — ASSESSMENT & PLAN NOTE
Patient has history of shiatzki rings causing dysphagia.  Has EGD scheduled for December.    Pantoprazole 40mg daily

## 2024-11-16 NOTE — CONSULTS
Consult Note  Gastroenterology/Hepatology    Consult Requested By: hospital medicine  Reason for Consult: colitis    SUBJECTIVE:     History of Present Illness: This is a very pleasant 73yo M with CAD, COPD, hx of lung cancer, admitted yesterday (11/15) with complaints of lower abd pain and LLQ pain.  Patient states pain started suddenly and was severe. He noticed his abdomen was tender to the touch.  In the ED he had CT scan that showed constipation and colitis involving the sigmoid and descending colon.  Patient denies having this severe pain before but he does suffer with chronic constipation.  He has been using daily stool softener which has only partially helped.  He last had Colonoscopy 4/2024 at [facility] that was unremarkable.     Review of patient's allergies indicates:   Allergen Reactions    Penicillins      AS A CHILD - NOT SURE REACTION       Past Medical History:   Diagnosis Date    Arthritis     Cancer SKIN    Cardiac angina     Colon polyp     COPD (chronic obstructive pulmonary disease)     Coronary artery disease ANGINA. HAD  [facility] 8/12. 40 % BLOCKAGE. DR RUBY IS CARDIOLOGIST.    Depression     GERD (gastroesophageal reflux disease)     Kidney cysts     Lung cancer 01/2019    Pathological staging [id number] stage IIa with pleural invasion present - s/p RLLectomy    MVP (mitral valve prolapse)     Trigger thumb     BILAT    Wears dentures     UPPER    Wears glasses      Past Surgical History:   Procedure Laterality Date    APPENDECTOMY      BACK SURGERY      COLONOSCOPY N/A 04/19/2024    Procedure: COLONOSCOPY;  Surgeon: Blayne Piper MD;  Location: [facility];  Service: Endoscopy;  Laterality: N/A;    ESOPHAGOGASTRODUODENOSCOPY N/A 10/8/2024    Procedure: EGD (ESOPHAGOGASTRODUODENOSCOPY);  Surgeon: Blayne Piper MD;  Location: [facility];  Service: Endoscopy;  Laterality: N/A;    INSERTION OF TUNNELED CENTRAL VENOUS CATHETER (CVC) WITH SUBCUTANEOUS PORT Left 04/11/2019    Procedure:  [id number];  Surgeon: Reji Griffiths MD;  Location: [facility] OR;  Service: General;  Laterality: Left;    KNEE SURGERY      BILAT    lung resection  2019    TRIGGER THUMB Bilateral     UPPER GASTROINTESTINAL ENDOSCOPY       Family History   Problem Relation Name Age of Onset    COPD Mother      Diabetes Mother      Heart disease Mother      Birth defects Father      Diabetes Father      Heart disease Father      Colon cancer Neg Hx       Social History     Tobacco Use    Smoking status: Former     Current packs/day: 0.00     Types: Cigarettes     Start date: 1979     Quit date: 2019     Years since quittin.8    Smokeless tobacco: Never    Tobacco comments:     1-2 CIGT SOME DAYS/states last cigarette 20   Substance Use Topics    Alcohol use: Not Currently     Comment: no ETOH for 12 years    Drug use: Yes     Types: Hydrocodone       Review of Systems:  ROS negative except as stated above in HPI    OBJECTIVE:     Vital Signs:  Temp:  [97.8 °F (36.6 °C)-98.4 °F (36.9 °C)]   Pulse:  []   Resp:  [16]   BP: (113-130)/(69-78)   SpO2:  [81 %-96 %]     Physical Exam:  General: well developed, well nourished  Eyes: conjunctivae/corneas clear. PERRL..  HENT: Head:normocephalic, atraumatic. Ears:hearing grossly normal bilaterally. Nose: Nares normal. Septum midline. Mucosa normal. No drainage or sinus tenderness., no discharge. Throat: lips, mucosa, and tongue normal; teeth and gums normal and no throat erythema.  Neck: supple, symmetrical, trachea midline, no JVD and thyroid not enlarged, symmetric, no tenderness/mass/nodules  Lungs:  clear to auscultation bilaterally and normal respiratory effort  Cardiovascular: Heart: regular rate and rhythm, S1, S2 normal, no murmur, click, rub or gallop. Chest Wall: no tenderness. Extremities: no cyanosis or edema, or clubbing. Pulses: 2+ and symmetric.  Abdomen/Rectal: Abdomen: soft, non-tender non-distented; bowel sounds normal; no masses,  no  "organomegaly. Rectal: not examined  Skin: Skin color, texture, turgor normal. No rashes or lesions  Musculoskeletal:normal gait and no clubbing, cyanosis  Lymph Nodes: No cervical or supraclavicular adenopathy  Neurologic: Normal strength and tone. No focal numbness or weakness  Psych/Behavioral:  Normal. and Alert and oriented, appropriate affect.      Laboratory and Radiology Data:  CBC:   Recent Labs   Lab 11/16/24  0533   WBC 12.04   RBC 4.29*   HGB 12.0*   HCT 36.1*      MCV 84   MCH 28.0   MCHC 33.2     BMP:   Recent Labs   Lab 11/16/24  0533   *   *   K 4.0      CO2 25   BUN 18   CREATININE 1.3   CALCIUM 9.0   MG 1.7     CMP:   Recent Labs   Lab 11/16/24  0533   *   CALCIUM 9.0   *   K 4.0   CO2 25      BUN 18   CREATININE 1.3     LFTs: No results for input(s): "ALT", "AST", "ALKPHOS", "BILITOT", "PROT", "ALBUMIN" in the last 168 hours.  Coagulation: No results for input(s): "LABPROT", "INR", "APTT" in the last 168 hours.    ASSESSMENT/PLAN:   This is a very pleasant 73yo M with above med hx admitted on 11/15 with LLQ/lower abd pain.  CT shows colitis involving sigmoid/descending colon plus evidence of constipation.  Last colon 4/2024 at [facility] was unremarkable.       Plan:  L sided colitis- CT personally reviewed.  Findings consistent with colitis.  Differential includes infectious causes vs. Ischemic colitis vs. Stercoral colitis (constipation induced colitis).  Suspect ischemic vs. Stercoral colitis as most likely cause.  Continue IV antibiotics with Cipro plus Flagyl.  Will start bowel regimen of Miralax 17gm TID to treat constipation.  Will monitor for clinical improvement.  Will need some form of bowel regimen at home after discharge to treat chronic constipation.       Thank you very much for the consult.  I will continue to follow.  Please do not hesitate to contact me with any questions or concerns.    Henrik León MD     "

## 2024-11-16 NOTE — ASSESSMENT & PLAN NOTE
Patient has acute onset abdominal pain, found to have colitis of descending/sigmoid colon.  Recently has been constipated with small hard purulent stools for 2 weeks in the setting of chronic opioid use.     Start ciprofloxacin 400mg IV Q12  Start metronidazole 500mg IV Q8  Follow blood culture  Surgery and GI consults

## 2024-11-16 NOTE — PROGRESS NOTES
Automatic Inhaler to Nebulizer Interchange    fluticasone/umeclidinium/vilanterol (Trelegy) 100 mcg/ 62.5 mcg/ 25 mcg changed to budesonide 0.5 mg twice daily AND ipratropium 0.5 mg every 6 hour scheduled AND arformoterol 15 mcg twice daily per [facility] Automatic Therapeutic Substitutions Protocol.    Please contact pharmacy at extension [phone] with any questions.     Thank you,   Pato Mark

## 2024-11-16 NOTE — PLAN OF CARE
[facility]  Initial Discharge Assessment       Primary Care Provider: Judy Muro MD    Admission Diagnosis: Lower abdominal pain [R10.30]    Admission Date: 11/15/2024  Expected Discharge Date:    met with Pt at bedside to complete discharge assessment. Pt AAOx4s. Demographics, PCP, and insurance verified. No home health. No dialysis. Pt reports ability to complete ADLs without assistance. Pt verbalized plan to discharge home via family transport. Pt has no other needs to be addressed at this time.    Transition of Care Barriers: None    Payor: [employer] MEDICARE / Plan: [employer] MEDICARE HMO / Product Type: Capitation /     Extended Emergency Contact Information  Primary Emergency Contact: Charissa Steele  Mobile Phone: [phone]  Relation: Daughter  Preferred language: English   needed? No  Secondary Emergency Contact: Kaye Goode  Address: [address] [address] [facility]  Home Phone: [phone]  Mobile Phone: [phone]  Relation: Daughter  Preferred language: English   needed? No    Discharge Plan A: Home  Discharge Plan B: Home      [facility] - [place], [place] - [address]  [address] [address]  Phone: [phone] Fax: [phone]    [facility]pharm[facility] #[id number] - [place] - [address] [facility]  [address] [address]  Phone: [phone] Fax: [phone]      Initial Assessment (most recent)       Adult Discharge Assessment - 11/16/24 0932          Discharge Assessment    Assessment Type Discharge Planning Assessment     Confirmed/corrected address, phone number and insurance Yes     Confirmed Demographics Correct on Facesheet     Source of Information patient     Does patient/caregiver understand observation status Yes     Communicated GLEN with patient/caregiver Date not available/Unable to determine     Reason For Admission Colitis     People  in Home alone     Do you expect to return to your current living situation? Yes     Do you have help at home or someone to help you manage your care at home? Yes     Who are your caregiver(s) and their phone number(s)? Charissa Steele (Daughter)  [phone]     Prior to hospitilization cognitive status: Alert/Oriented     Current cognitive status: Alert/Oriented     Walking or Climbing Stairs Difficulty no     Dressing/Bathing Difficulty no     Home Accessibility wheelchair accessible     Home Layout Able to live on 1st floor     Equipment Currently Used at Home none     Readmission within 30 days? No     Patient currently being followed by outpatient case management? No     Do you currently have service(s) that help you manage your care at home? No     Do you take prescription medications? Yes     Do you have prescription coverage? Yes     Coverage Payor: [employer] MEDICARE - HUMANA MEDICARE HMO -     Do you have any problems affording any of your prescribed medications? No     Is the patient taking medications as prescribed? yes     How do you get to doctors appointments? car, drives self     Are you on dialysis? No     Do you take coumadin? No     Discharge Plan A Home     Discharge Plan B Home     DME Needed Upon Discharge  none     Discharge Plan discussed with: Patient     Transition of Care Barriers None

## 2024-11-16 NOTE — PLAN OF CARE
Pt was explained CHAWLA. Pt verbalized understanding of CHAWLA and signed. CHAWLA scanned to .     11/16/24 0931   CHAWLA Message   Medicare Outpatient and Observation Notification regarding financial responsibility Given to patient/caregiver;Explained to patient/caregiver;Signed/date by patient/caregiver   Date CHAWLA was signed 11/16/24   Time CHAWLA was signed [id number]

## 2024-11-16 NOTE — SUBJECTIVE & OBJECTIVE
Past Medical History:   Diagnosis Date    Arthritis     Cancer SKIN    Cardiac angina     Colon polyp     COPD (chronic obstructive pulmonary disease)     Coronary artery disease ANGINA. HAD  [facility] 8/12. 40 % BLOCKAGE. DR RUBY IS CARDIOLOGIST.    Depression     GERD (gastroesophageal reflux disease)     Kidney cysts     Lung cancer 01/2019    Pathological staging [id number] stage IIa with pleural invasion present - s/p RLLectomy    MVP (mitral valve prolapse)     Trigger thumb     BILAT    Wears dentures     UPPER    Wears glasses        Past Surgical History:   Procedure Laterality Date    APPENDECTOMY      BACK SURGERY      COLONOSCOPY N/A 04/19/2024    Procedure: COLONOSCOPY;  Surgeon: Blayne Piper MD;  Location: [facility];  Service: Endoscopy;  Laterality: N/A;    ESOPHAGOGASTRODUODENOSCOPY N/A 10/8/2024    Procedure: EGD (ESOPHAGOGASTRODUODENOSCOPY);  Surgeon: Blayne Piper MD;  Location: [facility];  Service: Endoscopy;  Laterality: N/A;    INSERTION OF TUNNELED CENTRAL VENOUS CATHETER (CVC) WITH SUBCUTANEOUS PORT Left 04/11/2019    Procedure: [id number]-[id number];  Surgeon: Reji Griffiths MD;  Location: [facility];  Service: General;  Laterality: Left;    KNEE SURGERY      BILAT    lung resection  02/27/2019    TRIGGER THUMB Bilateral     UPPER GASTROINTESTINAL ENDOSCOPY         Review of patient's allergies indicates:   Allergen Reactions    Penicillins      AS A CHILD - NOT SURE REACTION       Current Facility-Administered Medications on File Prior to Encounter   Medication    methylPREDNISolone acetate injection 40 mg     Current Outpatient Medications on File Prior to Encounter   Medication Sig    eszopiclone (LUNESTA) 2 MG Tab Take 2 mg by mouth.    fluticasone propionate (FLONASE) 50 mcg/actuation nasal spray 1 spray by Each Nostril route once daily.    ipratropium (ATROVENT) 42 mcg (0.06 %) nasal spray 2 sprays by Nasal route 3 (three) times daily.    albuterol (PROVENTIL/VENTOLIN HFA) 90  mcg/actuation inhaler Inhale 2 puffs into the lungs every 6 (six) hours as needed for Wheezing or Shortness of Breath. Rescue    aspirin (ECOTRIN) 81 MG EC tablet Take 81 mg by mouth every evening.    atorvastatin (LIPITOR) 40 MG tablet Take 40 mg by mouth every evening.     azelastine (ASTELIN) 137 mcg (0.1 %) nasal spray 2 sprays by Nasal route.    cetirizine (ZYRTEC) 10 MG tablet Take 10 mg by mouth once daily.    cyclobenzaprine (FLEXERIL) 10 MG tablet Take 10 mg by mouth 2 (two) times daily as needed for Muscle spasms.    fluticasone-umeclidin-vilanter (TRELEGY ELLIPTA) 100-62.5-25 mcg DsDv Inhale 1 puff into the lungs Daily.    HYDROcodone-acetaminophen (NORCO)  mg per tablet Take 1 tablet by mouth 2 (two) times daily as needed.    multivitamin capsule Take 1 capsule by mouth once daily.    nitroGLYCERIN (NITROSTAT) 0.4 MG SL tablet Place 0.4 mg under the tongue every 5 (five) minutes as needed.    pantoprazole (PROTONIX) 40 MG tablet Take 40 mg by mouth every morning.    sertraline (ZOLOFT) 50 MG tablet Take 50 mg by mouth once daily.    traZODone (DESYREL) 150 MG tablet Take 225 mg by mouth nightly.     Family History       Problem Relation (Age of Onset)    Birth defects Father    COPD Mother    Diabetes Mother, Father    Heart disease Mother, Father          Tobacco Use    Smoking status: Former     Current packs/day: 0.00     Types: Cigarettes     Start date: 1979     Quit date: 2019     Years since quittin.8    Smokeless tobacco: Never    Tobacco comments:     1-2 CIGT SOME DAYS/states last cigarette 20   Substance and Sexual Activity    Alcohol use: Not Currently     Comment: no ETOH for 12 years    Drug use: Yes     Types: Hydrocodone    Sexual activity: Yes     Partners: Female     Review of Systems   Constitutional:  Negative for chills and fever.   HENT:  Negative for ear pain and sore throat.    Eyes:  Negative for visual disturbance.   Respiratory:  Negative for cough, chest  tightness, shortness of breath and wheezing.    Cardiovascular:  Negative for chest pain, palpitations and leg swelling.   Gastrointestinal:  Positive for abdominal pain and constipation. Negative for blood in stool, diarrhea, nausea and vomiting.   Endocrine: Negative for polyuria.   Genitourinary:  Negative for dysuria and hematuria.   Skin:  Negative for rash.   Neurological:  Negative for syncope, weakness, numbness and headaches.   Psychiatric/Behavioral:  Negative for agitation and confusion.      Objective:     Vital Signs (Most Recent):  Temp: 97.5 °F (36.4 °C) (11/16/24 0043)  Pulse: 88 (11/16/24 0043)  Resp: 16 (11/16/24 0043)  BP: (!) 151/76 (11/16/24 0043)  SpO2: 96 % (11/16/24 0043) Vital Signs (24h Range):  Temp:  [97.5 °F (36.4 °C)] 97.5 °F (36.4 °C)  Pulse:  [74-99] 88  Resp:  [16-24] 16  SpO2:  [95 %-99 %] 96 %  BP: (125-152)/(70-91) 151/76     Weight: 81.6 kg (180 lb)  Body mass index is 22.5 kg/m².     Physical Exam  Constitutional:       General: He is not in acute distress.     Appearance: Normal appearance. He is not toxic-appearing or diaphoretic.   HENT:      Head: Normocephalic and atraumatic.      Nose: No rhinorrhea.      Mouth/Throat:      Mouth: Mucous membranes are moist.      Pharynx: No oropharyngeal exudate or posterior oropharyngeal erythema.   Eyes:      General: No scleral icterus.     Extraocular Movements: Extraocular movements intact.      Pupils: Pupils are equal, round, and reactive to light.   Cardiovascular:      Rate and Rhythm: Normal rate and regular rhythm.      Pulses: Normal pulses.      Heart sounds: Normal heart sounds. No murmur heard.  Pulmonary:      Effort: Pulmonary effort is normal. No respiratory distress.      Breath sounds: Normal breath sounds. No wheezing or rales.   Abdominal:      General: There is no distension.      Palpations: Abdomen is soft.      Tenderness: There is abdominal tenderness.      Comments: Tenderness to palpation in bilateral lower  "quadrants, worse in LLQ    Musculoskeletal:      Right lower leg: No edema.      Left lower leg: No edema.   Skin:     General: Skin is warm and dry.      Coloration: Skin is not jaundiced.   Neurological:      General: No focal deficit present.      Mental Status: He is alert and oriented to person, place, and time.      Motor: No weakness.   Psychiatric:         Mood and Affect: Mood normal.         Behavior: Behavior normal.              CRANIAL NERVES     CN III, IV, VI   Pupils are equal, round, and reactive to light.       Significant Labs: All pertinent labs within the past 24 hours have been reviewed.  BMP:   Recent Labs   Lab 11/15/24  2133   *      K 4.2      CO2 19*   BUN 16   CREATININE 1.3   CALCIUM 9.9     CBC:   Recent Labs   Lab 11/15/24  2133   WBC 16.34*   HGB 13.4*   HCT 41.0        Lactic Acid: No results for input(s): "LACTATE" in the last 48 hours.  Lipase: No results for input(s): "LIPASE" in the last 48 hours.  Lipid Panel: No results for input(s): "CHOL", "HDL", "LDLCALC", "TRIG", "CHOLHDL" in the last 48 hours.    Significant Imaging: I have reviewed all pertinent imaging results/findings within the past 24 hours.  "

## 2024-11-16 NOTE — ASSESSMENT & PLAN NOTE
Patient takes norco for chronic back/hip pain (has h/o osteonecrosis of bilateral hips)    Senna/Docusate  PRN lactulose  Will try to avoid opioids - PRN pain medications with acetaminophen/ketorolac

## 2024-11-16 NOTE — H&P
[facility]  History & Physical    Patient Name: Adam Goode  MRN: [id number]  Patient Class: OP- Observation  Admission Date: 11/15/2024  Attending Physician: Adalberto Jefferson MD   Primary Care Provider: Jduy Muro MD         Patient information was obtained from patient and ER records.     Subjective:     Principal Problem:Colitis    Chief Complaint:   Chief Complaint   Patient presents with    Abdominal Pain     Pt states took 3 colace this morning and a fleets enema tonight with no results        HPI: 74 year old male with comorbidities of COPD, CAD s/p stent, lung Ca s/p lobex, follows GI for  schatzki's ring 2/2 GERD (has EGD scheduled in December), CKD, chronic back/hip pain on norco/steroid injections presents with new onset lower abdominal pain extending from umbilicus to bilateral lower quadrants.  Pain started around 7 PM, is constant, initially located in bilateral lower quadrants/umbilicus but has localized to LLQ. Denies previous occurrences.  Reports small hard stools for the last two weeks.  Took colace and fleet enema at home without relief.  States for the last few days he noticed mucus while wiping after bowel movements.  Denies history of inflammatory bowel disease or family history of autoimmune/inflammotory conditions.    Denies fever, radiation, migration, back/flank pain, difficulty with urination, dysuria, urgency, hematuria, hematochezia/melena.    CT Abdomen/pelvis with IV contrast suggested nonspecific colitis of descending and sigmoid colon.  Labs revealed elevated WBC count and lactate.  Was given morphine in ED for pain.      Past Medical History:   Diagnosis Date    Arthritis     Cancer SKIN    Cardiac angina     Colon polyp     COPD (chronic obstructive pulmonary disease)     Coronary artery disease ANGINA. HAD  [facility] 8/12. 40 % BLOCKAGE. DR RUBY IS CARDIOLOGIST.    Depression     GERD (gastroesophageal reflux disease)     Kidney cysts      Lung cancer 01/2019    Pathological staging [id number] stage IIa with pleural invasion present - s/p RLLectomy    MVP (mitral valve prolapse)     Trigger thumb     BILAT    Wears dentures     UPPER    Wears glasses        Past Surgical History:   Procedure Laterality Date    APPENDECTOMY      BACK SURGERY      COLONOSCOPY N/A 04/19/2024    Procedure: COLONOSCOPY;  Surgeon: Blayne Piper MD;  Location: [facility];  Service: Endoscopy;  Laterality: N/A;    ESOPHAGOGASTRODUODENOSCOPY N/A 10/8/2024    Procedure: EGD (ESOPHAGOGASTRODUODENOSCOPY);  Surgeon: Blayne Piper MD;  Location: [facility];  Service: Endoscopy;  Laterality: N/A;    INSERTION OF TUNNELED CENTRAL VENOUS CATHETER (CVC) WITH SUBCUTANEOUS PORT Left 04/11/2019    Procedure: [id number];  Surgeon: Reji Griffiths MD;  Location: [facility];  Service: General;  Laterality: Left;    KNEE SURGERY      BILAT    lung resection  02/27/2019    TRIGGER THUMB Bilateral     UPPER GASTROINTESTINAL ENDOSCOPY         Review of patient's allergies indicates:   Allergen Reactions    Penicillins      AS A CHILD - NOT SURE REACTION       Current Facility-Administered Medications on File Prior to Encounter   Medication    methylPREDNISolone acetate injection 40 mg     Current Outpatient Medications on File Prior to Encounter   Medication Sig    eszopiclone (LUNESTA) 2 MG Tab Take 2 mg by mouth.    fluticasone propionate (FLONASE) 50 mcg/actuation nasal spray 1 spray by Each Nostril route once daily.    ipratropium (ATROVENT) 42 mcg (0.06 %) nasal spray 2 sprays by Nasal route 3 (three) times daily.    albuterol (PROVENTIL/VENTOLIN HFA) 90 mcg/actuation inhaler Inhale 2 puffs into the lungs every 6 (six) hours as needed for Wheezing or Shortness of Breath. Rescue    aspirin (ECOTRIN) 81 MG EC tablet Take 81 mg by mouth every evening.    atorvastatin (LIPITOR) 40 MG tablet Take 40 mg by mouth every evening.     azelastine (ASTELIN) 137 mcg (0.1 %) nasal spray  2 sprays by Nasal route.    cetirizine (ZYRTEC) 10 MG tablet Take 10 mg by mouth once daily.    cyclobenzaprine (FLEXERIL) 10 MG tablet Take 10 mg by mouth 2 (two) times daily as needed for Muscle spasms.    fluticasone-umeclidin-vilanter (TRELEGY ELLIPTA) 100-62.5-25 mcg DsDv Inhale 1 puff into the lungs Daily.    HYDROcodone-acetaminophen (NORCO)  mg per tablet Take 1 tablet by mouth 2 (two) times daily as needed.    multivitamin capsule Take 1 capsule by mouth once daily.    nitroGLYCERIN (NITROSTAT) 0.4 MG SL tablet Place 0.4 mg under the tongue every 5 (five) minutes as needed.    pantoprazole (PROTONIX) 40 MG tablet Take 40 mg by mouth every morning.    sertraline (ZOLOFT) 50 MG tablet Take 50 mg by mouth once daily.    traZODone (DESYREL) 150 MG tablet Take 225 mg by mouth nightly.     Family History       Problem Relation (Age of Onset)    Birth defects Father    COPD Mother    Diabetes Mother, Father    Heart disease Mother, Father          Tobacco Use    Smoking status: Former     Current packs/day: 0.00     Types: Cigarettes     Start date: 1979     Quit date: 2019     Years since quittin.8    Smokeless tobacco: Never    Tobacco comments:     1-2 CIGT SOME DAYS/states last cigarette 20   Substance and Sexual Activity    Alcohol use: Not Currently     Comment: no ETOH for 12 years    Drug use: Yes     Types: Hydrocodone    Sexual activity: Yes     Partners: Female     Review of Systems   Constitutional:  Negative for chills and fever.   HENT:  Negative for ear pain and sore throat.    Eyes:  Negative for visual disturbance.   Respiratory:  Negative for cough, chest tightness, shortness of breath and wheezing.    Cardiovascular:  Negative for chest pain, palpitations and leg swelling.   Gastrointestinal:  Positive for abdominal pain and constipation. Negative for blood in stool, diarrhea, nausea and vomiting.   Endocrine: Negative for polyuria.   Genitourinary:  Negative for dysuria  and hematuria.   Skin:  Negative for rash.   Neurological:  Negative for syncope, weakness, numbness and headaches.   Psychiatric/Behavioral:  Negative for agitation and confusion.      Objective:     Vital Signs (Most Recent):  Temp: 97.5 °F (36.4 °C) (11/16/24 0043)  Pulse: 88 (11/16/24 0043)  Resp: 16 (11/16/24 0043)  BP: (!) 151/76 (11/16/24 0043)  SpO2: 96 % (11/16/24 0043) Vital Signs (24h Range):  Temp:  [97.5 °F (36.4 °C)] 97.5 °F (36.4 °C)  Pulse:  [74-99] 88  Resp:  [16-24] 16  SpO2:  [95 %-99 %] 96 %  BP: (125-152)/(70-91) 151/76     Weight: 81.6 kg (180 lb)  Body mass index is 22.5 kg/m².     Physical Exam  Constitutional:       General: He is not in acute distress.     Appearance: Normal appearance. He is not toxic-appearing or diaphoretic.   HENT:      Head: Normocephalic and atraumatic.      Nose: No rhinorrhea.      Mouth/Throat:      Mouth: Mucous membranes are moist.      Pharynx: No oropharyngeal exudate or posterior oropharyngeal erythema.   Eyes:      General: No scleral icterus.     Extraocular Movements: Extraocular movements intact.      Pupils: Pupils are equal, round, and reactive to light.   Cardiovascular:      Rate and Rhythm: Normal rate and regular rhythm.      Pulses: Normal pulses.      Heart sounds: Normal heart sounds. No murmur heard.  Pulmonary:      Effort: Pulmonary effort is normal. No respiratory distress.      Breath sounds: Normal breath sounds. No wheezing or rales.   Abdominal:      General: There is no distension.      Palpations: Abdomen is soft.      Tenderness: There is abdominal tenderness.      Comments: Tenderness to palpation in bilateral lower quadrants, worse in LLQ    Musculoskeletal:      Right lower leg: No edema.      Left lower leg: No edema.   Skin:     General: Skin is warm and dry.      Coloration: Skin is not jaundiced.   Neurological:      General: No focal deficit present.      Mental Status: He is alert and oriented to person, place, and time.       "Motor: No weakness.   Psychiatric:         Mood and Affect: Mood normal.         Behavior: Behavior normal.              CRANIAL NERVES     CN III, IV, VI   Pupils are equal, round, and reactive to light.       Significant Labs: All pertinent labs within the past 24 hours have been reviewed.  BMP:   Recent Labs   Lab 11/15/24  2133   *      K 4.2      CO2 19*   BUN 16   CREATININE 1.3   CALCIUM 9.9     CBC:   Recent Labs   Lab 11/15/24  2133   WBC 16.34*   HGB 13.4*   HCT 41.0        Lactic Acid: No results for input(s): "LACTATE" in the last 48 hours.  Lipase: No results for input(s): "LIPASE" in the last 48 hours.  Lipid Panel: No results for input(s): "CHOL", "HDL", "LDLCALC", "TRIG", "CHOLHDL" in the last 48 hours.    Significant Imaging: I have reviewed all pertinent imaging results/findings within the past 24 hours.  Assessment/Plan:     * Colitis  Patient has acute onset abdominal pain, found to have colitis of descending/sigmoid colon.  Recently has been constipated with small hard purulent stools for 2 weeks in the setting of chronic opioid use.     Start ciprofloxacin 400mg IV Q12  Start metronidazole 500mg IV Q8  Follow blood culture  Surgery and GI consults        Opioid-induced constipation  Patient takes norco for chronic back/hip pain (has h/o osteonecrosis of bilateral hips)    Senna/Docusate  PRN lactulose  Will try to avoid opioids - PRN pain medications with acetaminophen/ketorolac         CAD (coronary artery disease)  Patient with known CAD s/p stent placement, which is controlled Will continue ASA and Statin and monitor for S/Sx of angina/ACS. Continue to monitor on telemetry.     Gastroesophageal reflux disease with esophagitis  Patient has history of shiatzki rings causing dysphagia.  Has EGD scheduled for December.    Pantoprazole 40mg daily       COPD without exacerbation  Patient's COPD is controlled currently.  Patient is currently off COPD Pathway. Continue " scheduled inhalers  and monitor respiratory status closely.       VTE Risk Mitigation (From admission, onward)           Ordered     enoxaparin injection 40 mg  Daily         11/16/24 0012     IP VTE HIGH RISK PATIENT  Once         11/16/24 0012     Place sequential compression device  Until discontinued         11/16/24 0012                       On 11/16/2024, patient should be placed in hospital observation services under my care.        Automatic Inhaler to Nebulizer Interchange    fluticasone/umeclidinium/vilanterol (Trelegy) 100 mcg/ 62.5 mcg/ 25 mcg changed to budesonide 0.5 mg twice daily AND ipratropium 0.5 mg every 6 hour scheduled AND arformoterol 15 mcg twice daily per [facility] Automatic Therapeutic Substitutions Protocol.    Please contact pharmacy at extension [phone] with any questions.     Thank you,   Pato Rivera MD  Department of Hospital Medicine  [facility]

## 2024-11-16 NOTE — CARE UPDATE
Patient admitted with colitis and started on broad-spectrum antibiotics.  Awaiting consult by General surgery and Gastroenterology.  If no surgical intervention plan, continue IV antibiotics for 3 day course and transition to p.o..

## 2024-11-16 NOTE — CARE UPDATE
11/16/24 0713   Patient Assessment/Suction   Level of Consciousness (AVPU) alert   Respiratory Effort Normal;Unlabored   Expansion/Accessory Muscles/Retractions no use of accessory muscles   All Lung Fields Breath Sounds diminished   Rhythm/Pattern, Respiratory unlabored;pattern regular;depth regular   PRE-TX-O2   Device (Oxygen Therapy) room air   SpO2 96 %   Pulse Oximetry Type Intermittent   $ Pulse Oximetry - Multiple Charge Pulse Oximetry - Multiple   Pulse 86   Resp 16   Aerosol Therapy   $ Aerosol Therapy Charges Aerosol Treatment  (brovana)   Daily Review of Necessity (SVN) completed   Respiratory Treatment Status (SVN) given   Treatment Route (SVN) mouthpiece utilized;oxygen   Patient Position HOB elevated   Post Treatment Assessment (SVN) breath sounds unchanged;vital signs unchanged   Signs of Intolerance (SVN) none

## 2024-11-16 NOTE — ED PROVIDER NOTES
Chief complaint:  Abdominal Pain (Pt states took 3 colace this morning and a fleets enema tonight with no results)      HPI:  Adam Goode is a 74 y.o. male with hx COPD, CAD s/p stent, lung CA s/p lobex, GERD with Schatzki's ring, CKD presenting with acute onset of moderate to severe lower abdominal pain extending from the level of the umbilicus to the bilateral lower quadrant area.  Pain is constant and present for several hours.  Patient endorses typical frequency of bowel movement every 4-5 days with last bowel movement three days ago.  Felt as if his stools have been more difficult and smaller over the last several days.  He did take Colace at home without relief of symptoms in addition to attempt at fleets enema.  He denies radiation or migration of pain.  He denies back or flank pain.  He denies difficulty urinating or any change in urination including frequency, urgency, dysuria, hematuria.  He denies rectal bleeding.  He denies similar history of pain.  There is no associated fever known.    ROS: As per HPI and below:  No chest pain, dyspnea, swelling, fever.  No testicular pain.    Review of patient's allergies indicates:   Allergen Reactions    Penicillins      AS A CHILD - NOT SURE REACTION       Patient's Medications   New Prescriptions    No medications on file   Previous Medications    ALBUTEROL (PROVENTIL/VENTOLIN HFA) 90 MCG/ACTUATION INHALER    Inhale 2 puffs into the lungs every 6 (six) hours as needed for Wheezing or Shortness of Breath. Rescue    ASPIRIN (ECOTRIN) 81 MG EC TABLET    Take 81 mg by mouth every evening.    ATORVASTATIN (LIPITOR) 40 MG TABLET    Take 40 mg by mouth every evening.     AZELASTINE (ASTELIN) 137 MCG (0.1 %) NASAL SPRAY    2 sprays by Nasal route.    CETIRIZINE (ZYRTEC) 10 MG TABLET    Take 10 mg by mouth once daily.    CYCLOBENZAPRINE (FLEXERIL) 10 MG TABLET    Take 10 mg by mouth 2 (two) times daily as needed for Muscle spasms.    ESZOPICLONE (LUNESTA) 2 MG TAB     Take 2 mg by mouth.    FLUTICASONE PROPIONATE (FLONASE) 50 MCG/ACTUATION NASAL SPRAY    1 spray by Each Nostril route once daily.    FLUTICASONE-UMECLIDIN-VILANTER (TRELEGY ELLIPTA) 100-62.5-25 MCG DSDV    Inhale 1 puff into the lungs Daily.    HYDROCODONE-ACETAMINOPHEN (NORCO)  MG PER TABLET    Take 1 tablet by mouth 2 (two) times daily as needed.    IPRATROPIUM (ATROVENT) 42 MCG (0.06 %) NASAL SPRAY    2 sprays by Nasal route 3 (three) times daily.    MULTIVITAMIN CAPSULE    Take 1 capsule by mouth once daily.    NITROGLYCERIN (NITROSTAT) 0.4 MG SL TABLET    Place 0.4 mg under the tongue every 5 (five) minutes as needed.    PANTOPRAZOLE (PROTONIX) 40 MG TABLET    Take 40 mg by mouth every morning.    SERTRALINE (ZOLOFT) 50 MG TABLET    Take 50 mg by mouth once daily.    TRAZODONE (DESYREL) 150 MG TABLET    Take 225 mg by mouth nightly.   Modified Medications    No medications on file   Discontinued Medications    No medications on file       PMH:  As per HPI and below:  Past Medical History:   Diagnosis Date    Arthritis     Cancer SKIN    Cardiac angina     Colon polyp     COPD (chronic obstructive pulmonary disease)     Coronary artery disease ANGINA. HAD  [facility] 8/12. 40 % BLOCKAGE. DR RUBY IS CARDIOLOGIST.    Depression     GERD (gastroesophageal reflux disease)     Kidney cysts     Lung cancer 01/2019    Pathological staging [id number] stage IIa with pleural invasion present - s/p RLLectomy    MVP (mitral valve prolapse)     Trigger thumb     BILAT    Wears dentures     UPPER    Wears glasses      Past Surgical History:   Procedure Laterality Date    APPENDECTOMY      BACK SURGERY      COLONOSCOPY N/A 04/19/2024    Procedure: COLONOSCOPY;  Surgeon: Blayne Piper MD;  Location: [facility];  Service: Endoscopy;  Laterality: N/A;    ESOPHAGOGASTRODUODENOSCOPY N/A 10/8/2024    Procedure: EGD (ESOPHAGOGASTRODUODENOSCOPY);  Surgeon: Blayne Piper MD;  Location: [facility];  Service: Endoscopy;   Laterality: N/A;    INSERTION OF TUNNELED CENTRAL VENOUS CATHETER (CVC) WITH SUBCUTANEOUS PORT Left 2019    Procedure: [id number];  Surgeon: Reji Griffiths MD;  Location: [facility] OR;  Service: General;  Laterality: Left;    KNEE SURGERY      BILAT    lung resection  2019    TRIGGER THUMB Bilateral     UPPER GASTROINTESTINAL ENDOSCOPY         Social History     Socioeconomic History    Marital status:    Tobacco Use    Smoking status: Former     Current packs/day: 0.00     Types: Cigarettes     Start date: 1979     Quit date: 2019     Years since quittin.8    Smokeless tobacco: Never    Tobacco comments:     1-2 CIGT SOME DAYS/states last cigarette 19   Substance and Sexual Activity    Alcohol use: Not Currently     Comment: no ETOH for 12 years    Drug use: Yes     Types: Hydrocodone    Sexual activity: Yes     Partners: Female     Social Drivers of Health     Financial Resource Strain: Medium Risk (2024)    Overall Financial Resource Strain (CARDIA)     Difficulty of Paying Living Expenses: Somewhat hard   Food Insecurity: Food Insecurity Present (2024)    Hunger Vital Sign     Worried About Running Out of Food in the Last Year: Sometimes true   Transportation Needs: No Transportation Needs (6/10/2024)    Received from [facility] and [facility]    PRAPARE - Transportation     Lack of Transportation (Medical): No     Lack of Transportation (Non-Medical): No   Physical Activity: Unknown (2024)    Exercise Vital Sign     Days of Exercise per Week: 5 days   Stress: No Stress Concern Present (2024)    [employer] - Occupational Stress Questionnaire     Feeling of Stress : Not at all   Housing Stability: Unknown (2024)    Housing Stability Vital Sign     Unable to Pay for Housing in the Last Year: No       Family History   Problem Relation Name Age of Onset     COPD Mother      Diabetes Mother      Heart disease Mother      Birth defects Father      Diabetes Father      Heart disease Father      Colon cancer Neg Hx         Physical Exam:    Vitals:    11/15/24 2340   BP: (!) 151/76   Pulse: 88   Resp:    Temp:      GENERAL:  Mild discomfort secondary to pain.  Alert.    HEENT:  Moist mucous membranes.  Normocephalic and atraumatic.    NECK:  No swelling.  Midline trachea.   CARDIOVASCULAR:  Regular rate and rhythm.  2+ radial pulses.    PULMONARY:  Lungs clear to auscultation bilaterally.  No wheezes, rales, or rhonci.    ABDOMEN:  Diffuse abdominal tenderness with voluntary guarding present.  No involuntary guarding, distention, masses, palpable hernias.    EXTREMITIES:  Warm and well perfused.  Brisk capillary refill.  No peripheral edema.  NEUROLOGICAL:  Normal mental status.  Appropriate and conversant.    SKIN:  No rashes or ecchymoses.    BACK:  Atraumatic.  No CVA tenderness to palpation.    RECTAL:  Nonthrombosed, nontender external hemorrhoids present with no perirectal masses.  No pain on rectal exam or stool in rectal vault.    Labs Reviewed   CBC W/ AUTO DIFFERENTIAL - Abnormal       Result Value    WBC 16.34 (*)     RBC 4.73      Hemoglobin 13.4 (*)     Hematocrit 41.0      MCV 87      MCH 28.3      MCHC 32.7      RDW 14.1      Platelets 307      MPV 8.7 (*)     Immature Granulocytes 0.6 (*)     Gran # (ANC) 12.5 (*)     Immature Grans (Abs) 0.09 (*)     Lymph # 2.7      Mono # 0.9      Eos # 0.1      Baso # 0.07      nRBC 0      Gran % 76.4 (*)     Lymph % 16.3 (*)     Mono % 5.6      Eosinophil % 0.7      Basophil % 0.4      Differential Method Automated     BASIC METABOLIC PANEL - Abnormal    Sodium 136      Potassium 4.2      Chloride 103      CO2 19 (*)     Glucose 154 (*)     BUN 16      Creatinine 1.3      Calcium 9.9      Anion Gap 14      eGFR 58.0 (*)    ISTAT LACTATE - Abnormal    POC Lactate 2.35 (*)     Sample VENOUS     CULTURE, BLOOD   ISTAT  CREATININE    POC Creatinine 1.4      Sample VENOUS     POCT CREATININE   POCT LACTATE       Current Discharge Medication List        CONTINUE these medications which have NOT CHANGED    Details   eszopiclone (LUNESTA) 2 MG Tab Take 2 mg by mouth.      fluticasone propionate (FLONASE) 50 mcg/actuation nasal spray 1 spray by Each Nostril route once daily.      ipratropium (ATROVENT) 42 mcg (0.06 %) nasal spray 2 sprays by Nasal route 3 (three) times daily.      albuterol (PROVENTIL/VENTOLIN HFA) 90 mcg/actuation inhaler Inhale 2 puffs into the lungs every 6 (six) hours as needed for Wheezing or Shortness of Breath. Rescue  Qty: 54 g, Refills: 3      aspirin (ECOTRIN) 81 MG EC tablet Take 81 mg by mouth every evening.      atorvastatin (LIPITOR) 40 MG tablet Take 40 mg by mouth every evening.       azelastine (ASTELIN) 137 mcg (0.1 %) nasal spray 2 sprays by Nasal route.      cetirizine (ZYRTEC) 10 MG tablet Take 10 mg by mouth once daily.      cyclobenzaprine (FLEXERIL) 10 MG tablet Take 10 mg by mouth 2 (two) times daily as needed for Muscle spasms.      fluticasone-umeclidin-vilanter (TRELEGY ELLIPTA) 100-62.5-25 mcg DsDv Inhale 1 puff into the lungs Daily.  Qty: 60 each, Refills: 6      HYDROcodone-acetaminophen (NORCO)  mg per tablet Take 1 tablet by mouth 2 (two) times daily as needed.      multivitamin capsule Take 1 capsule by mouth once daily.      nitroGLYCERIN (NITROSTAT) 0.4 MG SL tablet Place 0.4 mg under the tongue every 5 (five) minutes as needed.      pantoprazole (PROTONIX) 40 MG tablet Take 40 mg by mouth every morning.      sertraline (ZOLOFT) 50 MG tablet Take 50 mg by mouth once daily.      traZODone (DESYREL) 150 MG tablet Take 225 mg by mouth nightly.             Orders Placed This Encounter   Procedures    Blood culture    CT Abdomen Pelvis With IV Contrast NO Oral Contrast    CBC auto differential    Basic metabolic panel (BMP)    Basic Metabolic Panel (BMP)    Magnesium    Phosphorus     CBC with Automated Differential    Diet Adult Regular    Vital signs    Bladder scan    Notify Provider    Place sequential compression device    Recheck Blood Glucose:    Full code    Inpatient consult to General surgery    Insert Saline lock IV    Saline lock IV    Possible Hospitalization    Place in Observation       Imaging Results              CT Abdomen Pelvis With IV Contrast NO Oral Contrast (Final result)  Result time 11/15/24 22:21:06      Final result by Aries Velazquez MD (11/15/24 22:21:06)                   Impression:      Pericolonic fat stranding and mild wall thickening involving portions of the descending and sigmoid colon.  Findings are suggestive of a nonspecific colitis including infectious, inflammatory, or ischemic etiologies.  Consider endoscopic follow-up when clinically appropriate if not recently performed.    Significant volume of retained stool throughout the colon, most pronounced throughout the left hemicolon.    Simple and mildly complex right renal cysts.    Additional findings as above.      Electronically signed by: Aries Velazquez MD  Date:    11/15/2024  Time:    22:21               Narrative:    EXAMINATION:  CT ABDOMEN PELVIS WITH IV CONTRAST    CLINICAL HISTORY:  LLQ abdominal pain;RLQ abdominal pain (Age >= 14y);    TECHNIQUE:  Low dose axial images, sagittal and coronal reformations were obtained from the lung bases to the pubic symphysis following the IV administration of 100 mL of Omnipaque 350 .  Oral contrast was not given.    COMPARISON:  CT 02/15/2024    FINDINGS:  Lung bases demonstrate emphysematous changes as well as scattered peripheral reticular opacities.  There are bibasilar linear bandlike opacities suggesting atelectasis or scarring.  No significant pleural fluid.  There is 3 vessel coronary artery calcification.    Liver is normal in size.  There are scattered well-circumscribed hepatic hypodensities, likely cysts, similar to prior examination.  The  gallbladder shows no evidence of stones or pericholecystic fluid.  There is no intra-or extrahepatic biliary ductal dilatation.    There is a small hiatal hernia.  Stomach otherwise appears within normal limits.  The spleen, pancreas, and adrenal glands appear within normal limits.    The kidneys are normal in size and location and enhance symmetrically.  There is no evidence of hydronephrosis. There is a mildly complex 6.3 cm right renal cyst containing a calcified internal septation.  There is a stable appearing 1.6 cm simple appearing right lower pole renal cyst.  There is mild nonspecific bilateral perinephric edema.  Urinary bladder appears within normal limits.  Prostate is unremarkable.    The abdominal aorta is normal in course and caliber with significant atherosclerotic calcification along its course.  There is no retroperitoneal hematoma.    There is no evidence to suggest small bowel obstruction.  There is a significant volume of retained stool throughout the colon, most pronounced throughout the left hemicolon.  There is pericolonic fat stranding and mild wall thickening involving portions of the descending and sigmoid colon suggestive of a nonspecific colitis.  The appendix is not definitively visualized, however no localized inflammatory change is seen at its expected location. There is no free intraperitoneal air or portal venous gas.  There are scattered shotty small mesenteric and retroperitoneal lymph nodes.    There is osteonecrosis of the bilateral femoral heads.  There are degenerative changes of the visualized spine.  The extraperitoneal soft tissues are unremarkable.                                  (Rad read)    ED Course as of 11/16/24 0019   Fri Nov 15, 2024   4318 I discussed presentation with workup including findings with Dr. Boateng on call for general surgery showing nonspecific colitis.  Minimally elevated lactic acid noted.  I will admit for observation and serial abdominal exams.  [MR]      ED Course User Index  [MR] Aries Morrison MD       MDM:    74 y.o. male with acute onset of lower abdominal pain.  Morphine IV ordered for pain.  Patient concern for constipation and I did explain to him this is a diagnosis of exclusion and may be addressed with home laxatives on an as needed basis if workup is otherwise negative.  Broad workup initiated including CT of the abdomen and pelvis to exclude acute, life-threatening process such as perforated viscus, obstruction, diverticulitis, abscess, appendicitis.  He has no urinary complaints and I doubt UTI.  CT shows nonspecific findings of colitis with possible constipation.  There is no other emergent process evident.  There is mild elevation of lactic acid with mesenteric ischemia considered although less likely.  I have discussed with General surgery.  I will admit for observation and serial abdominal exams.  Pain is well controlled here with morphine.  He may have constipation related to chronic opioid use.  Bowel regimen will likely be beneficial in this instance.  Methylnaltrexone at hospital medicine discretion.  Antibiotics at their discretion given nonspecific colitis as well.  I do not think he requires emergent surgical intervention.  I have discussed with hospital medicine who will assume care.    Diagnoses:    1. Lower abdominal pain  2. Colitis       Aries Morrison MD  11/16/24 0019

## 2024-11-17 LAB
ANION GAP SERPL CALC-SCNC: 7 MMOL/L (ref 8–16)
BASOPHILS # BLD AUTO: 0.03 K/UL (ref 0–0.2)
BASOPHILS NFR BLD: 0.3 % (ref 0–1.9)
BUN SERPL-MCNC: 21 MG/DL (ref 8–23)
CALCIUM SERPL-MCNC: 8.1 MG/DL (ref 8.7–10.5)
CHLORIDE SERPL-SCNC: 107 MMOL/L (ref 95–110)
CO2 SERPL-SCNC: 23 MMOL/L (ref 23–29)
CREAT SERPL-MCNC: 1.2 MG/DL (ref 0.5–1.4)
DIFFERENTIAL METHOD BLD: ABNORMAL
EOSINOPHIL # BLD AUTO: 0.1 K/UL (ref 0–0.5)
EOSINOPHIL NFR BLD: 1.4 % (ref 0–8)
ERYTHROCYTE [DISTWIDTH] IN BLOOD BY AUTOMATED COUNT: 14.2 % (ref 11.5–14.5)
EST. GFR  (NO RACE VARIABLE): >60 ML/MIN/1.73 M^2
GLUCOSE SERPL-MCNC: 92 MG/DL (ref 70–110)
HCT VFR BLD AUTO: 30.4 % (ref 40–54)
HGB BLD-MCNC: 10.1 G/DL (ref 14–18)
IMM GRANULOCYTES # BLD AUTO: 0.05 K/UL (ref 0–0.04)
IMM GRANULOCYTES NFR BLD AUTO: 0.5 % (ref 0–0.5)
LYMPHOCYTES # BLD AUTO: 2.1 K/UL (ref 1–4.8)
LYMPHOCYTES NFR BLD: 22 % (ref 18–48)
MAGNESIUM SERPL-MCNC: 1.7 MG/DL (ref 1.6–2.6)
MCH RBC QN AUTO: 28.4 PG (ref 27–31)
MCHC RBC AUTO-ENTMCNC: 33.2 G/DL (ref 32–36)
MCV RBC AUTO: 85 FL (ref 82–98)
MONOCYTES # BLD AUTO: 0.8 K/UL (ref 0.3–1)
MONOCYTES NFR BLD: 8.4 % (ref 4–15)
NEUTROPHILS # BLD AUTO: 6.4 K/UL (ref 1.8–7.7)
NEUTROPHILS NFR BLD: 67.4 % (ref 38–73)
NRBC BLD-RTO: 0 /100 WBC
PHOSPHATE SERPL-MCNC: 3.5 MG/DL (ref 2.7–4.5)
PLATELET # BLD AUTO: 189 K/UL (ref 150–450)
PMV BLD AUTO: 8.2 FL (ref 9.2–12.9)
POTASSIUM SERPL-SCNC: 3.5 MMOL/L (ref 3.5–5.1)
RBC # BLD AUTO: 3.56 M/UL (ref 4.6–6.2)
SODIUM SERPL-SCNC: 137 MMOL/L (ref 136–145)
WBC # BLD AUTO: 9.44 K/UL (ref 3.9–12.7)

## 2024-11-17 PROCEDURE — 25000003 PHARM REV CODE 250: Performed by: STUDENT IN AN ORGANIZED HEALTH CARE EDUCATION/TRAINING PROGRAM

## 2024-11-17 PROCEDURE — 83735 ASSAY OF MAGNESIUM: CPT | Performed by: STUDENT IN AN ORGANIZED HEALTH CARE EDUCATION/TRAINING PROGRAM

## 2024-11-17 PROCEDURE — 94640 AIRWAY INHALATION TREATMENT: CPT

## 2024-11-17 PROCEDURE — 36415 COLL VENOUS BLD VENIPUNCTURE: CPT | Performed by: STUDENT IN AN ORGANIZED HEALTH CARE EDUCATION/TRAINING PROGRAM

## 2024-11-17 PROCEDURE — 84100 ASSAY OF PHOSPHORUS: CPT | Performed by: STUDENT IN AN ORGANIZED HEALTH CARE EDUCATION/TRAINING PROGRAM

## 2024-11-17 PROCEDURE — 99900031 HC PATIENT EDUCATION (STAT)

## 2024-11-17 PROCEDURE — 85025 COMPLETE CBC W/AUTO DIFF WBC: CPT | Performed by: STUDENT IN AN ORGANIZED HEALTH CARE EDUCATION/TRAINING PROGRAM

## 2024-11-17 PROCEDURE — 63600175 PHARM REV CODE 636 W HCPCS: Performed by: STUDENT IN AN ORGANIZED HEALTH CARE EDUCATION/TRAINING PROGRAM

## 2024-11-17 PROCEDURE — 25000242 PHARM REV CODE 250 ALT 637 W/ HCPCS: Performed by: STUDENT IN AN ORGANIZED HEALTH CARE EDUCATION/TRAINING PROGRAM

## 2024-11-17 PROCEDURE — 80048 BASIC METABOLIC PNL TOTAL CA: CPT | Performed by: STUDENT IN AN ORGANIZED HEALTH CARE EDUCATION/TRAINING PROGRAM

## 2024-11-17 PROCEDURE — 12000002 HC ACUTE/MED SURGE SEMI-PRIVATE ROOM

## 2024-11-17 PROCEDURE — 94761 N-INVAS EAR/PLS OXIMETRY MLT: CPT

## 2024-11-17 PROCEDURE — 25000003 PHARM REV CODE 250: Performed by: INTERNAL MEDICINE

## 2024-11-17 PROCEDURE — 99900035 HC TECH TIME PER 15 MIN (STAT)

## 2024-11-17 RX ADMIN — POLYETHYLENE GLYCOL 3350 17 G: 17 POWDER, FOR SOLUTION ORAL at 08:11

## 2024-11-17 RX ADMIN — POLYETHYLENE GLYCOL 3350 17 G: 17 POWDER, FOR SOLUTION ORAL at 09:11

## 2024-11-17 RX ADMIN — IPRATROPIUM BROMIDE 0.5 MG: 0.5 SOLUTION RESPIRATORY (INHALATION) at 06:11

## 2024-11-17 RX ADMIN — KETOROLAC TROMETHAMINE 15 MG: 30 INJECTION, SOLUTION INTRAMUSCULAR at 02:11

## 2024-11-17 RX ADMIN — METRONIDAZOLE 500 MG: 500 INJECTION, SOLUTION INTRAVENOUS at 04:11

## 2024-11-17 RX ADMIN — ASPIRIN 81 MG: 81 TABLET, COATED ORAL at 09:11

## 2024-11-17 RX ADMIN — POLYETHYLENE GLYCOL 3350 17 G: 17 POWDER, FOR SOLUTION ORAL at 04:11

## 2024-11-17 RX ADMIN — BUDESONIDE INHALATION 0.5 MG: 0.5 SUSPENSION RESPIRATORY (INHALATION) at 07:11

## 2024-11-17 RX ADMIN — METRONIDAZOLE 500 MG: 500 INJECTION, SOLUTION INTRAVENOUS at 01:11

## 2024-11-17 RX ADMIN — ARFORMOTEROL TARTRATE 15 MCG: 15 SOLUTION RESPIRATORY (INHALATION) at 06:11

## 2024-11-17 RX ADMIN — CIPROFLOXACIN 400 MG: 2 INJECTION, SOLUTION INTRAVENOUS at 02:11

## 2024-11-17 RX ADMIN — TRAZODONE HYDROCHLORIDE 225 MG: 50 TABLET ORAL at 09:11

## 2024-11-17 RX ADMIN — KETOROLAC TROMETHAMINE 15 MG: 30 INJECTION, SOLUTION INTRAMUSCULAR at 08:11

## 2024-11-17 RX ADMIN — IPRATROPIUM BROMIDE 0.5 MG: 0.5 SOLUTION RESPIRATORY (INHALATION) at 01:11

## 2024-11-17 RX ADMIN — IPRATROPIUM BROMIDE 0.5 MG: 0.5 SOLUTION RESPIRATORY (INHALATION) at 07:11

## 2024-11-17 RX ADMIN — CIPROFLOXACIN 400 MG: 2 INJECTION, SOLUTION INTRAVENOUS at 12:11

## 2024-11-17 RX ADMIN — KETOROLAC TROMETHAMINE 15 MG: 30 INJECTION, SOLUTION INTRAMUSCULAR at 07:11

## 2024-11-17 RX ADMIN — ATORVASTATIN CALCIUM 40 MG: 40 TABLET, FILM COATED ORAL at 09:11

## 2024-11-17 RX ADMIN — BUDESONIDE INHALATION 0.5 MG: 0.5 SUSPENSION RESPIRATORY (INHALATION) at 06:11

## 2024-11-17 RX ADMIN — ENOXAPARIN SODIUM 40 MG: 40 INJECTION SUBCUTANEOUS at 04:11

## 2024-11-17 RX ADMIN — ARFORMOTEROL TARTRATE 15 MCG: 15 SOLUTION RESPIRATORY (INHALATION) at 07:11

## 2024-11-17 RX ADMIN — METRONIDAZOLE 500 MG: 500 INJECTION, SOLUTION INTRAVENOUS at 08:11

## 2024-11-17 RX ADMIN — SERTRALINE HYDROCHLORIDE 50 MG: 50 TABLET ORAL at 08:11

## 2024-11-17 RX ADMIN — PANTOPRAZOLE SODIUM 40 MG: 40 TABLET, DELAYED RELEASE ORAL at 09:11

## 2024-11-17 NOTE — HOSPITAL COURSE
Patient admitted with colitis and started on broad-spectrum antibiotics.  Seen by by General surgery and Gastroenterology.  Since no surgical intervention plan, continue IV antibiotics for 2-3 day course and transition to p.o. to complete 7 days.  Gastroenterology was consulted, Colitis- suspect either ischemic colitis vs. Stercoral colitis, and has added bowel regimen and patient had BM yesterday and agree with po abx and miralax as outpatient.

## 2024-11-17 NOTE — SUBJECTIVE & OBJECTIVE
Interval History:  Patient seen and examined and feels much better today, had bowel movement today.    Review of Systems   Constitutional:  Negative for chills and fever.   HENT:  Negative for ear pain and sore throat.    Eyes:  Negative for visual disturbance.   Respiratory:  Negative for cough, chest tightness, shortness of breath and wheezing.    Cardiovascular:  Negative for chest pain, palpitations and leg swelling.   Gastrointestinal:  Positive for abdominal pain and constipation. Negative for blood in stool, diarrhea, nausea and vomiting.   Endocrine: Negative for polyuria.   Genitourinary:  Negative for dysuria and hematuria.   Skin:  Negative for rash.   Neurological:  Negative for syncope, weakness, numbness and headaches.   Psychiatric/Behavioral:  Negative for agitation and confusion.      Objective:     Vital Signs (Most Recent):  Temp: 97.8 °F (36.6 °C) (11/17/24 1200)  Pulse: 64 (11/17/24 1315)  Resp: 14 (11/17/24 1315)  BP: 133/75 (11/17/24 1200)  SpO2: 96 % (11/17/24 1315) Vital Signs (24h Range):  Temp:  [97.8 °F (36.6 °C)-99 °F (37.2 °C)] 97.8 °F (36.6 °C)  Pulse:  [64-74] 64  Resp:  [14-19] 14  SpO2:  [93 %-97 %] 96 %  BP: ()/(46-75) 133/75     Weight: 79.1 kg (174 lb 6.1 oz)  Body mass index is 21.8 kg/m².    Intake/Output Summary (Last 24 hours) at 11/17/2024 1422  Last data filed at 11/17/2024 0441  Gross per 24 hour   Intake 1900 ml   Output --   Net 1900 ml         Physical Exam  Constitutional:       General: He is not in acute distress.     Appearance: Normal appearance. He is not toxic-appearing or diaphoretic.   HENT:      Head: Normocephalic and atraumatic.      Nose: No rhinorrhea.      Mouth/Throat:      Mouth: Mucous membranes are moist.      Pharynx: No oropharyngeal exudate or posterior oropharyngeal erythema.   Eyes:      General: No scleral icterus.     Extraocular Movements: Extraocular movements intact.      Pupils: Pupils are equal, round, and reactive to light.    Cardiovascular:      Rate and Rhythm: Normal rate and regular rhythm.      Pulses: Normal pulses.      Heart sounds: Normal heart sounds. No murmur heard.  Pulmonary:      Effort: Pulmonary effort is normal. No respiratory distress.      Breath sounds: Normal breath sounds. No wheezing or rales.   Abdominal:      General: There is no distension.      Palpations: Abdomen is soft.      Tenderness: There is abdominal tenderness.      Comments: Tenderness to palpation in bilateral lower quadrants, worse in LLQ    Musculoskeletal:      Right lower leg: No edema.      Left lower leg: No edema.   Skin:     General: Skin is warm and dry.      Coloration: Skin is not jaundiced.   Neurological:      General: No focal deficit present.      Mental Status: He is alert and oriented to person, place, and time.      Motor: No weakness.   Psychiatric:         Mood and Affect: Mood normal.         Behavior: Behavior normal.             Significant Labs: All pertinent labs within the past 24 hours have been reviewed.  Recent Lab Results         11/17/24  0610        Anion Gap 7       Baso # 0.03       Basophil % 0.3       BUN 21       Calcium 8.1       Chloride 107       CO2 23       Creatinine 1.2       Differential Method Automated       eGFR >60.0       Eos # 0.1       Eos % 1.4       Glucose 92       Gran # (ANC) 6.4       Gran % 67.4       Hematocrit 30.4       Hemoglobin 10.1       Immature Grans (Abs) 0.05  Comment: Mild elevation in immature granulocytes is non specific and   can be seen in a variety of conditions including stress response,   acute inflammation, trauma and pregnancy. Correlation with other   laboratory and clinical findings is essential.         Immature Granulocytes 0.5       Lymph # 2.1       Lymph % 22.0       Magnesium  1.7       MCH 28.4       MCHC 33.2       MCV 85       Mono # 0.8       Mono % 8.4       MPV 8.2       nRBC 0       Phosphorus Level 3.5       Platelet Count 189       Potassium 3.5        RBC 3.56       RDW 14.2       Sodium 137       WBC 9.44               Significant Imaging: I have reviewed all pertinent imaging results/findings within the past 24 hours.  Imaging Results              CT Abdomen Pelvis With IV Contrast NO Oral Contrast (Final result)  Result time 11/15/24 22:21:06      Final result by Aries Velazquez MD (11/15/24 22:21:06)                   Impression:      Pericolonic fat stranding and mild wall thickening involving portions of the descending and sigmoid colon.  Findings are suggestive of a nonspecific colitis including infectious, inflammatory, or ischemic etiologies.  Consider endoscopic follow-up when clinically appropriate if not recently performed.    Significant volume of retained stool throughout the colon, most pronounced throughout the left hemicolon.    Simple and mildly complex right renal cysts.    Additional findings as above.      Electronically signed by: Aries Velazquez MD  Date:    11/15/2024  Time:    22:21               Narrative:    EXAMINATION:  CT ABDOMEN PELVIS WITH IV CONTRAST    CLINICAL HISTORY:  LLQ abdominal pain;RLQ abdominal pain (Age >= 14y);    TECHNIQUE:  Low dose axial images, sagittal and coronal reformations were obtained from the lung bases to the pubic symphysis following the IV administration of 100 mL of Omnipaque 350 .  Oral contrast was not given.    COMPARISON:  CT 02/15/2024    FINDINGS:  Lung bases demonstrate emphysematous changes as well as scattered peripheral reticular opacities.  There are bibasilar linear bandlike opacities suggesting atelectasis or scarring.  No significant pleural fluid.  There is 3 vessel coronary artery calcification.    Liver is normal in size.  There are scattered well-circumscribed hepatic hypodensities, likely cysts, similar to prior examination.  The gallbladder shows no evidence of stones or pericholecystic fluid.  There is no intra-or extrahepatic biliary ductal dilatation.    There is a small  hiatal hernia.  Stomach otherwise appears within normal limits.  The spleen, pancreas, and adrenal glands appear within normal limits.    The kidneys are normal in size and location and enhance symmetrically.  There is no evidence of hydronephrosis. There is a mildly complex 6.3 cm right renal cyst containing a calcified internal septation.  There is a stable appearing 1.6 cm simple appearing right lower pole renal cyst.  There is mild nonspecific bilateral perinephric edema.  Urinary bladder appears within normal limits.  Prostate is unremarkable.    The abdominal aorta is normal in course and caliber with significant atherosclerotic calcification along its course.  There is no retroperitoneal hematoma.    There is no evidence to suggest small bowel obstruction.  There is a significant volume of retained stool throughout the colon, most pronounced throughout the left hemicolon.  There is pericolonic fat stranding and mild wall thickening involving portions of the descending and sigmoid colon suggestive of a nonspecific colitis.  The appendix is not definitively visualized, however no localized inflammatory change is seen at its expected location. There is no free intraperitoneal air or portal venous gas.  There are scattered shotty small mesenteric and retroperitoneal lymph nodes.    There is osteonecrosis of the bilateral femoral heads.  There are degenerative changes of the visualized spine.  The extraperitoneal soft tissues are unremarkable.

## 2024-11-17 NOTE — CONSULTS
[facility]  General Surgery  Consult Note    Inpatient consult to General surgery  Consult performed by: Jamie Boateng III, MD  Consult ordered by: Aries Morrison MD  Reason for consult: Left-sided colitis, constipation        Subjective:     Chief Complaint/Reason for Admission: Left-sided colitis, constipation     History of Present Illness:  Patient is 74-year-old male with past medical history of lung cancer diagnosed and treated 5 years ago, coronary artery disease, COPD.  He was admitted overnight with sigmoid and descending colitis.  Patient states left lower quadrant pain started suddenly and was severe.  CT scan showed colitis involving the sigmoid and descending colon as well as colon full of stool.  He was admitted.  He was started on antibiotics.  He is awake and alert.  He is hemodynamically stable.  He is afebrile.  He states that he has history of chronic constipation- he reports his whole life.  It has been especially severe over the past several years.  It will usually take a laxative if it has been over 7 days since last bowel movement.  He states that it has been about 7 days since his last normal bowel movement he did not have any laxative at home.  He reports having small hard pebble like bowel movements the last 3 days.  He is passing flatus.  He reports no known history of colitis or diverticulitis.  He has had a recent colonoscopy - April 20, 2024 - several small benign polyps seen in the sigmoid and in the cecum otherwise unremarkable.  No mention of diverticulosis.    No current facility-administered medications on file prior to encounter.     Current Outpatient Medications on File Prior to Encounter   Medication Sig    albuterol (PROVENTIL/VENTOLIN HFA) 90 mcg/actuation inhaler Inhale 2 puffs into the lungs every 6 (six) hours as needed for Wheezing or Shortness of Breath. Rescue    aspirin (ECOTRIN) 81 MG EC tablet Take 81 mg by mouth every evening.     atorvastatin (LIPITOR) 40 MG tablet Take 40 mg by mouth every evening.     azelastine (ASTELIN) 137 mcg (0.1 %) nasal spray 2 sprays by Nasal route.    cyclobenzaprine (FLEXERIL) 10 MG tablet Take 10 mg by mouth 2 (two) times a day.    eszopiclone (LUNESTA) 2 MG Tab Take 2 mg by mouth nightly.    fluticasone propionate (FLONASE) 50 mcg/actuation nasal spray 1 spray by Each Nostril route once daily.    fluticasone-umeclidin-vilanter (TRELEGY ELLIPTA) 100-62.5-25 mcg DsDv Inhale 1 puff into the lungs Daily.    HYDROcodone-acetaminophen (NORCO)  mg per tablet Take 1 tablet by mouth 2 (two) times daily as needed for Pain.    ipratropium (ATROVENT) 42 mcg (0.06 %) nasal spray 2 sprays by Nasal route 3 (three) times daily.    multivitamin capsule Take 1 capsule by mouth once daily.    nitroGLYCERIN (NITROSTAT) 0.4 MG SL tablet Place 0.4 mg under the tongue every 5 (five) minutes as needed for Chest pain.    pantoprazole (PROTONIX) 40 MG tablet Take 40 mg by mouth every morning.    sertraline (ZOLOFT) 50 MG tablet Take 50 mg by mouth once daily.    traZODone (DESYREL) 150 MG tablet Take 225 mg by mouth nightly.    [DISCONTINUED] cetirizine (ZYRTEC) 10 MG tablet Take 10 mg by mouth once daily.       Review of patient's allergies indicates:   Allergen Reactions    Penicillins      AS A CHILD - NOT SURE REACTION       Past Medical History:   Diagnosis Date    Arthritis     Cancer SKIN    Cardiac angina     Colon polyp     COPD (chronic obstructive pulmonary disease)     Coronary artery disease ANGINA. HAD  [facility] 8/12. 40 % BLOCKAGE. DR RUBY IS CARDIOLOGIST.    Depression     GERD (gastroesophageal reflux disease)     Kidney cysts     Lung cancer 01/2019    Pathological staging [id number] stage IIa with pleural invasion present - s/p RLLectomy    MVP (mitral valve prolapse)     Trigger thumb     BILAT    Wears dentures     UPPER    Wears glasses      Past Surgical History:   Procedure Laterality Date    APPENDECTOMY       BACK SURGERY      COLONOSCOPY N/A 2024    Procedure: COLONOSCOPY;  Surgeon: Blayne Piper MD;  Location: [facility];  Service: Endoscopy;  Laterality: N/A;    ESOPHAGOGASTRODUODENOSCOPY N/A 10/8/2024    Procedure: EGD (ESOPHAGOGASTRODUODENOSCOPY);  Surgeon: Blayne Piper MD;  Location: [facility] ENDO;  Service: Endoscopy;  Laterality: N/A;    INSERTION OF TUNNELED CENTRAL VENOUS CATHETER (CVC) WITH SUBCUTANEOUS PORT Left 2019    Procedure: TJOGLVENC-SBPU-E-CATH;  Surgeon: Reji Griffiths MD;  Location: [facility];  Service: General;  Laterality: Left;    KNEE SURGERY      BILAT    lung resection  2019    TRIGGER THUMB Bilateral     UPPER GASTROINTESTINAL ENDOSCOPY       Family History       Problem Relation (Age of Onset)    Birth defects Father    COPD Mother    Diabetes Mother, Father    Heart disease Mother, Father          Tobacco Use    Smoking status: Former     Current packs/day: 0.00     Types: Cigarettes     Start date: 1979     Quit date: 2019     Years since quittin.8    Smokeless tobacco: Never    Tobacco comments:     1-2 CIGT SOME DAYS/states last cigarette 20   Substance and Sexual Activity    Alcohol use: Not Currently     Comment: no ETOH for 12 years    Drug use: Yes     Types: Hydrocodone    Sexual activity: Yes     Partners: Female     Review of Systems   Constitutional:  Negative for appetite change, chills, fever and unexpected weight change.   HENT:  Negative for hearing loss, rhinorrhea, sore throat and voice change.    Eyes:  Negative for photophobia and visual disturbance.   Respiratory:  Negative for cough, choking and shortness of breath.    Cardiovascular:  Negative for chest pain, palpitations and leg swelling.   Gastrointestinal:  Positive for abdominal pain and constipation. Negative for blood in stool, diarrhea, nausea and vomiting.   Endocrine: Negative for cold intolerance, heat intolerance, polydipsia and polyuria.   Musculoskeletal:  Negative  for arthralgias, back pain, joint swelling and neck stiffness.   Skin:  Negative for color change, pallor and rash.   Neurological:  Negative for dizziness, seizures, syncope and headaches.   Hematological:  Negative for adenopathy. Does not bruise/bleed easily.   Psychiatric/Behavioral:  Negative for agitation, behavioral problems and confusion.      Objective:     Vital Signs (Most Recent):  Temp: 98.4 °F (36.9 °C) (11/16/24 1658)  Pulse: 71 (11/16/24 1658)  Resp: 16 (11/16/24 1300)  BP: 109/62 (11/16/24 1658)  SpO2: (!) 93 % (11/16/24 1658) Vital Signs (24h Range):  Temp:  [97.5 °F (36.4 °C)-99.2 °F (37.3 °C)] 98.4 °F (36.9 °C)  Pulse:  [] 71  Resp:  [16-24] 16  SpO2:  [81 %-99 %] 93 %  BP: (109-153)/(62-91) 109/62     Weight: 79.1 kg (174 lb 6.1 oz)  Body mass index is 21.8 kg/m².      Intake/Output Summary (Last 24 hours) at 11/16/2024 1827  Last data filed at 11/16/2024 1738  Gross per 24 hour   Intake 1000 ml   Output --   Net 1000 ml       Physical Exam  Constitutional:       General: He is awake.      Appearance: Normal appearance. He is well-developed. He is not toxic-appearing.   Pulmonary:      Effort: No tachypnea, bradypnea or respiratory distress.   Abdominal:      General: There is no distension.      Palpations: Abdomen is soft.      Tenderness: There is abdominal tenderness in the left lower quadrant. There is no guarding.   Skin:     Coloration: Skin is not jaundiced.   Neurological:      Mental Status: He is alert and oriented to person, place, and time.   Psychiatric:         Behavior: Behavior is cooperative.         Significant Labs:  CBC:   Recent Labs   Lab 11/16/24  0533   WBC 12.04   RBC 4.29*   HGB 12.0*   HCT 36.1*      MCV 84   MCH 28.0   MCHC 33.2     CMP:   Recent Labs   Lab 11/16/24  0533   *   CALCIUM 9.0   *   K 4.0   CO2 25      BUN 18   CREATININE 1.3       Significant Diagnostics:  I have reviewed all pertinent imaging results/findings within the  past 24 hours.    Assessment/Plan:     Active Diagnoses:    Diagnosis Date Noted POA    PRINCIPAL PROBLEM:  Colitis [K52.9] 11/16/2024 Yes    Opioid-induced constipation [K59.03, T40.2X5A] 11/16/2024 Yes    Gastroesophageal reflux disease with esophagitis [K21.00] 11/16/2024 Yes    CAD (coronary artery disease) [I25.10] 11/16/2024 Yes    COPD without exacerbation [J44.9] 11/21/2019 Yes      Problems Resolved During this Admission:   Patient admitted.  Started on antibiotics.  GI note reviewed.  Agree that there is concern for ischemic versus stercoral colitis.  He has been started on bowel regimen with MiraLax t.i.d. for now.  No indication for emergent surgical intervention.  Will follow-up and discuss with GI.    Thank you for your consult.     Jamie Boateng III, MD  General Surgery  [facility]

## 2024-11-17 NOTE — CARE UPDATE
11/17/24 0645   Patient Assessment/Suction   Level of Consciousness (AVPU) alert   Respiratory Effort Normal;Unlabored   All Lung Fields Breath Sounds clear   Rhythm/Pattern, Respiratory unlabored   PRE-TX-O2   Device (Oxygen Therapy) room air   SpO2 96 %   Pulse Oximetry Type Intermittent   $ Pulse Oximetry - Multiple Charge Pulse Oximetry - Multiple   Pulse 70   Resp 16   Aerosol Therapy   $ Aerosol Therapy Charges Aerosol Treatment  (atrovent)   Daily Review of Necessity (SVN) completed   Respiratory Treatment Status (SVN) given   Treatment Route (SVN) mouthpiece utilized;oxygen   Patient Position HOB elevated   Post Treatment Assessment (SVN) breath sounds unchanged;vital signs unchanged   Signs of Intolerance (SVN) none

## 2024-11-17 NOTE — PROGRESS NOTES
[facility]  Progress Note    Patient Name: Adam Goode  MRN: [id number]  Patient Class: IP- Inpatient   Admission Date: 11/15/2024  Length of Stay: 1 days  Attending Physician: Sole Montoya DO  Primary Care Provider: Judy Muro MD        Subjective:     Principal Problem:Colitis        HPI:  74 year old male with comorbidities of COPD, CAD s/p stent, lung Ca s/p lobex, follows GI for  schatzki's ring 2/2 GERD (has EGD scheduled in December), CKD, chronic back/hip pain on norco/steroid injections presents with new onset lower abdominal pain extending from umbilicus to bilateral lower quadrants.  Pain started around 7 PM, is constant, initially located in bilateral lower quadrants/umbilicus but has localized to LLQ. Denies previous occurrences.  Reports small hard stools for the last two weeks.  Took colace and fleet enema at home without relief.  States for the last few days he noticed mucus while wiping after bowel movements.  Denies history of inflammatory bowel disease or family history of autoimmune/inflammotory conditions.    Denies fever, radiation, migration, back/flank pain, difficulty with urination, dysuria, urgency, hematuria, hematochezia/melena.    CT Abdomen/pelvis with IV contrast suggested nonspecific colitis of descending and sigmoid colon.  Labs revealed elevated WBC count and lactate.  Was given morphine in ED for pain.      Overview/Hospital Course:  Patient admitted with colitis and started on broad-spectrum antibiotics.  Seen by by General surgery and Gastroenterology.  Since no surgical intervention plan, continue IV antibiotics for 2-3 day course and transition to p.o. to complete 7 days.  Gastroenterology has added bowel regimen and patient had BM today.          Interval History:  Patient seen and examined and feels much better today, had bowel movement today.    Review of Systems   Constitutional:  Negative for chills and fever.   HENT:   Negative for ear pain and sore throat.    Eyes:  Negative for visual disturbance.   Respiratory:  Negative for cough, chest tightness, shortness of breath and wheezing.    Cardiovascular:  Negative for chest pain, palpitations and leg swelling.   Gastrointestinal:  Positive for abdominal pain and constipation. Negative for blood in stool, diarrhea, nausea and vomiting.   Endocrine: Negative for polyuria.   Genitourinary:  Negative for dysuria and hematuria.   Skin:  Negative for rash.   Neurological:  Negative for syncope, weakness, numbness and headaches.   Psychiatric/Behavioral:  Negative for agitation and confusion.      Objective:     Vital Signs (Most Recent):  Temp: 97.8 °F (36.6 °C) (11/17/24 1200)  Pulse: 64 (11/17/24 1315)  Resp: 14 (11/17/24 1315)  BP: 133/75 (11/17/24 1200)  SpO2: 96 % (11/17/24 1315) Vital Signs (24h Range):  Temp:  [97.8 °F (36.6 °C)-99 °F (37.2 °C)] 97.8 °F (36.6 °C)  Pulse:  [64-74] 64  Resp:  [14-19] 14  SpO2:  [93 %-97 %] 96 %  BP: ()/(46-75) 133/75     Weight: 79.1 kg (174 lb 6.1 oz)  Body mass index is 21.8 kg/m².    Intake/Output Summary (Last 24 hours) at 11/17/2024 1422  Last data filed at 11/17/2024 0441  Gross per 24 hour   Intake 1900 ml   Output --   Net 1900 ml         Physical Exam  Constitutional:       General: He is not in acute distress.     Appearance: Normal appearance. He is not toxic-appearing or diaphoretic.   HENT:      Head: Normocephalic and atraumatic.      Nose: No rhinorrhea.      Mouth/Throat:      Mouth: Mucous membranes are moist.      Pharynx: No oropharyngeal exudate or posterior oropharyngeal erythema.   Eyes:      General: No scleral icterus.     Extraocular Movements: Extraocular movements intact.      Pupils: Pupils are equal, round, and reactive to light.   Cardiovascular:      Rate and Rhythm: Normal rate and regular rhythm.      Pulses: Normal pulses.      Heart sounds: Normal heart sounds. No murmur heard.  Pulmonary:      Effort:  Pulmonary effort is normal. No respiratory distress.      Breath sounds: Normal breath sounds. No wheezing or rales.   Abdominal:      General: There is no distension.      Palpations: Abdomen is soft.      Tenderness: There is abdominal tenderness.      Comments: Tenderness to palpation in bilateral lower quadrants, worse in LLQ    Musculoskeletal:      Right lower leg: No edema.      Left lower leg: No edema.   Skin:     General: Skin is warm and dry.      Coloration: Skin is not jaundiced.   Neurological:      General: No focal deficit present.      Mental Status: He is alert and oriented to person, place, and time.      Motor: No weakness.   Psychiatric:         Mood and Affect: Mood normal.         Behavior: Behavior normal.             Significant Labs: All pertinent labs within the past 24 hours have been reviewed.  Recent Lab Results         11/17/24  0610        Anion Gap 7       Baso # 0.03       Basophil % 0.3       BUN 21       Calcium 8.1       Chloride 107       CO2 23       Creatinine 1.2       Differential Method Automated       eGFR >60.0       Eos # 0.1       Eos % 1.4       Glucose 92       Gran # (ANC) 6.4       Gran % 67.4       Hematocrit 30.4       Hemoglobin 10.1       Immature Grans (Abs) 0.05  Comment: Mild elevation in immature granulocytes is non specific and   can be seen in a variety of conditions including stress response,   acute inflammation, trauma and pregnancy. Correlation with other   laboratory and clinical findings is essential.         Immature Granulocytes 0.5       Lymph # 2.1       Lymph % 22.0       Magnesium  1.7       MCH 28.4       MCHC 33.2       MCV 85       Mono # 0.8       Mono % 8.4       MPV 8.2       nRBC 0       Phosphorus Level 3.5       Platelet Count 189       Potassium 3.5       RBC 3.56       RDW 14.2       Sodium 137       WBC 9.44               Significant Imaging: I have reviewed all pertinent imaging results/findings within the past 24 hours.  Imaging  Results              CT Abdomen Pelvis With IV Contrast NO Oral Contrast (Final result)  Result time 11/15/24 22:21:06      Final result by Aries Velazquez MD (11/15/24 22:21:06)                   Impression:      Pericolonic fat stranding and mild wall thickening involving portions of the descending and sigmoid colon.  Findings are suggestive of a nonspecific colitis including infectious, inflammatory, or ischemic etiologies.  Consider endoscopic follow-up when clinically appropriate if not recently performed.    Significant volume of retained stool throughout the colon, most pronounced throughout the left hemicolon.    Simple and mildly complex right renal cysts.    Additional findings as above.      Electronically signed by: Aries Velazquez MD  Date:    11/15/2024  Time:    22:21               Narrative:    EXAMINATION:  CT ABDOMEN PELVIS WITH IV CONTRAST    CLINICAL HISTORY:  LLQ abdominal pain;RLQ abdominal pain (Age >= 14y);    TECHNIQUE:  Low dose axial images, sagittal and coronal reformations were obtained from the lung bases to the pubic symphysis following the IV administration of 100 mL of Omnipaque 350 .  Oral contrast was not given.    COMPARISON:  CT 02/15/2024    FINDINGS:  Lung bases demonstrate emphysematous changes as well as scattered peripheral reticular opacities.  There are bibasilar linear bandlike opacities suggesting atelectasis or scarring.  No significant pleural fluid.  There is 3 vessel coronary artery calcification.    Liver is normal in size.  There are scattered well-circumscribed hepatic hypodensities, likely cysts, similar to prior examination.  The gallbladder shows no evidence of stones or pericholecystic fluid.  There is no intra-or extrahepatic biliary ductal dilatation.    There is a small hiatal hernia.  Stomach otherwise appears within normal limits.  The spleen, pancreas, and adrenal glands appear within normal limits.    The kidneys are normal in size and location and  enhance symmetrically.  There is no evidence of hydronephrosis. There is a mildly complex 6.3 cm right renal cyst containing a calcified internal septation.  There is a stable appearing 1.6 cm simple appearing right lower pole renal cyst.  There is mild nonspecific bilateral perinephric edema.  Urinary bladder appears within normal limits.  Prostate is unremarkable.    The abdominal aorta is normal in course and caliber with significant atherosclerotic calcification along its course.  There is no retroperitoneal hematoma.    There is no evidence to suggest small bowel obstruction.  There is a significant volume of retained stool throughout the colon, most pronounced throughout the left hemicolon.  There is pericolonic fat stranding and mild wall thickening involving portions of the descending and sigmoid colon suggestive of a nonspecific colitis.  The appendix is not definitively visualized, however no localized inflammatory change is seen at its expected location. There is no free intraperitoneal air or portal venous gas.  There are scattered shotty small mesenteric and retroperitoneal lymph nodes.    There is osteonecrosis of the bilateral femoral heads.  There are degenerative changes of the visualized spine.  The extraperitoneal soft tissues are unremarkable.                                        Assessment/Plan:      * Colitis  Patient has acute onset abdominal pain, found to have colitis of descending/sigmoid colon.  Recently has been constipated with small hard purulent stools for 2 weeks in the setting of chronic opioid use.     Start ciprofloxacin 400mg IV Q12  Start metronidazole 500mg IV Q8  Follow blood culture  Surgery and GI consulted and no plan for surgical intervention, continue to advance diet, start bowel regimen, continue empiric antibiotics.        CAD (coronary artery disease)  Patient with known CAD s/p stent placement, which is controlled Will continue ASA and Statin and monitor for S/Sx of  angina/ACS. Continue to monitor on telemetry.     Gastroesophageal reflux disease with esophagitis  Patient has history of shiatzki rings causing dysphagia.  Has EGD scheduled for December.    Pantoprazole 40mg daily       Opioid-induced constipation  Patient takes norco for chronic back/hip pain (has h/o osteonecrosis of bilateral hips)    Senna/Docusate  PRN lactulose  Will try to avoid opioids - PRN pain medications with acetaminophen/ketorolac         COPD without exacerbation  Patient's COPD is controlled currently.  Patient is currently off COPD Pathway. Continue scheduled inhalers  and monitor respiratory status closely.       VTE Risk Mitigation (From admission, onward)           Ordered     enoxaparin injection 40 mg  Daily         11/16/24 0012     IP VTE HIGH RISK PATIENT  Once         11/16/24 0012     Place sequential compression device  Until discontinued         11/16/24 0012                    Discharge Planning   GLEN:      Code Status: Full Code   Is the patient medically ready for discharge?:     Reason for patient still in hospital (select all that apply): Treatment, Consult recommendations, and Pending disposition  Discharge Plan A: Mario Alberto Montoya DO  Department of Hospital Medicine   [facility]

## 2024-11-17 NOTE — PLAN OF CARE
11/16/24 2007   Patient Assessment/Suction   Level of Consciousness (AVPU) alert   Respiratory Effort Normal;Unlabored   Expansion/Accessory Muscles/Retractions no use of accessory muscles   All Lung Fields Breath Sounds Anterior:;Lateral:;diminished   Rhythm/Pattern, Respiratory pattern regular   Cough Frequency infrequent   Cough Type nonproductive   PRE-TX-O2   Device (Oxygen Therapy) room air   SpO2 95 %   Pulse Oximetry Type Intermittent   $ Pulse Oximetry - Multiple Charge Pulse Oximetry - Multiple   Pulse 73   Resp 18   Aerosol Therapy   $ Aerosol Therapy Charges Aerosol Treatment  (atv)   Daily Review of Necessity (SVN) completed   Respiratory Treatment Status (SVN) given   Treatment Route (SVN) oxygen;mouthpiece utilized   Patient Position HOB elevated   Post Treatment Assessment (SVN) breath sounds improved   Signs of Intolerance (SVN) none   Breath Sounds Post-Respiratory Treatment   Throughout All Fields Post-Treatment All Fields   Throughout All Fields Post-Treatment aeration increased   Post-treatment Heart Rate (beats/min) 77   Post-treatment Resp Rate (breaths/min) 19   General Safety Checklist   Safety Promotion/Fall Prevention side rails raised   Respiratory Interventions   Cough And Deep Breathing done with encouragement   Education   $ Education Bronchodilator;15 min

## 2024-11-17 NOTE — PROGRESS NOTES
"Progress Note  Gastroenterology/Hepatology    SUBJECTIVE:     Follow-up For:  colitis    HPI/Interval history: patient reports having a BM last night and again this morning.  Pain is much better. He is still on liquid diet but is hungry and wants to eat.     PMH/FH/SH/Review of Systems: See H and P/Consult dated 11/15/2024    OBJECTIVE:     Vital Signs Range (Last 24H):  Temp:  [97.8 °F (36.6 °C)-99 °F (37.2 °C)]   Pulse:  [64-74]   Resp:  [14-19]   BP: ()/(46-75)   SpO2:  [93 %-97 %]     I & O (Last 24H):  Intake/Output Summary (Last 24 hours) at 11/17/2024 1548  Last data filed at 11/17/2024 0441  Gross per 24 hour   Intake 1900 ml   Output --   Net 1900 ml       Physical Exam:  General: well developed, well nourished  Lungs:  clear to auscultation bilaterally and normal respiratory effort  Cardiovascular: Heart: regular rate and rhythm, S1, S2 normal, no murmur, click, rub or gallop. Chest Wall: no tenderness. Extremities: no cyanosis or edema, or clubbing. Pulses: 2+ and symmetric.  Abdomen/Rectal: Abdomen: soft, non-tender non-distented; bowel sounds normal; no masses,  no organomegaly. Rectal: not examined    Lab/X-ray Results:  CBC:   Recent Labs   Lab 11/17/24  0610   WBC 9.44   RBC 3.56*   HGB 10.1*   HCT 30.4*      MCV 85   MCH 28.4   MCHC 33.2     BMP:   Recent Labs   Lab 11/17/24  0610   GLU 92      K 3.5      CO2 23   BUN 21   CREATININE 1.2   CALCIUM 8.1*   MG 1.7     CMP:   Recent Labs   Lab 11/17/24  0610   GLU 92   CALCIUM 8.1*      K 3.5   CO2 23      BUN 21   CREATININE 1.2     LFTs: No results for input(s): "ALT", "AST", "ALKPHOS", "BILITOT", "PROT", "ALBUMIN" in the last 168 hours.  Coagulation: No results for input(s): "LABPROT", "INR", "APTT" in the last 168 hours.    ASSESSMENT/PLAN:   This is a very pleasant 73yo M with COPD, CAD, hx of lung cancer, chronic constipation, admitted on 11/15 with complaints of LLQ/lower abd pain.  Pain started suddenly just " prior to admission.  CT showed colitis involving proximal sigmoid/descending colon and showed evidence of constipation      Colitis- suspect either ischemic colitis vs. Stercoral colitis.  Patient is improving with bowel regimen and IV antibiotics.  He has had multiple bowel movements since Miralax was started and his pain is much improved.  Will continue IV Cipro/Flagyl.  If he continues to improve, can likely be discharged home on oral Cipro/Flagyl for an additional 5-7 days.  Continue Miralax 17gm TID while in hospital. Upon discharge can continue Miralax once daily.  If this is too strong, can reduce to every other day.  Recommend he remain on this to treat his chronic constipation.  Last colonoscopy was 4/2024 at [facility].  No need to repeat. Can follow up with his primary GI after discharge.         Thank you very much for the consult.  I will sign off.  Please do not hesitate to contact me with any questions or concerns.    Henrik León MD

## 2024-11-17 NOTE — ASSESSMENT & PLAN NOTE
Patient has acute onset abdominal pain, found to have colitis of descending/sigmoid colon.  Recently has been constipated with small hard purulent stools for 2 weeks in the setting of chronic opioid use.     Start ciprofloxacin 400mg IV Q12  Start metronidazole 500mg IV Q8  Follow blood culture  Surgery and GI consulted and no plan for surgical intervention, continue to advance diet, start bowel regimen, continue empiric antibiotics.

## 2024-11-18 VITALS
HEART RATE: 69 BPM | BODY MASS INDEX: 21.68 KG/M2 | DIASTOLIC BLOOD PRESSURE: 78 MMHG | TEMPERATURE: 98 F | OXYGEN SATURATION: 97 % | HEIGHT: 75 IN | SYSTOLIC BLOOD PRESSURE: 145 MMHG | RESPIRATION RATE: 16 BRPM | WEIGHT: 174.38 LBS

## 2024-11-18 LAB
ANION GAP SERPL CALC-SCNC: 6 MMOL/L (ref 8–16)
BASOPHILS # BLD AUTO: 0.04 K/UL (ref 0–0.2)
BASOPHILS NFR BLD: 0.6 % (ref 0–1.9)
BUN SERPL-MCNC: 11 MG/DL (ref 8–23)
CALCIUM SERPL-MCNC: 8.8 MG/DL (ref 8.7–10.5)
CHLORIDE SERPL-SCNC: 111 MMOL/L (ref 95–110)
CO2 SERPL-SCNC: 23 MMOL/L (ref 23–29)
CREAT SERPL-MCNC: 1.1 MG/DL (ref 0.5–1.4)
DIFFERENTIAL METHOD BLD: ABNORMAL
EOSINOPHIL # BLD AUTO: 0.2 K/UL (ref 0–0.5)
EOSINOPHIL NFR BLD: 2.5 % (ref 0–8)
ERYTHROCYTE [DISTWIDTH] IN BLOOD BY AUTOMATED COUNT: 14.2 % (ref 11.5–14.5)
EST. GFR  (NO RACE VARIABLE): >60 ML/MIN/1.73 M^2
GLUCOSE SERPL-MCNC: 95 MG/DL (ref 70–110)
HCT VFR BLD AUTO: 31.4 % (ref 40–54)
HGB BLD-MCNC: 10.1 G/DL (ref 14–18)
IMM GRANULOCYTES # BLD AUTO: 0.03 K/UL (ref 0–0.04)
IMM GRANULOCYTES NFR BLD AUTO: 0.4 % (ref 0–0.5)
LYMPHOCYTES # BLD AUTO: 1.7 K/UL (ref 1–4.8)
LYMPHOCYTES NFR BLD: 24.6 % (ref 18–48)
MAGNESIUM SERPL-MCNC: 1.7 MG/DL (ref 1.6–2.6)
MCH RBC QN AUTO: 27.6 PG (ref 27–31)
MCHC RBC AUTO-ENTMCNC: 32.2 G/DL (ref 32–36)
MCV RBC AUTO: 86 FL (ref 82–98)
MONOCYTES # BLD AUTO: 0.6 K/UL (ref 0.3–1)
MONOCYTES NFR BLD: 9.1 % (ref 4–15)
NEUTROPHILS # BLD AUTO: 4.2 K/UL (ref 1.8–7.7)
NEUTROPHILS NFR BLD: 62.8 % (ref 38–73)
NRBC BLD-RTO: 0 /100 WBC
PHOSPHATE SERPL-MCNC: 2.7 MG/DL (ref 2.7–4.5)
PLATELET # BLD AUTO: 215 K/UL (ref 150–450)
PMV BLD AUTO: 8.5 FL (ref 9.2–12.9)
POTASSIUM SERPL-SCNC: 3.8 MMOL/L (ref 3.5–5.1)
RBC # BLD AUTO: 3.66 M/UL (ref 4.6–6.2)
SODIUM SERPL-SCNC: 140 MMOL/L (ref 136–145)
WBC # BLD AUTO: 6.71 K/UL (ref 3.9–12.7)

## 2024-11-18 PROCEDURE — 63600175 PHARM REV CODE 636 W HCPCS: Mod: JZ | Performed by: STUDENT IN AN ORGANIZED HEALTH CARE EDUCATION/TRAINING PROGRAM

## 2024-11-18 PROCEDURE — 25000003 PHARM REV CODE 250: Performed by: STUDENT IN AN ORGANIZED HEALTH CARE EDUCATION/TRAINING PROGRAM

## 2024-11-18 PROCEDURE — 84100 ASSAY OF PHOSPHORUS: CPT | Performed by: STUDENT IN AN ORGANIZED HEALTH CARE EDUCATION/TRAINING PROGRAM

## 2024-11-18 PROCEDURE — 83735 ASSAY OF MAGNESIUM: CPT | Performed by: STUDENT IN AN ORGANIZED HEALTH CARE EDUCATION/TRAINING PROGRAM

## 2024-11-18 PROCEDURE — 80048 BASIC METABOLIC PNL TOTAL CA: CPT | Performed by: STUDENT IN AN ORGANIZED HEALTH CARE EDUCATION/TRAINING PROGRAM

## 2024-11-18 PROCEDURE — 85025 COMPLETE CBC W/AUTO DIFF WBC: CPT | Performed by: STUDENT IN AN ORGANIZED HEALTH CARE EDUCATION/TRAINING PROGRAM

## 2024-11-18 PROCEDURE — 94761 N-INVAS EAR/PLS OXIMETRY MLT: CPT

## 2024-11-18 PROCEDURE — 36415 COLL VENOUS BLD VENIPUNCTURE: CPT | Performed by: STUDENT IN AN ORGANIZED HEALTH CARE EDUCATION/TRAINING PROGRAM

## 2024-11-18 PROCEDURE — 25000003 PHARM REV CODE 250: Performed by: INTERNAL MEDICINE

## 2024-11-18 PROCEDURE — 99900035 HC TECH TIME PER 15 MIN (STAT)

## 2024-11-18 RX ORDER — CIPROFLOXACIN 500 MG/1
500 TABLET ORAL 2 TIMES DAILY
Qty: 10 TABLET | Refills: 0 | Status: SHIPPED | OUTPATIENT
Start: 2024-11-18 | End: 2024-11-23

## 2024-11-18 RX ORDER — POLYETHYLENE GLYCOL 3350 17 G/17G
17 POWDER, FOR SOLUTION ORAL DAILY
Qty: 510 G | Refills: 0 | Status: SHIPPED | OUTPATIENT
Start: 2024-11-18

## 2024-11-18 RX ORDER — METRONIDAZOLE 500 MG/1
500 TABLET ORAL 3 TIMES DAILY
Qty: 15 TABLET | Refills: 0 | Status: SHIPPED | OUTPATIENT
Start: 2024-11-18 | End: 2024-11-23

## 2024-11-18 RX ADMIN — CIPROFLOXACIN 400 MG: 2 INJECTION, SOLUTION INTRAVENOUS at 12:11

## 2024-11-18 RX ADMIN — POLYETHYLENE GLYCOL 3350 17 G: 17 POWDER, FOR SOLUTION ORAL at 08:11

## 2024-11-18 RX ADMIN — SERTRALINE HYDROCHLORIDE 50 MG: 50 TABLET ORAL at 08:11

## 2024-11-18 RX ADMIN — METRONIDAZOLE 500 MG: 500 INJECTION, SOLUTION INTRAVENOUS at 08:11

## 2024-11-18 RX ADMIN — KETOROLAC TROMETHAMINE 15 MG: 30 INJECTION, SOLUTION INTRAMUSCULAR at 05:11

## 2024-11-18 RX ADMIN — METRONIDAZOLE 500 MG: 500 INJECTION, SOLUTION INTRAVENOUS at 12:11

## 2024-11-18 NOTE — DISCHARGE INSTRUCTIONS
Our goal at [facility] is to always give you outstanding care and exceptional service. You may receive a survey by mail, text or e-mail in the next 7-10 days from Annia Rojas and our leadership team asking about the care you received with us. The survey should only take 5-10 minutes to complete and is very important to us.     Your feedback provides us with a way to recognize our staff who work tirelessly to provide the best care! Also, your responses help us learn how to improve when your experience was below our aspiration of excellence. We WILL use your feedback to continue making improvements to help us provide the highest quality care. We keep your personal information and feedback confidential. We appreciate your time completing this survey and can't wait to hear from you!!!     We want you to leave us today feeling like you can DEFINITELY recommend us to others! We look forward to your continued care with us! Thanks so much for choosing [facility] for your healthcare needs!

## 2024-11-18 NOTE — DISCHARGE SUMMARY
[facility] Hospital Medicine  Discharge Summary      Patient Name: Adam Goode  MRN: [id number]  TAM: [id number]  Patient Class: IP- Inpatient  Admission Date: 11/15/2024  Hospital Length of Stay: 2 days  Discharge Date and Time:  11/18/2024 1:12 PM  Attending Physician: Sole Montoya DO   Discharging Provider: Sole Montoya DO  Primary Care Provider: Judy Muro MD    Primary Care Team: Networked reference to record PCT     HPI:   74 year old male with comorbidities of COPD, CAD s/p stent, lung Ca s/p lobex, follows GI for  schatzki's ring 2/2 GERD (has EGD scheduled in December), CKD, chronic back/hip pain on norco/steroid injections presents with new onset lower abdominal pain extending from umbilicus to bilateral lower quadrants.  Pain started around 7 PM, is constant, initially located in bilateral lower quadrants/umbilicus but has localized to LLQ. Denies previous occurrences.  Reports small hard stools for the last two weeks.  Took colace and fleet enema at home without relief.  States for the last few days he noticed mucus while wiping after bowel movements.  Denies history of inflammatory bowel disease or family history of autoimmune/inflammotory conditions.    Denies fever, radiation, migration, back/flank pain, difficulty with urination, dysuria, urgency, hematuria, hematochezia/melena.    CT Abdomen/pelvis with IV contrast suggested nonspecific colitis of descending and sigmoid colon.  Labs revealed elevated WBC count and lactate.  Was given morphine in ED for pain.      * No surgery found *      Hospital Course:   Patient admitted with colitis and started on broad-spectrum antibiotics.  Seen by by General surgery and Gastroenterology.  Since no surgical intervention plan, continue IV antibiotics for 2-3 day course and transition to p.o. to complete 7 days.  Gastroenterology was consulted, Colitis- suspect either ischemic colitis vs. Stercoral colitis, and has added  bowel regimen and patient had BM yesterday and agree with po abx and miralax as outpatient.           Goals of Care Treatment Preferences:  Code Status: Full Code      SDOH Screening:  The patient declined to be screened for utility difficulties, food insecurity, transport difficulties, housing insecurity, and interpersonal safety, so no concerns could be identified this admission.     Consults:   Consults (From admission, onward)          Status Ordering Provider     Inpatient consult to Gastroenterology  Once        Provider:  Henrik León MD    Completed SHADE PAZ     Inpatient consult to General surgery  Once        Provider:  Jamie Boateng III, MD    Completed ELVIN MOORE            No new Assessment & Plan notes have been filed under this hospital service since the last note was generated.  Service: Hospital Medicine    Final Active Diagnoses:    Diagnosis Date Noted POA    PRINCIPAL PROBLEM:  Colitis [K52.9] 11/16/2024 Yes    Opioid-induced constipation [K59.03, T40.2X5A] 11/16/2024 Yes    Gastroesophageal reflux disease with esophagitis [K21.00] 11/16/2024 Yes    CAD (coronary artery disease) [I25.10] 11/16/2024 Yes    COPD without exacerbation [J44.9] 11/21/2019 Yes      Problems Resolved During this Admission:       Discharged Condition: good    Disposition: Home or Self Care    Follow Up:   Follow-up Information       Judy Muro MD Follow up.    Specialties: Hospitalist, Internal Medicine  Why: Patient wants to schedule his own appointment.  Contact information:  [address]  [address] [place] [id number]  [phone]                           Patient Instructions:      Notify your health care provider if you experience any of the following:  temperature >100.4     Notify your health care provider if you experience any of the following:  persistent nausea and vomiting or diarrhea     Notify your health care provider if you experience any of the following:   "severe uncontrolled pain     Notify your health care provider if you experience any of the following:  difficulty breathing or increased cough     Notify your health care provider if you experience any of the following:  persistent dizziness, light-headedness, or visual disturbances     Activity as tolerated       Significant Diagnostic Studies: Labs: BMP:   Recent Labs   Lab 11/17/24  0610 11/18/24  0622   GLU 92 95    140   K 3.5 3.8    111*   CO2 23 23   BUN 21 11   CREATININE 1.2 1.1   CALCIUM 8.1* 8.8   MG 1.7 1.7   , CMP   Recent Labs   Lab 11/17/24  0610 11/18/24  0622    140   K 3.5 3.8    111*   CO2 23 23   GLU 92 95   BUN 21 11   CREATININE 1.2 1.1   CALCIUM 8.1* 8.8   ANIONGAP 7* 6*   , CBC   Recent Labs   Lab 11/17/24  0610 11/18/24  0622   WBC 9.44 6.71   HGB 10.1* 10.1*   HCT 30.4* 31.4*    215   , INR   Lab Results   Component Value Date    INR 0.9 01/30/2019   , Lipid Panel   Lab Results   Component Value Date    CHOL 167 08/19/2024    HDL 51 08/19/2024    LDLCALC 85 08/19/2024    TRIG 185 (H) 08/19/2024    CHOLHDL 40.6 04/05/2023   , Troponin No results for input(s): "TROPONINI" in the last 168 hours., and A1C:   Recent Labs   Lab 11/16/24  0533   HGBA1C 5.8     Radiology:   Imaging Results              CT Abdomen Pelvis With IV Contrast NO Oral Contrast (Final result)  Result time 11/15/24 22:21:06      Final result by Aries Velazquez MD (11/15/24 22:21:06)                   Impression:      Pericolonic fat stranding and mild wall thickening involving portions of the descending and sigmoid colon.  Findings are suggestive of a nonspecific colitis including infectious, inflammatory, or ischemic etiologies.  Consider endoscopic follow-up when clinically appropriate if not recently performed.    Significant volume of retained stool throughout the colon, most pronounced throughout the left hemicolon.    Simple and mildly complex right renal cysts.    Additional " findings as above.      Electronically signed by: Aries Velazquez MD  Date:    11/15/2024  Time:    22:21               Narrative:    EXAMINATION:  CT ABDOMEN PELVIS WITH IV CONTRAST    CLINICAL HISTORY:  LLQ abdominal pain;RLQ abdominal pain (Age >= 14y);    TECHNIQUE:  Low dose axial images, sagittal and coronal reformations were obtained from the lung bases to the pubic symphysis following the IV administration of 100 mL of Omnipaque 350 .  Oral contrast was not given.    COMPARISON:  CT 02/15/2024    FINDINGS:  Lung bases demonstrate emphysematous changes as well as scattered peripheral reticular opacities.  There are bibasilar linear bandlike opacities suggesting atelectasis or scarring.  No significant pleural fluid.  There is 3 vessel coronary artery calcification.    Liver is normal in size.  There are scattered well-circumscribed hepatic hypodensities, likely cysts, similar to prior examination.  The gallbladder shows no evidence of stones or pericholecystic fluid.  There is no intra-or extrahepatic biliary ductal dilatation.    There is a small hiatal hernia.  Stomach otherwise appears within normal limits.  The spleen, pancreas, and adrenal glands appear within normal limits.    The kidneys are normal in size and location and enhance symmetrically.  There is no evidence of hydronephrosis. There is a mildly complex 6.3 cm right renal cyst containing a calcified internal septation.  There is a stable appearing 1.6 cm simple appearing right lower pole renal cyst.  There is mild nonspecific bilateral perinephric edema.  Urinary bladder appears within normal limits.  Prostate is unremarkable.    The abdominal aorta is normal in course and caliber with significant atherosclerotic calcification along its course.  There is no retroperitoneal hematoma.    There is no evidence to suggest small bowel obstruction.  There is a significant volume of retained stool throughout the colon, most pronounced throughout the  left hemicolon.  There is pericolonic fat stranding and mild wall thickening involving portions of the descending and sigmoid colon suggestive of a nonspecific colitis.  The appendix is not definitively visualized, however no localized inflammatory change is seen at its expected location. There is no free intraperitoneal air or portal venous gas.  There are scattered shotty small mesenteric and retroperitoneal lymph nodes.    There is osteonecrosis of the bilateral femoral heads.  There are degenerative changes of the visualized spine.  The extraperitoneal soft tissues are unremarkable.                                        Pending Diagnostic Studies:       None           Medications:  Reconciled Home Medications:      Medication List        START taking these medications      ciprofloxacin HCl 500 MG tablet  Commonly known as: CIPRO  Take 1 tablet (500 mg total) by mouth 2 (two) times daily. for 5 days     metroNIDAZOLE 500 MG tablet  Commonly known as: FLAGYL  Take 1 tablet (500 mg total) by mouth 3 (three) times daily. for 5 days     polyethylene glycol 17 gram/dose powder  Commonly known as: GLYCOLAX  Take 17 g by mouth once daily.            CONTINUE taking these medications      albuterol 90 mcg/actuation inhaler  Commonly known as: PROVENTIL/VENTOLIN HFA  Inhale 2 puffs into the lungs every 6 (six) hours as needed for Wheezing or Shortness of Breath. Rescue     aspirin 81 MG EC tablet  Commonly known as: ECOTRIN  Take 81 mg by mouth every evening.     atorvastatin 40 MG tablet  Commonly known as: LIPITOR  Take 40 mg by mouth every evening.     azelastine 137 mcg (0.1 %) nasal spray  Commonly known as: ASTELIN  2 sprays by Nasal route.  Notes to patient: Resume if taking prior to hospitalization     eszopiclone 2 MG Tab  Commonly known as: LUNESTA  Take 2 mg by mouth nightly.  Notes to patient: Resume if taking prior to hospitalization     fluticasone propionate 50 mcg/actuation nasal spray  Commonly known  as: FLONASE  1 spray by Each Nostril route once daily.  Notes to patient: Resume if taking prior to hospitalization     HYDROcodone-acetaminophen  mg per tablet  Commonly known as: NORCO  Take 1 tablet by mouth 2 (two) times daily as needed for Pain.  Notes to patient: Resume if taking prior to hospitalization     ipratropium 42 mcg (0.06 %) nasal spray  Commonly known as: ATROVENT  2 sprays by Nasal route 3 (three) times daily.     multivitamin capsule  Take 1 capsule by mouth once daily.  Notes to patient: Resume if taking prior to hospitalization     nitroGLYCERIN 0.4 MG SL tablet  Commonly known as: NITROSTAT  Place 0.4 mg under the tongue every 5 (five) minutes as needed for Chest pain.     pantoprazole 40 MG tablet  Commonly known as: PROTONIX  Take 40 mg by mouth every morning.     sertraline 50 MG tablet  Commonly known as: ZOLOFT  Take 50 mg by mouth once daily.     traZODone 150 MG tablet  Commonly known as: DESYREL  Take 225 mg by mouth nightly.     TRELEGY ELLIPTA 100-62.5-25 mcg Dsdv  Generic drug: fluticasone-umeclidin-vilanter  Inhale 1 puff into the lungs Daily.  Notes to patient: Resume if taking prior to hospitalization            STOP taking these medications      cyclobenzaprine 10 MG tablet  Commonly known as: FLEXERIL              Indwelling Lines/Drains at time of discharge:   Lines/Drains/Airways       None                   Time spent on the discharge of patient: 35 minutes         Sole Montoya DO  Department of Hospital Medicine  [facility]

## 2024-11-18 NOTE — PROGRESS NOTES
Discharge instructions given to pt. Pt verbalized understanding. PIV removed. Pt leaving with personal belongings. Meds to be delivered to bedside. Pt leaving with family.

## 2024-11-18 NOTE — PLAN OF CARE
Pt clear for DC from case management standpoint. Discharging to home with his brother coming to get him this afternoon.  Patient states he would like to schedule his own PCP follow up.        11/18/24 0926   Final Note   Assessment Type Final Discharge Note   Anticipated Discharge Disposition Home   Hospital Resources/Appts/Education Provided Patient refused appointment set-up

## 2024-11-18 NOTE — PLAN OF CARE
11/17/24 1907   Patient Assessment/Suction   Level of Consciousness (AVPU) alert   Respiratory Effort Normal;Unlabored   Expansion/Accessory Muscles/Retractions no use of accessory muscles   All Lung Fields Breath Sounds Anterior:;Lateral:;clear   Rhythm/Pattern, Respiratory pattern regular   Cough Frequency infrequent   Cough Type nonproductive   PRE-TX-O2   Device (Oxygen Therapy) room air   SpO2 97 %   Pulse Oximetry Type Intermittent   $ Pulse Oximetry - Multiple Charge Pulse Oximetry - Multiple   Pulse 71   Resp 18   Aerosol Therapy   $ Aerosol Therapy Charges Aerosol Treatment  (atv)   Daily Review of Necessity (SVN) completed   Respiratory Treatment Status (SVN) given   Treatment Route (SVN) mouthpiece utilized;oxygen   Patient Position HOB elevated   Post Treatment Assessment (SVN) breath sounds unchanged   Signs of Intolerance (SVN) none   Breath Sounds Post-Respiratory Treatment   Throughout All Fields Post-Treatment All Fields   Throughout All Fields Post-Treatment no change   Post-treatment Heart Rate (beats/min) 77   Post-treatment Resp Rate (breaths/min) 17   General Safety Checklist   Safety Promotion/Fall Prevention side rails raised   Respiratory Interventions   Cough And Deep Breathing done with encouragement   Education   $ Education Bronchodilator;15 min

## 2024-11-21 LAB — BACTERIA BLD CULT: NORMAL

## 2024-12-03 ENCOUNTER — HOSPITAL ENCOUNTER (OUTPATIENT)
Facility: HOSPITAL | Age: 74
Discharge: HOME | End: 2024-12-03
Attending: INTERNAL MEDICINE | Admitting: INTERNAL MEDICINE
Payer: MEDICARE

## 2024-12-03 ENCOUNTER — ANESTHESIA (OUTPATIENT)
Dept: ENDOSCOPY | Facility: HOSPITAL | Age: 74
End: 2024-12-03
Payer: MEDICARE

## 2024-12-03 ENCOUNTER — ANESTHESIA EVENT (OUTPATIENT)
Dept: ENDOSCOPY | Facility: HOSPITAL | Age: 74
End: 2024-12-03
Payer: MEDICARE

## 2024-12-03 DIAGNOSIS — K22.2 SCHATZKI RING OF DISTAL ESOPHAGUS: ICD-10-CM

## 2024-12-03 DIAGNOSIS — D13.2 ADENOMATOUS DUODENAL POLYP: Primary | ICD-10-CM

## 2024-12-03 DIAGNOSIS — K31.7 GASTRIC POLYPS: ICD-10-CM

## 2024-12-03 PROCEDURE — 63600175 PHARM REV CODE 636 W HCPCS: Performed by: STUDENT IN AN ORGANIZED HEALTH CARE EDUCATION/TRAINING PROGRAM

## 2024-12-03 PROCEDURE — 27201012 HC FORCEPS, HOT/COLD, DISP: Performed by: INTERNAL MEDICINE

## 2024-12-03 PROCEDURE — 43248 EGD GUIDE WIRE INSERTION: CPT | Mod: ,,, | Performed by: INTERNAL MEDICINE

## 2024-12-03 PROCEDURE — D9220A PRA ANESTHESIA: Mod: CRNA,,, | Performed by: STUDENT IN AN ORGANIZED HEALTH CARE EDUCATION/TRAINING PROGRAM

## 2024-12-03 PROCEDURE — D9220A PRA ANESTHESIA: Mod: ANES,,, | Performed by: ANESTHESIOLOGY

## 2024-12-03 PROCEDURE — 43248 EGD GUIDE WIRE INSERTION: CPT | Performed by: INTERNAL MEDICINE

## 2024-12-03 PROCEDURE — 43239 EGD BIOPSY SINGLE/MULTIPLE: CPT | Mod: 59,,, | Performed by: INTERNAL MEDICINE

## 2024-12-03 PROCEDURE — 43239 EGD BIOPSY SINGLE/MULTIPLE: CPT | Mod: 59 | Performed by: INTERNAL MEDICINE

## 2024-12-03 PROCEDURE — 25000003 PHARM REV CODE 250: Performed by: INTERNAL MEDICINE

## 2024-12-03 PROCEDURE — C1769 GUIDE WIRE: HCPCS | Performed by: INTERNAL MEDICINE

## 2024-12-03 PROCEDURE — 37000009 HC ANESTHESIA EA ADD 15 MINS: Performed by: INTERNAL MEDICINE

## 2024-12-03 PROCEDURE — 37000008 HC ANESTHESIA 1ST 15 MINUTES: Performed by: INTERNAL MEDICINE

## 2024-12-03 PROCEDURE — 88305 TISSUE EXAM BY PATHOLOGIST: CPT | Mod: TC | Performed by: PATHOLOGY

## 2024-12-03 RX ORDER — LIDOCAINE HYDROCHLORIDE 20 MG/ML
INJECTION, SOLUTION EPIDURAL; INFILTRATION; INTRACAUDAL; PERINEURAL
Status: DISCONTINUED
Start: 2024-12-03 | End: 2024-12-03 | Stop reason: HOSPADM

## 2024-12-03 RX ORDER — PROPOFOL 10 MG/ML
INJECTION, EMULSION INTRAVENOUS
Status: COMPLETED
Start: 2024-12-03 | End: 2024-12-03

## 2024-12-03 RX ORDER — LIDOCAINE HYDROCHLORIDE 20 MG/ML
INJECTION INTRAVENOUS
Status: DISCONTINUED | OUTPATIENT
Start: 2024-12-03 | End: 2024-12-03

## 2024-12-03 RX ORDER — SODIUM CHLORIDE 9 MG/ML
INJECTION, SOLUTION INTRAVENOUS CONTINUOUS
Status: DISCONTINUED | OUTPATIENT
Start: 2024-12-03 | End: 2024-12-03 | Stop reason: HOSPADM

## 2024-12-03 RX ORDER — PROPOFOL 10 MG/ML
VIAL (ML) INTRAVENOUS
Status: DISCONTINUED | OUTPATIENT
Start: 2024-12-03 | End: 2024-12-03

## 2024-12-03 RX ADMIN — PROPOFOL 100 MG: 10 INJECTION, EMULSION INTRAVENOUS at 12:12

## 2024-12-03 RX ADMIN — PROPOFOL 20 MG: 10 INJECTION, EMULSION INTRAVENOUS at 12:12

## 2024-12-03 RX ADMIN — LIDOCAINE HYDROCHLORIDE 80 MG: 20 INJECTION, SOLUTION INTRAVENOUS at 12:12

## 2024-12-03 RX ADMIN — SODIUM CHLORIDE: 9 INJECTION, SOLUTION INTRAVENOUS at 12:12

## 2024-12-03 NOTE — PLAN OF CARE
Meets criteria for discharge. Discharged to home with family. Denies nausea. Tolerating fluids. Denies pain. Steady gait. Discharge instructions reviewed and printed handout given. Verbalized understanding.

## 2024-12-03 NOTE — ANESTHESIA PREPROCEDURE EVALUATION
12/03/2024  Adam Goode is a 74 y.o., male.      Pre-op Assessment    I have reviewed the Patient Summary Reports.     I have reviewed the Nursing Notes. I have reviewed the NPO Status.   I have reviewed the Medications.     Review of Systems  Anesthesia Hx:             Denies Family Hx of Anesthesia complications.    Denies Personal Hx of Anesthesia complications.                    Cardiovascular:        CAD   CABG/stent    Denies Angina.             Cardiovascular Symptoms: Angina       Coronary Artery Disease:                                  Pulmonary:   COPD         Chronic Obstructive Pulmonary Disease (COPD):                      Renal/:  Chronic Renal Disease        Kidney Function/Disease             Hepatic/GI:     GERD                Musculoskeletal:  Arthritis          Spine Disorders: lumbar and cervical            Neurological:    Neuromuscular Disease,             Peripheral Neuropathy                        Neuromuscular Disease   Psych:  Psychiatric History                  Physical Exam  General: Well nourished, Cooperative, Alert and Oriented    Airway:  Mallampati: III / II    Dental:  Dentures        Anesthesia Plan  Type of Anesthesia, risks & benefits discussed:    Anesthesia Type: Gen Natural Airway  Intra-op Monitoring Plan: Standard ASA Monitors  Induction:  IV  Informed Consent: Informed consent signed with the Patient and all parties understand the risks and agree with anesthesia plan.  All questions answered.   ASA Score: 3    Ready For Surgery From Anesthesia Perspective.     .

## 2024-12-03 NOTE — PROVATION PATIENT INSTRUCTIONS
Discharge Summary/Instructions after an Endoscopic Procedure  Patient Name: Adam Goode  Patient MRN: [id number]  Patient YOB: 1950  Tuesday, December 3, 2024  Blayne Mason MD  Dear patient,  As a result of recent federal legislation (The Federal Cures Act), you may   receive lab or pathology results from your procedure in your [facility]   account before your physician is able to contact you. Your physician or   their representative will relay the results to you with their   recommendations at their soonest availability.  Thank you,  RESTRICTIONS:  During your procedure today, you received medications for sedation.  These   medications may affect your judgment, balance and coordination.  Therefore,   for 24 hours, you have the following restrictions:   - DO NOT drive a car, operate machinery, make legal/financial decisions,   sign important papers or drink alcohol.    ACTIVITY:  Today: no heavy lifting, straining or running due to procedural   sedation/anesthesia.  The following day: return to full activity including work.  DIET:  Eat and drink normally unless instructed otherwise.     TREATMENT FOR COMMON SIDE EFFECTS:  - Mild abdominal pain, nausea, belching, bloating or excessive gas:  rest,   eat lightly and use a heating pad.  - Sore Throat: treat with throat lozenges and/or gargle with warm salt   water.  - Because air was used during the procedure, expelling large amounts of air   from your rectum or belching is normal.  - If a bowel prep was taken, you may not have a bowel movement for 1-3 days.    This is normal.  SYMPTOMS TO WATCH FOR AND REPORT TO YOUR PHYSICIAN:  1. Abdominal pain or bloating, other than gas cramps.  2. Chest pain.  3. Back pain.  4. Signs of infection such as: chills or fever occurring within 24 hours   after the procedure.  5. Rectal bleeding, which would show as bright red, maroon, or black stools.   (A tablespoon of blood from the rectum is not serious, especially  if   hemorrhoids are present.)  6. Vomiting.  7. Weakness or dizziness.  GO DIRECTLY TO THE NEAREST EMERGENCY ROOM IF YOU HAVE ANY OF THE FOLLOWING:      Difficulty breathing              Chills and/or fever over 101 F   Persistent vomiting and/or vomiting blood   Severe abdominal pain   Severe chest pain   Black, tarry stools   Bleeding- more than one tablespoon   Any other symptom or condition that you feel may need urgent attention  Your doctor recommends these additional instructions:  If any biopsies were taken, your doctors clinic will contact you in 1 to 2   weeks with any results.  - Patient has a contact number available for emergencies.  The signs and   symptoms of potential delayed complications were discussed with the   patient.  Return to normal activities tomorrow.  Written discharge   instructions were provided to the patient.   - Resume previous diet.   - Continue present medications.   - No aspirin, ibuprofen, naproxen, or other non-steroidal anti-inflammatory   drugs.   - Await pathology results.   - Discharge patient to home (ambulatory).   - Follow an antireflux regimen.   - Return to GI office after studies are complete.  For questions, problems or results please call your physician - Blayne Mason MD at Work:  [phone].  [facility], EMERGENCY ROOM PHONE NUMBER: [phone]  IF A COMPLICATION OR EMERGENCY SITUATION ARISES AND YOU ARE UNABLE TO REACH   YOUR PHYSICIAN - GO DIRECTLY TO THE EMERGENCY ROOM.  Blayne Mason MD  12/3/2024 12:41:58 PM  This report has been verified and signed electronically.  Dear patient,  As a result of recent federal legislation (The Federal Cures Act), you may   receive lab or pathology results from your procedure in your [facility]   account before your physician is able to contact you. Your physician or   their representative will relay the results to you with their   recommendations at their soonest availability.  Thank you,  PROVATION

## 2024-12-03 NOTE — H&P
CC: Follow up of duodenal polyp    74 year old male with above. States that symptoms are absent, no alleviating/exacerbating factors. No bleeding or weight loss.     ROS:  No headache, no fever/chills, no chest pain/SOB, no nausea/vomiting/diarrhea/constipation/GI bleeding/abdominal pain, no dysuria/hematuria.    VSSAF   Exam:   Alert and oriented x 3; no apparent distress   PERRLA, sclera anicteric  CV: Regular rate/rhythm, normal PMI   Lungs: Clear bilaterally with no wheeze/rales   Abdomen: Soft, NT/ND, normal bowel sounds   Ext: No cyanosis, clubbing     Impression:   As above    Plan:   Proceed with endoscopy. Further recs to follow.

## 2024-12-03 NOTE — ANESTHESIA POSTPROCEDURE EVALUATION
Anesthesia Post Evaluation    Patient: Adam Goode    Procedure(s) Performed: Procedure(s) (LRB):  EGD (ESOPHAGOGASTRODUODENOSCOPY) (N/A)    Final Anesthesia Type: general      Patient location during evaluation: PACU  Patient participation: Yes- Able to Participate  Level of consciousness: awake and alert  Post-procedure vital signs: reviewed and stable  Pain management: adequate  Airway patency: patent    PONV status at discharge: No PONV  Anesthetic complications: no      Cardiovascular status: blood pressure returned to baseline  Respiratory status: unassisted  Hydration status: euvolemic  Follow-up not needed.              Vitals Value Taken Time   /74 12/03/24 1311   Temp 36.6 °C (97.8 °F) 12/03/24 1303   Pulse 64 12/03/24 1315   Resp 24 12/03/24 1315   SpO2 98 % 12/03/24 1315         Event Time   Out of Recovery 13:24:00         Pain/Eric Score: Eric Score: 10 (12/3/2024  1:17 PM)

## 2024-12-04 VITALS
TEMPERATURE: 98 F | RESPIRATION RATE: 24 BRPM | HEIGHT: 75 IN | SYSTOLIC BLOOD PRESSURE: 121 MMHG | BODY MASS INDEX: 21.68 KG/M2 | OXYGEN SATURATION: 98 % | DIASTOLIC BLOOD PRESSURE: 74 MMHG | HEART RATE: 64 BPM | WEIGHT: 174.38 LBS

## 2025-02-17 DIAGNOSIS — F17.210 NICOTINE DEPENDENCE, CIGARETTES, UNCOMPLICATED: Primary | ICD-10-CM

## 2025-02-21 ENCOUNTER — HOSPITAL ENCOUNTER (OUTPATIENT)
Dept: RADIOLOGY | Facility: HOSPITAL | Age: 75
Discharge: HOME | End: 2025-02-21
Attending: INTERNAL MEDICINE
Payer: MEDICARE

## 2025-02-21 DIAGNOSIS — F17.210 NICOTINE DEPENDENCE, CIGARETTES, UNCOMPLICATED: ICD-10-CM

## 2025-02-21 PROCEDURE — 71271 CT THORAX LUNG CANCER SCR C-: CPT | Mod: 26,,, | Performed by: RADIOLOGY

## 2025-02-21 PROCEDURE — 71271 CT THORAX LUNG CANCER SCR C-: CPT | Mod: TC

## 2025-04-10 DIAGNOSIS — M16.0 PRIMARY OSTEOARTHRITIS OF BOTH HIPS: Primary | ICD-10-CM

## 2025-04-21 ENCOUNTER — HOSPITAL ENCOUNTER (OUTPATIENT)
Dept: RADIOLOGY | Facility: HOSPITAL | Age: 75
Discharge: HOME | End: 2025-04-21
Attending: ANESTHESIOLOGY
Payer: MEDICARE

## 2025-04-21 DIAGNOSIS — M16.0 PRIMARY OSTEOARTHRITIS OF BOTH HIPS: ICD-10-CM

## 2025-04-21 DIAGNOSIS — M54.51 VERTEBROGENIC LOW BACK PAIN: Primary | ICD-10-CM

## 2025-04-21 PROCEDURE — 73721 MRI JNT OF LWR EXTRE W/O DYE: CPT | Mod: 26,LT,, | Performed by: RADIOLOGY

## 2025-04-21 PROCEDURE — 73721 MRI JNT OF LWR EXTRE W/O DYE: CPT | Mod: TC,RT

## 2025-04-21 PROCEDURE — 73721 MRI JNT OF LWR EXTRE W/O DYE: CPT | Mod: TC,LT

## 2025-04-21 PROCEDURE — 73721 MRI JNT OF LWR EXTRE W/O DYE: CPT | Mod: 26,RT,, | Performed by: RADIOLOGY

## 2025-05-05 ENCOUNTER — HOSPITAL ENCOUNTER (OUTPATIENT)
Dept: RADIOLOGY | Facility: HOSPITAL | Age: 75
Discharge: HOME | End: 2025-05-05
Attending: ANESTHESIOLOGY
Payer: MEDICARE

## 2025-05-05 DIAGNOSIS — M54.51 VERTEBROGENIC LOW BACK PAIN: ICD-10-CM

## 2025-05-05 PROCEDURE — 72148 MRI LUMBAR SPINE W/O DYE: CPT | Mod: 26,,, | Performed by: RADIOLOGY

## 2025-05-05 PROCEDURE — 72148 MRI LUMBAR SPINE W/O DYE: CPT | Mod: TC,PO

## 2025-06-17 ENCOUNTER — OFFICE VISIT (OUTPATIENT)
Dept: ORTHOPEDICS | Facility: CLINIC | Age: 75
End: 2025-06-17
Payer: MEDICARE

## 2025-06-17 VITALS — BODY MASS INDEX: 22.75 KG/M2 | WEIGHT: 183 LBS | HEIGHT: 75 IN

## 2025-06-17 DIAGNOSIS — M87.051 AVASCULAR NECROSIS OF HIP, RIGHT: Primary | ICD-10-CM

## 2025-06-17 PROCEDURE — 1159F MED LIST DOCD IN RCRD: CPT | Mod: CPTII,S$GLB,, | Performed by: ORTHOPAEDIC SURGERY

## 2025-06-17 PROCEDURE — 1160F RVW MEDS BY RX/DR IN RCRD: CPT | Mod: CPTII,S$GLB,, | Performed by: ORTHOPAEDIC SURGERY

## 2025-06-17 PROCEDURE — 3008F BODY MASS INDEX DOCD: CPT | Mod: CPTII,S$GLB,, | Performed by: ORTHOPAEDIC SURGERY

## 2025-06-17 PROCEDURE — 1125F AMNT PAIN NOTED PAIN PRSNT: CPT | Mod: CPTII,S$GLB,, | Performed by: ORTHOPAEDIC SURGERY

## 2025-06-17 PROCEDURE — 1101F PT FALLS ASSESS-DOCD LE1/YR: CPT | Mod: CPTII,S$GLB,, | Performed by: ORTHOPAEDIC SURGERY

## 2025-06-17 PROCEDURE — 3288F FALL RISK ASSESSMENT DOCD: CPT | Mod: CPTII,S$GLB,, | Performed by: ORTHOPAEDIC SURGERY

## 2025-06-17 PROCEDURE — 99203 OFFICE O/P NEW LOW 30 MIN: CPT | Mod: S$GLB,,, | Performed by: ORTHOPAEDIC SURGERY

## 2025-06-17 PROCEDURE — 99999 PR PBB SHADOW E&M-EST. PATIENT-LVL III: CPT | Mod: PBBFAC,,, | Performed by: ORTHOPAEDIC SURGERY

## 2025-06-17 RX ORDER — TRIAMCINOLONE ACETONIDE 40 MG/ML
40 INJECTION, SUSPENSION INTRA-ARTICULAR; INTRAMUSCULAR
Status: SHIPPED | OUTPATIENT
Start: 2025-06-17

## 2025-06-17 NOTE — PROGRESS NOTES
[facility]    Subjective:     Chief Complaint:   Chief Complaint   Patient presents with    Right Hip - Pain     Has had a few shots in his hip thelast one was around the first of the year but the r. Didn't do the shot right, does have a MRI, went back to Dr. Whalen when the pain came back they discussed the Mri and was told that he couldn't do any more shots and needs a hip replacement so he wanted him to come back to talk to Dr. Obregon, would like shots today if he can; sharp pain that will shoot down his leg almost making him fall sometimes, always has pain; today is the best its felt in a while        Past Medical History:   Diagnosis Date    Arthritis     Cancer SKIN    Cardiac angina     Colon polyp     COPD (chronic obstructive pulmonary disease)     Coronary artery disease ANGINA. HAD  [facility] 8/12. 40 % BLOCKAGE. DR RUBY IS CARDIOLOGIST.    Depression     GERD (gastroesophageal reflux disease)     Kidney cysts     Lung cancer 01/2019    Pathological staging [id number] stage IIa with pleural invasion present - s/p RLLectomy    MVP (mitral valve prolapse)     Trigger thumb     BILAT    Wears dentures     UPPER    Wears glasses        Past Surgical History:   Procedure Laterality Date    APPENDECTOMY      BACK SURGERY      COLONOSCOPY N/A 04/19/2024    Procedure: COLONOSCOPY;  Surgeon: Blayne Piper MD;  Location: [facility];  Service: Endoscopy;  Laterality: N/A;    ESOPHAGOGASTRODUODENOSCOPY N/A 10/8/2024    Procedure: EGD (ESOPHAGOGASTRODUODENOSCOPY);  Surgeon: Blayne Piper MD;  Location: [facility];  Service: Endoscopy;  Laterality: N/A;    ESOPHAGOGASTRODUODENOSCOPY N/A 12/3/2024    Procedure: EGD (ESOPHAGOGASTRODUODENOSCOPY);  Surgeon: Blayne Piper MD;  Location: [facility];  Service: Endoscopy;  Laterality: N/A;    INSERTION OF TUNNELED CENTRAL VENOUS CATHETER (CVC) WITH SUBCUTANEOUS PORT Left 04/11/2019    Procedure: [id number];  Surgeon: Reji Griffiths MD;   Location: [facility];  Service: General;  Laterality: Left;    KNEE SURGERY      BILAT    lung resection  02/27/2019    TRIGGER THUMB Bilateral     UPPER GASTROINTESTINAL ENDOSCOPY         Current Outpatient Medications   Medication Sig    albuterol (PROVENTIL/VENTOLIN HFA) 90 mcg/actuation inhaler Inhale 2 puffs into the lungs every 6 (six) hours as needed for Wheezing or Shortness of Breath. Rescue    aspirin (ECOTRIN) 81 MG EC tablet Take 81 mg by mouth every evening.    atorvastatin (LIPITOR) 40 MG tablet Take 40 mg by mouth every evening.     azelastine (ASTELIN) 137 mcg (0.1 %) nasal spray 2 sprays by Nasal route.    fluticasone propionate (FLONASE) 50 mcg/actuation nasal spray 1 spray by Each Nostril route once daily.    fluticasone-umeclidin-vilanter (TRELEGY ELLIPTA) 100-62.5-25 mcg DsDv Inhale 1 puff into the lungs Daily.    HYDROcodone-acetaminophen (NORCO)  mg per tablet Take 1 tablet by mouth 2 (two) times daily as needed for Pain.    ipratropium (ATROVENT) 42 mcg (0.06 %) nasal spray 2 sprays by Nasal route 3 (three) times daily.    multivitamin capsule Take 1 capsule by mouth once daily.    nitroGLYCERIN (NITROSTAT) 0.4 MG SL tablet Place 0.4 mg under the tongue every 5 (five) minutes as needed for Chest pain.    pantoprazole (PROTONIX) 40 MG tablet Take 40 mg by mouth every morning.    polyethylene glycol (GLYCOLAX) 17 gram/dose powder Take 17 g by mouth once daily.    sertraline (ZOLOFT) 50 MG tablet Take 50 mg by mouth once daily.    traZODone (DESYREL) 150 MG tablet Take 225 mg by mouth nightly.     Current Facility-Administered Medications   Medication    methylPREDNISolone acetate injection 40 mg    triamcinolone acetonide injection 40 mg       Review of patient's allergies indicates:   Allergen Reactions    Penicillins      AS A CHILD - NOT SURE REACTION       Family History   Problem Relation Name Age of Onset    COPD Mother      Diabetes Mother      Heart disease Mother      Birth defects  Father      Diabetes Father      Heart disease Father      Colon cancer Neg Hx         Social History[1]    History of present illness:  74-year-old male presents to clinic today with referral from pain management for evaluation of abnormal MRI.  Patient has a MRIs of the bilateral hips for chronic hip and buttocks pain unrelieved with pain management intervention and injections.  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      Review of Systems:    Constitution: Negative for chills, fever, and sweats.  Negative for unexplained weight loss.    HENT:  Negative for headaches and blurry vision.    Cardiovascular:Negative for chest pain or irregular heart beat. Negative for hypertension.    Respiratory:  Negative for cough and shortness of breath.    Gastrointestinal: Negative for abdominal pain, heartburn, melena, nausea, and vomitting.    Genitourinary:  Negative bladder incontinence and dysuria.    Musculoskeletal:  See HPI for details.     Neurological: Negative for numbness.    Psychiatric/Behavioral: Negative for depression.  The patient is not nervous/anxious.      Endocrine: Negative for polyuria    Hematologic/Lymphatic: Negative for bleeding problem.  Does not bruise/bleed easily.    Skin: Negative for poor would healing and rash    Objective:      Physical Examination:    Vital Signs:  There were no vitals filed for this visit.    Body mass index is 22.87 kg/m².    This a well-developed, well nourished patient in no acute distress.  They are alert and oriented and cooperative to examination.        Examination of the bilateral hips, patient has referred pain to the bilateral posterior hip and buttocks region.  He has got some lumbar sacral pain as well with subjective radicular pain along the posterior lateral aspect of the legs to the knees.  He denies true groin pain.  He has mildly limited internal rotation bilaterally however range of motion of the bilateral hips  does reproduce his hip pain.  Bilateral straight leg raises are negative.  Normal range motion in the knees.  Distally neurovascularly intact.    Pertinent New Results:  Narrative & Impression  EXAMINATION:  MRI HIP WITHOUT CONTRAST RIGHT; MRI HIP WITHOUT CONTRAST LEFT     CLINICAL HISTORY:  osteoarthritis; Bilateral primary osteoarthritis of hip     TECHNIQUE:  Axial  coronal and sagittal images of the RIGHT hip were obtained on a 1.5 Ulcy magnet.     Axial  coronal and sagittal images of the LEFT hip were obtained on a 1.5 Lucy magnet.     CONTRAST: None.     COMPARISON:  CT of the abdomen/pelvis from 01/15/2024.     FINDINGS:  RIGHT HIP:     Alignment: Normal.     Dysplasia: None     Fluid: No hip joint effusion is seen.     Cartilage:     Femoral: Mild cartilage thinning is seen along the superior margin of the femoral head     Acetabular: Similar mild cartilage thinning is seen along the superior margin the acetabular roof.  There is a small size overhang acetabular osteophyte in keeping with mild osteoarthrosis.     Labrum: Suspected tiny acetabular labral tear through the anterior superior acetabular labrum (sagittal image 9).     Capsule/ligaments: Intact.     Osseous: Serpiginous sclerosis seen through the anterior superior margin of the femoral head covering an area of approximately 39 x 32 x 19 mm (TRX CC X AP) compatible with avascular necrosis, without articular surface collapse.     Bones (other than subarticular marrow): Normal.     Muscles/tendons/entheses: Mild edema is seen at the gluteus medius tendon insertion adjacent to the greater trochanter compatible with mild gluteus medius tendinopathy/greater trochanteric pain syndrome/bursitis (axial image 15 series 7).     LEFT HIP:     Alignment: Normal.     Dysplasia: None     Fluid: No hip joint effusion is seen     Cartilage:     Femoral: Mild cartilage thinning is seen along the superior margin of the femoral head.     Acetabular: Mild  cartilage thinning is seen along the adjacent superior acetabular roof.  There is a moderate size overhang acetabular osteophyte, in keeping with mild to moderate overall osteoarthrosis.  There is a 8 x 4 mm ossific density along the anterior superior margin of the acetabulum, likely from an osteophyte (axial image 7).     Labrum: Tear through the anterior superior labrum appears to be present, likely secondary to the osteophyte.     Capsule/ligaments: Intact.     Osseous: Serpiginous sclerosis is seen in the anterior superior femoral head covering an area of 29 x 21 x 13 mm (TRX CC X AP) compatible with avascular necrosis, without articular surface collapse.     Bones (other than subarticular marrow): No acute fracture or dislocation seen.     Muscles/tendons/entheses: Minimal edema is seen along the insertion of the gluteus medius tendon on the greater trochanter suggesting minimal tendinopathy.     PELVIC STRUCTURES:     The visualized pelvis shows a heterogeneous mildly enlarged prostate gland with susceptibility artifact centrally, possibly from radiation seeds.     Impression:     1.  No acute fracture or dislocation.     2.  Findings of mild-to-moderate right greater than left femoral head, avascular necrosis without articular surface collapse.     3.  Mild edema along the gluteus medius tendon insertion (right greater than left) suggesting mild gluteus medius tendinopathy.     4.  Mild left greater than right hip joint osteoarthrosis peer        Electronically signed by:Rajesh George  Date:                                            04/21/2025  Time:                                           08:26        Exam Ended: 04/21/25 08:13 CDT Last Resulted: 04/21/25 08:26 CDT     XRAY Report / Interpretation:       Assessment/Plan:      Chronic bilateral hip pain, abnormal MRI bilateral hips.  Images personally reviewed as well, patient with advanced avascular necrosis of the right femoral head as well to a lesser  "extent the left femoral head.  Right greater than left hip pain.  Ultimately patient needs a total hip arthroplasty.  We discussed this with the patient in great detail today.  Unfortunately he has a family member who is ill, he is going to be traveling for the next couple of weeks.  He would like to consider surgical intervention upon his return.  We gave him a right intramuscular injection in the buttocks.  See if that provides him any meaningful relief in his symptoms from the steroids.  We will continue his narcotics as prescribed by pain management.  We did consent him today for right total hip arthroplasty.  He will follow up with us after he returns from the care of his family member to schedule surgery.    Patient was consented for surgery. Risks and benefits of the procedure were discussed in great detail to include the expected preoperative, intraoperative and postoperative courses. Complications may include but are not limited to bleeding, infection, damage to nerves, arteries, blood vessels, bones, tendons, ligaments, stiffness, instability, deep vein thrombus (DVT), pulmonary embolus (PE), altered sensation, continued pain, RSD, loss of motion or a need for additional surgery. As well as general anesthetic complications including seizure, stroke, heart attack and even death.    The mobility limitation cannot be sufficiently resolved by the use of a cane. Patient's functional mobility deficit can be sufficiently resolved with the use of a (Rolling Walker or Walker). Patient's mobility limitation significantly impairs their ability to participate in one of more activities of daily living. The use of a (RW or Walker) will significantly improve the patient's ability to participate in MRADLS and the patient will use it on regular basis in the home.    Bobo Agudelo, Physician Assistant, served in the capacity as a "scribe" for this patient encounter.  A "face-to-face" encounter occurred with Dr. Whiting " Finger on this date.  The treatment plan and medical decision-making is outlined above. Patient was seen and examined with a chaperone.       This note was created using Dragon voice recognition software that occasionally misinterpreted phrases or words.             [1]   Social History  Socioeconomic History    Marital status:    Tobacco Use    Smoking status: Former     Current packs/day: 0.00     Types: Cigarettes     Start date: 1979     Quit date: 2019     Years since quittin.4    Smokeless tobacco: Never    Tobacco comments:     1-2 CIGT SOME DAYS/states last cigarette 20   Substance and Sexual Activity    Alcohol use: Not Currently     Comment: no ETOH for 12 years    Drug use: Yes     Types: Hydrocodone    Sexual activity: Yes     Partners: Female     Social Drivers of Health     Financial Resource Strain: Patient Declined (2024)    Overall Financial Resource Strain (CARDIA)     Difficulty of Paying Living Expenses: Patient declined   Recent Concern: Financial Resource Strain - Medium Risk (2024)    Overall Financial Resource Strain (CARDIA)     Difficulty of Paying Living Expenses: Somewhat hard   Food Insecurity: Patient Declined (2024)    Hunger Vital Sign     Worried About Running Out of Food in the Last Year: Patient declined     Ran Out of Food in the Last Year: Patient declined   Recent Concern: Food Insecurity - Food Insecurity Present (2024)    Hunger Vital Sign     Worried About Running Out of Food in the Last Year: Sometimes true   Transportation Needs: Patient Declined (2024)    TRANSPORTATION NEEDS     Transportation : Patient declined   Physical Activity: Unknown (2024)    Exercise Vital Sign     Days of Exercise per Week: 5 days   Stress: Patient Declined (2024)    [place] [facility] [employer] [facility] - Occupational Stress Questionnaire     Feeling of Stress : Patient declined   Recent Concern: Stress - Stress Concern Present  (11/16/2024)    [place] [facility] [employer] [facility] - Occupational Stress Questionnaire     Feeling of Stress : To some extent   Housing Stability: Patient Declined (11/18/2024)    Housing Stability Vital Sign     Unable to Pay for Housing in the Last Year: Patient declined     Homeless in the Last Year: Patient declined

## 2025-06-18 ENCOUNTER — TELEPHONE (OUTPATIENT)
Dept: ORTHOPEDICS | Facility: CLINIC | Age: 75
End: 2025-06-18
Payer: MEDICARE

## 2025-06-18 ENCOUNTER — PATIENT MESSAGE (OUTPATIENT)
Dept: ORTHOPEDICS | Facility: CLINIC | Age: 75
End: 2025-06-18
Payer: MEDICARE

## 2025-06-18 NOTE — TELEPHONE ENCOUNTER
----- Message from Martha sent at 6/17/2025  1:31 PM CDT -----  He is ready to schedule surgery please call

## 2025-06-18 NOTE — TELEPHONE ENCOUNTER
----- Message from Peng Elizondo sent at 6/18/2025  1:54 PM CDT -----  Regarding: Surgery  Would like a call to schedule his hip replacement, states he signed consent forms

## 2025-06-23 DIAGNOSIS — M87.051 AVASCULAR NECROSIS OF HIP, RIGHT: Primary | ICD-10-CM

## 2025-06-23 DIAGNOSIS — Z01.818 PRE-OP TESTING: ICD-10-CM

## 2025-06-23 RX ORDER — CEFAZOLIN SODIUM 2 G/50ML
2 SOLUTION INTRAVENOUS
OUTPATIENT
Start: 2025-06-23

## 2025-07-09 ENCOUNTER — HOSPITAL ENCOUNTER (OUTPATIENT)
Dept: PREADMISSION TESTING | Facility: HOSPITAL | Age: 75
Discharge: HOME | End: 2025-07-09
Attending: ORTHOPAEDIC SURGERY
Payer: MEDICARE

## 2025-07-09 DIAGNOSIS — M87.051 AVASCULAR NECROSIS OF HIP, RIGHT: Primary | ICD-10-CM

## 2025-07-09 NOTE — PRE ADMISSION SCREENING
Patient Name: Adam Goode  YOB: 1950   MRN: [id number]     [facility]   Basic Mobility Inpatient Short Form 6 Clicks         How much difficulty does the patient currently have  Unable  A Lot  A Little  None      1. Turning over in bed (including adjusting bedclothes, sheets and blankets)?     1 []    2 []    3 [x]    4 []        2. Sitting down on and standing up from a chair with arms (e.g., wheelchair, bedside commode, etc.)     1 []  2 []  3 []     4 [x]      3. Moving from lying on back to sitting on the side of the bed?     1 []  2 []  3 [x]    4 []    How much help from another person does the patient currently need  Total  A Lot  A Little  None      4. Moving to and from a bed to a chair (including a wheelchair)?    1 []  2 []  3 []    4 [x]      5. Need to walk in hospital room?    1 []  2 []  3 []    4 [x]      6. Climbing 3-5 steps with a railing?    1 []  2 []  3 []    4 [x]       Raw Score:      22            CMS 0-100% Score:    20.91        %   Standardized Score:   53.28            CMS Modifier:      CJ                                    [facility]   Basic Mobility Inpatient Short Form 6 Clicks Score Conversion Table*         *Use this form to convert -PAC Basic Mobility Inpatient Raw Scores.   [facility] Inpatient Basic Mobility Short Form Scoring Example   1. Add the number values associated with the response to each item. For example, items totals yield a Raw Score of 21.   2. Match the raw score to the t-Scale scores (t-Scale score = 50.25, SE = 4.69).   3. Find the associated CMS % (CMS % = 28.97%).   4. Locate the correct CMS Functional Modifier Code, or G Code (G code = CJ)     NOTE: Each -PAC Short Form has a separate conversion table. Make sure that you use the correct conversion table.       Instruction Manual - page 45 contains conversion table

## 2025-07-09 NOTE — PRE ADMISSION SCREENING
"               [facility] Risk Assessment Scale    Patient Name: Adam Goode  YOB: 1950  MRN: [id number]            RIsk Factor Measure Recommendation Patient Data Scale/Score   BMI >40 Reconsider surgery, weight loss   Estimated body mass index is 22.87 kg/m² as calculated from the following:    Height as of 6/17/25: 6' 3" (1.905 m).    Weight as of 6/17/25: 83 kg (182 lb 15.7 oz).   [x] 0 = 1 - 24.9  [] 1 = 25-29.9  [] 2 = 30-34.9  [] 3 = 35-39.9  [] 4 = 40-44.9  [] 5 = 45-99.9   Hemoglobin AIC (if applicable) >9 Delay surgery until DM under control  Refer for:  Nutrition Therapy  Exercise   Medication    Lab Results   Component Value Date    HGBA1C 5.8 11/16/2024       Lab Results   Component Value Date    GLU 95 11/18/2024      [] 0 = 4.0-5.6  [x] 1 = 5.7-6.4  [] 2 = 6.5-6.9  [] 3 = 7.0-7.9  [] 4 = 8.0-8.9  [] 5 = 9.0-12   Hemoglobin (Anemia) <9 Delay surgery   Correct anemia Lab Results   Component Value Date    HGB 14.9 02/17/2025    [] 20 - <9.0                    Albumin <3 Delay surgery &Workup Lab Results   Component Value Date    ALBUMIN 4.5 02/20/2024    [] 20 - <3.0   Smoking Cessation >4 Weeks Delay Surgery  Refer to OP Cessation Class    Former Smoker  Quit: 2019 [] 20 - current smoker                                _____ PPD                    Hx of MI, PE, Arrhythmia, CVA, DVT <30 Days Delay Surgery    N/A [] 20      Infection Variable Delay surgery and re-evaluate   N/A [] 20 - recent/current infection     Depression (PHQ) >10 out of 27 Delay Surgery and re-evaluate  Medication  Counseling              [x] 0     []1     []2     []3      []4      [] 5                    (1-4)      (5-9)  (10-14)  (15-19)   (20-27)     Memory Impairment & Memory loss (Mini-Cog Screening Tool) Advanced dementia and/or Parkinson's Reconsider surgery     [x] 0     []1     []2     []3     []4     [] 5     Physical Conditioning (Modified AM-PAC Per Physical Therapy at [facility]) Unable to ambulate on day of " surgery Delay surgery and re-evaluate  Pre-Rehabilitation   (PT evaluation)       []  0   [x]4       []8     []12        []16     []20       (<20%)   (<40%)   (<60%)   (<80% )    (>80%)     Home Environment/Caregiver support  (Per /Navigator Interview)    Availability of basic services and/or approprate assistance during post-operative period Delay surgery and re-evaluate  Safe home environment  Health   1 week post-surgery  Transportation  availability  Ability to obtain DME/Medications post-op    [x] 0     []1     []2     []3     []4     [] 5  [x] 0     []1     []2     []3     []4     [] 5  [x] 0     []1     []2     []3     []4     [] 5  [x] 0     []1     []2     []3     []4     [] 5         MD Contact: Dr. Brito Comments:  Total Score:  5

## 2025-07-09 NOTE — PRE ADMISSION SCREENING
JOINT CAMP ASSESSMENT    Name dAam Goode   MRN [id number]    Age/Sex 74 y.o. male    Surgeon Dr. Whiting Finger   Joint Camp Date 7/9/2025   Surgery Date 7/23/2025   Procedure Right Hip Arthroplasty   Insurance Payor: [employer] MEDICARE / Plan: [employer] MEDICARE HMO / Product Type: Capitation /    Care Team Patient Care Team:  Judy Mruo MD as PCP - General (Hospitalist)    Pharmacy   [facility] [place] [facility]  [address]  [place] [address]  Phone: [phone] Fax: [phone]    [id number] - [place], [place] - [address]  [address]  [place] [address]  Phone: [phone] Fax: [phone]    [facility] - [place], [place] - [address]  [address] [facility] [address]  Phone: [phone] Fax: [phone]     AM-PAC Score   22   Risk Assessment Score 5     Past Medical History:   Diagnosis Date    Arthritis     Cancer SKIN    Cardiac angina     Colon polyp     COPD (chronic obstructive pulmonary disease)     Coronary artery disease ANGINA. HAD  [facility] 8/12. 40 % BLOCKAGE. DR RUBY IS CARDIOLOGIST.    Depression     GERD (gastroesophageal reflux disease)     Kidney cysts     Lung cancer 01/2019    Pathological staging [id number] stage IIa with pleural invasion present - s/p RLLectomy    MVP (mitral valve prolapse)     Trigger thumb     BILAT    Wears dentures     UPPER    Wears glasses        Past Surgical History:   Procedure Laterality Date    APPENDECTOMY      BACK SURGERY      COLONOSCOPY N/A 04/19/2024    Procedure: COLONOSCOPY;  Surgeon: Blayne Piper MD;  Location: [facility];  Service: Endoscopy;  Laterality: N/A;    CORONARY ANGIOPLASTY WITH STENT PLACEMENT      ESOPHAGOGASTRODUODENOSCOPY N/A 10/08/2024    Procedure: EGD (ESOPHAGOGASTRODUODENOSCOPY);  Surgeon: Blayne Piper MD;  Location: [facility];  Service: Endoscopy;  Laterality: N/A;    ESOPHAGOGASTRODUODENOSCOPY N/A 12/03/2024    Procedure: EGD  (ESOPHAGOGASTRODUODENOSCOPY);  Surgeon: Blayne Piper MD;  Location: [facility] ENDO;  Service: Endoscopy;  Laterality: N/A;    INSERTION OF TUNNELED CENTRAL VENOUS CATHETER (CVC) WITH SUBCUTANEOUS PORT Left 04/11/2019    Procedure: [id number];  Surgeon: Reji Griffiths MD;  Location: [facility] OR;  Service: General;  Laterality: Left;    KNEE SURGERY      BILAT    lung resection  02/27/2019    TRIGGER THUMB Bilateral     UPPER GASTROINTESTINAL ENDOSCOPY           Home Enviroment     Living Arrangement: Lives alone  Home Environment: 1-story house/ trailer, walk-in shower (Daughter house)  Home Safety Concerns: Pets in the home: dogs (2).    DISCHARGE CAREGIVER/SUPPORT SYSTEM     Identified post-op caregiver: Patient has children / family / friends to help, daughter, Charissa Steele.  Patient to stay with daughter at [address] [place] [address].Patient's caregiver(s) will be able to provide physical assistance. Patient will have someone to assist overnight.      Caregiver present at pre-op interview:  No      PRE-OPERATIVE FUNCTIONAL STATUS     Employment: Retired    Pre-op Functional Status: Patient is independent with mobility/ambulation, transfers, ADL's, IADL's.    Use of assistive device for ambulation: none  ADL: self care  ADL Limitations: difficulty with walking  Medical Restrictions: Unstable ambulation and Decreased range of motions in extremities    POTENTIAL BARRIERS TO DISCHARGE/POTENTIAL POST-OP COMPLICATIONS     Patient with hx of angina, COPD, coronary artery disease with long-term anticoagulation use (Aspirin 81 mg once daily). POSSIBLE SAME DAY DISCHARGE.    DISCHARGE PLAN     Expected LOS of 1 days or less for joint replacement discussed with patient. We also discussed a discharge path of HH for approximately two weeks with a transition to outpatient PT on the third week given no post-op complications.      Patient in agreement with discharge plan: Yes    Discharge to: Discharge  home with home health (PT/OT) x2 weeks with transition to outpatient PT     HH:  [facility] ([place], [place]).  Patient disclosure form completed and sent to case management for upload to the medical record.      OP PT: [facility] ([facility])     Home DME: single point cane    Needed DME at D/C: rolling walker, bedside commode, and assistive device kit. Patient to purchase BSC from retail prior to surgery.     Rx: Per Dr. Obregon at discharge     Meds to Beds: Yes  Patient expected to discharge on Aspirin 81mg by mouth twice daily for DVT prophylaxis.

## 2025-07-16 ENCOUNTER — HOSPITAL ENCOUNTER (OUTPATIENT)
Dept: PREADMISSION TESTING | Facility: HOSPITAL | Age: 75
Discharge: HOME | End: 2025-07-16
Attending: ORTHOPAEDIC SURGERY
Payer: MEDICARE

## 2025-07-16 VITALS
TEMPERATURE: 98 F | HEART RATE: 86 BPM | RESPIRATION RATE: 14 BRPM | SYSTOLIC BLOOD PRESSURE: 144 MMHG | BODY MASS INDEX: 22.38 KG/M2 | DIASTOLIC BLOOD PRESSURE: 78 MMHG | WEIGHT: 180 LBS | OXYGEN SATURATION: 98 % | HEIGHT: 75 IN

## 2025-07-16 DIAGNOSIS — Z01.818 PRE-OP TESTING: ICD-10-CM

## 2025-07-16 DIAGNOSIS — M87.051 AVASCULAR NECROSIS OF HIP, RIGHT: ICD-10-CM

## 2025-07-16 LAB
ABORH RETYPE: NORMAL
ABSOLUTE EOSINOPHIL (SMH): 0.18 K/UL
ABSOLUTE MONOCYTE (SMH): 0.59 K/UL (ref 0.3–1)
ABSOLUTE NEUTROPHIL COUNT (SMH): 4.5 K/UL (ref 1.8–7.7)
ANION GAP (SMH): 7 MMOL/L (ref 8–16)
BASOPHILS # BLD AUTO: 0.04 K/UL
BASOPHILS NFR BLD AUTO: 0.5 %
BUN SERPL-MCNC: 27 MG/DL (ref 8–23)
CALCIUM SERPL-MCNC: 10.1 MG/DL (ref 8.7–10.5)
CHLORIDE SERPL-SCNC: 105 MMOL/L (ref 95–110)
CO2 SERPL-SCNC: 28 MMOL/L (ref 23–29)
CREAT SERPL-MCNC: 1.2 MG/DL (ref 0.5–1.4)
ERYTHROCYTE [DISTWIDTH] IN BLOOD BY AUTOMATED COUNT: 13.4 % (ref 11.5–14.5)
GFR SERPLBLD CREATININE-BSD FMLA CKD-EPI: >60 ML/MIN/1.73/M2
GLUCOSE SERPL-MCNC: 113 MG/DL (ref 70–110)
HCT VFR BLD AUTO: 42.9 % (ref 40–54)
HGB BLD-MCNC: 13.7 GM/DL (ref 14–18)
IMM GRANULOCYTES # BLD AUTO: 0.05 K/UL (ref 0–0.04)
IMM GRANULOCYTES NFR BLD AUTO: 0.6 % (ref 0–0.5)
INDIRECT COOMBS: NORMAL
LYMPHOCYTES # BLD AUTO: 2.35 K/UL (ref 1–4.8)
MCH RBC QN AUTO: 28.4 PG (ref 27–31)
MCHC RBC AUTO-ENTMCNC: 31.9 G/DL (ref 32–36)
MCV RBC AUTO: 89 FL (ref 82–98)
NUCLEATED RBC (/100WBC) (SMH): 0 /100 WBC
PLATELET # BLD AUTO: 277 K/UL (ref 150–450)
PMV BLD AUTO: 8.4 FL (ref 9.2–12.9)
POTASSIUM SERPL-SCNC: 3.9 MMOL/L (ref 3.5–5.1)
RBC # BLD AUTO: 4.82 M/UL (ref 4.6–6.2)
RELATIVE EOSINOPHIL (SMH): 2.3 % (ref 0–8)
RELATIVE LYMPHOCYTE (SMH): 30.4 % (ref 18–48)
RELATIVE MONOCYTE (SMH): 7.6 % (ref 4–15)
RELATIVE NEUTROPHIL (SMH): 58.6 % (ref 38–73)
RH BLD: NORMAL
SODIUM SERPL-SCNC: 140 MMOL/L (ref 136–145)
SPECIMEN OUTDATE: NORMAL
WBC # BLD AUTO: 7.73 K/UL (ref 3.9–12.7)

## 2025-07-16 PROCEDURE — 36415 COLL VENOUS BLD VENIPUNCTURE: CPT | Performed by: ORTHOPAEDIC SURGERY

## 2025-07-16 PROCEDURE — 85025 COMPLETE CBC W/AUTO DIFF WBC: CPT | Performed by: ORTHOPAEDIC SURGERY

## 2025-07-16 PROCEDURE — 86901 BLOOD TYPING SEROLOGIC RH(D): CPT | Performed by: ORTHOPAEDIC SURGERY

## 2025-07-16 PROCEDURE — 82310 ASSAY OF CALCIUM: CPT | Performed by: ORTHOPAEDIC SURGERY

## 2025-07-16 NOTE — DISCHARGE INSTRUCTIONS
To confirm, Your doctor has instructed you that surgery is scheduled for:     Pre-Op will call the afternoon prior to surgery between 4:00 and 6:00 PM with the final arrival time.      Please report to [facility] via [facility] entrance. Check in at registration desk.    DO NOT EAT AFTER MIDNIGHT BEFORE SURGERY. OK TO HAVE CLEAR LIQUIDS UP TO 2 HOURS PRIOR TO ARRIVAL.    CLEAR LIQUIDS INCLUDE: WATER, GATORADE, CLEAR JUICE (NO PULP), BLACK COFFEE AND TEA.    TAKE ONLY THESE MEDICATIONS WITH A SMALL SIP OF WATER THE MORNING OF YOUR PROCEDURE:      ONLY if you are diabetic, check your sugar in the morning before your procedure.       Do not take any diabetic medicines or insulin the morning of surgery .     PLEASE NOTE:  The surgery schedule has many variables which may affect the time of your surgery case.  Family members should be available if your surgery time changes.  Plan to be here the day of your procedure between 4-6 hours.      DO NOT TAKE THESE MEDICATIONS 5-7 DAYS PRIOR to your procedure or per your surgeon's request: ASPIRIN, ALEVE, ADVIL, IBUPROFEN,  YUDITH SELTZER, BC , FISH OIL , VITAMIN E, HERBALS  (May take Tylenol)      ONLY if you are prescribed any types of blood thinners such as:  Aspirin, Coumadin, Plavix, Pradaxa, Xarelto, Aggrenox, Effient, Eliquis, Savasya, Brilinta, or any other, ask your surgeon whether you should stop taking them and how long before surgery you should stop.  You may also need to verify with the prescribing physician if it is ok to stop your medication.                                                        IMPORTANT INSTRUCTIONS      Do not smoke, vape or drink alcoholic beverages 24 hours prior to your procedure.  Shower the night before AND the morning of your procedure with a Chlorhexidine wash such as Hibiclens or Dial antibacterial soap from the neck down. Do not apply any deodorants, lotions or powders after each shower.  Do not get it on your face or in  your eyes.  You may use your own shampoo and face wash. This helps your skin to be as bacteria free as possible.  DO NOT remove hair from the surgery site.  Do not shave the incision site unless you are given specific instructions to do so.    Sleep in a bed with clean sheets.  Do not sleep with a pet in the bed.   If you wear contact lenses, dentures, hearing aids or glasses, bring a container to put them in during surgery and give to a family member for safe keeping.    Please leave all jewelry, piercing's and valuables at home.   ONLY if you have been diagnosed with sleep apnea please bring your C-PAP machine.  ONLY if you wear home oxygen please bring your portable oxygen tank the day of your procedure.   ONLY for patients requiring bowel prep, written instructions will be given by your doctor's office.  ONLY if you have a neuro stimulator, please bring the controller with you the morning of surgery.    If your doctor has scheduled you for an overnight stay, bring a small overnight bag with any personal items you need.    Make arrangements in advance for transportation home by a responsible adult.      You must make arrangements for transportation, TAXI'S, UBER'S OR LYFTS ARE NOT ALLOWED.        If you have any questions about these instructions, call Pre-Op Admit  Nursing at [phone] or the Pre-Op Day Surgery Unit at [phone].

## 2025-07-16 NOTE — PROGRESS NOTES
Notify pt that ST is positive and Dr Yu cannot clear him for surgery. He needs appt with Dr Yu to discuss angiogram  Electronically signed by RYANN Thorne at 07/15/2025 6:24

## 2025-07-17 ENCOUNTER — TELEPHONE (OUTPATIENT)
Dept: ORTHOPEDICS | Facility: CLINIC | Age: 75
End: 2025-07-17
Payer: MEDICARE

## 2025-07-17 NOTE — TELEPHONE ENCOUNTER
----- Message from Med Assistant Elizondo sent at 7/17/2025  3:28 PM CDT -----  Please call patient back.

## 2025-07-18 DIAGNOSIS — M87.051 AVASCULAR NECROSIS OF HIP, RIGHT: Primary | ICD-10-CM

## 2025-07-18 RX ORDER — ONDANSETRON 4 MG/1
4 TABLET, FILM COATED ORAL EVERY 8 HOURS PRN
Qty: 30 TABLET | Refills: 0 | Status: SHIPPED | OUTPATIENT
Start: 2025-07-18

## 2025-07-18 RX ORDER — GABAPENTIN 300 MG/1
300 CAPSULE ORAL 2 TIMES DAILY
Qty: 60 CAPSULE | Refills: 0 | Status: SHIPPED | OUTPATIENT
Start: 2025-07-18 | End: 2025-08-17

## 2025-07-18 RX ORDER — CELECOXIB 200 MG/1
200 CAPSULE ORAL 2 TIMES DAILY
Qty: 60 CAPSULE | Refills: 0 | Status: SHIPPED | OUTPATIENT
Start: 2025-07-18 | End: 2025-08-17

## 2025-07-18 RX ORDER — ASPIRIN 81 MG/1
81 TABLET ORAL EVERY 12 HOURS
Qty: 60 TABLET | Refills: 0 | Status: SHIPPED | OUTPATIENT
Start: 2025-07-18 | End: 2025-08-17

## 2025-07-18 RX ORDER — OXYCODONE AND ACETAMINOPHEN 7.5; 325 MG/1; MG/1
1 TABLET ORAL EVERY 6 HOURS PRN
Qty: 28 TABLET | Refills: 0 | Status: SHIPPED | OUTPATIENT
Start: 2025-07-18

## 2025-07-18 RX ORDER — DOCUSATE SODIUM 100 MG/1
100 CAPSULE, LIQUID FILLED ORAL 2 TIMES DAILY
Qty: 60 CAPSULE | Refills: 0 | Status: SHIPPED | OUTPATIENT
Start: 2025-07-18 | End: 2025-08-17

## 2025-07-22 ENCOUNTER — ANESTHESIA EVENT (OUTPATIENT)
Dept: SURGERY | Facility: HOSPITAL | Age: 75
End: 2025-07-22
Payer: MEDICARE

## 2025-07-22 NOTE — ANESTHESIA PREPROCEDURE EVALUATION
07/22/2025  Adam Goode is a 74 y.o., male.    Problem List[1]    Past Surgical History:   Procedure Laterality Date    APPENDECTOMY      BACK SURGERY      CARDIAC SURGERY      COLONOSCOPY N/A 04/19/2024    Procedure: COLONOSCOPY;  Surgeon: Blayne Piper MD;  Location: [facility];  Service: Endoscopy;  Laterality: N/A;    CORONARY ANGIOPLASTY WITH STENT PLACEMENT      ESOPHAGOGASTRODUODENOSCOPY N/A 10/08/2024    Procedure: EGD (ESOPHAGOGASTRODUODENOSCOPY);  Surgeon: Blayne Piper MD;  Location: [facility];  Service: Endoscopy;  Laterality: N/A;    ESOPHAGOGASTRODUODENOSCOPY N/A 12/03/2024    Procedure: EGD (ESOPHAGOGASTRODUODENOSCOPY);  Surgeon: Blayne Piper MD;  Location: [facility];  Service: Endoscopy;  Laterality: N/A;    INSERTION OF TUNNELED CENTRAL VENOUS CATHETER (CVC) WITH SUBCUTANEOUS PORT Left 04/11/2019    Procedure: [id number];  Surgeon: Reji Griffiths MD;  Location: [facility];  Service: General;  Laterality: Left;    KNEE SURGERY      BILAT    lung resection  02/27/2019    TRIGGER THUMB Bilateral     UPPER GASTROINTESTINAL ENDOSCOPY          Tobacco Use:  The patient  reports that he quit smoking about 6 years ago. His smoking use included cigarettes. He started smoking about 46 years ago. He has never used smokeless tobacco.     Results for orders placed or performed during the hospital encounter of 04/04/23   EKG 12-lead    Collection Time: 04/04/23  7:08 PM    Narrative    Test Reason : R07.9,    Vent. Rate : 074 BPM     Atrial Rate : 074 BPM     P-R Int : 166 ms          QRS Dur : 108 ms      QT Int : 398 ms       P-R-T Axes : 061 -39 034 degrees     QTc Int : 441 ms    Normal sinus rhythm  Left axis deviation  Abnormal ECG  No previous ECGs available  Confirmed by Chris Arenas MD ([id number]) on 4/11/2023 10:47:10 AM    Referred By: [email or web address]   SELF            Confirmed By:Chris Arenas MD             Lab Results   Component Value Date    WBC 7.73 07/16/2025    HGB 13.7 (L) 07/16/2025    HCT 42.9 07/16/2025    MCV 89 07/16/2025     07/16/2025     BMP  Lab Results   Component Value Date     07/16/2025    K 3.9 07/16/2025     07/16/2025    CO2 28 07/16/2025    BUN 27 (H) 07/16/2025    CREATININE 1.2 07/16/2025    CALCIUM 10.1 07/16/2025    ANIONGAP 7 (L) 07/16/2025     (H) 07/16/2025    GLU 95 11/18/2024    GLU 92 11/17/2024       Results for orders placed during the hospital encounter of 04/04/23    Echo Saline Bubble? No    Interpretation Summary  · The left ventricle is normal in size with mild concentric hypertrophy and normal systolic function.  · The estimated ejection fraction is 55%.  · Normal left ventricular diastolic function.  · Normal right ventricular size with normal right ventricular systolic function.  · Mild left atrial enlargement.  · Normal central venous pressure (3 mmHg).  · The estimated PA systolic pressure is 21 mmHg.      Nuclear stress test 7/14/25 Conclusions:  Overall LV systolic function is normal without regional wall motion abnormalities.  Stress LV EF: 57%.  No previous exam available for comparison.  There is some mild decreased uptake noted in the inferoposterior wall on both rest and  stress images which may be due to diaphragmatic attenuation, however, slightly  improved upake on rest images compared to stress images may represent reversible  ischemia.    Cardiology clinic note 7/17/25   ASSESSMENT:    1. Coronary artery disease involving native coronary artery of native heart without angina pectoris   2. Pure hypercholesterolemia   3. History of coronary artery stent placement   4. History of lung cancer   5. COPD without exacerbation (CMS/HCC)     Clear for surgery low risk        Pre-op Assessment    I have reviewed the Patient Summary Reports.     I have reviewed the Nursing Notes. I have reviewed the NPO  Status.   I have reviewed the Medications.     Review of Systems  Anesthesia Hx:  No problems with previous Anesthesia   Neg history of prior surgery.          Denies Family Hx of Anesthesia complications.    Denies Personal Hx of Anesthesia complications.                    Social:  Former Smoker, No Alcohol Use       Hematology/Oncology:  Hematology Normal   Oncology Normal                                   EENT/Dental:  EENT/Dental Normal           Cardiovascular:        CAD (cleared by Mine)  asymptomatic CABG/stent (Stent)           ECG has been reviewed.                            Pulmonary:   COPD, mild                     Renal/:  Chronic Renal Disease (Kidney cyst.)                Hepatic/GI:     GERD, well controlled                Musculoskeletal:     Avascular necrosis right hip.       Spine Disorders: cervical and lumbar            Neurological:           Chemotherapy-induced neuropathy.      Peripheral Neuropathy (chemo induced neuropathy toes)                          Endocrine:  Endocrine Normal            Dermatological:  Skin Normal    Psych:    depression                Physical Exam  General: Well nourished    Airway:  Mallampati: II   Mouth Opening: Normal  TM Distance: Normal  Tongue: Normal  Neck ROM: Normal ROM    Dental:  Dentures    Chest/Lungs:  Clear to auscultation, Normal Respiratory Rate    Heart:  Rate: Normal  Rhythm: Regular Rhythm        Anesthesia Plan  Type of Anesthesia, risks & benefits discussed:    Anesthesia Type: Spinal  Intra-op Monitoring Plan: Standard ASA Monitors  Post Op Pain Control Plan: IV/PO Opioids PRN and multimodal analgesia  Induction:  IV  Informed Consent: Informed consent signed with the Patient and all parties understand the risks and agree with anesthesia plan.  All questions answered.   ASA Score: 3  Anesthesia Plan Notes:   Spinal     Ready For Surgery From Anesthesia Perspective.     .           [1]   Patient Active Problem List  Diagnosis    DDD  (degenerative disc disease), lumbar    DDD (degenerative disc disease), cervical    Cervical radiculitis    Lung nodule    Malignant neoplasm of lower lobe of right lung    Status post surgery    Encounter for antineoplastic chemotherapy    Alopecia of scalp    Chemotherapy induced neutropenia    Chemotherapy-induced neuropathy    COPD without exacerbation    Encounter for medication review    History of tobacco abuse    Squamous cell carcinoma lung, right    History of lung cancer    Chest pain due to coronary artery disease    Elevated glucose    Hypoxemia    Colitis    Opioid-induced constipation    Gastroesophageal reflux disease with esophagitis    CAD (coronary artery disease)

## 2025-07-23 ENCOUNTER — ANESTHESIA (OUTPATIENT)
Dept: SURGERY | Facility: HOSPITAL | Age: 75
End: 2025-07-23
Payer: MEDICARE

## 2025-07-23 ENCOUNTER — HOSPITAL ENCOUNTER (OUTPATIENT)
Facility: HOSPITAL | Age: 75
Discharge: HOME | End: 2025-07-23
Attending: ORTHOPAEDIC SURGERY | Admitting: ORTHOPAEDIC SURGERY
Payer: MEDICARE

## 2025-07-23 VITALS
TEMPERATURE: 97 F | OXYGEN SATURATION: 100 % | HEART RATE: 61 BPM | RESPIRATION RATE: 16 BRPM | SYSTOLIC BLOOD PRESSURE: 134 MMHG | DIASTOLIC BLOOD PRESSURE: 71 MMHG

## 2025-07-23 DIAGNOSIS — Z01.818 PRE-OP TESTING: ICD-10-CM

## 2025-07-23 DIAGNOSIS — M87.051 AVASCULAR NECROSIS OF HIP, RIGHT: Primary | ICD-10-CM

## 2025-07-23 PROCEDURE — 71000015 HC POSTOP RECOV 1ST HR: Performed by: ORTHOPAEDIC SURGERY

## 2025-07-23 PROCEDURE — 71000016 HC POSTOP RECOV ADDL HR: Performed by: ORTHOPAEDIC SURGERY

## 2025-07-23 PROCEDURE — 37000009 HC ANESTHESIA EA ADD 15 MINS: Performed by: ORTHOPAEDIC SURGERY

## 2025-07-23 PROCEDURE — 97116 GAIT TRAINING THERAPY: CPT

## 2025-07-23 PROCEDURE — 36000710: Performed by: ORTHOPAEDIC SURGERY

## 2025-07-23 PROCEDURE — 97530 THERAPEUTIC ACTIVITIES: CPT

## 2025-07-23 PROCEDURE — 63600175 PHARM REV CODE 636 W HCPCS: Performed by: NURSE ANESTHETIST, CERTIFIED REGISTERED

## 2025-07-23 PROCEDURE — 97161 PT EVAL LOW COMPLEX 20 MIN: CPT

## 2025-07-23 PROCEDURE — 27201423 OPTIME MED/SURG SUP & DEVICES STERILE SUPPLY: Performed by: ORTHOPAEDIC SURGERY

## 2025-07-23 PROCEDURE — 25000003 PHARM REV CODE 250: Performed by: NURSE ANESTHETIST, CERTIFIED REGISTERED

## 2025-07-23 PROCEDURE — 63600175 PHARM REV CODE 636 W HCPCS: Mod: JZ | Performed by: ANESTHESIOLOGY

## 2025-07-23 PROCEDURE — 97165 OT EVAL LOW COMPLEX 30 MIN: CPT

## 2025-07-23 PROCEDURE — 63600175 PHARM REV CODE 636 W HCPCS: Performed by: ANESTHESIOLOGY

## 2025-07-23 PROCEDURE — 71000039 HC RECOVERY, EACH ADD'L HOUR: Performed by: ORTHOPAEDIC SURGERY

## 2025-07-23 PROCEDURE — 27130 TOTAL HIP ARTHROPLASTY: CPT | Mod: RT,,, | Performed by: ORTHOPAEDIC SURGERY

## 2025-07-23 PROCEDURE — 37000008 HC ANESTHESIA 1ST 15 MINUTES: Performed by: ORTHOPAEDIC SURGERY

## 2025-07-23 PROCEDURE — C1776 JOINT DEVICE (IMPLANTABLE): HCPCS | Performed by: ORTHOPAEDIC SURGERY

## 2025-07-23 PROCEDURE — 25000003 PHARM REV CODE 250: Performed by: ANESTHESIOLOGY

## 2025-07-23 PROCEDURE — 25000003 PHARM REV CODE 250: Performed by: ORTHOPAEDIC SURGERY

## 2025-07-23 PROCEDURE — 36000711: Performed by: ORTHOPAEDIC SURGERY

## 2025-07-23 PROCEDURE — 71000033 HC RECOVERY, INTIAL HOUR: Performed by: ORTHOPAEDIC SURGERY

## 2025-07-23 DEVICE — PINNACLE HIP SOLUTIONS ALTRX LD POLYETHYLENE ACETABULAR LINER +4 10 DEGREE 40MM ID 60MM OD
Type: IMPLANTABLE DEVICE | Site: HIP | Status: FUNCTIONAL
Brand: PINNACLE ALTRX

## 2025-07-23 DEVICE — SUMMIT FEMORAL STEM 12/14 TAPER TAPER ED W/POROCOAT SIZE 8 STD 160MM
Type: IMPLANTABLE DEVICE | Site: HIP | Status: FUNCTIONAL
Brand: SUMMIT POROCOAT

## 2025-07-23 DEVICE — BIOLOX DELTA TS CERAMIC FEMORAL HEAD 12/14 TAPER REVISION DIAMETER 40MM +8.5
Type: IMPLANTABLE DEVICE | Site: HIP | Status: FUNCTIONAL
Brand: BIOLOX DELTA

## 2025-07-23 DEVICE — PINNACLE GRIPTION ACETABULAR SHELL SECTOR 60MM OD
Type: IMPLANTABLE DEVICE | Site: HIP | Status: FUNCTIONAL
Brand: PINNACLE GRIPTION

## 2025-07-23 RX ORDER — GLUCAGON 1 MG
1 KIT INJECTION
Status: DISCONTINUED | OUTPATIENT
Start: 2025-07-23 | End: 2025-07-24 | Stop reason: HOSPADM

## 2025-07-23 RX ORDER — OXYCODONE HYDROCHLORIDE 5 MG/1
5 TABLET ORAL
Status: COMPLETED | OUTPATIENT
Start: 2025-07-23 | End: 2025-07-23

## 2025-07-23 RX ORDER — FAMOTIDINE 10 MG/ML
INJECTION, SOLUTION INTRAVENOUS
Status: DISCONTINUED | OUTPATIENT
Start: 2025-07-23 | End: 2025-07-23

## 2025-07-23 RX ORDER — CEFAZOLIN 2 G/1
2 INJECTION, POWDER, FOR SOLUTION INTRAMUSCULAR; INTRAVENOUS
Status: DISCONTINUED | OUTPATIENT
Start: 2025-07-23 | End: 2025-07-24 | Stop reason: HOSPADM

## 2025-07-23 RX ORDER — HYDROMORPHONE HYDROCHLORIDE 1 MG/ML
0.2 INJECTION, SOLUTION INTRAMUSCULAR; INTRAVENOUS; SUBCUTANEOUS EVERY 5 MIN PRN
Status: COMPLETED | OUTPATIENT
Start: 2025-07-23 | End: 2025-07-23

## 2025-07-23 RX ORDER — DIPHENHYDRAMINE HYDROCHLORIDE 50 MG/ML
12.5 INJECTION, SOLUTION INTRAMUSCULAR; INTRAVENOUS EVERY 6 HOURS PRN
Status: DISCONTINUED | OUTPATIENT
Start: 2025-07-23 | End: 2025-07-24 | Stop reason: HOSPADM

## 2025-07-23 RX ORDER — MUPIROCIN 20 MG/G
1 OINTMENT TOPICAL 2 TIMES DAILY
Status: DISCONTINUED | OUTPATIENT
Start: 2025-07-23 | End: 2025-07-24 | Stop reason: HOSPADM

## 2025-07-23 RX ORDER — DEXAMETHASONE SODIUM PHOSPHATE 4 MG/ML
INJECTION, SOLUTION INTRA-ARTICULAR; INTRALESIONAL; INTRAMUSCULAR; INTRAVENOUS; SOFT TISSUE
Status: DISCONTINUED | OUTPATIENT
Start: 2025-07-23 | End: 2025-07-23

## 2025-07-23 RX ORDER — SODIUM CHLORIDE 0.9 % (FLUSH) 0.9 %
5 SYRINGE (ML) INJECTION
Status: DISCONTINUED | OUTPATIENT
Start: 2025-07-23 | End: 2025-07-24 | Stop reason: HOSPADM

## 2025-07-23 RX ORDER — ONDANSETRON HYDROCHLORIDE 2 MG/ML
INJECTION, SOLUTION INTRAVENOUS
Status: DISCONTINUED | OUTPATIENT
Start: 2025-07-23 | End: 2025-07-23

## 2025-07-23 RX ORDER — ONDANSETRON HYDROCHLORIDE 2 MG/ML
4 INJECTION, SOLUTION INTRAVENOUS DAILY PRN
Status: DISCONTINUED | OUTPATIENT
Start: 2025-07-23 | End: 2025-07-24 | Stop reason: HOSPADM

## 2025-07-23 RX ORDER — CEFAZOLIN 2 G/1
1 INJECTION, POWDER, FOR SOLUTION INTRAMUSCULAR; INTRAVENOUS
Status: DISCONTINUED | OUTPATIENT
Start: 2025-07-24 | End: 2025-07-24 | Stop reason: HOSPADM

## 2025-07-23 RX ORDER — BISACODYL 10 MG/1
10 SUPPOSITORY RECTAL DAILY
Status: DISCONTINUED | OUTPATIENT
Start: 2025-07-23 | End: 2025-07-24 | Stop reason: HOSPADM

## 2025-07-23 RX ORDER — TRANEXAMIC ACID 10 MG/ML
1000 INJECTION, SOLUTION INTRAVENOUS
Status: COMPLETED | OUTPATIENT
Start: 2025-07-23 | End: 2025-07-23

## 2025-07-23 RX ORDER — MIDAZOLAM HYDROCHLORIDE 1 MG/ML
INJECTION INTRAMUSCULAR; INTRAVENOUS
Status: DISCONTINUED | OUTPATIENT
Start: 2025-07-23 | End: 2025-07-23

## 2025-07-23 RX ORDER — PROPOFOL 10 MG/ML
VIAL (ML) INTRAVENOUS
Status: DISCONTINUED | OUTPATIENT
Start: 2025-07-23 | End: 2025-07-23

## 2025-07-23 RX ORDER — ZOLPIDEM TARTRATE 5 MG/1
5 TABLET ORAL NIGHTLY PRN
Status: DISCONTINUED | OUTPATIENT
Start: 2025-07-23 | End: 2025-07-24 | Stop reason: HOSPADM

## 2025-07-23 RX ORDER — HYDROCODONE BITARTRATE AND ACETAMINOPHEN 5; 325 MG/1; MG/1
1 TABLET ORAL EVERY 4 HOURS PRN
Status: DISCONTINUED | OUTPATIENT
Start: 2025-07-23 | End: 2025-07-24 | Stop reason: HOSPADM

## 2025-07-23 RX ORDER — PROPOFOL 10 MG/ML
VIAL (ML) INTRAVENOUS CONTINUOUS PRN
Status: DISCONTINUED | OUTPATIENT
Start: 2025-07-23 | End: 2025-07-23

## 2025-07-23 RX ORDER — CLINDAMYCIN PHOSPHATE 900 MG/50ML
INJECTION, SOLUTION INTRAVENOUS
Status: DISCONTINUED | OUTPATIENT
Start: 2025-07-23 | End: 2025-07-23

## 2025-07-23 RX ORDER — PHENYLEPHRINE HYDROCHLORIDE 10 MG/ML
INJECTION INTRAVENOUS
Status: DISCONTINUED | OUTPATIENT
Start: 2025-07-23 | End: 2025-07-23

## 2025-07-23 RX ORDER — FAMOTIDINE 20 MG/1
20 TABLET, FILM COATED ORAL 2 TIMES DAILY
Status: CANCELLED | OUTPATIENT
Start: 2025-07-23

## 2025-07-23 RX ORDER — DIPHENHYDRAMINE HYDROCHLORIDE 50 MG/ML
INJECTION, SOLUTION INTRAMUSCULAR; INTRAVENOUS
Status: DISCONTINUED | OUTPATIENT
Start: 2025-07-23 | End: 2025-07-23

## 2025-07-23 RX ORDER — BUPIVACAINE HYDROCHLORIDE 7.5 MG/ML
INJECTION, SOLUTION EPIDURAL; RETROBULBAR
Status: COMPLETED | OUTPATIENT
Start: 2025-07-23 | End: 2025-07-23

## 2025-07-23 RX ORDER — ACETAMINOPHEN 10 MG/ML
INJECTION, SOLUTION INTRAVENOUS
Status: DISCONTINUED | OUTPATIENT
Start: 2025-07-23 | End: 2025-07-23

## 2025-07-23 RX ORDER — TRANEXAMIC ACID 1 G/10ML
INJECTION, SOLUTION INTRAVENOUS
Status: DISCONTINUED | OUTPATIENT
Start: 2025-07-23 | End: 2025-07-23

## 2025-07-23 RX ADMIN — PHENYLEPHRINE HYDROCHLORIDE 100 MCG: 10 INJECTION INTRAVENOUS at 08:07

## 2025-07-23 RX ADMIN — HYDROMORPHONE HYDROCHLORIDE 0.2 MG: 1 INJECTION, SOLUTION INTRAMUSCULAR; INTRAVENOUS; SUBCUTANEOUS at 11:07

## 2025-07-23 RX ADMIN — PHENYLEPHRINE HYDROCHLORIDE 100 MCG: 10 INJECTION INTRAVENOUS at 09:07

## 2025-07-23 RX ADMIN — MIDAZOLAM HYDROCHLORIDE 1 MG: 1 INJECTION, SOLUTION INTRAMUSCULAR; INTRAVENOUS at 08:07

## 2025-07-23 RX ADMIN — ONDANSETRON 4 MG: 2 INJECTION INTRAMUSCULAR; INTRAVENOUS at 08:07

## 2025-07-23 RX ADMIN — DIPHENHYDRAMINE HYDROCHLORIDE 25 MG: 50 INJECTION INTRAMUSCULAR; INTRAVENOUS at 08:07

## 2025-07-23 RX ADMIN — HYDROMORPHONE HYDROCHLORIDE 0.2 MG: 1 INJECTION, SOLUTION INTRAMUSCULAR; INTRAVENOUS; SUBCUTANEOUS at 10:07

## 2025-07-23 RX ADMIN — HYDROCODONE BITARTRATE AND ACETAMINOPHEN 1 TABLET: 5; 325 TABLET ORAL at 01:07

## 2025-07-23 RX ADMIN — BUPIVACAINE HYDROCHLORIDE 1.6 ML: 7.5 INJECTION, SOLUTION EPIDURAL; RETROBULBAR at 08:07

## 2025-07-23 RX ADMIN — CLINDAMYCIN IN 5 PERCENT DEXTROSE 900 MG: 18 INJECTION, SOLUTION INTRAVENOUS at 07:07

## 2025-07-23 RX ADMIN — TRANEXAMIC ACID 1000 MG: 10 INJECTION, SOLUTION INTRAVENOUS at 11:07

## 2025-07-23 RX ADMIN — TRANEXAMIC ACID 1000 MG: 100 INJECTION, SOLUTION INTRAVENOUS at 08:07

## 2025-07-23 RX ADMIN — PROPOFOL 100 MCG/KG/MIN: 10 INJECTION, EMULSION INTRAVENOUS at 08:07

## 2025-07-23 RX ADMIN — SODIUM CHLORIDE, SODIUM LACTATE, POTASSIUM CHLORIDE, AND CALCIUM CHLORIDE: .6; .31; .03; .02 INJECTION, SOLUTION INTRAVENOUS at 07:07

## 2025-07-23 RX ADMIN — PROPOFOL 30 MG: 10 INJECTION, EMULSION INTRAVENOUS at 08:07

## 2025-07-23 RX ADMIN — OXYCODONE HYDROCHLORIDE 5 MG: 5 TABLET ORAL at 10:07

## 2025-07-23 RX ADMIN — DEXAMETHASONE SODIUM PHOSPHATE 4 MG: 4 INJECTION, SOLUTION INTRAMUSCULAR; INTRAVENOUS at 08:07

## 2025-07-23 RX ADMIN — FAMOTIDINE 20 MG: 10 INJECTION, SOLUTION INTRAVENOUS at 08:07

## 2025-07-23 RX ADMIN — ACETAMINOPHEN 1000 MG: 10 INJECTION, SOLUTION INTRAVENOUS at 08:07

## 2025-07-23 NOTE — OP NOTE
[facility]  Surgery Department  Operative Note    SUMMARY     Date of Procedure: 7/23/2025     Procedure:  Right total hip arthroplasty                       Primary repair of gluteus medius muscle    Surgeons and Role:     * Henok Obregon MD - Primary    Assisting Surgeon:  Agatha    Pre-Operative Diagnosis: Avascular necrosis of hip, right [M87.051]  Pre-op testing [Z01.818]    Post-Operative Diagnosis: Post-Op Diagnosis Codes:     * Avascular necrosis of hip, right [M87.051]     * Pre-op testing [Z01.818]    Anesthesia: Spinal    Intraoperative Findings:  Complete delamination of the femoral head.  Avascular rupture of the gluteus medius attachment to the greater tuberosity    Description of the Findings of the Procedure:  Patient was placed on the operative table in the lateral decubitus position.  He was prepped and draped in the usual sterile manner for surgery.  A time-out was undertaken and completed.  We now created a posterior lateral approach to the hip it was carried down sharply through the skin with a 10 blade.  All bleeding was meticulously controlled.  The deep fascia was incised in line with its fibers.  At this point we encountered a degenerative rupture of the gluteus medius measuring almost 2 cm across the top of the greater trochanter.  I felt that this was important to repair for general stability of the hip.  Therefore I sharply debrided the edge of the gluteus medius to get down to good tissue.  I drilled 2 holes in the greater trochanter him placed fiber wires through those holes and then secured the gluteus medius tendon back to the greater trochanter with the FiberWire.  We had a good repair.  At this point the short external rotators were identified.  The Charnley retractor was placed being sure not to entrapped the sciatic nerve.  The short external rotators were taken down and tagged for later reattachment.  The capsule was split the hip was dislocated.  There was a  large effusion.  There was complete delamination of the femoral head.  A preliminary neck cut was made in the head was removed.  We now used the cookie cutter followed by the canal finer followed by the lateralizer.  We now reamed all the way up to a size 8.  That gave us good chatter.  I now broached to a size 8 and that gave us excellent fit and fill.  I turned my attention to the acetabulum.  Retractors were placed anteriorly and posteriorly.  The foveal contents were cleaned with the Bovie.  We medialized with a 47 mm Reamer just down to the level of the fovea.  We now expanded all the way up to a 59 mm Reamer which gave us good bleeding bone and a nice round cup.  I tapped a 60 mm cup into position and this gave me an excellent Press-Fit.  The liner was tapped into position.  We checked it with a hemostat.  I pulsed and irrigated and turned my attention back to the stem.  We dropped her 8. Stem.  We trialed all the way up to a +8.  That gave us excellent leg lengths and excellent stability in both flexion and extension.  I pulsed and irrigated again.  We tapped the actual construct into position the construct trialed perfectly.  We pulsed and irrigated I closed the short external rotators and the capsule with 2. FiberWire.  The gluteus medius repair held well during manipulation for the total hip and it was still very secure.  The sciatic nerve was in good position and condition and I removed the Charnley retractors.  We closed the deep fascia with a running number 2 Strattice fixed.  We irrigated and closed the subcu with 2-0 Strattice fixed and the skin with 4-0 Monocryl sterile dressings were applied and the patient was noted to be in stable condition pending an x-ray neurovascular check    Complications: No    Estimated Blood Loss (EBL): * No values recorded between 7/23/2025  7:57 AM and 7/23/2025  9:21 AM *           Implants:   Implant Name Type Inv. Item Serial No.  Lot No. LRB No. Used  Action   CUP ACT PINNACLE 60MM - [id number]  CUP ACT PINNACLE 60MM  [employer] [id number] Right 1 Implanted   LINER ACET PINN SZ40 10D+4X60M - [id number]  LINER ACET PINN SZ40 10D+4X60M  [employer] [id number] Right 1 Implanted   HIP STEM SUMMIT SZ8 - [id number]  HIP STEM SUMMIT SZ8  [employer] [id number] Right 1 Implanted   HEAD FEM 12/14 TAPER 8.5X40 - [id number]  HEAD FEM 12/14 TAPER 8.5X40  [employer] [id number] Right 1 Implanted       Specimens:   Specimen (24h ago, onward)      None                    Condition: Good    Disposition: PACU - hemodynamically stable.              Discharge Note    SUMMARY     Admit Date: 7/23/2025    Discharge Date and Time:  07/23/2025 9:21 AM    Hospital Course (synopsis of major diagnoses, care, treatment, and services provided during the course of the hospital stay): Uneventful      Final Diagnosis: Post-Op Diagnosis Codes:     * Avascular necrosis of hip, right [M87.051]     * Pre-op testing [Z01.818]    Disposition: Home or Self Care    Follow Up/Patient Instructions:     Medications:  Reconciled Home Medications:   Current Discharge Medication List        CONTINUE these medications which have NOT CHANGED    Details   atorvastatin (LIPITOR) 40 MG tablet Take 40 mg by mouth every evening.       azelastine (ASTELIN) 137 mcg (0.1 %) nasal spray 2 sprays by Nasal route.      celecoxib (CELEBREX) 200 MG capsule Take 1 capsule (200 mg total) by mouth 2 (two) times daily.  Qty: 60 capsule, Refills: 0    Associated Diagnoses: Avascular necrosis of hip, right      docusate sodium (COLACE) 100 MG capsule Take 1 capsule (100 mg total) by mouth 2 (two) times daily.  Qty: 60 capsule, Refills: 0    Associated Diagnoses: Avascular necrosis of hip, right      fluticasone propionate (FLONASE) 50 mcg/actuation nasal spray 1 spray by Each Nostril route once daily.      fluticasone-umeclidin-vilanter (TRELEGY ELLIPTA) 100-62.5-25 mcg DsDv Inhale 1 puff into the lungs Daily.  Qty: 180 each, Refills: 4     Associated Diagnoses: Centrilobular emphysema      gabapentin (NEURONTIN) 300 MG capsule Take 1 capsule (300 mg total) by mouth 2 (two) times daily.  Qty: 60 capsule, Refills: 0    Associated Diagnoses: Avascular necrosis of hip, right      HYDROcodone-acetaminophen (NORCO)  mg per tablet Take 1 tablet by mouth 2 (two) times daily as needed for Pain.      ipratropium (ATROVENT) 42 mcg (0.06 %) nasal spray 2 sprays by Nasal route 3 (three) times daily.      multivitamin capsule Take 1 capsule by mouth once daily.      ondansetron (ZOFRAN) 4 MG tablet Take 1 tablet (4 mg total) by mouth every 8 (eight) hours as needed for Nausea.  Qty: 30 tablet, Refills: 0    Associated Diagnoses: Avascular necrosis of hip, right      pantoprazole (PROTONIX) 40 MG tablet Take 40 mg by mouth every morning.      polyethylene glycol (GLYCOLAX) 17 gram/dose powder Take 17 g by mouth once daily.  Qty: 510 g, Refills: 0      sertraline (ZOLOFT) 50 MG tablet Take 50 mg by mouth once daily.      traZODone (DESYREL) 150 MG tablet Take 225 mg by mouth nightly.      aspirin (ECOTRIN) 81 MG EC tablet Take 1 tablet (81 mg total) by mouth every 12 (twelve) hours.  Qty: 60 tablet, Refills: 0    Associated Diagnoses: Avascular necrosis of hip, right      nitroGLYCERIN (NITROSTAT) 0.4 MG SL tablet Place 0.4 mg under the tongue every 5 (five) minutes as needed for Chest pain.      oxyCODONE-acetaminophen (PERCOCET) 7.5-325 mg per tablet Take 1 tablet by mouth every 6 (six) hours as needed for Pain.  Qty: 28 tablet, Refills: 0    Comments: This prescription and the attached prescriptions are for postoperative use for the patient's scheduled total hip arthroplasty on Wednesday July 23, 2025.  This is for the meds to bed program.  Associated Diagnoses: Avascular necrosis of hip, right           Discharge Procedure Orders   Diet general     Call MD for:  temperature >100.4     Call MD for:  persistent nausea and vomiting     Call MD for:  severe  uncontrolled pain     Call MD for:  difficulty breathing, headache or visual disturbances     Call MD for:  redness, tenderness, or signs of infection (pain, swelling, redness, odor or green/yellow discharge around incision site)     Call MD for:  hives     Call MD for:  persistent dizziness or light-headedness     Call MD for:  extreme fatigue

## 2025-07-23 NOTE — ANESTHESIA PROCEDURE NOTES
Spinal    Diagnosis: Right Total hip arthroplasty  Patient location during procedure: OR  Start time: 7/23/2025 8:10 AM  Timeout: 7/23/2025 8:10 AM  End time: 7/23/2025 8:15 AM    Staffing  Authorizing Provider: Everett Hernandez Jr., MD  Performing Provider: Everett Hernandez Jr., MD    Staffing  Performed by: Everett Hernandez Jr., MD  Authorized by: Everett Hernandez Jr., MD    Spinal Block  Patient position: sitting  Prep: Betadine and ChloraPrep  Patient monitoring: heart rate, cardiac monitor, continuous pulse ox and frequent blood pressure checks  Approach: midline  Location: L2-3  Injection technique: single shot  CSF Fluid: clear free-flowing CSF  Needle  Needle type: pencil-tip   Needle gauge: 27 G  Needle length: 4 in  Additional Documentation: no paresthesia on injection and negative aspiration for heme  Needle localization: anatomical landmarks  Assessment  Sensory level: T6  Ease of block: easy  Patient's tolerance of the procedure: comfortable throughout block and no complaints  Medications:    Medications: bupivacaine (pf) (MARCAINE) injection 0.75% - Intraspinal   1.6 mL - 7/23/2025 8:14:00 AM

## 2025-07-23 NOTE — PT/OT/SLP EVAL
Occupational Therapy   Evaluation and Discharge Note    Name: Adam Goode  MRN: [id number]  Admitting Diagnosis: <principal problem not specified>  Recent Surgery: Procedure(s) (LRB):  ARTHROPLASTY, TOTAL HIP, POSTERIOR APPROACH (Right)  REPAIR, GLUTEUS MEDIUS TENDON (Right) * Day of Surgery *    Recommendations:     Discharge Recommendations: Low Intensity Therapy  Discharge Equipment Recommendations: none  Barriers to discharge:  None    Assessment:     Adam Goode is a 74 y.o. male with a medical diagnosis of <principal problem not specified>. At this time, patient is functioning at their prior level of function and does not require further acute OT services.     Plan:     During this hospitalization, patient does not require further acute OT services.  Please re-consult if situation changes.    Plan of Care Reviewed with: patient    Subjective     Chief Complaint: not having 100% feeling in his left leg   Patient/Family Comments/goals: d/c to his daughter's home     Occupational Profile:  Living Environment: Lives alone but plans to d/c to his daughter's home; she has  walk-in shower   Previous level of function: Modified independent to independent with ADLs and functional mobility   Roles and Routines: retired   Equipment Used at home: cane, straight, walker, rolling, bedside commode, hip kit, wheelchair  Assistance upon Discharge: daughter     Pain/Comfort:  Pain Rating 1: 7/10  Location - Side 1: Right  Location - Orientation 1: generalized  Location 1: hip  Pain Addressed 1: Pre-medicate for activity, Reposition, Distraction, Nurse notified, Cessation of Activity  Pain Rating Post-Intervention 1: 7/10    Objective:     Communicated with: nurse prior to session.  Patient found supine with telemetry, pulse ox (continuous), peripheral IV, blood pressure cuff upon OT entry to room.    General Precautions: Standard, fall  Orthopedic Precautions: RLE weight bearing as tolerated, RLE posterior  precautions  Braces: N/A  Respiratory Status: Room air     Occupational Performance:    Bed Mobility:    Patient completed Supine to Sit with stand by assistance  Patient completed Sit to Supine with stand by assistance    Functional Mobility/Transfers:  Patient completed Sit <> Stand Transfer with contact guard assistance  with  rolling walker   Functional Mobility: CGA with RW ~10ft     Activities of Daily Living:  Reviewed hip kit; pt verbalized understanding of how to use items in hip kit     Cognitive/Visual Perceptual:  Cognitive/Psychosocial Skills:     -       Oriented to: Person, Place, Time, and Situation   -       Follows Commands/attention:Follows multistep  commands  -       Communication: clear/fluent    Physical Exam:  Upper Extremity Range of Motion:     -       Right Upper Extremity: WFL  -       Left Upper Extremity: WFL  Upper Extremity Strength:    -       Right Upper Extremity: WFL  -       Left Upper Extremity: WFL   Strength:    -       Right Upper Extremity: WFL  -       Left Upper Extremity: WFL  Fine Motor Coordination:    -       Intact     AMPAC 6 Click ADL:  AMPAC Total Score: 22    Treatment & Education:  Handout provided with hip precautions and application of precautions to ADLs; reviewed with patient; pt verbalized understanding with no further questions for OT    Patient left HOB elevated with all lines intact and call button in reach    GOALS:   Multidisciplinary Problems       Occupational Therapy Goals       Not on file                    DME Justifications:  No DME recommended requiring DME justifications    History:     Past Medical History:   Diagnosis Date    Arthritis     Cancer SKIN    Cardiac angina     Colon polyp     COPD (chronic obstructive pulmonary disease)     Coronary artery disease ANGINA. HAD  [facility] 8/12. 40 % BLOCKAGE. DR RUBY IS CARDIOLOGIST.    Depression     GERD (gastroesophageal reflux disease)     Kidney cysts     Lung cancer 01/2019    Pathological  staging [id number] stage IIa with pleural invasion present - s/p RLLectomy    MVP (mitral valve prolapse)     Trigger thumb     BILAT    Wears dentures     UPPER    Wears glasses          Past Surgical History:   Procedure Laterality Date    APPENDECTOMY      BACK SURGERY      CARDIAC SURGERY      COLONOSCOPY N/A 04/19/2024    Procedure: COLONOSCOPY;  Surgeon: Blayne Piper MD;  Location: [facility];  Service: Endoscopy;  Laterality: N/A;    CORONARY ANGIOPLASTY WITH STENT PLACEMENT      ESOPHAGOGASTRODUODENOSCOPY N/A 10/08/2024    Procedure: EGD (ESOPHAGOGASTRODUODENOSCOPY);  Surgeon: Blayne Piper MD;  Location: [facility];  Service: Endoscopy;  Laterality: N/A;    ESOPHAGOGASTRODUODENOSCOPY N/A 12/03/2024    Procedure: EGD (ESOPHAGOGASTRODUODENOSCOPY);  Surgeon: Blayne Piper MD;  Location: [facility];  Service: Endoscopy;  Laterality: N/A;    INSERTION OF TUNNELED CENTRAL VENOUS CATHETER (CVC) WITH SUBCUTANEOUS PORT Left 04/11/2019    Procedure: [id number];  Surgeon: Reji Griffiths MD;  Location: [facility];  Service: General;  Laterality: Left;    KNEE SURGERY      BILAT    lung resection  02/27/2019    TRIGGER THUMB Bilateral     UPPER GASTROINTESTINAL ENDOSCOPY         Time Tracking:     OT Date of Treatment: 07/23/25  OT Start Time: 1401  OT Stop Time: 1425  OT Total Time (min): 24 min    Billable Minutes:Evaluation 10  Therapeutic Activity 14    7/23/2025

## 2025-07-23 NOTE — TRANSFER OF CARE
Anesthesia Transfer of Care Note    Patient: Adam Goode    Procedure(s) Performed: Procedure(s) (LRB):  ARTHROPLASTY, HIP, RIGHT (Right)    Patient location: PACU    Anesthesia Type: spinal    Transport from OR: Transported from OR on 2-3 L/min O2 by NC with adequate spontaneous ventilation    Post pain: adequate analgesia    Post assessment: no apparent anesthetic complications    Post vital signs: stable    Level of consciousness: awake    Nausea/Vomiting: no nausea/vomiting    Complications: none    Transfer of care protocol was followed    Last vitals: Visit Vitals  /65 (BP Location: Right arm, Patient Position: Lying)   Pulse 80   Temp 36.1 °C (97 °F) (Temporal)   Resp 15   SpO2 98%

## 2025-07-23 NOTE — PT/OT/SLP EVAL
Physical Therapy Evaluation    Patient Name:  Adam Goode   MRN:  [id number]    Recommendations:     Discharge Recommendations: Low Intensity Therapy (HHPT)   Discharge Equipment Recommendations: none   Barriers to discharge: LLE numbness preventing safe ambulation     Assessment:     Adam Goode is a 74 y.o. male admitted with a medical diagnosis of <principal problem not specified>.  He presents with the following impairments/functional limitations: weakness, impaired endurance, impaired sensation, impaired self care skills, impaired functional mobility, gait instability, impaired balance, decreased lower extremity function, pain, orthopedic precautions. Patient is agreeable to participation with PT evaluation in PACU 18. Patient is POD #0 R SARAHY. He reports 6/10 right hip pain at rest and states LLE still feels very numb. He was educated on RLE WBAT and posterior hip precautions. He requires SBA for supine to sit transfer with VC for maintaining RLE posterior hip precautions. His R knee has a tendency to collapse in while sitting with VC for neutral positioning to maintain posterior hip precautions. He requires Mariela for sit to stand transfer with RW. He was only able to take a few steps forward/backward and a few side steps to the right with RW, Min-ModA, RLE WBAT, and moderate instability due to numbness. He returned to bed with plans for PT to check back once numbness improves for further gait training.     Rehab Prognosis: Good; patient would benefit from acute skilled PT services to address these deficits and reach maximum level of function.    Recent Surgery: Procedure(s) (LRB):  ARTHROPLASTY, TOTAL HIP, POSTERIOR APPROACH (Right)  REPAIR, GLUTEUS MEDIUS TENDON (Right) * Day of Surgery *    Plan:     During this hospitalization, patient to be seen BID to address the identified rehab impairments via gait training, therapeutic activities, therapeutic exercises and progress toward the following goals:    Plan  of Care Expires:  08/23/25    Subjective     Chief Complaint: LLE numb  Patient/Family Comments/goals: wants numbness to improve so he can walk   Pain/Comfort:  Pain Rating 1: 6/10  Location - Side 1: Right  Location 1: hip  Pain Addressed 1: Reposition, Distraction    Patients cultural, spiritual, Yazdanism conflicts given the current situation:      Living Environment:  Patient lives alone, but will be staying with daughter (home) in a H with no JIHAN.   Prior to admission, patients level of function was no AD for ambulation at baseline, but would use SPC prn. He denies any recent falls or PT. He is retired. Equipment used at home: cane, straight, walker, rolling, bedside commode, hip kit.  DME owned (not currently used): none.  Upon discharge, patient will have assistance from daughter and HHPT.    Objective:     Communicated with RADHA Pope prior to session.  Patient found HOB elevated with blood pressure cuff, hip abduction pillow, peripheral IV, telemetry  upon PT entry to room.    General Precautions: Standard, fall  Orthopedic Precautions:RLE weight bearing as tolerated, RLE posterior precautions   Braces: N/A  Respiratory Status: Room air    Exams:  RLE Strength: WFL maintaining posterior hip precautions   LLE Strength: WFL    Functional Mobility:  Bed Mobility:     Supine to Sit: stand by assistance  Transfers:     Sit to Stand:  minimum assistance with rolling walker  Gait: a few steps forward/backward and a few side steps to the right with RW, Min-ModA, RLE WBAT, and moderate instability due to numbness      AM-PAC 6 CLICK MOBILITY  Total Score:17       Treatment & Education:  Patient was educated on the importance of OOB activity and functional mobility to negate negative effects of prolonged bed rest during hospitalization, safe transfers and ambulation, RLE WBAT and posterior hip precautions, hip abduction pillow use, and D/C planning     Patient left HOB elevated with all lines intact, call button  in reach, and RN notified.    GOALS:   Multidisciplinary Problems       Physical Therapy Goals          Problem: Physical Therapy    Goal Priority Disciplines Outcome Interventions   Physical Therapy Goal     PT, PT/OT     Description: Goals to be met by: 25    Patient will increase functional independence with mobility by performin. Supine to sit with Supervision  2. Sit to stand transfer with Supervision  3. Bed to chair transfer with Supervision using Rolling Walker  4. Gait  x 250 feet with Supervision using Rolling Walker.   5. Lower extremity exercise program x20 reps per handout, with supervision                       DME Justifications:  No DME recommended requiring DME justifications    History:     Past Medical History:   Diagnosis Date    Arthritis     Cancer SKIN    Cardiac angina     Colon polyp     COPD (chronic obstructive pulmonary disease)     Coronary artery disease ANGINA. HAD  [facility] . 40 % BLOCKAGE. DR RUBY IS CARDIOLOGIST.    Depression     GERD (gastroesophageal reflux disease)     Kidney cysts     Lung cancer 2019    Pathological staging [id number] stage IIa with pleural invasion present - s/p RLLectomy    MVP (mitral valve prolapse)     Trigger thumb     BILAT    Wears dentures     UPPER    Wears glasses        Past Surgical History:   Procedure Laterality Date    APPENDECTOMY      BACK SURGERY      CARDIAC SURGERY      COLONOSCOPY N/A 2024    Procedure: COLONOSCOPY;  Surgeon: Blayne Piper MD;  Location: [facility];  Service: Endoscopy;  Laterality: N/A;    CORONARY ANGIOPLASTY WITH STENT PLACEMENT      ESOPHAGOGASTRODUODENOSCOPY N/A 10/08/2024    Procedure: EGD (ESOPHAGOGASTRODUODENOSCOPY);  Surgeon: Blayne Piper MD;  Location: [facility];  Service: Endoscopy;  Laterality: N/A;    ESOPHAGOGASTRODUODENOSCOPY N/A 2024    Procedure: EGD (ESOPHAGOGASTRODUODENOSCOPY);  Surgeon: Blayne Piper MD;  Location: [facility];  Service: Endoscopy;  Laterality:  N/A;    INSERTION OF TUNNELED CENTRAL VENOUS CATHETER (CVC) WITH SUBCUTANEOUS PORT Left 04/11/2019    Procedure: [id number]-CATH;  Surgeon: Reji Griffiths MD;  Location: [facility] OR;  Service: General;  Laterality: Left;    KNEE SURGERY      BILAT    lung resection  02/27/2019    TRIGGER THUMB Bilateral     UPPER GASTROINTESTINAL ENDOSCOPY         Time Tracking:     PT Received On: 07/23/25  PT Start Time: 1257     PT Stop Time: 1315  PT Total Time (min): 18 min     Billable Minutes: Evaluation 18      07/23/2025

## 2025-07-23 NOTE — ANESTHESIA POSTPROCEDURE EVALUATION
Anesthesia Post Evaluation    Patient: Adam Goode    Procedure(s) Performed: Procedure(s) (LRB):  ARTHROPLASTY, HIP, RIGHT (Right)    Final Anesthesia Type: spinal      Patient location during evaluation: PACU  Patient participation: Yes- Able to Participate  Level of consciousness: awake and alert and oriented  Post-procedure vital signs: reviewed and stable  Pain management: adequate  Airway patency: patent    PONV status at discharge: No PONV  Anesthetic complications: no      Cardiovascular status: blood pressure returned to baseline and hemodynamically stable  Respiratory status: unassisted, spontaneous ventilation and room air  Hydration status: euvolemic  Follow-up not needed.        Release to pre/post unit, pt awake, comfortable and responding appropriately. Block resolving, R leg still weak. Will re-evaluate prior to discharge.        Vitals Value Taken Time   /84 07/23/25 11:00   Temp 36.1 °C (97 °F) 07/23/25 09:40   Pulse 53 07/23/25 11:10   Resp 16 07/23/25 11:10   SpO2 100 % 07/23/25 11:10   Vitals shown include unfiled device data.      No case tracking events are documented in the log.      Pain/Eric Score: Pain Rating Prior to Med Admin: 5 (7/23/2025 11:05 AM)

## 2025-07-23 NOTE — H&P
CC/Indication for Procedure: 74 y.o. male with Avascular necrosis of hip, right [M87.051]  Pre-op testing [Z01.818].    Patient scheduled for [place] TOTAL HIP ARTHROPLASTY [56351] (ARTHROPLASTY, HIP, RIGHT).    Past Medical History:   Diagnosis Date    Arthritis     Cancer SKIN    Cardiac angina     Colon polyp     COPD (chronic obstructive pulmonary disease)     Coronary artery disease ANGINA. HAD  [facility] 8/12. 40 % BLOCKAGE. DR RUBY IS CARDIOLOGIST.    Depression     GERD (gastroesophageal reflux disease)     Kidney cysts     Lung cancer 01/2019    Pathological staging [id number] stage IIa with pleural invasion present - s/p RLLectomy    MVP (mitral valve prolapse)     Trigger thumb     BILAT    Wears dentures     UPPER    Wears glasses      Past Surgical History:   Procedure Laterality Date    APPENDECTOMY      BACK SURGERY      CARDIAC SURGERY      COLONOSCOPY N/A 04/19/2024    Procedure: COLONOSCOPY;  Surgeon: Blayne Piper MD;  Location: [facility];  Service: Endoscopy;  Laterality: N/A;    CORONARY ANGIOPLASTY WITH STENT PLACEMENT      ESOPHAGOGASTRODUODENOSCOPY N/A 10/08/2024    Procedure: EGD (ESOPHAGOGASTRODUODENOSCOPY);  Surgeon: Blayne Piper MD;  Location: [facility];  Service: Endoscopy;  Laterality: N/A;    ESOPHAGOGASTRODUODENOSCOPY N/A 12/03/2024    Procedure: EGD (ESOPHAGOGASTRODUODENOSCOPY);  Surgeon: Blayne Piper MD;  Location: [facility];  Service: Endoscopy;  Laterality: N/A;    INSERTION OF TUNNELED CENTRAL VENOUS CATHETER (CVC) WITH SUBCUTANEOUS PORT Left 04/11/2019    Procedure: [id number]-[id number];  Surgeon: Reji Griffiths MD;  Location: [facility];  Service: General;  Laterality: Left;    KNEE SURGERY      BILAT    lung resection  02/27/2019    TRIGGER THUMB Bilateral     UPPER GASTROINTESTINAL ENDOSCOPY       Family History   Problem Relation Name Age of Onset    COPD Mother      Diabetes Mother      Heart disease Mother      Birth defects Father      Diabetes Father      Heart  disease Father      Colon cancer Neg Hx       Social History     Socioeconomic History    Marital status:    Tobacco Use    Smoking status: Former     Current packs/day: 0.00     Types: Cigarettes     Start date: 1979     Quit date: 2019     Years since quittin.5    Smokeless tobacco: Never    Tobacco comments:     1-2 CIGT SOME DAYS/states last cigarette 20   Substance and Sexual Activity    Alcohol use: Not Currently     Comment: no ETOH for 12 years    Drug use: Yes     Types: Hydrocodone    Sexual activity: Yes     Partners: Female     Social Drivers of Health     Financial Resource Strain: Low Risk  (2025)    Received from [facility]    Overall Financial Resource Strain (CARDIA)     How hard is it for you to pay for the very basics like food, housing, medical care, and heating?: Not very hard   Food Insecurity: No Food Insecurity (2025)    Received from [facility]    Hunger Vital Sign     Worried About Running Out of Food in the Last Year: Never true     Ran Out of Food in the Last Year: Never true   Transportation Needs: No Transportation Needs (2025)    Received from [facility]    PRAPARE - Transportation     In the past 12 months, has lack of transportation kept you from medical appointments or from getting medications?: No     In the past 12 months, has lack of transportation kept you from meetings, work, or from getting things needed for daily living?: No   Physical Activity: Insufficiently Active (2025)    Received from [facility]    Exercise Vital Sign     Days of Exercise per Week: 3 days     Minutes of Exercise per Session: 20 min   Stress: Stress Concern Present (2025)    Received from [facility]    [place] [employer] - Occupational Stress Questionnaire     Do you feel stress - tense, restless, nervous, or anxious, or unable to sleep at night because your mind is troubled all the time - these days?: To some extent   Housing  Stability: Unknown (7/13/2025)    Received from [facility]    Housing Stability Vital Sign     Unable to Pay for Housing in the Last Year: No       Review of patient's allergies indicates:   Allergen Reactions    Penicillins      AS A CHILD - NOT SURE REACTION       Current Medications[1]    ROS:    Denies chest pain or palpitations  Denies shortness of breath  Denies fevers or chills  Denies chest pain  Denies abdominal pain    PE:    General Appearance: Well nourished  Orientation: Oriented to time, place, person  Mental Status: Alert  Heart: RRR  Lungs: CTA  Abdomen: Soft and non-tender    Anesthesia/Surgery risks, benefits and alternative options discussed and understood by patient/family.    This note was created using Dragon voice recognition software that occasionally misinterpreted phrases or words.       [1]   Current Facility-Administered Medications:     ceFAZolin 2 g, 2 g, Intravenous, On Call Procedure, Henok Obregon MD    tranexamic acid in NaCl,iso-os IVPB 1,000 mg, 1,000 mg, Intravenous, On Call Procedure, Henok Obregon MD

## 2025-07-23 NOTE — PT/OT/SLP PROGRESS
Physical Therapy Treatment    Patient Name:  Adam Goode   MRN:  [id number]    Recommendations:     Discharge Recommendations: Low Intensity Therapy (HHPT)  Discharge Equipment Recommendations: none  Barriers to discharge: has not voided yet post-op    Assessment:     Adam Goode is a 74 y.o. male admitted with a medical diagnosis of <principal problem not specified>.  He presents with the following impairments/functional limitations: weakness, impaired endurance, impaired sensation, impaired self care skills, impaired functional mobility, gait instability, impaired balance, decreased lower extremity function, pain, orthopedic precautions. Patient is agreeable to participation with second PT visit. He reports improvement in LLE numbness. He performs supine to sit with SBA and sit to stand with RW and CGA. He ambulated 200' with RW, CGA, RLE WBAT, VC for step to gait pattern with patient progressing to step through without cueing, and 8/10 pain with 1 instance of mild RLE buckle as patient returned to bed, but he did not lose his balance. He requests to remain sitting EOB in hopes he will soon be able to void in urinal. RN present and all needs met.      Rehab Prognosis: Good; patient would benefit from acute skilled PT services to address these deficits and reach maximum level of function.    Recent Surgery: Procedure(s) (LRB):  ARTHROPLASTY, TOTAL HIP, POSTERIOR APPROACH (Right)  REPAIR, GLUTEUS MEDIUS TENDON (Right) * Day of Surgery *    Plan:     During this hospitalization, patient to be seen BID to address the identified rehab impairments via gait training, therapeutic activities, therapeutic exercises and progress toward the following goals:    Plan of Care Expires:  08/23/25    Subjective     Chief Complaint: pain  Patient/Family Comments/goals: to pee so he can go home   Pain/Comfort:  Pain Rating 1: 8/10  Location - Side 1: Right  Location 1: hip  Pain Addressed 1: Nurse notified      Objective:      Communicated with RADHA Pope prior to session.  Patient found HOB elevated with pulse ox (continuous), hip abduction pillow upon PT entry to room.     General Precautions: Standard, fall  Orthopedic Precautions: RLE weight bearing as tolerated, RLE posterior precautions  Braces: N/A  Respiratory Status: Room air     Functional Mobility:  Bed Mobility:     Supine to Sit: stand by assistance  Transfers:     Sit to Stand:  contact guard assistance with rolling walker  Gait: 200' with RW, CGA, RLE WBAT, VC for step to gait pattern with patient progressing to step through without cueing, and 8/10 pain with 1 instance of mild RLE buckle as patient returned to bed, but he did not lose his balance      AM-PAC 6 CLICK MOBILITY  Turning over in bed (including adjusting bedclothes, sheets and blankets)?: 4  Sitting down on and standing up from a chair with arms (e.g., wheelchair, bedside commode, etc.): 3  Moving from lying on back to sitting on the side of the bed?: 4  Moving to and from a bed to a chair (including a wheelchair)?: 3  Need to walk in hospital room?: 2  Climbing 3-5 steps with a railing?: 1  Basic Mobility Total Score: 17       Treatment & Education:  Patient was educated on the importance of OOB activity and functional mobility to negate negative effects of prolonged bed rest during hospitalization, safe transfers and ambulation, RLE WBAT and posterior hip precautions, hip abduction pillow use, RW  and BSC use, HHPT, car transfers, and D/C planning     Patient left sitting edge of bed with RN present..    GOALS:   Multidisciplinary Problems       Physical Therapy Goals          Problem: Physical Therapy    Goal Priority Disciplines Outcome Interventions   Physical Therapy Goal     PT, PT/OT     Description: Goals to be met by: 25    Patient will increase functional independence with mobility by performin. Supine to sit with Supervision  2. Sit to stand transfer with Supervision  3. Bed to chair  transfer with Supervision using Rolling Walker  4. Gait  x 250 feet with Supervision using Rolling Walker.   5. Lower extremity exercise program x20 reps per handout, with supervision                       DME Justifications:  No DME recommended requiring DME justifications    Time Tracking:     PT Received On: 07/23/25  PT Start Time: 1455     PT Stop Time: 1515  PT Total Time (min): 20 min     Billable Minutes: Gait Training 20    Treatment Type: Treatment  PT/PTA: PT     Number of PTA visits since last PT visit: 0     07/23/2025

## 2025-07-23 NOTE — NURSING
Pt AAOX3, Vital signs stable, pt tolerating oral liquids. PT and OT evaluation complete. Pt ready for discharge

## 2025-07-23 NOTE — NURSING
Pt unable to void after multiple attempts.  Bladder scanned 800ml. Dr Obregon Notified, 16 Cymro miller catheter inserted without difficulty per Dr. Obregon order. Pt will return tomorrow for miller removal. 1000 ml clear yellow urine drained. Miller care and maintenance instructions provided

## 2025-07-24 ENCOUNTER — TELEPHONE (OUTPATIENT)
Dept: ANESTHESIOLOGY | Facility: HOSPITAL | Age: 75
End: 2025-07-24

## 2025-07-24 ENCOUNTER — OFFICE VISIT (OUTPATIENT)
Dept: ORTHOPEDICS | Facility: CLINIC | Age: 75
End: 2025-07-24
Payer: MEDICARE

## 2025-07-24 VITALS — HEIGHT: 75 IN | WEIGHT: 179.88 LBS | BODY MASS INDEX: 22.37 KG/M2

## 2025-07-24 DIAGNOSIS — Z96.641 S/P TOTAL RIGHT HIP ARTHROPLASTY: Primary | ICD-10-CM

## 2025-07-24 PROCEDURE — 1101F PT FALLS ASSESS-DOCD LE1/YR: CPT | Mod: CPTII,S$GLB,, | Performed by: ORTHOPAEDIC SURGERY

## 2025-07-24 PROCEDURE — 1125F AMNT PAIN NOTED PAIN PRSNT: CPT | Mod: CPTII,S$GLB,, | Performed by: ORTHOPAEDIC SURGERY

## 2025-07-24 PROCEDURE — 99024 POSTOP FOLLOW-UP VISIT: CPT | Mod: S$GLB,,, | Performed by: ORTHOPAEDIC SURGERY

## 2025-07-24 PROCEDURE — 1159F MED LIST DOCD IN RCRD: CPT | Mod: CPTII,S$GLB,, | Performed by: ORTHOPAEDIC SURGERY

## 2025-07-24 PROCEDURE — 99999 PR PBB SHADOW E&M-EST. PATIENT-LVL IV: CPT | Mod: PBBFAC,,, | Performed by: ORTHOPAEDIC SURGERY

## 2025-07-24 PROCEDURE — 1160F RVW MEDS BY RX/DR IN RCRD: CPT | Mod: CPTII,S$GLB,, | Performed by: ORTHOPAEDIC SURGERY

## 2025-07-24 PROCEDURE — 3288F FALL RISK ASSESSMENT DOCD: CPT | Mod: CPTII,S$GLB,, | Performed by: ORTHOPAEDIC SURGERY

## 2025-07-24 RX ORDER — HYDROMORPHONE HYDROCHLORIDE 4 MG/1
4 TABLET ORAL EVERY 6 HOURS PRN
Qty: 28 TABLET | Refills: 0 | Status: SHIPPED | OUTPATIENT
Start: 2025-07-24

## 2025-07-24 NOTE — TELEPHONE ENCOUNTER
Anesthesia Telephone Follow Up       Patient Name: Adam Goode  Date and time of call: 7/24/2025 and 10:14 AM  Call handled by: Everett Hernandez Jr   Current phone number: [phone] (home)     Block:  Spinal    Dictated Follow Up:  Patient postop day 1 status post right total hip arthroplasty done under spinal anesthesia.  Patient reports return of motor and sensory and ambulating with walker status post surgery.  Patient denies any headache or back pain.  No apparent anesthetic complications.  We will sign off.

## 2025-07-24 NOTE — PROGRESS NOTES
[facility] POST-OP NOTE    Subjective:           Chief Complaint:   Chief Complaint   Patient presents with    Right Hip - Post-op Evaluation     POD1, s/p right SARAHY 7/23/25, he had a bad night, the pain medication is not helping with pain at all,        Past Medical History:   Diagnosis Date    Arthritis     Cancer SKIN    Cardiac angina     Colon polyp     COPD (chronic obstructive pulmonary disease)     Coronary artery disease ANGINA. HAD  [facility] 8/12. 40 % BLOCKAGE. DR RUBY IS CARDIOLOGIST.    Depression     GERD (gastroesophageal reflux disease)     Kidney cysts     Lung cancer 01/2019    Pathological staging [id number] stage IIa with pleural invasion present - s/p RLLectomy    MVP (mitral valve prolapse)     Trigger thumb     BILAT    Wears dentures     UPPER    Wears glasses        Past Surgical History:   Procedure Laterality Date    APPENDECTOMY      BACK SURGERY      CARDIAC SURGERY      COLONOSCOPY N/A 04/19/2024    Procedure: COLONOSCOPY;  Surgeon: Blayne Piper MD;  Location: [facility];  Service: Endoscopy;  Laterality: N/A;    CORONARY ANGIOPLASTY WITH STENT PLACEMENT      ESOPHAGOGASTRODUODENOSCOPY N/A 10/08/2024    Procedure: EGD (ESOPHAGOGASTRODUODENOSCOPY);  Surgeon: Blayne Piper MD;  Location: [facility];  Service: Endoscopy;  Laterality: N/A;    ESOPHAGOGASTRODUODENOSCOPY N/A 12/03/2024    Procedure: EGD (ESOPHAGOGASTRODUODENOSCOPY);  Surgeon: Blayne Piper MD;  Location: [facility];  Service: Endoscopy;  Laterality: N/A;    HIP SURGERY Right 07/23/2025    INSERTION OF TUNNELED CENTRAL VENOUS CATHETER (CVC) WITH SUBCUTANEOUS PORT Left 04/11/2019    Procedure: [id number];  Surgeon: Reji Griffiths MD;  Location: [facility];  Service: General;  Laterality: Left;    KNEE SURGERY      BILAT    lung resection  02/27/2019    TRIGGER THUMB Bilateral     UPPER GASTROINTESTINAL ENDOSCOPY         Current Outpatient Medications   Medication Sig    aspirin (ECOTRIN) 81 MG EC  tablet Take 1 tablet (81 mg total) by mouth every 12 (twelve) hours.    atorvastatin (LIPITOR) 40 MG tablet Take 40 mg by mouth every evening.     azelastine (ASTELIN) 137 mcg (0.1 %) nasal spray 2 sprays by Nasal route.    celecoxib (CELEBREX) 200 MG capsule Take 1 capsule (200 mg total) by mouth 2 (two) times daily.    docusate sodium (COLACE) 100 MG capsule Take 1 capsule (100 mg total) by mouth 2 (two) times daily.    fluticasone propionate (FLONASE) 50 mcg/actuation nasal spray 1 spray by Each Nostril route once daily.    fluticasone-umeclidin-vilanter (TRELEGY ELLIPTA) 100-62.5-25 mcg DsDv Inhale 1 puff into the lungs Daily.    gabapentin (NEURONTIN) 300 MG capsule Take 1 capsule (300 mg total) by mouth 2 (two) times daily.    HYDROcodone-acetaminophen (NORCO)  mg per tablet Take 1 tablet by mouth 2 (two) times daily as needed for Pain.    ipratropium (ATROVENT) 42 mcg (0.06 %) nasal spray 2 sprays by Nasal route 3 (three) times daily.    multivitamin capsule Take 1 capsule by mouth once daily.    nitroGLYCERIN (NITROSTAT) 0.4 MG SL tablet Place 0.4 mg under the tongue every 5 (five) minutes as needed for Chest pain.    ondansetron (ZOFRAN) 4 MG tablet Take 1 tablet (4 mg total) by mouth every 8 (eight) hours as needed for Nausea.    oxyCODONE-acetaminophen (PERCOCET) 7.5-325 mg per tablet Take 1 tablet by mouth every 6 (six) hours as needed for Pain.    pantoprazole (PROTONIX) 40 MG tablet Take 40 mg by mouth every morning.    polyethylene glycol (GLYCOLAX) 17 gram/dose powder Take 17 g by mouth once daily.    sertraline (ZOLOFT) 50 MG tablet Take 50 mg by mouth once daily.    traZODone (DESYREL) 150 MG tablet Take 225 mg by mouth nightly.     Current Facility-Administered Medications   Medication    methylPREDNISolone acetate injection 40 mg    triamcinolone acetonide injection 40 mg       Review of patient's allergies indicates:   Allergen Reactions    Penicillins      AS A CHILD - NOT SURE REACTION  "      Family History   Problem Relation Name Age of Onset    COPD Mother      Diabetes Mother      Heart disease Mother      Birth defects Father      Diabetes Father      Heart disease Father      Colon cancer Neg Hx         Social History[1]    History of present illness:   Beto comes in today postop day 1 right total hip arthroplasty.  Comes in today for wound check, dressing change, pain control assessment.  He is having a good bit of difficulty with pain control.  Has been chronic narcotic medication, Norco 10/325 at frequency of 90 month though his pain management provider.    Review of Systems:    Musculoskeletal:  See HPI      Objective:        Physical Examination:    Vital Signs:  There were no vitals filed for this visit.    Body mass index is 22.49 kg/m².    This a well-developed, well nourished patient in no acute distress.  They are alert and oriented and cooperative to examination.         Patient comes in today ambulating via wheelchair.  He is neurovascularly intact throughout the right lower extremity.  Negative Homans on the right.  Dressing clean dry and intact.    Pertinent New Results:    XRAY Report / Interpretation:   No new radiographs taken on today's clinic visit.    Assessment/Plan:        1. Status post right total hip arthroplasty.      Patient is set to begin home health PT.  Discuss total hip replacement precautions again today.  We will also discussed narcotic pain control regimen.   Dr. Obregon did give him a prescription for Dilaudid 4 mg in lieu of the Percocet.  We will see him back in about 10 days for wound check and suture removal.  We will transition to outpatient physical therapy at that time.      Michael Bach, Physician Assistant, served in the capacity as a "scribe" for this patient encounter.  A "face-to-face" encounter occurred with Dr. Henok Obregon on this date.  The treatment plan and medical decision-making is outlined above. Patient was seen and examined " with a chaperone.     This note was created using Dragon voice recognition software that occasionally misinterpreted phrases or words.             [1]   Social History  Socioeconomic History    Marital status:    Tobacco Use    Smoking status: Former     Current packs/day: 0.00     Types: Cigarettes     Start date: 1979     Quit date: 2019     Years since quittin.5    Smokeless tobacco: Never    Tobacco comments:     1-2 CIGT SOME DAYS/states last cigarette 20   Substance and Sexual Activity    Alcohol use: Not Currently     Comment: no ETOH for 12 years    Drug use: Yes     Types: Hydrocodone    Sexual activity: Yes     Partners: Female     Social Drivers of Health     Financial Resource Strain: Low Risk  (2025)    Received from [facility]    Overall Financial Resource Strain (CARDIA)     How hard is it for you to pay for the very basics like food, housing, medical care, and heating?: Not very hard   Food Insecurity: No Food Insecurity (2025)    Received from [facility]    Hunger Vital Sign     Worried About Running Out of Food in the Last Year: Never true     Ran Out of Food in the Last Year: Never true   Transportation Needs: No Transportation Needs (2025)    Received from [facility]    PRAPARE - Transportation     In the past 12 months, has lack of transportation kept you from medical appointments or from getting medications?: No     In the past 12 months, has lack of transportation kept you from meetings, work, or from getting things needed for daily living?: No   Physical Activity: Insufficiently Active (2025)    Received from [facility]    Exercise Vital Sign     Days of Exercise per Week: 3 days     Minutes of Exercise per Session: 20 min   Stress: Stress Concern Present (2025)    Received from [facility]    [place] [employer] - Occupational Stress Questionnaire     Do you feel stress - tense, restless, nervous, or anxious, or unable  to sleep at night because your mind is troubled all the time - these days?: To some extent   Housing Stability: Unknown (7/13/2025)    Received from [facility]    Housing Stability Vital Sign     Unable to Pay for Housing in the Last Year: No

## 2025-08-01 ENCOUNTER — CLINICAL SUPPORT (OUTPATIENT)
Dept: ORTHOPEDICS | Facility: CLINIC | Age: 75
End: 2025-08-01
Payer: MEDICARE

## 2025-08-01 VITALS — HEIGHT: 75 IN | BODY MASS INDEX: 22.37 KG/M2 | WEIGHT: 179.88 LBS

## 2025-08-01 DIAGNOSIS — Z96.641 S/P TOTAL RIGHT HIP ARTHROPLASTY: Primary | ICD-10-CM

## 2025-08-01 PROCEDURE — 99999 PR PBB SHADOW E&M-EST. PATIENT-LVL III: CPT | Mod: PBBFAC,,,

## 2025-08-01 RX ORDER — OXYCODONE AND ACETAMINOPHEN 7.5; 325 MG/1; MG/1
1 TABLET ORAL EVERY 6 HOURS PRN
Qty: 28 TABLET | Refills: 0 | Status: SHIPPED | OUTPATIENT
Start: 2025-08-01

## 2025-08-01 NOTE — PROGRESS NOTES
[facility] POST-OP NOTE    Subjective:           Chief Complaint: No chief complaint on file.      Past Medical History:   Diagnosis Date    Arthritis     Cancer SKIN    Cardiac angina     Colon polyp     COPD (chronic obstructive pulmonary disease)     Coronary artery disease ANGINA. HAD  [facility] 8/12. 40 % BLOCKAGE. DR RUBY IS CARDIOLOGIST.    Depression     GERD (gastroesophageal reflux disease)     Kidney cysts     Lung cancer 01/2019    Pathological staging [id number] stage IIa with pleural invasion present - s/p RLLectomy    MVP (mitral valve prolapse)     Trigger thumb     BILAT    Wears dentures     UPPER    Wears glasses        Past Surgical History:   Procedure Laterality Date    APPENDECTOMY      ARTHROPLASTY OF HIP BY POSTERIOR APPROACH Right 7/23/2025    Procedure: ARTHROPLASTY, TOTAL HIP, POSTERIOR APPROACH;  Surgeon: Henok Obregon MD;  Location: [facility] OR;  Service: Orthopedics;  Laterality: Right;  [place]    BACK SURGERY      CARDIAC SURGERY      COLONOSCOPY N/A 04/19/2024    Procedure: COLONOSCOPY;  Surgeon: Blayne Piper MD;  Location: [facility];  Service: Endoscopy;  Laterality: N/A;    CORONARY ANGIOPLASTY WITH STENT PLACEMENT      ESOPHAGOGASTRODUODENOSCOPY N/A 10/08/2024    Procedure: EGD (ESOPHAGOGASTRODUODENOSCOPY);  Surgeon: Blayne Piper MD;  Location: [facility];  Service: Endoscopy;  Laterality: N/A;    ESOPHAGOGASTRODUODENOSCOPY N/A 12/03/2024    Procedure: EGD (ESOPHAGOGASTRODUODENOSCOPY);  Surgeon: Blayne Piper MD;  Location: [facility];  Service: Endoscopy;  Laterality: N/A;    HIP SURGERY Right 07/23/2025    INSERTION OF TUNNELED CENTRAL VENOUS CATHETER (CVC) WITH SUBCUTANEOUS PORT Left 04/11/2019    Procedure: [id number];  Surgeon: Reji Griffiths MD;  Location: [facility];  Service: General;  Laterality: Left;    KNEE SURGERY      BILAT    lung resection  02/27/2019    REPAIR, TENDON, GLUTEUS MEDIUS Right 7/23/2025    Procedure: REPAIR, GLUTEUS MEDIUS  TENDON;  Surgeon: Henok Obregon MD;  Location: [facility];  Service: Orthopedics;  Laterality: Right;    TRIGGER THUMB Bilateral     UPPER GASTROINTESTINAL ENDOSCOPY         Current Outpatient Medications   Medication Sig    aspirin (ECOTRIN) 81 MG EC tablet Take 1 tablet (81 mg total) by mouth every 12 (twelve) hours.    atorvastatin (LIPITOR) 40 MG tablet Take 40 mg by mouth every evening.     azelastine (ASTELIN) 137 mcg (0.1 %) nasal spray 2 sprays by Nasal route.    celecoxib (CELEBREX) 200 MG capsule Take 1 capsule (200 mg total) by mouth 2 (two) times daily.    docusate sodium (COLACE) 100 MG capsule Take 1 capsule (100 mg total) by mouth 2 (two) times daily.    fluticasone propionate (FLONASE) 50 mcg/actuation nasal spray 1 spray by Each Nostril route once daily.    fluticasone-umeclidin-vilanter (TRELEGY ELLIPTA) 100-62.5-25 mcg DsDv Inhale 1 puff into the lungs Daily.    gabapentin (NEURONTIN) 300 MG capsule Take 1 capsule (300 mg total) by mouth 2 (two) times daily.    HYDROcodone-acetaminophen (NORCO)  mg per tablet Take 1 tablet by mouth 2 (two) times daily as needed for Pain.    HYDROmorphone (DILAUDID) 4 MG tablet Take 1 tablet (4 mg total) by mouth every 6 (six) hours as needed for Pain.    ipratropium (ATROVENT) 42 mcg (0.06 %) nasal spray 2 sprays by Nasal route 3 (three) times daily.    multivitamin capsule Take 1 capsule by mouth once daily.    nitroGLYCERIN (NITROSTAT) 0.4 MG SL tablet Place 0.4 mg under the tongue every 5 (five) minutes as needed for Chest pain.    ondansetron (ZOFRAN) 4 MG tablet Take 1 tablet (4 mg total) by mouth every 8 (eight) hours as needed for Nausea.    oxyCODONE-acetaminophen (PERCOCET) 7.5-325 mg per tablet Take 1 tablet by mouth every 6 (six) hours as needed for Pain.    pantoprazole (PROTONIX) 40 MG tablet Take 40 mg by mouth every morning.    polyethylene glycol (GLYCOLAX) 17 gram/dose powder Take 17 g by mouth once daily.    sertraline (ZOLOFT) 50 MG tablet  Take 50 mg by mouth once daily.    traZODone (DESYREL) 150 MG tablet Take 225 mg by mouth nightly.     Current Facility-Administered Medications   Medication    methylPREDNISolone acetate injection 40 mg    triamcinolone acetonide injection 40 mg       Review of patient's allergies indicates:   Allergen Reactions    Penicillins      AS A CHILD - NOT SURE REACTION       Family History   Problem Relation Name Age of Onset    COPD Mother      Diabetes Mother      Heart disease Mother      Birth defects Father      Diabetes Father      Heart disease Father      Colon cancer Neg Hx         Social History[1]    History of present illness:  Adam comes in today for follow-up for his right total hip arthroplasty.  Comes in today for wound check and suture removal.    Review of Systems:    Musculoskeletal:  See HPI      Objective:        Physical Examination:    Vital Signs:  There were no vitals filed for this visit.    There is no height or weight on file to calculate BMI.    This a well-developed, well nourished patient in no acute distress.  They are alert and oriented and cooperative to examination.          Right hip exam: Skin to right hip clean dry and intact.  No erythema or ecchymosis.  No signs or symptoms of infection.  Neurovascularly intact throughout the right lower extremity.  Negative Homans on the right.  Right lateral hip incision healing well without wound dehiscence or drainage.  He is neurovascularly intact throughout the right lower extremity.  He can weightbear as tolerated right lower extremity.  He is ambulating with a rolling walker.      Pertinent New Results:    XRAY Report / Interpretation:   No new radiographs taken on today's clinic visit.    Assessment/Plan:        1. Status post right total hip arthroplasty.      Sutures removed today.  He tolerated this well.  Advised to clean the operative site once a day with warm soapy water.  Do not apply any topical creams or ointments.  Do not  submerge underwater in a pool or bathtub type environment.  He will continue his aspirin 81 mg by mouth twice a day for 1 month postop and totality for DVT prophylaxis.  Did review total hip replacement precautions again.  Prescription was given to transition from home health PT to outpatient therapy once home health is commenced.  We will check him back in 1 month to assess his postoperative progress. Refill was given today for his Percocet pain medication as well.    CARI Romo, PAKwasiC    This note was created using Dragon voice recognition software that occasionally misinterpreted phrases or words.             [1]   Social History  Socioeconomic History    Marital status:    Tobacco Use    Smoking status: Former     Current packs/day: 0.00     Types: Cigarettes     Start date: 1979     Quit date: 2019     Years since quittin.5    Smokeless tobacco: Never    Tobacco comments:     1-2 CIGT SOME DAYS/states last cigarette 20   Substance and Sexual Activity    Alcohol use: Not Currently     Comment: no ETOH for 12 years    Drug use: Yes     Types: Hydrocodone    Sexual activity: Yes     Partners: Female     Social Drivers of Health     Financial Resource Strain: Low Risk  (2025)    Received from [facility]    Overall Financial Resource Strain (CARDIA)     How hard is it for you to pay for the very basics like food, housing, medical care, and heating?: Not very hard   Food Insecurity: No Food Insecurity (2025)    Received from [facility]    Hunger Vital Sign     Worried About Running Out of Food in the Last Year: Never true     Ran Out of Food in the Last Year: Never true   Transportation Needs: No Transportation Needs (2025)    Received from [facility]    PRAPARE - Transportation     In the past 12 months, has lack of transportation kept you from medical appointments or from getting medications?: No     In the past 12 months, has lack of transportation kept  you from meetings, work, or from getting things needed for daily living?: No   Physical Activity: Insufficiently Active (7/13/2025)    Received from [facility]    Exercise Vital Sign     Days of Exercise per Week: 3 days     Minutes of Exercise per Session: 20 min   Stress: Stress Concern Present (7/13/2025)    Received from [facility] [place] [facility] [employer] - Occupational Stress Questionnaire     Do you feel stress - tense, restless, nervous, or anxious, or unable to sleep at night because your mind is troubled all the time - these days?: To some extent   Housing Stability: Unknown (7/13/2025)    Received from [facility]    Housing Stability Vital Sign     Unable to Pay for Housing in the Last Year: No

## 2025-08-11 ENCOUNTER — TELEPHONE (OUTPATIENT)
Dept: ORTHOPEDICS | Facility: CLINIC | Age: 75
End: 2025-08-11
Payer: MEDICARE

## 2025-08-11 DIAGNOSIS — Z96.641 S/P TOTAL RIGHT HIP ARTHROPLASTY: ICD-10-CM

## 2025-08-11 RX ORDER — OXYCODONE AND ACETAMINOPHEN 7.5; 325 MG/1; MG/1
1 TABLET ORAL EVERY 8 HOURS PRN
Qty: 21 TABLET | Refills: 0 | Status: SHIPPED | OUTPATIENT
Start: 2025-08-11

## 2025-08-12 ENCOUNTER — CLINICAL SUPPORT (OUTPATIENT)
Dept: REHABILITATION | Facility: HOSPITAL | Age: 75
End: 2025-08-12
Attending: ORTHOPAEDIC SURGERY
Payer: MEDICARE

## 2025-08-12 DIAGNOSIS — R53.1 DECREASED STRENGTH: Primary | ICD-10-CM

## 2025-08-12 DIAGNOSIS — R26.89 DECREASED FUNCTIONAL MOBILITY: ICD-10-CM

## 2025-08-12 DIAGNOSIS — Z96.641 S/P TOTAL RIGHT HIP ARTHROPLASTY: ICD-10-CM

## 2025-08-12 DIAGNOSIS — M25.60 DECREASED RANGE OF MOTION: ICD-10-CM

## 2025-08-12 DIAGNOSIS — M62.89 MUSCLE TIGHTNESS: ICD-10-CM

## 2025-08-12 PROCEDURE — 97161 PT EVAL LOW COMPLEX 20 MIN: CPT | Mod: PN

## 2025-08-12 PROCEDURE — 97530 THERAPEUTIC ACTIVITIES: CPT | Mod: PN

## 2025-08-15 ENCOUNTER — CLINICAL SUPPORT (OUTPATIENT)
Dept: REHABILITATION | Facility: HOSPITAL | Age: 75
End: 2025-08-15
Payer: MEDICARE

## 2025-08-15 DIAGNOSIS — M62.89 MUSCLE TIGHTNESS: ICD-10-CM

## 2025-08-15 DIAGNOSIS — M25.60 DECREASED RANGE OF MOTION: ICD-10-CM

## 2025-08-15 DIAGNOSIS — R26.89 DECREASED FUNCTIONAL MOBILITY: ICD-10-CM

## 2025-08-15 DIAGNOSIS — R53.1 DECREASED STRENGTH: Primary | ICD-10-CM

## 2025-08-15 PROCEDURE — 97110 THERAPEUTIC EXERCISES: CPT | Mod: PN,CQ

## 2025-08-15 PROCEDURE — 97112 NEUROMUSCULAR REEDUCATION: CPT | Mod: PN,CQ

## 2025-08-15 PROCEDURE — 97530 THERAPEUTIC ACTIVITIES: CPT | Mod: PN,CQ

## 2025-08-18 ENCOUNTER — CLINICAL SUPPORT (OUTPATIENT)
Dept: REHABILITATION | Facility: HOSPITAL | Age: 75
End: 2025-08-18
Payer: MEDICARE

## 2025-08-18 DIAGNOSIS — R26.89 DECREASED FUNCTIONAL MOBILITY: ICD-10-CM

## 2025-08-18 DIAGNOSIS — R53.1 DECREASED STRENGTH: Primary | ICD-10-CM

## 2025-08-18 DIAGNOSIS — M25.60 DECREASED RANGE OF MOTION: ICD-10-CM

## 2025-08-18 DIAGNOSIS — M62.89 MUSCLE TIGHTNESS: ICD-10-CM

## 2025-08-18 PROCEDURE — 97530 THERAPEUTIC ACTIVITIES: CPT | Mod: PN

## 2025-08-18 PROCEDURE — 97112 NEUROMUSCULAR REEDUCATION: CPT | Mod: PN

## 2025-08-19 DIAGNOSIS — Z96.641 S/P TOTAL RIGHT HIP ARTHROPLASTY: ICD-10-CM

## 2025-08-19 RX ORDER — OXYCODONE AND ACETAMINOPHEN 5; 325 MG/1; MG/1
1 TABLET ORAL EVERY 8 HOURS PRN
Qty: 21 TABLET | Refills: 0 | Status: SHIPPED | OUTPATIENT
Start: 2025-08-20

## 2025-08-19 RX ORDER — OXYCODONE AND ACETAMINOPHEN 7.5; 325 MG/1; MG/1
1 TABLET ORAL EVERY 8 HOURS PRN
Qty: 21 TABLET | Refills: 0 | Status: CANCELLED | OUTPATIENT
Start: 2025-08-19

## 2025-08-20 ENCOUNTER — CLINICAL SUPPORT (OUTPATIENT)
Dept: REHABILITATION | Facility: HOSPITAL | Age: 75
End: 2025-08-20
Payer: MEDICARE

## 2025-08-20 DIAGNOSIS — M62.89 MUSCLE TIGHTNESS: ICD-10-CM

## 2025-08-20 DIAGNOSIS — R53.1 DECREASED STRENGTH: Primary | ICD-10-CM

## 2025-08-20 DIAGNOSIS — R26.89 DECREASED FUNCTIONAL MOBILITY: ICD-10-CM

## 2025-08-20 DIAGNOSIS — M25.60 DECREASED RANGE OF MOTION: ICD-10-CM

## 2025-08-20 PROCEDURE — 97112 NEUROMUSCULAR REEDUCATION: CPT | Mod: PN,CQ

## 2025-08-20 PROCEDURE — 97110 THERAPEUTIC EXERCISES: CPT | Mod: PN,CQ

## 2025-08-20 PROCEDURE — 97530 THERAPEUTIC ACTIVITIES: CPT | Mod: PN,CQ

## 2025-09-02 ENCOUNTER — OFFICE VISIT (OUTPATIENT)
Dept: ORTHOPEDICS | Facility: CLINIC | Age: 75
End: 2025-09-02
Payer: MEDICARE

## 2025-09-02 ENCOUNTER — HOSPITAL ENCOUNTER (OUTPATIENT)
Dept: RADIOLOGY | Facility: HOSPITAL | Age: 75
Discharge: HOME | End: 2025-09-02
Attending: ORTHOPAEDIC SURGERY
Payer: MEDICARE

## 2025-09-02 VITALS — BODY MASS INDEX: 22.37 KG/M2 | HEIGHT: 75 IN | WEIGHT: 179.88 LBS

## 2025-09-02 DIAGNOSIS — Z96.641 S/P TOTAL RIGHT HIP ARTHROPLASTY: Primary | ICD-10-CM

## 2025-09-02 PROCEDURE — 1101F PT FALLS ASSESS-DOCD LE1/YR: CPT | Mod: CPTII,S$GLB,, | Performed by: ORTHOPAEDIC SURGERY

## 2025-09-02 PROCEDURE — 1160F RVW MEDS BY RX/DR IN RCRD: CPT | Mod: CPTII,S$GLB,, | Performed by: ORTHOPAEDIC SURGERY

## 2025-09-02 PROCEDURE — 99999 PR PBB SHADOW E&M-EST. PATIENT-LVL III: CPT | Mod: PBBFAC,,, | Performed by: ORTHOPAEDIC SURGERY

## 2025-09-02 PROCEDURE — 3288F FALL RISK ASSESSMENT DOCD: CPT | Mod: CPTII,S$GLB,, | Performed by: ORTHOPAEDIC SURGERY

## 2025-09-02 PROCEDURE — 72170 X-RAY EXAM OF PELVIS: CPT | Mod: 26,,, | Performed by: RADIOLOGY

## 2025-09-02 PROCEDURE — 72170 X-RAY EXAM OF PELVIS: CPT | Mod: TC,PN

## 2025-09-02 PROCEDURE — 1125F AMNT PAIN NOTED PAIN PRSNT: CPT | Mod: CPTII,S$GLB,, | Performed by: ORTHOPAEDIC SURGERY

## 2025-09-02 PROCEDURE — 1159F MED LIST DOCD IN RCRD: CPT | Mod: CPTII,S$GLB,, | Performed by: ORTHOPAEDIC SURGERY

## 2025-09-02 PROCEDURE — 99024 POSTOP FOLLOW-UP VISIT: CPT | Mod: S$GLB,,, | Performed by: ORTHOPAEDIC SURGERY

## 2025-09-02 RX ORDER — ASPIRIN 81 MG/1
81 TABLET ORAL DAILY
COMMUNITY

## 2025-09-03 ENCOUNTER — CLINICAL SUPPORT (OUTPATIENT)
Dept: REHABILITATION | Facility: HOSPITAL | Age: 75
End: 2025-09-03
Payer: MEDICARE

## 2025-09-03 DIAGNOSIS — M25.60 DECREASED RANGE OF MOTION: ICD-10-CM

## 2025-09-03 DIAGNOSIS — R53.1 DECREASED STRENGTH: Primary | ICD-10-CM

## 2025-09-03 DIAGNOSIS — M62.89 MUSCLE TIGHTNESS: ICD-10-CM

## 2025-09-03 DIAGNOSIS — R26.89 DECREASED FUNCTIONAL MOBILITY: ICD-10-CM

## 2025-09-03 PROCEDURE — 97112 NEUROMUSCULAR REEDUCATION: CPT | Mod: PN

## 2025-09-03 PROCEDURE — 97530 THERAPEUTIC ACTIVITIES: CPT | Mod: PN

## (undated) DEVICE — SOL IRRI STRL WATER 1000ML

## (undated) DEVICE — NDL SAFETY 25G X 1.5 ECLIPSE

## (undated) DEVICE — GLOVE SURG ULTRA TOUCH 7.5

## (undated) DEVICE — DRAPE SURG U SLOT 47X70 LONG

## (undated) DEVICE — SYS LABEL CORRECT MED

## (undated) DEVICE — GLOVE SENSICARE PI GRN 9

## (undated) DEVICE — NDL HYPODERMIC BLUNT 18G 1.5IN

## (undated) DEVICE — NDL TUOHY EPIDURAL 20G X 3.5

## (undated) DEVICE — SYR GLASS 5CC LUER LOK

## (undated) DEVICE — SOL NACL IRR 1000ML BTL

## (undated) DEVICE — ALCOHOL 70% ANTISEPTIC ISO 4OZ

## (undated) DEVICE — APPLICATOR CHLORAPREP CLR 10.5

## (undated) DEVICE — SUT CTD VICRYL 1 UND BR

## (undated) DEVICE — DRAPE SPLIT ADHESIVE 77X120IN

## (undated) DEVICE — BLADE SGTL XTK LNG 25X78.5X1.5

## (undated) DEVICE — GLOVE SENSICARE PI SURG 8.5

## (undated) DEVICE — TUBING MINIBORE EXTENSION

## (undated) DEVICE — SYR 10CC LUER LOCK

## (undated) DEVICE — GLOVE BIOGEL SKINSENSE PI 7.5

## (undated) DEVICE — SEALER AQUAMANTYS 3.48MM

## (undated) DEVICE — SUT FIBERWIRE 2 38 IN TAPER

## (undated) DEVICE — Device

## (undated) DEVICE — GLOVE SENSICARE PI GRN 8.5

## (undated) DEVICE — SYR DISP LL 5CC

## (undated) DEVICE — ELECTRODE BLD EXT 6.50 ST DISP

## (undated) DEVICE — SYS CLSR DERMABOND PRINEO 22CM

## (undated) DEVICE — DRAPE U SPLIT SHEET 54X76IN

## (undated) DEVICE — PAD ABDOMINAL STERILE 5X9IN

## (undated) DEVICE — SUT STRATAFIX SPIRAL VIOLET

## (undated) DEVICE — PILLOW ABDUCTION MED

## (undated) DEVICE — SUT FIBERWIRE

## (undated) DEVICE — CONNECTOR TUBING STR 5 IN 1

## (undated) DEVICE — SUT STRATAFIX PDS 4 FS-2 14IN

## (undated) DEVICE — TOGA FLYTE PEEL AWAY XLARGE

## (undated) DEVICE — SUT MONOCRYL PLUS UD 3-0 27

## (undated) DEVICE — SOL NACL IRR 3000ML

## (undated) DEVICE — HANDPIECE INTERPULSE SET

## (undated) DEVICE — COVER LIGHT HANDLE 80/CA

## (undated) DEVICE — YANKAUER OPEN TIP W/O VENT

## (undated) DEVICE — DRESSING ADAPTIC N ADH 3X8IN

## (undated) DEVICE — SYRINGE SAFETY LOK 5CC 305558

## (undated) DEVICE — SYS SURGIPHOR STRL IRRG

## (undated) DEVICE — SPONGE GAUZE 4X4 12 PLY STRL

## (undated) DEVICE — COVER CAMERA OPERATING ROOM